# Patient Record
Sex: MALE | Race: WHITE | NOT HISPANIC OR LATINO | Employment: OTHER | URBAN - METROPOLITAN AREA
[De-identification: names, ages, dates, MRNs, and addresses within clinical notes are randomized per-mention and may not be internally consistent; named-entity substitution may affect disease eponyms.]

---

## 2017-01-31 ENCOUNTER — GENERIC CONVERSION - ENCOUNTER (OUTPATIENT)
Dept: CARDIAC SURGERY | Facility: CLINIC | Age: 77
End: 2017-01-31

## 2017-12-22 ENCOUNTER — HOSPITAL ENCOUNTER (OUTPATIENT)
Facility: HOSPITAL | Age: 77
Setting detail: OBSERVATION
Discharge: HOME | End: 2017-12-23
Attending: EMERGENCY MEDICINE | Admitting: HOSPITALIST
Payer: COMMERCIAL

## 2017-12-22 ENCOUNTER — APPOINTMENT (EMERGENCY)
Dept: RADIOLOGY | Facility: HOSPITAL | Age: 77
End: 2017-12-22
Payer: COMMERCIAL

## 2017-12-22 DIAGNOSIS — J18.9 PNEUMONIA: Primary | ICD-10-CM

## 2017-12-22 DIAGNOSIS — M54.9 BACK PAIN: ICD-10-CM

## 2017-12-22 PROBLEM — R06.02 SOB (SHORTNESS OF BREATH): Status: ACTIVE | Noted: 2017-12-22

## 2017-12-22 PROBLEM — J20.9 ACUTE TRACHEOBRONCHITIS: Status: ACTIVE | Noted: 2017-12-22

## 2017-12-22 PROBLEM — N40.0 BPH (BENIGN PROSTATIC HYPERPLASIA): Chronic | Status: ACTIVE | Noted: 2017-12-22

## 2017-12-22 PROBLEM — K31.819 GAVE (GASTRIC ANTRAL VASCULAR ECTASIA): Chronic | Status: ACTIVE | Noted: 2017-12-22

## 2017-12-22 PROBLEM — M48.05 SPINAL STENOSIS OF THORACOLUMBAR REGION: Chronic | Status: ACTIVE | Noted: 2017-12-22

## 2017-12-22 LAB
ANION GAP SERPL CALCULATED.3IONS-SCNC: 10 MMOL/L (ref 4–13)
APTT PPP: 35 SECONDS (ref 23–35)
ATRIAL RATE: 71 BPM
BACTERIA UR QL AUTO: ABNORMAL /HPF
BASOPHILS # BLD AUTO: 0.01 THOUSANDS/ΜL (ref 0–0.1)
BASOPHILS NFR BLD AUTO: 0 % (ref 0–1)
BILIRUB UR QL STRIP: NEGATIVE
BUN SERPL-MCNC: 24 MG/DL (ref 5–25)
CALCIUM SERPL-MCNC: 10.3 MG/DL (ref 8.3–10.1)
CHLORIDE SERPL-SCNC: 103 MMOL/L (ref 100–108)
CLARITY UR: CLEAR
CO2 SERPL-SCNC: 26 MMOL/L (ref 21–32)
COLOR UR: YELLOW
CREAT SERPL-MCNC: 1.19 MG/DL (ref 0.6–1.3)
EOSINOPHIL # BLD AUTO: 0.02 THOUSAND/ΜL (ref 0–0.61)
EOSINOPHIL NFR BLD AUTO: 0 % (ref 0–6)
ERYTHROCYTE [DISTWIDTH] IN BLOOD BY AUTOMATED COUNT: 15.8 % (ref 11.6–15.1)
GFR SERPL CREATININE-BSD FRML MDRD: 59 ML/MIN/1.73SQ M
GLUCOSE SERPL-MCNC: 138 MG/DL (ref 65–140)
GLUCOSE UR STRIP-MCNC: NEGATIVE MG/DL
HCT VFR BLD AUTO: 31.1 % (ref 36.5–49.3)
HGB BLD-MCNC: 9.7 G/DL (ref 12–17)
HGB UR QL STRIP.AUTO: ABNORMAL
INR PPP: 1.08 (ref 0.86–1.16)
KETONES UR STRIP-MCNC: NEGATIVE MG/DL
LEUKOCYTE ESTERASE UR QL STRIP: NEGATIVE
LYMPHOCYTES # BLD AUTO: 0.79 THOUSANDS/ΜL (ref 0.6–4.47)
LYMPHOCYTES NFR BLD AUTO: 6 % (ref 14–44)
MCH RBC QN AUTO: 25.5 PG (ref 26.8–34.3)
MCHC RBC AUTO-ENTMCNC: 31.2 G/DL (ref 31.4–37.4)
MCV RBC AUTO: 82 FL (ref 82–98)
MONOCYTES # BLD AUTO: 0.78 THOUSAND/ΜL (ref 0.17–1.22)
MONOCYTES NFR BLD AUTO: 6 % (ref 4–12)
NEUTROPHILS # BLD AUTO: 11.38 THOUSANDS/ΜL (ref 1.85–7.62)
NEUTS SEG NFR BLD AUTO: 88 % (ref 43–75)
NITRITE UR QL STRIP: NEGATIVE
NON-SQ EPI CELLS URNS QL MICRO: ABNORMAL /HPF
P AXIS: 75 DEGREES
PH UR STRIP.AUTO: 5.5 [PH] (ref 4.5–8)
PLATELET # BLD AUTO: 381 THOUSANDS/UL (ref 149–390)
PMV BLD AUTO: 9.1 FL (ref 8.9–12.7)
POTASSIUM SERPL-SCNC: 4 MMOL/L (ref 3.5–5.3)
PR INTERVAL: 176 MS
PROT UR STRIP-MCNC: ABNORMAL MG/DL
PROTHROMBIN TIME: 14.3 SECONDS (ref 12.1–14.4)
QRS AXIS: -9 DEGREES
QRSD INTERVAL: 98 MS
QT INTERVAL: 376 MS
QTC INTERVAL: 408 MS
RBC # BLD AUTO: 3.81 MILLION/UL (ref 3.88–5.62)
RBC #/AREA URNS AUTO: ABNORMAL /HPF
SODIUM SERPL-SCNC: 139 MMOL/L (ref 136–145)
SP GR UR STRIP.AUTO: 1.02 (ref 1–1.03)
T WAVE AXIS: 29 DEGREES
TROPONIN I SERPL-MCNC: <0.02 NG/ML
UROBILINOGEN UR QL STRIP.AUTO: 0.2 E.U./DL
VENTRICULAR RATE: 71 BPM
WBC # BLD AUTO: 12.98 THOUSAND/UL (ref 4.31–10.16)
WBC #/AREA URNS AUTO: ABNORMAL /HPF

## 2017-12-22 PROCEDURE — 99284 EMERGENCY DEPT VISIT MOD MDM: CPT

## 2017-12-22 PROCEDURE — 87040 BLOOD CULTURE FOR BACTERIA: CPT | Performed by: EMERGENCY MEDICINE

## 2017-12-22 PROCEDURE — 85025 COMPLETE CBC W/AUTO DIFF WBC: CPT | Performed by: EMERGENCY MEDICINE

## 2017-12-22 PROCEDURE — 93005 ELECTROCARDIOGRAM TRACING: CPT

## 2017-12-22 PROCEDURE — 85730 THROMBOPLASTIN TIME PARTIAL: CPT | Performed by: EMERGENCY MEDICINE

## 2017-12-22 PROCEDURE — 81001 URINALYSIS AUTO W/SCOPE: CPT | Performed by: PHYSICIAN ASSISTANT

## 2017-12-22 PROCEDURE — 85610 PROTHROMBIN TIME: CPT | Performed by: EMERGENCY MEDICINE

## 2017-12-22 PROCEDURE — 36415 COLL VENOUS BLD VENIPUNCTURE: CPT | Performed by: EMERGENCY MEDICINE

## 2017-12-22 PROCEDURE — 93005 ELECTROCARDIOGRAM TRACING: CPT | Performed by: EMERGENCY MEDICINE

## 2017-12-22 PROCEDURE — 84484 ASSAY OF TROPONIN QUANT: CPT | Performed by: EMERGENCY MEDICINE

## 2017-12-22 PROCEDURE — 80048 BASIC METABOLIC PNL TOTAL CA: CPT | Performed by: EMERGENCY MEDICINE

## 2017-12-22 RX ORDER — LIDOCAINE 50 MG/G
1 PATCH TOPICAL DAILY
Status: DISCONTINUED | OUTPATIENT
Start: 2017-12-22 | End: 2017-12-23 | Stop reason: HOSPADM

## 2017-12-22 RX ORDER — DIAZEPAM 5 MG/ML
2.5 INJECTION, SOLUTION INTRAMUSCULAR; INTRAVENOUS EVERY 6 HOURS PRN
Status: DISCONTINUED | OUTPATIENT
Start: 2017-12-22 | End: 2017-12-23 | Stop reason: HOSPADM

## 2017-12-22 RX ORDER — FERROUS SULFATE 300 MG/5ML
300 LIQUID (ML) ORAL 2 TIMES DAILY
Status: DISCONTINUED | OUTPATIENT
Start: 2017-12-22 | End: 2017-12-22 | Stop reason: SDUPTHER

## 2017-12-22 RX ORDER — LEVOTHYROXINE SODIUM 88 UG/1
88 TABLET ORAL DAILY
COMMUNITY
End: 2018-08-06 | Stop reason: DRUGHIGH

## 2017-12-22 RX ORDER — GABAPENTIN 100 MG/1
100 CAPSULE ORAL
Status: DISCONTINUED | OUTPATIENT
Start: 2017-12-22 | End: 2017-12-23 | Stop reason: HOSPADM

## 2017-12-22 RX ORDER — METHOCARBAMOL 500 MG/1
500 TABLET, FILM COATED ORAL EVERY 6 HOURS SCHEDULED
Status: DISCONTINUED | OUTPATIENT
Start: 2017-12-22 | End: 2017-12-23 | Stop reason: HOSPADM

## 2017-12-22 RX ORDER — AMLODIPINE BESYLATE 5 MG/1
5 TABLET ORAL DAILY
Status: DISCONTINUED | OUTPATIENT
Start: 2017-12-22 | End: 2017-12-23 | Stop reason: HOSPADM

## 2017-12-22 RX ORDER — AZITHROMYCIN 250 MG/1
500 TABLET, FILM COATED ORAL EVERY 24 HOURS
Status: DISCONTINUED | OUTPATIENT
Start: 2017-12-23 | End: 2017-12-23 | Stop reason: HOSPADM

## 2017-12-22 RX ORDER — TRAMADOL HYDROCHLORIDE 50 MG/1
50 TABLET ORAL EVERY 6 HOURS PRN
Status: DISCONTINUED | OUTPATIENT
Start: 2017-12-22 | End: 2017-12-23 | Stop reason: HOSPADM

## 2017-12-22 RX ORDER — LOSARTAN POTASSIUM 50 MG/1
100 TABLET ORAL DAILY
Status: DISCONTINUED | OUTPATIENT
Start: 2017-12-22 | End: 2017-12-23 | Stop reason: HOSPADM

## 2017-12-22 RX ORDER — ONDANSETRON 2 MG/ML
4 INJECTION INTRAMUSCULAR; INTRAVENOUS EVERY 6 HOURS PRN
Status: DISCONTINUED | OUTPATIENT
Start: 2017-12-22 | End: 2017-12-23 | Stop reason: HOSPADM

## 2017-12-22 RX ORDER — DOXAZOSIN MESYLATE 4 MG/1
4 TABLET ORAL
COMMUNITY
End: 2018-04-30 | Stop reason: SDUPTHER

## 2017-12-22 RX ORDER — CALCIUM CARBONATE 200(500)MG
1000 TABLET,CHEWABLE ORAL DAILY PRN
Status: DISCONTINUED | OUTPATIENT
Start: 2017-12-22 | End: 2017-12-23 | Stop reason: HOSPADM

## 2017-12-22 RX ORDER — LEVOTHYROXINE SODIUM 88 UG/1
88 TABLET ORAL
Status: DISCONTINUED | OUTPATIENT
Start: 2017-12-23 | End: 2017-12-23 | Stop reason: HOSPADM

## 2017-12-22 RX ORDER — AMLODIPINE BESYLATE 5 MG/1
5 TABLET ORAL DAILY
COMMUNITY
End: 2018-02-23 | Stop reason: ALTCHOICE

## 2017-12-22 RX ORDER — LOSARTAN POTASSIUM 100 MG/1
50 TABLET ORAL DAILY
COMMUNITY
End: 2018-06-11

## 2017-12-22 RX ORDER — FINASTERIDE 5 MG/1
5 TABLET, FILM COATED ORAL DAILY
Status: DISCONTINUED | OUTPATIENT
Start: 2017-12-22 | End: 2017-12-23 | Stop reason: HOSPADM

## 2017-12-22 RX ORDER — FERROUS SULFATE 300 MG/5ML
300 LIQUID (ML) ORAL
Status: DISCONTINUED | OUTPATIENT
Start: 2017-12-22 | End: 2017-12-23 | Stop reason: HOSPADM

## 2017-12-22 RX ORDER — FINASTERIDE 5 MG/1
5 TABLET, FILM COATED ORAL DAILY
COMMUNITY
End: 2018-04-30 | Stop reason: SDUPTHER

## 2017-12-22 RX ORDER — DOXAZOSIN MESYLATE 4 MG/1
4 TABLET ORAL
Status: DISCONTINUED | OUTPATIENT
Start: 2017-12-22 | End: 2017-12-23 | Stop reason: HOSPADM

## 2017-12-22 RX ORDER — DOCUSATE SODIUM 100 MG/1
100 CAPSULE, LIQUID FILLED ORAL 2 TIMES DAILY
Status: DISCONTINUED | OUTPATIENT
Start: 2017-12-22 | End: 2017-12-23 | Stop reason: HOSPADM

## 2017-12-22 RX ORDER — ACETAMINOPHEN 325 MG/1
975 TABLET ORAL EVERY 8 HOURS SCHEDULED
Status: DISCONTINUED | OUTPATIENT
Start: 2017-12-22 | End: 2017-12-23 | Stop reason: HOSPADM

## 2017-12-22 RX ORDER — NICOTINE 21 MG/24HR
1 PATCH, TRANSDERMAL 24 HOURS TRANSDERMAL DAILY
Status: DISCONTINUED | OUTPATIENT
Start: 2017-12-22 | End: 2017-12-23 | Stop reason: HOSPADM

## 2017-12-22 RX ADMIN — NICOTINE 1 PATCH: 21 PATCH, EXTENDED RELEASE TRANSDERMAL at 16:38

## 2017-12-22 RX ADMIN — GABAPENTIN 100 MG: 100 CAPSULE ORAL at 21:42

## 2017-12-22 RX ADMIN — DOCUSATE SODIUM 100 MG: 100 CAPSULE, LIQUID FILLED ORAL at 17:10

## 2017-12-22 RX ADMIN — MINERAL SUPPLEMENT IRON 300 MG / 5 ML STRENGTH LIQUID 100 PER BOX UNFLAVORED 300 MG: at 16:41

## 2017-12-22 RX ADMIN — ONDANSETRON 4 MG: 2 INJECTION INTRAMUSCULAR; INTRAVENOUS at 23:36

## 2017-12-22 RX ADMIN — METHOCARBAMOL 500 MG: 500 TABLET ORAL at 17:10

## 2017-12-22 RX ADMIN — ACETAMINOPHEN 975 MG: 325 TABLET, FILM COATED ORAL at 21:42

## 2017-12-22 RX ADMIN — DOXAZOSIN 4 MG: 4 TABLET ORAL at 21:41

## 2017-12-22 RX ADMIN — ACETAMINOPHEN 975 MG: 325 TABLET, FILM COATED ORAL at 16:40

## 2017-12-22 RX ADMIN — SODIUM CHLORIDE 1000 ML: 0.9 INJECTION, SOLUTION INTRAVENOUS at 13:55

## 2017-12-22 RX ADMIN — TRAMADOL HYDROCHLORIDE 50 MG: 50 TABLET, FILM COATED ORAL at 16:39

## 2017-12-22 RX ADMIN — HYDROMORPHONE HYDROCHLORIDE 1 MG: 1 INJECTION, SOLUTION INTRAMUSCULAR; INTRAVENOUS; SUBCUTANEOUS at 14:29

## 2017-12-22 RX ADMIN — CEFTRIAXONE 1000 MG: 1 INJECTION, SOLUTION INTRAVENOUS at 14:33

## 2017-12-22 RX ADMIN — LIDOCAINE 1 PATCH: 50 PATCH TOPICAL at 16:39

## 2017-12-22 RX ADMIN — FINASTERIDE 5 MG: 5 TABLET, FILM COATED ORAL at 16:41

## 2017-12-22 RX ADMIN — METHOCARBAMOL 500 MG: 500 TABLET ORAL at 23:28

## 2017-12-22 RX ADMIN — CEFTRIAXONE 1000 MG: 1 INJECTION, SOLUTION INTRAVENOUS at 15:00

## 2017-12-22 RX ADMIN — TRAMADOL HYDROCHLORIDE 50 MG: 50 TABLET, FILM COATED ORAL at 23:28

## 2017-12-22 NOTE — ASSESSMENT & PLAN NOTE
· Severe mid-lower back pain suspect musculoskeletal with significant history of spinal stenosis of L4-L5 without radiculopathy  · Follows outpatient pain management specialist Dr Logan  · Hold home medication-meloxicam avoidance of NSAIDs given history of GAVE  · Initiate pain control-aqua K pad, Lidoderm patch, Tylenol around the clock, Robaxin Q 6 scheduled, tramadol p r n  moderate pain, Valium 2 5 mg IV p r n  severe pain q 6, morphine 2 mg p r n  Breakthrough    · Continue bowel regimen-[facility] PT consult

## 2017-12-22 NOTE — ED PROVIDER NOTES
History  Chief Complaint   Patient presents with    Back Pain     Pt sent from PCP c/o back pain  Had x-ray that shows pneumonia  Also believes blood count may be low  Patient referred to the emergency department by his primary care physician for treatment of pneumonia and back pain believed secondary to the pneumonia  Patient has had back pain in the thoracic region for 3 days that worsened today  He denies trauma fall or injury  Denies fever chills cough  Denies nausea vomiting diarrhea  He has not had anything for pain  Prior to Admission Medications   Prescriptions Last Dose Informant Patient Reported? Taking? IRON PO   Yes Yes   Sig: Take 65 mg by mouth daily   amLODIPine (NORVASC) 5 mg tablet   Yes Yes   Sig: Take 5 mg by mouth daily   doxazosin (CARDURA) 4 mg tablet   Yes Yes   Sig: Take 4 mg by mouth daily at bedtime   finasteride (PROSCAR) 5 mg tablet   Yes Yes   Sig: Take 5 mg by mouth daily   levothyroxine 88 mcg tablet   Yes Yes   Sig: Take 88 mcg by mouth daily   losartan (COZAAR) 100 MG tablet   Yes Yes   Sig: Take 100 mg by mouth daily      Facility-Administered Medications: None       Past Medical History:   Diagnosis Date    Anemia     Disease of thyroid gland     Hypertension        History reviewed  No pertinent surgical history  History reviewed  No pertinent family history  I have reviewed and agree with the history as documented  Social History   Substance Use Topics    Smoking status: Current Every Day Smoker    Smokeless tobacco: Never Used    Alcohol use No        Review of Systems   Constitutional: Negative  Negative for activity change, appetite change, chills, diaphoresis, fatigue and fever  HENT: Negative  Negative for congestion, sore throat, trouble swallowing and voice change  Eyes: Negative  Negative for photophobia and visual disturbance  Respiratory: Negative    Negative for cough, chest tightness, shortness of breath, wheezing and stridor  Cardiovascular: Negative  Negative for chest pain, palpitations and leg swelling  Gastrointestinal: Negative  Negative for abdominal pain, constipation, diarrhea, rectal pain and vomiting  Endocrine: Negative  Genitourinary: Negative  Negative for dysuria, flank pain, frequency, hematuria, penile pain, penile swelling, scrotal swelling, testicular pain and urgency  Musculoskeletal: Positive for back pain  Negative for arthralgias, gait problem, joint swelling, neck pain and neck stiffness  Skin: Negative  Negative for rash and wound  Allergic/Immunologic: Negative  Neurological: Negative  Negative for dizziness, tremors, seizures, syncope, facial asymmetry, speech difficulty, weakness, light-headedness, numbness and headaches  Hematological: Negative  Does not bruise/bleed easily  Psychiatric/Behavioral: Negative  Physical Exam  ED Triage Vitals [12/22/17 1319]   Temperature Pulse Respirations Blood Pressure SpO2   97 8 °F (36 6 °C) 69 16 (!) 188/87 99 %      Temp Source Heart Rate Source Patient Position - Orthostatic VS BP Location FiO2 (%)   Oral -- -- -- --      Pain Score       Worst Possible Pain           Orthostatic Vital Signs  Vitals:    12/22/17 1319   BP: (!) 188/87   Pulse: 69       Physical Exam   Constitutional: He is oriented to person, place, and time  He appears well-developed and well-nourished  Nontoxic appearance without respiratory distress  Patient is mildly uncomfortable especially with movement  HENT:   Head: Normocephalic and atraumatic  Right Ear: External ear normal    Left Ear: External ear normal    Mouth/Throat: Oropharynx is clear and moist    Eyes: Conjunctivae and EOM are normal  Pupils are equal, round, and reactive to light  Neck: Normal range of motion  Neck supple  Cardiovascular: Normal rate, regular rhythm, normal heart sounds and intact distal pulses      Pulmonary/Chest: Effort normal and breath sounds normal  No respiratory distress  He has no wheezes  He has no rales  He exhibits no tenderness  Abdominal: Soft  Bowel sounds are normal  He exhibits no distension and no mass  There is no tenderness  There is no rebound and no guarding  No hernia  Musculoskeletal: He exhibits no edema, tenderness or deformity  Moderate parathoracic back pain which is reproducible both with palpation and movement  Range of motion diminished secondary to back pain  Neurological: He is alert and oriented to person, place, and time  He has normal reflexes  He displays normal reflexes  No cranial nerve deficit or sensory deficit  He exhibits normal muscle tone  Coordination normal    Skin: Skin is warm and dry  No rash noted  No erythema  No pallor  Psychiatric: He has a normal mood and affect  His behavior is normal  Judgment and thought content normal    Nursing note and vitals reviewed  ED Medications  Medications   sodium chloride 0 9 % bolus 1,000 mL (1,000 mL Intravenous New Bag 12/22/17 8275)   HYDROmorphone (DILAUDID) 1 mg/mL injection 1 mg (not administered)   cefTRIAXone (ROCEPHIN) IVPB (premix) 1,000 mg (not administered)       Diagnostic Studies  Results Reviewed     Procedure Component Value Units Date/Time    Troponin I [[id number]]  (Normal) Collected:  12/22/17 1348    Lab Status:  Final result Specimen:  Blood from Arm, Right Updated:  12/22/17 1423     Troponin I <0 02 ng/mL     Narrative:         [employer] Chemistry analyzer 99% cutoff is > 0 04 ng/mL in network labs    o cTnI 99% cutoff is useful only when applied to patients in the clinical setting of myocardial ischemia  o cTnI 99% cutoff should be interpreted in the context of clinical history, ECG findings and possibly cardiac imaging to establish correct diagnosis  o cTnI 99% cutoff may be suggestive but clearly not indicative of a coronary event without the clinical setting of myocardial ischemia      Protime-INR [[id number]]  (Normal) Collected:  12/22/17 3504 Lab Status:  Final result Specimen:  Blood from Arm, Right Updated:  12/22/17 1417     Protime 14 3 seconds      INR 1 08    APTT [[id number]]  (Normal) Collected:  12/22/17 1348    Lab Status:  Final result Specimen:  Blood from Arm, Right Updated:  12/22/17 1417     PTT 35 seconds     Narrative: Therapeutic Heparin Range = 60-90 seconds    Basic metabolic panel [[id number]]  (Abnormal) Collected:  12/22/17 1348    Lab Status:  Final result Specimen:  Blood from Arm, Right Updated:  12/22/17 1414     Sodium 139 mmol/L      Potassium 4 0 mmol/L      Chloride 103 mmol/L      CO2 26 mmol/L      Anion Gap 10 mmol/L      BUN 24 mg/dL      Creatinine 1 19 mg/dL      Glucose 138 mg/dL      Calcium 10 3 (H) mg/dL      eGFR 59 ml/min/1 73sq m     Narrative:         National Kidney Disease Education Program recommendations are as follows:  GFR calculation is accurate only with a steady state creatinine  Chronic Kidney disease less than 60 ml/min/1 73 sq  meters  Kidney failure less than 15 ml/min/1 73 sq  meters      Blood culture #1 [[id number]]     Lab Status:  No result Specimen:  Blood from Line, Venous     Blood culture #2 [[id number]]     Lab Status:  No result Specimen:  Blood from Line, Venous     CBC and differential [[id number]]  (Abnormal) Collected:  12/22/17 1348    Lab Status:  Final result Specimen:  Blood from Arm, Right Updated:  12/22/17 1357     WBC 12 98 (H) Thousand/uL      RBC 3 81 (L) Million/uL      Hemoglobin 9 7 (L) g/dL      Hematocrit 31 1 (L) %      MCV 82 fL      MCH 25 5 (L) pg      MCHC 31 2 (L) g/dL      RDW 15 8 (H) %      MPV 9 1 fL      Platelets 897 Thousands/uL      Neutrophils Relative 88 (H) %      Lymphocytes Relative 6 (L) %      Monocytes Relative 6 %      Eosinophils Relative 0 %      Basophils Relative 0 %      Neutrophils Absolute 11 38 (H) Thousands/µL      Lymphocytes Absolute 0 79 Thousands/µL      Monocytes Absolute 0 78 Thousand/µL      Eosinophils Absolute 0 02 Thousand/µL Basophils Absolute 0 01 Thousands/µL                  No orders to display              Procedures  ECG 12 Lead Documentation  Date/Time: 12/22/2017 2:23 PM  Performed by: Chantelle Ornelas by: Mary Golden     ECG reviewed by me, the ED Provider: yes    Patient location:  ED  Previous ECG:     Previous ECG:  Compared to current    Similarity:  Changes noted  Interpretation:     Interpretation: abnormal             Phone Contacts  ED Phone Contact    ED Course  ED Course                                MDM  CritCare Time    Disposition  Final diagnoses:   Pneumonia   Back pain     Time reflects when diagnosis was documented in both MDM as applicable and the Disposition within this note     Time User Action Codes Description Comment    12/22/2017  2:17 PM Abe Phoenix Add [J18 9] Pneumonia     12/22/2017  2:17 PM Sada Hassan Add [M54 9] Back pain       ED Disposition     ED Disposition Condition Comment    Admit  Case was discussed with Dr Oliver Andersen   and the patient's admission status was agreed to be Admission Status: observation status to the service of Dr Sandra Hamilton    None       Patient's Medications   Discharge Prescriptions    No medications on file     No discharge procedures on file      ED Provider  Electronically Signed by           Anibal Gonzales MD  12/22/17 2403 Albert Blankenship MD  12/22/17 2167

## 2017-12-22 NOTE — ASSESSMENT & PLAN NOTE
· Denies cough and sputum production  · Continue broad-spectrum antibiotic therapy X 24 hours and transitioned to p o  azithromycin

## 2017-12-22 NOTE — ASSESSMENT & PLAN NOTE
· Stable respiratory status denies cough, sputum production, fevers or chills likely acute tracheobronchitis with under diagnosis possible COPD given significant smoking history 10 cigarettes per day over 50 years  · Continue broad-spectrum antibiotic therapy X 24 hours transition to oral azithromycin to complete 5 day course  · Offered the QT patch refused  · Likely need follow-up outpatient PFTs

## 2017-12-22 NOTE — H&P
H&P- Sonali Patiño 1940, 69 y o  male MRN: [id number]    Unit/Bed#: [facility] Encounter: [id number]    Primary Care Provider: Brooke Araya MD   Date and time admitted to hospital: 12/22/2017  1:12 PM        * SOB (shortness of breath)   Assessment & Plan    · Stable respiratory status denies cough, sputum production, fevers or chills likely acute tracheobronchitis with under diagnosis possible COPD given significant smoking history 10 cigarettes per day over 50 years  · Continue broad-spectrum antibiotic therapy X 24 hours transition to oral azithromycin to complete 5 day course  · Offered the QT patch refused  · Likely need follow-up outpatient PFTs        Acute tracheobronchitis   Assessment & Plan    · Denies cough and sputum production  · Continue broad-spectrum antibiotic therapy X 24 hours and transitioned to p o  azithromycin        Spinal stenosis of thoracolumbar region   Assessment & Plan    · Severe mid-lower back pain suspect musculoskeletal with significant history of spinal stenosis of L4-L5 without radiculopathy  · Follows outpatient pain management specialist Dr Logan  · Hold home medication-meloxicam avoidance of NSAIDs given history of GAVE  · Initiate pain control-aqua K pad, Lidoderm patch, Tylenol around the clock, Robaxin Q 6 scheduled, tramadol p r n  moderate pain, Valium 2 5 mg IV p r n  severe pain q 6, morphine 2 mg p r n  Breakthrough  · Continue bowel regimen-[facility] PT consult        Disease of thyroid gland   Assessment & Plan    · TSH fair  · Continue home medication-levothyroxine        GAVE (gastric antral vascular ectasia)   Assessment & Plan    · Stable hemoglobin/hematocrit 9 7/31 1 denies hematochezia, melena, hemoptysis or hematemesis recent EGD with Dr Anne Buckley gastroenterology on 12/04/2017   · Initiate Pepcid given opioid use  · Obtain iron panel  · Will need close follow-up with Dr Jimmie Charles of [facility] gastroenterology outpatient           Hypertension Assessment & Plan    · Stable BP  · Continue home medication-losartan, Cardura, amlodipine        BPH (benign prostatic hyperplasia)   Assessment & Plan    · Stable  · Continue home medication-Cardura, Proscar          VTE Prophylaxis: Enoxaparin (Lovenox)  / sequential compression device   Code Status:  Full code-discussed with patient patient's wife at the bedside  POLST: POLST information provided but not completed  Discussion with family:  Discussed with patient and patient's wife in regards to appropriate pain control along with suspected underlying lung disorder possible COPD with outpatient follow-up with PFT and initiation of broad-spectrum antibiotic therapy 24 hours then transition to p o  antibiotics    Anticipated Length of Stay:  Patient will be admitted on an Observation basis with an anticipated length of stay of  less than 2 midnights  Justification for Hospital Stay:  Shortness of breath secondary to acute tracheobronchitis with acute back pain secondary to spinal stenosis of thoracic lumbar region    Total Time for Visit, including Counseling / Coordination of Care: 1 hour  Greater than 50% of this total time spent on direct patient counseling and coordination of care  Chief Complaint:   "I could not get out of bed "    History of Present Illness:    Sonali Patiño is a 69 y o  male past medical history of hypothyroidism, hypertension, BPH, spinal stenosis of thoracic lumbar region who presents with shortness breath and mid back pain with difficulty ambulating X 1 day  Patient and patient's wife admitted to a sudden onset of mid back pain worse with ambulation and rotation without numbness or tingling in lower extremities, weakness of lower extremities patient had difficulty ambulating around home yesterday and was unable to get out of bed this morning    Patient's sought medical treatment at PCP who recommended chest x-ray after revealing increased lethargy and suspected pneumonia chest x-ray out patient on 12/22/2017 revealed a suspected consolidation of the left upper lobe lingular suspicious for COPD versus pneumonia and was directed by PCP to seek medical attention through the emergency department for further evaluation  Patient denied sputum production, cough, abdominal pain, nausea vomiting or diarrhea, fevers or chills, URI symptoms  Patient admits to significant smoking history roughly 10 cigarettes for over 50 years without history of lung disorder  Patient admits to shortness of breath with exertion and was likely secondary to 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 performed on 12/04/2017 with [facility] gastroenterologist Dr Jozef Ernst  Patient denies hematochezia, melena, hemoptysis, hematemesis  In the ED, patient was found to be hemodynamically stable a mild elevation leukocytosis 12 98 with a stable H&H 9 7/31 1 it was initiated on ceftriaxone given 1 mg of Dilaudid IV and admitted to medical-surgical floor as an observation for shortness of breath secondary to acute tracheobronchitis and adequate pain control secondary to spinal stenosis of the thoracic and lumbar region  Review of Systems:    Review of Systems   Constitutional: Positive for activity change and fatigue  Negative for appetite change, chills, diaphoresis, fever and unexpected weight change  HENT: Negative for congestion, postnasal drip, rhinorrhea, sinus pain, sinus pressure, sore throat, tinnitus, trouble swallowing and voice change  Eyes: Negative for photophobia, redness and visual disturbance  Respiratory: Positive for shortness of breath  Negative for apnea, cough, choking, chest tightness, wheezing and stridor  Cardiovascular: Negative for chest pain, palpitations and leg swelling  Gastrointestinal: Negative for abdominal distention, abdominal pain, constipation, diarrhea, nausea and vomiting  Endocrine: Negative for polydipsia, polyphagia and polyuria     Genitourinary: Negative for difficulty urinating, dysuria, hematuria and urgency  Musculoskeletal: Positive for back pain and gait problem  Negative for arthralgias, joint swelling, myalgias, neck pain and neck stiffness  Skin: Negative for color change and rash  Neurological: Positive for weakness  Negative for dizziness, tremors, seizures, syncope, facial asymmetry, speech difficulty, light-headedness, numbness and headaches  Past Medical and Surgical History:     Past Medical History:   Diagnosis Date    Anemia     Disease of thyroid gland     Hypertension        History reviewed  No pertinent surgical history  Meds/Allergies:    Prior to Admission medications    Medication Sig Start Date End Date Taking? Authorizing Provider   amLODIPine (NORVASC) 5 mg tablet Take 5 mg by mouth daily   Yes Historical Provider, MD   doxazosin (CARDURA) 4 mg tablet Take 4 mg by mouth daily at bedtime   Yes Historical Provider, MD   finasteride (PROSCAR) 5 mg tablet Take 5 mg by mouth daily   Yes Historical Provider, MD   IRON PO Take 65 mg by mouth daily   Yes Historical Provider, MD   levothyroxine 88 mcg tablet Take 88 mcg by mouth daily   Yes Historical Provider, MD   losartan (COZAAR) 100 MG tablet Take 100 mg by mouth daily   Yes Historical Provider, MD     I have reviewed home medications with patient family member  Allergies: No Known Allergies    Social History:     Marital Status: /Civil Union   Occupation:  Retired  Patient Pre-hospital Living Situation:  Lives with wife at home  Patient Pre-hospital Level of Mobility:   Ambulates without rolling walker cane or assistive  Patient Pre-hospital Diet Restrictions:  None  Substance Use History:   History   Alcohol Use No     History   Smoking Status    Current Every Day Smoker   Smokeless Tobacco    Never Used     History   Drug Use No       Family History:    History reviewed  No pertinent family history      Physical Exam:     Vitals:   Blood Pressure: (!) 188/87 (12/22/17 1319)  Pulse: 69 (12/22/17 1319)  Temperature: 97 8 °F (36 6 °C) (12/22/17 1319)  Temp Source: Oral (12/22/17 1319)  Respirations: 16 (12/22/17 1319)  Weight - Scale: 84 1 kg (185 lb 6 5 oz) (12/22/17 1319)  SpO2: 99 % (12/22/17 1319)    Physical Exam   Constitutional: He is oriented to person, place, and time  He appears well-developed  No distress  Lying somewhat uncomfortable in bed in no acute distress alert oriented to person place and time   HENT:   Head: Normocephalic and atraumatic  Mouth/Throat: Oropharynx is clear and moist  No oropharyngeal exudate  Eyes: Conjunctivae and EOM are normal  Pupils are equal, round, and reactive to light  Right eye exhibits no discharge  Left eye exhibits no discharge  No scleral icterus  Neck: Normal range of motion  Neck supple  No JVD present  No tracheal deviation present  No thyromegaly present  Cardiovascular: Normal rate, regular rhythm and intact distal pulses  Exam reveals no gallop and no friction rub  No murmur heard  DP pulses present bilaterally, no bilateral lower extremity edema   Pulmonary/Chest: Effort normal and breath sounds normal  No respiratory distress  He has no wheezes  He has no rales  He exhibits no tenderness  Slightly diminished breath sounds bilaterally   Abdominal: Soft  Bowel sounds are normal  He exhibits no distension and no mass  There is no tenderness  There is no rebound and no guarding  Musculoskeletal: He exhibits no edema, tenderness or deformity  Arms:  Neurological: He is alert and oriented to person, place, and time  He has normal reflexes  He displays normal reflexes  No cranial nerve deficit  He exhibits normal muscle tone  Coordination normal    5/5 upper and lower extremity strength, finger  5/5, sensation intact bilaterally upper and lower extremity   Skin: Skin is warm and dry  No rash noted  He is not diaphoretic  No erythema           Additional Data:     Lab Results: I have personally reviewed pertinent reports  Results from last 7 days  Lab Units 12/22/17  1348   WBC Thousand/uL 12 98*   HEMOGLOBIN g/dL 9 7*   HEMATOCRIT % 31 1*   PLATELETS Thousands/uL 381   NEUTROS PCT % 88*   LYMPHS PCT % 6*   MONOS PCT % 6   EOS PCT % 0       Results from last 7 days  Lab Units 12/22/17  1348   SODIUM mmol/L 139   POTASSIUM mmol/L 4 0   CHLORIDE mmol/L 103   CO2 mmol/L 26   BUN mg/dL 24   CREATININE mg/dL 1 19   CALCIUM mg/dL 10 3*   GLUCOSE RANDOM mg/dL 138       Results from last 7 days  Lab Units 12/22/17  1348   INR  1 08       Imaging: I have personally reviewed pertinent films in PACS    No orders to display       EKG, Pathology, and Other Studies Reviewed on Admission:   · EKG:  Normal sinus rhythm without ST elevation or depression HR 71  · Chest x-ray outpatient 12/22/2017-patchy density of lingular left upper lobe uncertain if chronic or acute suspect COPD versus pneumonia    Allscripts / Epic Records Reviewed: Yes     ** Please Note: This note has been constructed using a voice recognition system   **

## 2017-12-22 NOTE — ASSESSMENT & PLAN NOTE
· Stable hemoglobin/hematocrit 9 7/31 1 denies hematochezia, melena, hemoptysis or hematemesis recent EGD with Dr Ralph Townsends gastroenterology on 12/04/2017   · Initiate Pepcid given opioid use  · Obtain iron panel  · Will need close follow-up with Dr Genaro Fatima of  gastroenterology outpatient

## 2017-12-23 VITALS
SYSTOLIC BLOOD PRESSURE: 125 MMHG | RESPIRATION RATE: 18 BRPM | HEIGHT: 67 IN | TEMPERATURE: 98.4 F | WEIGHT: 186.51 LBS | OXYGEN SATURATION: 95 % | BODY MASS INDEX: 29.27 KG/M2 | HEART RATE: 86 BPM | DIASTOLIC BLOOD PRESSURE: 61 MMHG

## 2017-12-23 LAB
ANION GAP SERPL CALCULATED.3IONS-SCNC: 8 MMOL/L (ref 4–13)
BUN SERPL-MCNC: 20 MG/DL (ref 5–25)
CALCIUM SERPL-MCNC: 9.4 MG/DL (ref 8.3–10.1)
CHLORIDE SERPL-SCNC: 104 MMOL/L (ref 100–108)
CO2 SERPL-SCNC: 26 MMOL/L (ref 21–32)
CREAT SERPL-MCNC: 1.22 MG/DL (ref 0.6–1.3)
ERYTHROCYTE [DISTWIDTH] IN BLOOD BY AUTOMATED COUNT: 15.9 % (ref 11.6–15.1)
FERRITIN SERPL-MCNC: 660 NG/ML (ref 8–388)
GFR SERPL CREATININE-BSD FRML MDRD: 57 ML/MIN/1.73SQ M
GLUCOSE SERPL-MCNC: 150 MG/DL (ref 65–140)
HCT VFR BLD AUTO: 29 % (ref 36.5–49.3)
HGB BLD-MCNC: 8.7 G/DL (ref 12–17)
IRON SATN MFR SERPL: 8 %
IRON SERPL-MCNC: 16 UG/DL (ref 65–175)
MCH RBC QN AUTO: 24.6 PG (ref 26.8–34.3)
MCHC RBC AUTO-ENTMCNC: 30 G/DL (ref 31.4–37.4)
MCV RBC AUTO: 82 FL (ref 82–98)
PLATELET # BLD AUTO: 336 THOUSANDS/UL (ref 149–390)
PMV BLD AUTO: 9.2 FL (ref 8.9–12.7)
POTASSIUM SERPL-SCNC: 4.1 MMOL/L (ref 3.5–5.3)
RBC # BLD AUTO: 3.53 MILLION/UL (ref 3.88–5.62)
SODIUM SERPL-SCNC: 138 MMOL/L (ref 136–145)
TIBC SERPL-MCNC: 200 UG/DL (ref 250–450)
WBC # BLD AUTO: 11.65 THOUSAND/UL (ref 4.31–10.16)

## 2017-12-23 PROCEDURE — 83550 IRON BINDING TEST: CPT | Performed by: PHYSICIAN ASSISTANT

## 2017-12-23 PROCEDURE — 83540 ASSAY OF IRON: CPT | Performed by: PHYSICIAN ASSISTANT

## 2017-12-23 PROCEDURE — 82728 ASSAY OF FERRITIN: CPT | Performed by: PHYSICIAN ASSISTANT

## 2017-12-23 PROCEDURE — 80048 BASIC METABOLIC PNL TOTAL CA: CPT | Performed by: PHYSICIAN ASSISTANT

## 2017-12-23 PROCEDURE — 85027 COMPLETE CBC AUTOMATED: CPT | Performed by: PHYSICIAN ASSISTANT

## 2017-12-23 RX ORDER — METHOCARBAMOL 500 MG/1
500 TABLET, FILM COATED ORAL EVERY 8 HOURS PRN
Qty: 30 TABLET | Refills: 0 | Status: ON HOLD | OUTPATIENT
Start: 2017-12-23 | End: 2018-01-13 | Stop reason: ALTCHOICE

## 2017-12-23 RX ORDER — AZITHROMYCIN 500 MG/1
500 TABLET, FILM COATED ORAL EVERY 24 HOURS
Qty: 5 TABLET | Refills: 0 | Status: SHIPPED | OUTPATIENT
Start: 2017-12-24 | End: 2017-12-29

## 2017-12-23 RX ORDER — TRAMADOL HYDROCHLORIDE 50 MG/1
50 TABLET ORAL EVERY 6 HOURS PRN
Qty: 10 TABLET | Refills: 0 | Status: SHIPPED | OUTPATIENT
Start: 2017-12-23 | End: 2018-01-02

## 2017-12-23 RX ORDER — ACETAMINOPHEN 325 MG/1
975 TABLET ORAL EVERY 8 HOURS SCHEDULED
Qty: 30 TABLET | Refills: 0 | Status: SHIPPED | OUTPATIENT
Start: 2017-12-23 | End: 2018-01-02

## 2017-12-23 RX ADMIN — LIDOCAINE 1 PATCH: 50 PATCH TOPICAL at 08:27

## 2017-12-23 RX ADMIN — TRAMADOL HYDROCHLORIDE 50 MG: 50 TABLET, FILM COATED ORAL at 08:27

## 2017-12-23 RX ADMIN — AZITHROMYCIN 500 MG: 250 TABLET, FILM COATED ORAL at 05:25

## 2017-12-23 RX ADMIN — METHOCARBAMOL 500 MG: 500 TABLET ORAL at 05:25

## 2017-12-23 RX ADMIN — LOSARTAN POTASSIUM 100 MG: 50 TABLET, FILM COATED ORAL at 08:27

## 2017-12-23 RX ADMIN — ENOXAPARIN SODIUM 40 MG: 40 INJECTION SUBCUTANEOUS at 08:27

## 2017-12-23 RX ADMIN — AMLODIPINE BESYLATE 5 MG: 5 TABLET ORAL at 08:27

## 2017-12-23 RX ADMIN — ACETAMINOPHEN 975 MG: 325 TABLET, FILM COATED ORAL at 05:25

## 2017-12-23 RX ADMIN — MINERAL SUPPLEMENT IRON 300 MG / 5 ML STRENGTH LIQUID 100 PER BOX UNFLAVORED 300 MG: at 05:25

## 2017-12-23 RX ADMIN — FINASTERIDE 5 MG: 5 TABLET, FILM COATED ORAL at 08:27

## 2017-12-23 RX ADMIN — LEVOTHYROXINE SODIUM 88 MCG: 88 TABLET ORAL at 05:25

## 2017-12-23 RX ADMIN — DOCUSATE SODIUM 100 MG: 100 CAPSULE, LIQUID FILLED ORAL at 08:28

## 2017-12-23 NOTE — ASSESSMENT & PLAN NOTE
· Assessment: Pain under control now  No neurologic symptoms    · Plan: Send home with Tylenol scheduled for 10 days  Robaxin as needed  Tramadol as needed   Avoid NSAIDs, advice given

## 2017-12-23 NOTE — ASSESSMENT & PLAN NOTE
· Assessment: Secondary to anemia and back pain  Anemia dropped from 9 7 to 8 7 likely secondary to blood draws and dilution from IV bolus in ED  No active signs of bleeding  Denies SOB, wheezing, cough now  ·    · Plan: Stable for discharge  Send home with analgesia   Course of azithromycin to cover for possible bronchitis

## 2017-12-23 NOTE — DISCHARGE SUMMARY
[address] [facility]  Discharge- Dawood Holley 1940, 69 y o  male MRN: [id number]    Unit/Bed#: -01 Encounter: [id number]    Primary Care Provider: Elizabeth Martinez MD   Date and time admitted to hospital: 12/22/2017  1:12 PM        * SOB (shortness of breath)   Assessment & Plan    · Assessment: Secondary to anemia and back pain  Anemia dropped from 9 7 to 8 7 likely secondary to blood draws and dilution from IV bolus in ED  No active signs of bleeding  Denies SOB, wheezing, cough now  ·    · Plan: Stable for discharge  Send home with analgesia  Course of azithromycin to cover for possible bronchitis        Acute tracheobronchitis   Assessment & Plan    · Assessment: Continues to deny any respiratory symptoms  Lungs CTA B/L    · Plan: Course of Azithromycin as above        GAVE (gastric antral vascular ectasia)   Assessment & Plan    · Assessment: No signs of active bleeding  Hgb 8 7    · Plan: Continue iron supplementation, outpatient GI follow up  Offered hematology consultation for possible IV venofer infusions outpatient, but patient refused         Hypertension   Assessment & Plan    · Assessment: BP stable     · Plan: Continue home medical management        Spinal stenosis of thoracolumbar region   Assessment & Plan    · Assessment: Pain under control now  No neurologic symptoms    · Plan: Send home with Tylenol scheduled for 10 days  Robaxin as needed  Tramadol as needed  Avoid NSAIDs, advice given                Consultations During Hospital Stay:  · None    Procedures Performed:     · None    Significant Findings / Test Results:     · None    Incidental Findings:   · None     Test Results Pending at Discharge (will require follow up):    · None     Outpatient Tests Requested:  · None    Complications:  None    Reason for Admission: SOB, back pain    Hospital Course:     Dawood Holley is a 69 y o  male patient who originally presented to the hospital on 12/22/2017 due to intractable back pain and suspected pneumonia seen on outpatient CXR  Patient was mainly complaining of back pain with little respiratory symptoms  He was admitted for pain control and he improved with bed rest and analgesia  He failed to develop any further signs of infection so he was stable for discharge home with oral analgesia and azithromycin to cover for suspect bronchitis  Please see above list of diagnoses and related plan for additional information  Condition at Discharge: stable     Discharge Day Visit / Exam:     Subjective:  Patient feels much improved  Denies any back pain  Denies numbness  Denies SOB  Denies fevers or chills  Vitals: Blood Pressure: 125/61 (12/23/17 0700)  Pulse: 86 (12/23/17 0700)  Temperature: 98 4 °F (36 9 °C) (12/23/17 0700)  Temp Source: Oral (12/22/17 2328)  Respirations: 18 (12/23/17 0700)  Height: 5' 7" (170 2 cm) (12/22/17 1517)  Weight - Scale: 84 6 kg (186 lb 8 2 oz) (12/22/17 1517)  SpO2: 95 % (12/23/17 0700)  Exam:   Physical Exam   Constitutional: He is oriented to person, place, and time  Vital signs are normal  He appears well-developed and well-nourished  Non-toxic appearance  No distress  HENT:   Head: Normocephalic and atraumatic  Eyes: Conjunctivae and EOM are normal  Pupils are equal, round, and reactive to light  Neck: Neck supple  Cardiovascular: Normal rate, regular rhythm, S1 normal, S2 normal, normal heart sounds and intact distal pulses  Exam reveals no S3 and no S4  No murmur heard  Pulmonary/Chest: Effort normal and breath sounds normal  No accessory muscle usage  No respiratory distress  He has no decreased breath sounds  He has no wheezes  He has no rhonchi  He has no rales  He exhibits no tenderness  Abdominal: Soft  Bowel sounds are normal  He exhibits no distension and no mass  There is no tenderness  There is no rigidity, no rebound and no guarding  Neurological: He is alert and oriented to person, place, and time     Skin: Skin is warm and dry      Discussion with Family: Discussed with wife at bedside     Discharge instructions/Information to patient and family:   See after visit summary for information provided to patient and family  Provisions for Follow-Up Care:  See after visit summary for information related to follow-up care and any pertinent home health orders  Disposition:     Home    For Discharges to [facility]:   · Not Applicable to this Patient - Not Applicable to this Patient    Planned Readmission: None     Discharge Statement:  I spent 45 minutes discharging the patient  This time was spent on the day of discharge  I had direct contact with the patient on the day of discharge  Greater than 50% of the total time was spent examining patient, answering all patient questions, arranging and discussing plan of care with patient as well as directly providing post-discharge instructions  Additional time then spent on discharge activities  Discharge Medications:  See after visit summary for reconciled discharge medications provided to patient and family        ** Please Note: This note has been constructed using a voice recognition system **

## 2017-12-23 NOTE — ASSESSMENT & PLAN NOTE
· Assessment: No signs of active bleeding  Hgb 8 7    · Plan: Continue iron supplementation, outpatient GI follow up   Offered hematology consultation for possible IV venofer infusions outpatient, but patient refused

## 2017-12-23 NOTE — CASE MANAGEMENT
Initial Clinical Review    Admission: Date/Time/Statement: 12/22/2017 @  14:18    Orders Placed This Encounter   Procedures    Place in Observation (expected length of stay for this patient is less than two midnights)     Standing Status:   Standing     Number of Occurrences:   1     Order Specific Question:   Admitting Physician     Answer:   Wilmer Corey     Order Specific Question:   Level of Care     Answer:   Med Surg [16]         ED: Date/Time/Mode of Arrival:   ED Arrival Information     Expected Arrival Acuity Means of Arrival Escorted By Service Admission Type    - 12/22/2017 13:12 Urgent Walk-In Family Member General Medicine Urgent    Arrival Complaint    Weakness          Chief Complaint:   Chief Complaint   Patient presents with    Back Pain     Pt sent from PCP c/o back pain  Had x-ray that shows pneumonia  Also believes blood count may be low  History of Illness:  69 y o  male past medical history of hypothyroidism, hypertension, BPH, spinal stenosis of thoracic lumbar region who presents with shortness breath and mid back pain with difficulty ambulating X 1 day  Patient and patient's wife admitted to a sudden onset of mid back pain worse with ambulation and rotation without numbness or tingling in lower extremities, weakness of lower extremities patient had difficulty ambulating around home yesterday and was unable to get out of bed this morning  Patient's sought medical treatment at PCP who recommended chest x-ray after revealing increased lethargy and suspected pneumonia chest x-ray out patient on 12/22/2017 revealed a suspected consolidation of the left upper lobe lingular suspicious for COPD versus pneumonia and was directed by PCP to seek medical attention through the emergency department for further evaluation  Patient denied sputum production, cough, abdominal pain, nausea vomiting or diarrhea, fevers or chills, URI symptoms    Patient admits to significant smoking history roughly 10 cigarettes for over 50 years without history of lung disorder  Patient admits to shortness of breath with exertion and was likely secondary to 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 performed on 12/04/2017 with [facility] gastroenterologist Dr eGe Leon  Patient denies hematochezia, melena, hemoptysis, hematemesis        ED Vital Signs:   ED Triage Vitals   Temperature Pulse Respirations Blood Pressure SpO2   12/22/17 1319 12/22/17 1319 12/22/17 1319 12/22/17 1319 12/22/17 1319   97 8 °F (36 6 °C) 69 16 (!) 188/87 99 %      Temp Source Heart Rate Source Patient Position - Orthostatic VS BP Location FiO2 (%)   12/22/17 1319 -- 12/22/17 1517 12/22/17 1517 --   Oral  Lying Left arm       Pain Score       12/22/17 1319       Worst Possible Pain        Wt Readings from Last 1 Encounters:   12/22/17 84 6 kg (186 lb 8 2 oz)     Abnormal Labs/Diagnostic Test Results: WBC 12 98, H/H 9 7/31 1, Neutros PCT 88    ED Treatment:   Medication Administration from 12/22/2017 1312 to 12/22/2017 1513       Date/Time Order Dose Route Action Action by Comments     12/22/2017 1355 sodium chloride 0 9 % bolus 1,000 mL 1,000 mL Intravenous [address] [facility]      12/22/2017 1429 HYDROmorphone (DILAUDID) 1 mg/mL injection 1 mg 1 mg Intravenous Given Teri Moura RN      12/22/2017 1433 cefTRIAXone (ROCEPHIN) IVPB (premix) 1,000 mg 1,000 mg Intravenous [address] Teri Moura RN      12/22/2017 1500 cefTRIAXone (ROCEPHIN) IVPB (premix) 1,000 mg 1,000 mg Intravenous Norberto Herrera RN         Physical Exam   Constitutional: He is oriented to person, place, and time  He appears well-developed and well-nourished  Nontoxic appearance without respiratory distress  Patient is mildly uncomfortable especially with movement  Cardiovascular: Normal rate, regular rhythm, normal heart sounds and intact distal pulses  Pulmonary/Chest: Effort normal and breath sounds normal  No respiratory distress   He has no wheezes  He has no rales  He exhibits no tenderness  Musculoskeletal: He exhibits no edema, tenderness or deformity  Moderate parathoracic back pain which is reproducible both with palpation and movement  Range of motion diminished secondary to back pain  Past Medical/Surgical History: Active Ambulatory Problems     Diagnosis Date Noted    No Active Ambulatory Problems     Resolved Ambulatory Problems     Diagnosis Date Noted    No Resolved Ambulatory Problems     Past Medical History:   Diagnosis Date    Anemia     Disease of thyroid gland     Hypertension        Admitting Diagnosis: Back pain [M54 9]  Pneumonia [J18 9]  Weakness [R53 1]    Age/Sex: 69 y o  male    Assessment/Plan:     * SOB (shortness of breath)   Assessment & Plan     · Stable respiratory status denies cough, sputum production, fevers or chills likely acute tracheobronchitis with under diagnosis possible COPD given significant smoking history 10 cigarettes per day over 50 years  ? Continue broad-spectrum antibiotic therapy X 24 hours transition to oral azithromycin to complete 5 day course  ? Offered the QT patch refused  ? Likely need follow-up outpatient PFTs       Acute tracheobronchitis   Assessment & Plan     · Denies cough and sputum production  ? Continue broad-spectrum antibiotic therapy X 24 hours and transitioned to p o  azithromycin       Spinal stenosis of thoracolumbar region   Assessment & Plan     · Severe mid-lower back pain suspect musculoskeletal with significant history of spinal stenosis of L4-L5 without radiculopathy  ? Follows outpatient pain management specialist Jose Gunn  ? Hold home medication-meloxicam avoidance of NSAIDs given history of GAVE  ? Initiate pain control-aqua K pad, Lidoderm patch, Tylenol around the clock, Robaxin Q 6 scheduled, tramadol p r n  moderate pain, Valium 2 5 mg IV p r n  severe pain q 6, morphine 2 mg p r n  Breakthrough    ? Continue bowel regimen-[facility] PT consult     Disease of thyroid gland   Assessment & Plan     · TSH fair  ? Continue home medication-levothyroxine       GAVE (gastric antral vascular ectasia)   Assessment & Plan     · Stable hemoglobin/hematocrit 9 7/31 1 denies hematochezia, melena, hemoptysis or hematemesis recent EGD with Dr Catracho Alfonso gastroenterology on 12/04/2017   ? Initiate Pepcid given opioid use  ? Obtain iron panel  ? Will need close follow-up with Dr Nany Ravi of [facility] gastroenterology outpatient                     Hypertension   Assessment & Plan     · Stable BP  ? Continue home medication-losartan, Cardura, amlodipine       BPH (benign prostatic hyperplasia)   Assessment & Plan     · Stable  ? Continue home medication-Cardura, Proscar          VTE Prophylaxis: Enoxaparin (Lovenox)  / sequential compression device   Code Status:  Full code-discussed with patient patient's wife at the bedside  POLST: POLST information provided but not completed  Discussion with family:  Discussed with patient and patient's wife in regards to appropriate pain control along with suspected underlying lung disorder possible COPD with outpatient follow-up with PFT and initiation of broad-spectrum antibiotic therapy 24 hours then transition to p o  antibiotics     Anticipated Length of Stay:  Patient will be admitted on an Observation basis with an anticipated length of stay of  less than 2 midnights     Justification for Hospital Stay:  Shortness of breath secondary to acute tracheobronchitis with acute back pain secondary to spinal stenosis of thoracic lumbar region       Admission Orders:  Observation/MedSurg  Bilateral Sequential Compression Device  PT Evaluation  Aqua K Pad apply 20 mins each time    Scheduled Meds:   acetaminophen 975 mg Oral Q8H [facility]   amLODIPine 5 mg Oral Daily   azithromycin 500 mg Oral Q24H   docusate sodium 100 mg Oral BID   doxazosin 4 mg Oral HS   enoxaparin 40 mg Subcutaneous Daily   ferrous sulfate 300 mg Oral BID AC finasteride 5 mg Oral Daily   gabapentin 100 mg Oral HS   levothyroxine 88 mcg Oral Early Morning   lidocaine 1 patch Transdermal Daily   losartan 100 mg Oral Daily   methocarbamol 500 mg Oral Q6H [address]   nicotine 1 patch Transdermal Daily     IV Zofran 4 mg x 1 thus far  Ultram 50 mg po x 3 thus far

## 2017-12-23 NOTE — ASSESSMENT & PLAN NOTE
· Assessment: Continues to deny any respiratory symptoms   Lungs CTA B/L    · Plan: Course of Azithromycin as above

## 2017-12-27 LAB
BACTERIA BLD CULT: NORMAL
BACTERIA BLD CULT: NORMAL

## 2018-01-08 ENCOUNTER — GENERIC CONVERSION - ENCOUNTER (OUTPATIENT)
Dept: OTHER | Facility: OTHER | Age: 78
End: 2018-01-08

## 2018-01-08 ENCOUNTER — TRANSCRIBE ORDERS (OUTPATIENT)
Dept: ADMINISTRATIVE | Facility: HOSPITAL | Age: 78
End: 2018-01-08

## 2018-01-08 ENCOUNTER — HOSPITAL ENCOUNTER (OUTPATIENT)
Dept: RADIOLOGY | Facility: MEDICAL CENTER | Age: 78
Discharge: HOME | End: 2018-01-08
Payer: COMMERCIAL

## 2018-01-08 ENCOUNTER — APPOINTMENT (OUTPATIENT)
Dept: RADIOLOGY | Facility: MEDICAL CENTER | Age: 78
End: 2018-01-08
Payer: COMMERCIAL

## 2018-01-08 DIAGNOSIS — J18.9 PNEUMONIA DUE TO INFECTIOUS ORGANISM, UNSPECIFIED LATERALITY, UNSPECIFIED PART OF LUNG: ICD-10-CM

## 2018-01-08 DIAGNOSIS — M54.40 LOW BACK PAIN WITH SCIATICA, SCIATICA LATERALITY UNSPECIFIED, UNSPECIFIED BACK PAIN LATERALITY, UNSPECIFIED CHRONICITY: ICD-10-CM

## 2018-01-08 DIAGNOSIS — C34.90 MALIGNANT NEOPLASM OF LUNG, UNSPECIFIED LATERALITY, UNSPECIFIED PART OF LUNG (HCC): Primary | ICD-10-CM

## 2018-01-08 DIAGNOSIS — M54.40 LOW BACK PAIN WITH SCIATICA, SCIATICA LATERALITY UNSPECIFIED, UNSPECIFIED BACK PAIN LATERALITY, UNSPECIFIED CHRONICITY: Primary | ICD-10-CM

## 2018-01-08 PROCEDURE — 72110 X-RAY EXAM L-2 SPINE 4/>VWS: CPT

## 2018-01-08 PROCEDURE — 71250 CT THORAX DX C-: CPT

## 2018-01-09 ENCOUNTER — TRANSCRIBE ORDERS (OUTPATIENT)
Dept: ADMINISTRATIVE | Facility: HOSPITAL | Age: 78
End: 2018-01-09

## 2018-01-09 DIAGNOSIS — B38.1: Primary | ICD-10-CM

## 2018-01-11 ENCOUNTER — GENERIC CONVERSION - ENCOUNTER (OUTPATIENT)
Dept: OTHER | Facility: OTHER | Age: 78
End: 2018-01-11

## 2018-01-11 ENCOUNTER — HOSPITAL ENCOUNTER (OUTPATIENT)
Dept: PULMONOLOGY | Facility: HOSPITAL | Age: 78
Discharge: HOME | End: 2018-01-14
Attending: INTERNAL MEDICINE
Payer: COMMERCIAL

## 2018-01-11 ENCOUNTER — HOSPITAL ENCOUNTER (OUTPATIENT)
Dept: RADIOLOGY | Age: 78
Discharge: HOME | End: 2018-01-11
Payer: COMMERCIAL

## 2018-01-11 VITALS — BODY MASS INDEX: 28.04 KG/M2 | WEIGHT: 179 LBS

## 2018-01-11 DIAGNOSIS — B38.1: ICD-10-CM

## 2018-01-11 DIAGNOSIS — C34.90 MALIGNANT NEOPLASM OF LUNG, UNSPECIFIED LATERALITY, UNSPECIFIED PART OF LUNG (HCC): ICD-10-CM

## 2018-01-11 LAB — GLUCOSE SERPL-MCNC: 101 MG/DL (ref 65–140)

## 2018-01-11 PROCEDURE — 94726 PLETHYSMOGRAPHY LUNG VOLUMES: CPT

## 2018-01-11 PROCEDURE — 94060 EVALUATION OF WHEEZING: CPT

## 2018-01-11 PROCEDURE — 82948 REAGENT STRIP/BLOOD GLUCOSE: CPT

## 2018-01-11 PROCEDURE — 94760 N-INVAS EAR/PLS OXIMETRY 1: CPT

## 2018-01-11 PROCEDURE — 78815 PET IMAGE W/CT SKULL-THIGH: CPT

## 2018-01-11 PROCEDURE — 94729 DIFFUSING CAPACITY: CPT

## 2018-01-11 PROCEDURE — A9552 F18 FDG: HCPCS

## 2018-01-11 RX ADMIN — IOHEXOL 5 ML: 240 INJECTION, SOLUTION INTRATHECAL; INTRAVASCULAR; INTRAVENOUS; ORAL at 09:35

## 2018-01-12 ENCOUNTER — GENERIC CONVERSION - ENCOUNTER (OUTPATIENT)
Dept: OTHER | Facility: OTHER | Age: 78
End: 2018-01-12

## 2018-01-13 ENCOUNTER — HOSPITAL ENCOUNTER (INPATIENT)
Facility: HOSPITAL | Age: 78
LOS: 2 days | Discharge: HOME | DRG: 478 | End: 2018-01-15
Attending: INTERNAL MEDICINE | Admitting: INTERNAL MEDICINE
Payer: COMMERCIAL

## 2018-01-13 ENCOUNTER — APPOINTMENT (EMERGENCY)
Dept: RADIOLOGY | Facility: HOSPITAL | Age: 78
DRG: 478 | End: 2018-01-13
Payer: COMMERCIAL

## 2018-01-13 DIAGNOSIS — C34.90: ICD-10-CM

## 2018-01-13 DIAGNOSIS — G89.3 CANCER ASSOCIATED PAIN: ICD-10-CM

## 2018-01-13 DIAGNOSIS — M54.9 INTRACTABLE BACK PAIN: Primary | ICD-10-CM

## 2018-01-13 DIAGNOSIS — C41.2 CANCER OF SPINE (HCC): ICD-10-CM

## 2018-01-13 PROBLEM — J20.9 ACUTE TRACHEOBRONCHITIS: Status: RESOLVED | Noted: 2017-12-22 | Resolved: 2018-01-13

## 2018-01-13 PROBLEM — R06.02 SOB (SHORTNESS OF BREATH): Status: RESOLVED | Noted: 2017-12-22 | Resolved: 2018-01-13

## 2018-01-13 PROBLEM — K59.00 CONSTIPATION: Status: ACTIVE | Noted: 2018-01-13

## 2018-01-13 LAB
ALBUMIN SERPL BCP-MCNC: 2.2 G/DL (ref 3.5–5)
ALP SERPL-CCNC: 107 U/L (ref 46–116)
ALT SERPL W P-5'-P-CCNC: 39 U/L (ref 12–78)
ANION GAP SERPL CALCULATED.3IONS-SCNC: 8 MMOL/L (ref 4–13)
APTT PPP: 38 SECONDS (ref 23–35)
AST SERPL W P-5'-P-CCNC: 15 U/L (ref 5–45)
BASOPHILS # BLD AUTO: 0.01 THOUSANDS/ΜL (ref 0–0.1)
BASOPHILS NFR BLD AUTO: 0 % (ref 0–1)
BILIRUB SERPL-MCNC: 0.2 MG/DL (ref 0.2–1)
BUN SERPL-MCNC: 20 MG/DL (ref 5–25)
CALCIUM SERPL-MCNC: 8.9 MG/DL (ref 8.3–10.1)
CHLORIDE SERPL-SCNC: 105 MMOL/L (ref 100–108)
CLARITY, POC: CLEAR
CO2 SERPL-SCNC: 26 MMOL/L (ref 21–32)
COLOR, POC: YELLOW
CREAT SERPL-MCNC: 1.06 MG/DL (ref 0.6–1.3)
EOSINOPHIL # BLD AUTO: 0.03 THOUSAND/ΜL (ref 0–0.61)
EOSINOPHIL NFR BLD AUTO: 0 % (ref 0–6)
ERYTHROCYTE [DISTWIDTH] IN BLOOD BY AUTOMATED COUNT: 17.3 % (ref 11.6–15.1)
EXT BILIRUBIN, UA: NEGATIVE
EXT BLOOD URINE: NEGATIVE
EXT GLUCOSE, UA: NEGATIVE
EXT KETONES: NEGATIVE
EXT NITRITE, UA: NEGATIVE
EXT PH, UA: 6
EXT PROTEIN, UA: NORMAL
EXT SPECIFIC GRAVITY, UA: 1.02
EXT UROBILINOGEN: 0.2
GFR SERPL CREATININE-BSD FRML MDRD: 68 ML/MIN/1.73SQ M
GLUCOSE SERPL-MCNC: 164 MG/DL (ref 65–140)
HCT VFR BLD AUTO: 27.6 % (ref 36.5–49.3)
HGB BLD-MCNC: 8.2 G/DL (ref 12–17)
INR PPP: 1.03 (ref 0.86–1.16)
LYMPHOCYTES # BLD AUTO: 1.38 THOUSANDS/ΜL (ref 0.6–4.47)
LYMPHOCYTES NFR BLD AUTO: 13 % (ref 14–44)
MCH RBC QN AUTO: 24.7 PG (ref 26.8–34.3)
MCHC RBC AUTO-ENTMCNC: 29.7 G/DL (ref 31.4–37.4)
MCV RBC AUTO: 83 FL (ref 82–98)
MONOCYTES # BLD AUTO: 0.74 THOUSAND/ΜL (ref 0.17–1.22)
MONOCYTES NFR BLD AUTO: 7 % (ref 4–12)
NEUTROPHILS # BLD AUTO: 8.9 THOUSANDS/ΜL (ref 1.85–7.62)
NEUTS SEG NFR BLD AUTO: 80 % (ref 43–75)
PLATELET # BLD AUTO: 303 THOUSANDS/UL (ref 149–390)
PMV BLD AUTO: 9 FL (ref 8.9–12.7)
POTASSIUM SERPL-SCNC: 4 MMOL/L (ref 3.5–5.3)
PROT SERPL-MCNC: 6.4 G/DL (ref 6.4–8.2)
PROTHROMBIN TIME: 13.8 SECONDS (ref 12.1–14.4)
RBC # BLD AUTO: 3.32 MILLION/UL (ref 3.88–5.62)
SODIUM SERPL-SCNC: 139 MMOL/L (ref 136–145)
TSH SERPL DL<=0.05 MIU/L-ACNC: 1.3 UIU/ML (ref 0.36–3.74)
WBC # BLD AUTO: 11.06 THOUSAND/UL (ref 4.31–10.16)
WBC # BLD EST: NEGATIVE 10*3/UL

## 2018-01-13 PROCEDURE — 81002 URINALYSIS NONAUTO W/O SCOPE: CPT | Performed by: PHYSICIAN ASSISTANT

## 2018-01-13 PROCEDURE — 85025 COMPLETE CBC W/AUTO DIFF WBC: CPT | Performed by: PHYSICIAN ASSISTANT

## 2018-01-13 PROCEDURE — 80053 COMPREHEN METABOLIC PANEL: CPT | Performed by: PHYSICIAN ASSISTANT

## 2018-01-13 PROCEDURE — 85610 PROTHROMBIN TIME: CPT | Performed by: PHYSICIAN ASSISTANT

## 2018-01-13 PROCEDURE — 99285 EMERGENCY DEPT VISIT HI MDM: CPT

## 2018-01-13 PROCEDURE — 71045 X-RAY EXAM CHEST 1 VIEW: CPT

## 2018-01-13 PROCEDURE — 96376 TX/PRO/DX INJ SAME DRUG ADON: CPT

## 2018-01-13 PROCEDURE — 36415 COLL VENOUS BLD VENIPUNCTURE: CPT | Performed by: PHYSICIAN ASSISTANT

## 2018-01-13 PROCEDURE — 96374 THER/PROPH/DIAG INJ IV PUSH: CPT

## 2018-01-13 PROCEDURE — 85730 THROMBOPLASTIN TIME PARTIAL: CPT | Performed by: PHYSICIAN ASSISTANT

## 2018-01-13 PROCEDURE — 96361 HYDRATE IV INFUSION ADD-ON: CPT

## 2018-01-13 PROCEDURE — 84443 ASSAY THYROID STIM HORMONE: CPT | Performed by: PHYSICIAN ASSISTANT

## 2018-01-13 PROCEDURE — 96375 TX/PRO/DX INJ NEW DRUG ADDON: CPT

## 2018-01-13 RX ORDER — DOXAZOSIN MESYLATE 4 MG/1
4 TABLET ORAL DAILY
Status: DISCONTINUED | OUTPATIENT
Start: 2018-01-14 | End: 2018-01-15 | Stop reason: HOSPADM

## 2018-01-13 RX ORDER — ACETAMINOPHEN 325 MG/1
650 TABLET ORAL EVERY 6 HOURS PRN
Status: DISCONTINUED | OUTPATIENT
Start: 2018-01-13 | End: 2018-01-15 | Stop reason: HOSPADM

## 2018-01-13 RX ORDER — POLYETHYLENE GLYCOL 3350 17 G/17G
17 POWDER, FOR SOLUTION ORAL DAILY PRN
Status: DISCONTINUED | OUTPATIENT
Start: 2018-01-13 | End: 2018-01-14

## 2018-01-13 RX ORDER — MORPHINE SULFATE 4 MG/ML
4 INJECTION, SOLUTION INTRAMUSCULAR; INTRAVENOUS ONCE
Status: COMPLETED | OUTPATIENT
Start: 2018-01-13 | End: 2018-01-13

## 2018-01-13 RX ORDER — MORPHINE SULFATE 10 MG/ML
6 INJECTION, SOLUTION INTRAMUSCULAR; INTRAVENOUS ONCE
Status: COMPLETED | OUTPATIENT
Start: 2018-01-13 | End: 2018-01-13

## 2018-01-13 RX ORDER — DOCUSATE SODIUM 100 MG/1
100 CAPSULE, LIQUID FILLED ORAL 2 TIMES DAILY
Status: DISCONTINUED | OUTPATIENT
Start: 2018-01-13 | End: 2018-01-15 | Stop reason: HOSPADM

## 2018-01-13 RX ORDER — SENNOSIDES 8.6 MG
1 TABLET ORAL DAILY
Status: DISCONTINUED | OUTPATIENT
Start: 2018-01-13 | End: 2018-01-15

## 2018-01-13 RX ORDER — OXYCODONE HYDROCHLORIDE 5 MG/1
5 TABLET ORAL EVERY 4 HOURS PRN
Status: DISCONTINUED | OUTPATIENT
Start: 2018-01-13 | End: 2018-01-15 | Stop reason: HOSPADM

## 2018-01-13 RX ORDER — LEVOTHYROXINE SODIUM 88 UG/1
88 TABLET ORAL
Status: DISCONTINUED | OUTPATIENT
Start: 2018-01-14 | End: 2018-01-15 | Stop reason: HOSPADM

## 2018-01-13 RX ORDER — ONDANSETRON 2 MG/ML
4 INJECTION INTRAMUSCULAR; INTRAVENOUS ONCE
Status: COMPLETED | OUTPATIENT
Start: 2018-01-13 | End: 2018-01-13

## 2018-01-13 RX ORDER — FERROUS SULFATE 325(65) MG
325 TABLET ORAL
Status: DISCONTINUED | OUTPATIENT
Start: 2018-01-13 | End: 2018-01-15 | Stop reason: HOSPADM

## 2018-01-13 RX ORDER — TRAMADOL HYDROCHLORIDE 50 MG/1
50 TABLET ORAL EVERY 6 HOURS PRN
COMMUNITY
End: 2018-01-15 | Stop reason: HOSPADM

## 2018-01-13 RX ORDER — OXYCODONE HYDROCHLORIDE 10 MG/1
10 TABLET ORAL EVERY 4 HOURS PRN
Status: DISCONTINUED | OUTPATIENT
Start: 2018-01-13 | End: 2018-01-15

## 2018-01-13 RX ORDER — NICOTINE 21 MG/24HR
21 PATCH, TRANSDERMAL 24 HOURS TRANSDERMAL DAILY
Status: DISCONTINUED | OUTPATIENT
Start: 2018-01-14 | End: 2018-01-15 | Stop reason: HOSPADM

## 2018-01-13 RX ORDER — FINASTERIDE 5 MG/1
5 TABLET, FILM COATED ORAL
Status: DISCONTINUED | OUTPATIENT
Start: 2018-01-13 | End: 2018-01-15 | Stop reason: HOSPADM

## 2018-01-13 RX ORDER — LOSARTAN POTASSIUM 50 MG/1
100 TABLET ORAL DAILY
Status: DISCONTINUED | OUTPATIENT
Start: 2018-01-14 | End: 2018-01-15 | Stop reason: HOSPADM

## 2018-01-13 RX ORDER — ONDANSETRON 2 MG/ML
4 INJECTION INTRAMUSCULAR; INTRAVENOUS EVERY 6 HOURS PRN
Status: DISCONTINUED | OUTPATIENT
Start: 2018-01-13 | End: 2018-01-15 | Stop reason: HOSPADM

## 2018-01-13 RX ORDER — PREDNISONE 20 MG/1
20 TABLET ORAL DAILY
COMMUNITY
End: 2018-01-15 | Stop reason: HOSPADM

## 2018-01-13 RX ORDER — SENNOSIDES 8.6 MG
1 TABLET ORAL DAILY
Status: DISCONTINUED | OUTPATIENT
Start: 2018-01-14 | End: 2018-01-13

## 2018-01-13 RX ORDER — MORPHINE SULFATE 4 MG/ML
4 INJECTION, SOLUTION INTRAMUSCULAR; INTRAVENOUS EVERY 4 HOURS PRN
Status: DISCONTINUED | OUTPATIENT
Start: 2018-01-13 | End: 2018-01-15

## 2018-01-13 RX ORDER — AMLODIPINE BESYLATE 5 MG/1
5 TABLET ORAL DAILY
Status: DISCONTINUED | OUTPATIENT
Start: 2018-01-14 | End: 2018-01-15 | Stop reason: HOSPADM

## 2018-01-13 RX ADMIN — SENNOSIDES 8.6 MG: 8.6 TABLET, FILM COATED ORAL at 16:54

## 2018-01-13 RX ADMIN — ONDANSETRON 4 MG: 2 INJECTION INTRAMUSCULAR; INTRAVENOUS at 10:46

## 2018-01-13 RX ADMIN — SODIUM CHLORIDE 1000 ML: 0.9 INJECTION, SOLUTION INTRAVENOUS at 10:46

## 2018-01-13 RX ADMIN — FERROUS SULFATE TAB 325 MG (65 MG ELEMENTAL FE) 325 MG: 325 (65 FE) TAB at 16:52

## 2018-01-13 RX ADMIN — DOCUSATE SODIUM 100 MG: 100 CAPSULE, LIQUID FILLED ORAL at 17:00

## 2018-01-13 RX ADMIN — MORPHINE SULFATE 6 MG: 10 INJECTION, SOLUTION INTRAMUSCULAR; INTRAVENOUS at 10:47

## 2018-01-13 RX ADMIN — FINASTERIDE 5 MG: 5 TABLET, FILM COATED ORAL at 23:17

## 2018-01-13 RX ADMIN — MORPHINE SULFATE 4 MG: 4 INJECTION INTRAVENOUS at 14:29

## 2018-01-13 NOTE — ED NOTES
Report called to [facility], aware pt is non tele     Mireya Franco, DAVON  01/13/18 [id number]

## 2018-01-13 NOTE — H&P
H&P- Wilton Ebbing 1940, 69 y o  male MRN: [id number]    Unit/Bed#: -01 Encounter: [id number]    Primary Care Provider: Alayna Moran MD   Date and time admitted to hospital: 1/13/2018  9:04 AM        * Cancer associated pain   Assessment & Plan    · Pt recently diagnosed with cancer of the lung, osseous metastasis and lymph node involvement  · CT chest on 01/08/2018 showing a 2 0 cm spiculated nodule in the superior segment left lower lobe suspicious for malignancy  Large lytic lesion in the T7 and T10 vertebral bodies compatible with metastatic disease with minimal loss of stature and T10 suggesting pathologic fracture  Stable 6 mm nodules in the right middle lobe  · PET scan performed on 01/11/2018-FDG avid left lower lobe spiculated lesion compatible with hypermetabolic malignancy  FDG avid lymph nodes in the left perihilar and mediastinal regions compatible with metastasis  Multiple FDG avid osseous lesions compatible with osseous metastasis these are seen along the spine, ribs, sacrum, pelvis and bilateral proximal femur  Lesion at T3 transverse process, T7 vertebral body lesion, L1 lesion, lytic lesion at the left ilium, destructive lesion at the left bryson sacrum with underlying soft tissue component, lesion at the right femoral neck  · Add oxycodone and morphine for pain control  · Consult palliative care for goals of care pain control  · Consult Oncology  Patient is scheduled for his 1st Oncology visit this coming Thursday any has a biopsy planned on 01/24/2018 outpatient        Anemia   Assessment & Plan    · Monitor hemoglobin  · Recent iron level performed December was 16  · Continue with iron t i d         Constipation   Assessment & Plan    · Likely secondary to opioids  · Start Colace 100 mg b i d  and Senokot at night  · MiraLax p r n          GAVE (gastric antral vascular ectasia)   Assessment & Plan    · Monitor hemoglobin  · Check fecal occult blood due to black tarry stool Tobacco abuse   Assessment & Plan    · Patient reports that he quit 2 days ago, initiated Nicoderm patch outpatient 21 mg per day  · Continue with Nicoderm patch        BPH (benign prostatic hyperplasia)   Assessment & Plan    · Continue doxazosin and finasteride        Hypertension   Assessment & Plan    · Continue with amlodipine and Cozaar        Hypothyroid   Assessment & Plan    · TSH 1 296  · Continue with levothyroxine 88 mcg per day            VTE Prophylaxis: Hold on anticoagulation until fecal occult blood cards back negative  / sequential compression device   Code Status: level 2  POLST: POLST form is not discussed and not completed at this time  Discussion with family:  Discussed with patient, wife and daughter at bedside    Anticipated Length of Stay:  Patient will be admitted on an Inpatient basis with an anticipated length of stay of  greater than 2 midnights  Justification for Hospital Stay:  Due to uncontrolled pain    Total Time for Visit, including Counseling / Coordination of Care: 1 hour  Greater than 50% of this total time spent on direct patient counseling and coordination of care  Chief Complaint:   Intractable pain    History of Present Illness:    Nataliya Cartagena is a 69 y o  male with a new diagnosis of lung and bone cancer who presents with intractable back pain  Patient reports that he was recently admitted on 12/23/2017 for back pain  He was treated for muscular strain and he was discharged but unfortunately his back pain persisted  The patient saw his PCP who ordered a CT scan of the chest which showed a 2 cm spiculated nodule in the left lower lobe with large lytic lesions in the T7 and T10 vertebral bodies compatible with metastatic disease, with minimal loss of stature in T10 suggesting pathologic fracture  The patient then had a PET scan performed on 01/11/2018 showing FDG avid left lower lobe spiculated lesion compatible with hypermetabolic malignancy    FDG avid lymph nodes in the left perihilar and mediastinal regions compatible with metastasis  Multiple FDG avid osseous lesions compatible with osseous metastasis these are seen along the spine, ribs, sacrum, pelvis and bilateral proximal femur  Lesion at T3 transverse process, T7 vertebral body lesion, L1 lesion, lytic lesion at the left ilium, destructive lesion at the left bryson sacrum with underlying soft tissue component, lesion at the right femoral neck  The patient had an appointment set up with Oncology for this coming Thursday and had a planned biopsy for 01/24/2018  The patient presented today because he was in intractable pain  He was on Ultram at home which was not helping  The patient reports that the pain is up and down his upper and lower back and across his lower back  He describes it as aching intermittent pain that is exacerbated by movement and improved with rest   Prior to coming in today his pain was rated 8/10 but currently his pain is a 3/10 because he received morphine in the emergency room which helped  The patient denies any lower extremity weakness, loss of bowel or bladder control, or radiating symptoms  He does admit to shortness of breath but denies any coughing  He reports a 12 lb weight loss since the summer point which she is attributing to poor appetite due to the pain  Patient reports that he is constipated, he takes iron 3 times a day and also on pain medication at home  He has been taking iron 3 times a day along with stool softeners 3 times a day  The patient has a history of GAVE and follows with Dr Elian Hoyos outpatient  He does report having dark tarry stools recently which he is unsure if this is related to the iron or bleed  Review of Systems:    Review of Systems   Constitutional: Positive for appetite change (Poor appetite exacerbated by pain) and unexpected weight change (12 lb since December)  Negative for chills and fever     HENT: Negative for congestion, ear pain, sore throat and trouble swallowing  Eyes: Negative for visual disturbance  Respiratory: Positive for shortness of breath  Negative for cough and wheezing  Cardiovascular: Negative for chest pain, palpitations and leg swelling  Gastrointestinal: Positive for constipation  Negative for abdominal pain, blood in stool, diarrhea, nausea and vomiting  Black tarry stools   Genitourinary: Negative for difficulty urinating, dysuria, frequency and hematuria  Musculoskeletal: Positive for back pain  Negative for joint swelling and myalgias  Skin: Negative for rash  Neurological: Negative for dizziness, tremors, seizures, syncope, speech difficulty, weakness, light-headedness, numbness and headaches  Psychiatric/Behavioral: Negative for suicidal ideas  The patient is not nervous/anxious  Past Medical and Surgical History:     Past Medical History:   Diagnosis Date    Anemia     BPH (benign prostatic hyperplasia)     [facility]     [facility]     Disease of thyroid gland     GAVE (gastric antral vascular ectasia)     Hypertension     Spinal stenosis     Tobacco abuse        History reviewed  No pertinent surgical history  Meds/Allergies:    Prior to Admission medications    Medication Sig Start Date End Date Taking?  Authorizing Provider   amLODIPine (NORVASC) 5 mg tablet Take 5 mg by mouth daily   Yes Historical Provider, MD   doxazosin (CARDURA) 4 mg tablet Take 4 mg by mouth daily at bedtime   Yes Historical Provider, MD   finasteride (PROSCAR) 5 mg tablet Take 5 mg by mouth daily   Yes Historical Provider, MD   IRON PO Take 65 mg by mouth daily   Yes Historical Provider, MD   levothyroxine 88 mcg tablet Take 88 mcg by mouth daily   Yes Historical Provider, MD   losartan (COZAAR) 100 MG tablet Take 100 mg by mouth daily   Yes Historical Provider, MD   predniSONE 20 mg tablet Take 20 mg by mouth daily   Yes Remington Abreu MD   traMADol (ULTRAM) 50 mg tablet Take 50 mg by mouth every 6 (six) hours as needed for moderate pain   Yes Historical Provider, MD   methocarbamol (ROBAXIN) 500 mg tablet Take 1 tablet by mouth every 8 (eight) hours as needed for muscle spasms 12/23/17 1/13/18 Yes Candelario Nunez PA-C     I have reviewed home medications with patient personally  Allergies: No Known Allergies    Social History:     Marital Status: /Civil Union   Occupation:  Retired   Patient Pre-hospital Living Situation:  Lives with wife    Substance Use History:   History   Alcohol Use No     History   Smoking Status    Former Smoker    Packs/day: 0 50    Years: 15 00    Types: Cigarettes    Quit date: 1/11/2018   Smokeless Tobacco    Never Used     History   Drug Use No       Family History:    non-contributory    Physical Exam:     Vitals:   Blood Pressure: 114/65 (01/13/18 1627)  Pulse: 63 (01/13/18 1627)  Temperature: 98 4 °F (36 9 °C) (01/13/18 1627)  Temp Source: Oral (01/13/18 1627)  Respirations: 16 (01/13/18 1627)  Height: 5' 7" (170 2 cm) (01/13/18 1627)  Weight - Scale: 83 9 kg (185 lb) (01/13/18 0913)  SpO2: 97 % (01/13/18 1627)    Physical Exam   Constitutional: He is oriented to person, place, and time  He is cooperative  No distress  HENT:   Head: Normocephalic and atraumatic  Mouth/Throat: Oropharynx is clear and moist  No oropharyngeal exudate  Eyes: Conjunctivae and EOM are normal  Pupils are equal, round, and reactive to light  Right eye exhibits no discharge  No scleral icterus  Neck: Neck supple  No JVD present  Carotid bruit is not present  Cardiovascular: Normal rate, regular rhythm, S1 normal, S2 normal, normal heart sounds and intact distal pulses  Exam reveals no gallop and no friction rub  No murmur heard  Pulmonary/Chest: Effort normal and breath sounds normal  No respiratory distress  He has no wheezes  He has no rhonchi  He has no rales  Abdominal: Soft  Bowel sounds are normal  He exhibits no distension   There is no tenderness  There is no guarding  Musculoskeletal: He exhibits no edema or tenderness  Neurological: He is alert and oriented to person, place, and time  No cranial nerve deficit  Skin: Skin is warm and dry  No rash noted  He is not diaphoretic  No erythema  Psychiatric: He has a normal mood and affect  His behavior is normal  His mood appears not anxious  He does not exhibit a depressed mood  Additional Data:     Lab Results: I have personally reviewed pertinent reports  Results from last 7 days  Lab Units 01/13/18  1043   WBC Thousand/uL 11 06*   HEMOGLOBIN g/dL 8 2*   HEMATOCRIT % 27 6*   PLATELETS Thousands/uL 303   NEUTROS PCT % 80*   LYMPHS PCT % 13*   MONOS PCT % 7   EOS PCT % 0       Results from last 7 days  Lab Units 01/13/18  1043   SODIUM mmol/L 139   POTASSIUM mmol/L 4 0   CHLORIDE mmol/L 105   CO2 mmol/L 26   BUN mg/dL 20   CREATININE mg/dL 1 06   CALCIUM mg/dL 8 9   TOTAL PROTEIN g/dL 6 4   BILIRUBIN TOTAL mg/dL 0 20   ALK PHOS U/L 107   ALT U/L 39   AST U/L 15   GLUCOSE RANDOM mg/dL 164*       Results from last 7 days  Lab Units 01/13/18  1043   INR  1 03       Imaging: I have personally reviewed pertinent reports  XR chest 1 view portable   ED Interpretation by Fiona Strange PA-C (01/13 1151)   Known spiculated lung mass, pulmonary nodules  Final Result by Joseph Dey MD (01/13 1313)      No active disease  Workstation performed: [id number]             EKG, Pathology, and Other Studies Reviewed on Admission:   · EKG: n/a    Allscripts / Epic Records Reviewed: Yes     ** Please Note: This note has been constructed using a voice recognition system   **

## 2018-01-13 NOTE — ASSESSMENT & PLAN NOTE
· Monitor hemoglobin  · Check fecal occult blood due to black tarry stool  · Pt follows with Dr Jacqueline Quintana outpatient

## 2018-01-13 NOTE — ASSESSMENT & PLAN NOTE
· Pt recently diagnosed with cancer of the lung, osseous metastasis and lymph node involvement  · CT chest on 01/08/2018 showing a 2 0 cm spiculated nodule in the superior segment left lower lobe suspicious for malignancy  Large lytic lesion in the T7 and T10 vertebral bodies compatible with metastatic disease with minimal loss of stature and T10 suggesting pathologic fracture  Stable 6 mm nodules in the right middle lobe  · PET scan performed on 01/11/2018-FDG avid left lower lobe spiculated lesion compatible with hypermetabolic malignancy  FDG avid lymph nodes in the left perihilar and mediastinal regions compatible with metastasis  Multiple FDG avid osseous lesions compatible with osseous metastasis these are seen along the spine, ribs, sacrum, pelvis and bilateral proximal femur  Lesion at T3 transverse process, T7 vertebral body lesion, L1 lesion, lytic lesion at the left ilium, destructive lesion at the left bryson sacrum with underlying soft tissue component, lesion at the right femoral neck  · Add oxycodone and morphine for pain control  · Consult palliative care for goals of care pain control  · Consult Oncology    Patient is scheduled for his 1st Oncology visit this coming Thursday any has a biopsy planned on 01/24/2018 outpatient

## 2018-01-13 NOTE — PLAN OF CARE
Problem: Potential for Falls  Goal: Patient will remain free of falls  INTERVENTIONS:  - Assess patient frequently for physical needs  -  Identify cognitive and physical deficits and behaviors that affect risk of falls    -  [place] fall precautions as indicated by assessment   - Educate patient/family on patient safety including physical limitations  - Instruct patient to call for assistance with activity based on assessment  - Modify environment to reduce risk of injury  - Consider OT/PT consult to assist with strengthening/mobility   Outcome: Progressing      Problem: PAIN - ADULT  Goal: Verbalizes/displays adequate comfort level or baseline comfort level  Interventions:  - Encourage patient to monitor pain and request assistance  - Assess pain using appropriate pain scale  - Administer analgesics based on type and severity of pain and evaluate response  - Implement non-pharmacological measures as appropriate and evaluate response  - Consider cultural and social influences on pain and pain management  - Notify physician/advanced practitioner if interventions unsuccessful or patient reports new pain  Outcome: Progressing      Problem: INFECTION - ADULT  Goal: Absence or prevention of progression during hospitalization  INTERVENTIONS:  - Assess and monitor for signs and symptoms of infection  - Monitor lab/diagnostic results  - Monitor all insertion sites, i e  indwelling lines, tubes, and drains  - Monitor endotracheal (as able) and nasal secretions for changes in amount and color  - [place] appropriate cooling/warming therapies per order  - Administer medications as ordered  - Instruct and encourage patient and family to use good hand hygiene technique  - Identify and instruct in appropriate isolation precautions for identified infection/condition  Outcome: Progressing      Problem: SAFETY ADULT  Goal: Maintain or return to baseline ADL function  INTERVENTIONS:  -  Assess patient's ability to carry out ADLs; assess patient's baseline for ADL function and identify physical deficits which impact ability to perform ADLs (bathing, care of mouth/teeth, toileting, grooming, dressing, etc )  - Assess/evaluate cause of self-care deficits   - Assess range of motion  - Assess patient's mobility; develop plan if impaired  - Assess patient's need for assistive devices and provide as appropriate  - Encourage maximum independence but intervene and supervise when necessary  ¯ Involve family in performance of ADLs  ¯ Assess for home care needs following discharge   ¯ Request OT consult to assist with ADL evaluation and planning for discharge  ¯ Provide patient education as appropriate  Outcome: Progressing    Goal: Maintain or return mobility status to optimal level  INTERVENTIONS:  - Assess patient's baseline mobility status (ambulation, transfers, stairs, etc )    - Identify cognitive and physical deficits and behaviors that affect mobility  - Identify mobility aids required to assist with transfers and/or ambulation (gait belt, sit-to-stand, lift, walker, cane, etc )  - [place] fall precautions as indicated by assessment  - Record patient progress and toleration of activity level on Mobility SBAR; progress patient to next Phase/Stage  - Instruct patient to call for assistance with activity based on assessment  - Request Rehabilitation consult to assist with strengthening/weightbearing, etc   Outcome: Progressing

## 2018-01-13 NOTE — ED PROVIDER NOTES
History  Chief Complaint   Patient presents with    Back Pain     pt who had recent diagnosis of spine, hip and lung CA, unknown stage, unknown source presents with bilatera upper back pain and lower back pain, states that was sent home with tramadol and it is not working  cannot get into oncology for biopsy until jan 24th     This 27-year-old white male presents emergency room with intractable back pain  He was recently diagnosed with lung cancer with metastatic disease to the bone  He recently went for a PET scan that demonstrated that he had thoracic involvement as well as lumbar, ribs, sacrum, pelvis and and both femurs  Complains of intractable back pain that is unrelieved with tramadol  Denies any radicular symptoms of pain, numbness, weakness, tingling in his lower extremities  He denies any bowel or bladder incontinence  He denies any saddle anesthesia  He has had back pain on and off for the past month  He denies any weight loss  Initially had some plain x-rays of his lumbar spine that did not show any bony abnormalities  Follow-up CT scan is what diagnosis the lytic lesions  The PET scan confirmed the diagnosis  Patient has an appointment with the oncologist on Thursday for his 1st visit  He is scheduled to have a bone biopsy on January 24th  The patient states that he can't wait that long  Past medical history is positive for anemia, cancer of the lung with metastasis to the bone, hypothyroidism, hypertension          History provided by:  Patient  Back Pain   Location:  Thoracic spine and lumbar spine  Quality:  Aching  Pain severity:  Severe  Pain is:  Same all the time  Onset quality:  Gradual  Duration:  4 weeks  Timing:  Constant  Progression:  Worsening  Chronicity:  New  Context: not emotional stress, not falling, not jumping from heights, not lifting heavy objects, not MCA, not MVA, not occupational injury, not pedestrian accident, not physical stress, not recent illness, not recent injury and not twisting    Relieved by:  Nothing  Worsened by: Movement, standing, sitting, deep breathing and bending  Ineffective treatments:  Bed rest (Tramadol)  Associated symptoms: no abdominal pain, no abdominal swelling, no bladder incontinence, no bowel incontinence, no chest pain, no dysuria, no fever, no headaches, no leg pain, no numbness, no paresthesias, no pelvic pain, no perianal numbness, no tingling, no weakness and no weight loss    Risk factors: hx of cancer and lack of exercise    Risk factors: no hx of osteoporosis, no menopause, not obese, no recent surgery, no steroid use and no vascular disease        Prior to Admission Medications   Prescriptions Last Dose Informant Patient Reported? Taking? IRON PO 1/12/2018 at Unknown time  Yes Yes   Sig: Take 65 mg by mouth daily   amLODIPine (NORVASC) 5 mg tablet 1/13/2018 at 0800  Yes Yes   Sig: Take 5 mg by mouth daily   doxazosin (CARDURA) 4 mg tablet 1/13/2018 at 0800  Yes Yes   Sig: Take 4 mg by mouth daily at bedtime   finasteride (PROSCAR) 5 mg tablet 1/12/2018 at Unknown time  Yes Yes   Sig: Take 5 mg by mouth daily   levothyroxine 88 mcg tablet 1/13/2018 at 0800  Yes Yes   Sig: Take 88 mcg by mouth daily   losartan (COZAAR) 100 MG tablet 1/13/2018 at 0800  Yes Yes   Sig: Take 100 mg by mouth daily   predniSONE 20 mg tablet  Self Yes Yes   Sig: Take 20 mg by mouth daily   traMADol (ULTRAM) 50 mg tablet 1/12/2018 at Unknown time  Yes Yes   Sig: Take 50 mg by mouth every 6 (six) hours as needed for moderate pain      Facility-Administered Medications: None       Past Medical History:   Diagnosis Date    Anemia     BPH (benign prostatic hyperplasia)     Cancer of lung (HCC)     Cancer of spine ([address] )     Disease of thyroid gland     GAVE (gastric antral vascular ectasia)     Hypertension     Spinal stenosis     Tobacco abuse        History reviewed  No pertinent surgical history      Family History   Problem Relation Age of Onset  Esophageal cancer Mother     Diabetes Father     No Known Problems Sister     No Known Problems Brother      I have reviewed and agree with the history as documented  Social History   Substance Use Topics    Smoking status: Former Smoker     Packs/day: 0 50     Years: 15 00     Types: Cigarettes     Quit date: 1/11/2018    Smokeless tobacco: Never Used    Alcohol use No        Review of Systems   Constitutional: Positive for activity change  Negative for appetite change, chills, diaphoresis, fatigue, fever, unexpected weight change and weight loss  HENT: Negative for congestion, dental problem, ear discharge, ear pain, mouth sores, postnasal drip, rhinorrhea, sinus pain, sinus pressure, sore throat and trouble swallowing  Eyes: Negative for pain, discharge, redness and itching  Cardiovascular: Negative for chest pain  Gastrointestinal: Positive for constipation  Negative for abdominal distention, abdominal pain, anal bleeding, blood in stool, bowel incontinence, diarrhea, nausea and vomiting  Endocrine: Negative for cold intolerance, heat intolerance, polydipsia, polyphagia and polyuria  Genitourinary: Negative for bladder incontinence, difficulty urinating, dysuria, frequency, hematuria, pelvic pain and urgency  Musculoskeletal: Positive for back pain  Negative for neck pain and neck stiffness  Skin: Negative for color change, pallor and rash  Neurological: Negative for tingling, weakness, numbness, headaches and paresthesias  Psychiatric/Behavioral: Negative for confusion  All other systems reviewed and are negative        Physical Exam  ED Triage Vitals [01/13/18 0913]   Temperature Pulse Respirations Blood Pressure SpO2   97 6 °F (36 4 °C) 60 18 130/61 99 %      Temp Source Heart Rate Source Patient Position - Orthostatic VS BP Location FiO2 (%)   Oral Monitor Sitting Right arm --      Pain Score       8           Orthostatic Vital Signs  Vitals:    01/13/18 1130 01/13/18 1230 01/13/18 1300 01/13/18 1430   BP: 124/61 126/63 118/65 112/57   Pulse: 68 62 68 70   Patient Position - Orthostatic VS: Sitting Sitting Sitting Sitting       Physical Exam   Constitutional: He is oriented to person, place, and time  He appears well-developed and well-nourished  No distress  HENT:   Head: Normocephalic  Right Ear: External ear normal    Left Ear: External ear normal    Nose: Nose normal    Mouth/Throat: Oropharynx is clear and moist    Eyes: Right eye exhibits no discharge  Left eye exhibits no discharge  No scleral icterus  Neck: Neck supple  No tracheal deviation present  No thyromegaly present  Cardiovascular: Normal rate, regular rhythm, normal heart sounds and intact distal pulses  Exam reveals no gallop and no friction rub  No murmur heard  Pulmonary/Chest: Effort normal and breath sounds normal  No respiratory distress  He has no wheezes  He has no rales  He exhibits no tenderness  Abdominal: Soft  He exhibits no distension and no mass  There is no tenderness  There is no rebound and no guarding  Musculoskeletal: He exhibits no edema or tenderness  Examination of the thoracic and lumbar spine-  No tenderness upon palpation, right paravertebral spasm palpated mid dorsal area  Lymphadenopathy:     He has no cervical adenopathy  Neurological: He is alert and oriented to person, place, and time  Skin: Skin is warm  Capillary refill takes less than 2 seconds  No rash noted  He is not diaphoretic  No erythema  Psychiatric: He has a normal mood and affect  His behavior is normal  Judgment and thought content normal    Nursing note and vitals reviewed        ED Medications  Medications   amLODIPine (NORVASC) tablet 5 mg (not administered)   doxazosin (CARDURA) tablet 4 mg (not administered)   finasteride (PROSCAR) tablet 5 mg (not administered)   ferrous sulfate tablet 325 mg (not administered)   levothyroxine tablet 88 mcg (not administered)   losartan (COZAAR) tablet 100 mg (not administered)   docusate sodium (COLACE) capsule 100 mg (not administered)   senna (SENOKOT) tablet 8 6 mg (not administered)   polyethylene glycol (MIRALAX) packet 17 g (not administered)   enoxaparin (LOVENOX) subcutaneous injection 40 mg (not administered)   acetaminophen (TYLENOL) tablet 650 mg (not administered)   sodium chloride 0 9 % bolus 1,000 mL (0 mL Intravenous Stopped 1/13/18 1148)   morphine (PF) 10 mg/mL injection 6 mg (6 mg Intravenous Given 1/13/18 1047)   ondansetron (ZOFRAN) injection 4 mg (4 mg Intravenous Given 1/13/18 1046)   morphine (PF) 4 mg/mL injection 4 mg (4 mg Intravenous Given 1/13/18 1429)       Diagnostic Studies  Results Reviewed     Procedure Component Value Units Date/Time    TSH [[id number]]  (Normal) Collected:  01/13/18 1043    Lab Status:  Final result Specimen:  Blood from Arm, Right Updated:  01/13/18 1412     TSH 3RD GENERATON 1 296 uIU/mL     Narrative:         Patients undergoing fluorescein dye angiography may retain small amounts of fluorescein in the body for 48-72 hours post procedure  Samples containing fluorescein can produce falsely depressed TSH values  If the patient had this procedure,a specimen should be resubmitted post fluorescein clearance      Comprehensive metabolic panel [[id number]]  (Abnormal) Collected:  01/13/18 1043    Lab Status:  Final result Specimen:  Blood from Arm, Right Updated:  01/13/18 1411     Sodium 139 mmol/L      Potassium 4 0 mmol/L      Chloride 105 mmol/L      CO2 26 mmol/L      Anion Gap 8 mmol/L      BUN 20 mg/dL      Creatinine 1 06 mg/dL      Glucose 164 (H) mg/dL      Calcium 8 9 mg/dL      AST 15 U/L      ALT 39 U/L      Alkaline Phosphatase 107 U/L      Total Protein 6 4 g/dL      Albumin 2 2 (L) g/dL      Total Bilirubin 0 20 mg/dL      eGFR 68 ml/min/1 73sq m     Narrative:         National Kidney Disease Education Program recommendations are as follows:  GFR calculation is accurate only with a steady state creatinine  Chronic Kidney disease less than 60 ml/min/1 73 sq  meters  Kidney failure less than 15 ml/min/1 73 sq  meters  Protime-INR [[id number]]  (Normal) Collected:  01/13/18 1043    Lab Status:  Final result Specimen:  Blood from Arm, Right Updated:  01/13/18 1405     Protime 13 8 seconds      INR 1 03    APTT [[id number]]  (Abnormal) Collected:  01/13/18 1043    Lab Status:  Final result Specimen:  Blood from Arm, Right Updated:  01/13/18 1405     PTT 38 (H) seconds     Narrative:          Therapeutic Heparin Range = 60-90 seconds    CBC and differential [[id number]]  (Abnormal) Collected:  01/13/18 1043    Lab Status:  Final result Specimen:  Blood from Arm, Right Updated:  01/13/18 1347     WBC 11 06 (H) Thousand/uL      RBC 3 32 (L) Million/uL      Hemoglobin 8 2 (L) g/dL      Hematocrit 27 6 (L) %      MCV 83 fL      MCH 24 7 (L) pg      MCHC 29 7 (L) g/dL      RDW 17 3 (H) %      MPV 9 0 fL      Platelets 716 Thousands/uL      Neutrophils Relative 80 (H) %      Lymphocytes Relative 13 (L) %      Monocytes Relative 7 %      Eosinophils Relative 0 %      Basophils Relative 0 %      Neutrophils Absolute 8 90 (H) Thousands/µL      Lymphocytes Absolute 1 38 Thousands/µL      Monocytes Absolute 0 74 Thousand/µL      Eosinophils Absolute 0 03 Thousand/µL      Basophils Absolute 0 01 Thousands/µL     POCT urinalysis dipstick [[id number]]  (Normal) Resulted:  01/13/18 1041    Lab Status:  Final result Specimen:  Urine Updated:  01/13/18 1042     Color, UA Yellow     Clarity, UA Clear     EXT Glucose, UA Negative     EXT Bilirubin, UA (Ref: Negative) Negative     EXT Ketones, UA (Ref: Negative) Negative     EXT Spec Grav, UA 1 020     EXT Blood, UA (Ref: Negative) Negative     EXT pH, UA 6 0     EXT Protein, UA (Ref: Negative) Trace     EXT Urobilinogen, UA (Ref: 0 2- 1 0) 0 2     EXT Leukocytes, UA (Ref: Negative) Negative     EXT Nitrite, UA (Ref: Negative) Negative                 XR chest 1 view portable   ED Interpretation by Cleo Bowie PA-C (01/13 1151)   Known spiculated lung mass, pulmonary nodules  Final Result by Bryan Archibald MD (01/13 1313)      No active disease  Workstation performed: [id number]                    Procedures  Procedures       Phone Contacts  ED Phone Contact    ED Course  ED Course                                MDM  Number of Diagnoses or Management Options  Intractable back pain: new and requires workup     Amount and/or Complexity of Data Reviewed  Clinical lab tests: ordered and reviewed  Tests in the radiology section of CPT®: ordered and reviewed  Tests in the medicine section of CPT®: ordered and reviewed    Risk of Complications, Morbidity, and/or Mortality  Presenting problems: high  Diagnostic procedures: high  Management options: high  General comments: Patient presents emergency room with intractable back pain  He was recently diagnosed with lung cancer with metastasis to the bone  He was seen and evaluated  She was medicated for pain  His pain persisted  He was decided at that time that he would be admitted to the hospital for pain management and to see if we could facilitate his workup for his newly diagnosed cancer      Patient Progress  Patient progress: stable    CritCare Time    Disposition  Final diagnoses:   Intractable back pain - Lung cancer with bony metastasis T3, T7, T10, L1     Time reflects when diagnosis was documented in both MDM as applicable and the Disposition within this note     Time User Action Codes Description Comment    1/13/2018  2:30 PM Chantelle Ramirez Add [M54 9] Intractable back pain     1/13/2018  2:31 PM Chantelle Ramirez Modify [M54 9] Intractable back pain Lung cancer with bony metastasis T3, T7, T10, L1      ED Disposition     ED Disposition Condition Comment    Admit  Case was discussed with Dr Dylan Ayon and the patient's admission status was agreed to be Admission Status: inpatient status to the service of Dr Dylan Ayon          Follow-up Information    None       Current Discharge Medication List      CONTINUE these medications which have NOT CHANGED    Details   amLODIPine (NORVASC) 5 mg tablet Take 5 mg by mouth daily      doxazosin (CARDURA) 4 mg tablet Take 4 mg by mouth daily at bedtime      finasteride (PROSCAR) 5 mg tablet Take 5 mg by mouth daily      IRON PO Take 65 mg by mouth daily      levothyroxine 88 mcg tablet Take 88 mcg by mouth daily      losartan (COZAAR) 100 MG tablet Take 100 mg by mouth daily      predniSONE 20 mg tablet Take 20 mg by mouth daily      traMADol (ULTRAM) 50 mg tablet Take 50 mg by mouth every 6 (six) hours as needed for moderate pain           No discharge procedures on file      ED Provider  Electronically Signed by           Natty Guidry PA-C  01/13/18 7952

## 2018-01-13 NOTE — ASSESSMENT & PLAN NOTE
· Patient reports that he quit 2 days ago, initiated Nicoderm patch outpatient 21 mg per day  · Continue with Nicoderm patch

## 2018-01-14 ENCOUNTER — APPOINTMENT (INPATIENT)
Dept: CT IMAGING | Facility: HOSPITAL | Age: 78
DRG: 478 | End: 2018-01-14
Payer: COMMERCIAL

## 2018-01-14 LAB
ERYTHROCYTE [DISTWIDTH] IN BLOOD BY AUTOMATED COUNT: 17.4 % (ref 11.6–15.1)
FERRITIN SERPL-MCNC: 444 NG/ML (ref 8–388)
HCT VFR BLD AUTO: 31 % (ref 36.5–49.3)
HGB BLD-MCNC: 9.3 G/DL (ref 12–17)
IRON SATN MFR SERPL: 11 %
IRON SERPL-MCNC: 21 UG/DL (ref 65–175)
MCH RBC QN AUTO: 24.9 PG (ref 26.8–34.3)
MCHC RBC AUTO-ENTMCNC: 30 G/DL (ref 31.4–37.4)
MCV RBC AUTO: 83 FL (ref 82–98)
PLATELET # BLD AUTO: 291 THOUSANDS/UL (ref 149–390)
PMV BLD AUTO: 9 FL (ref 8.9–12.7)
RBC # BLD AUTO: 3.74 MILLION/UL (ref 3.88–5.62)
RETICS # AUTO: NORMAL 10*3/UL (ref 14356–105094)
RETICS # CALC: 0.95 % (ref 0.37–1.87)
TIBC SERPL-MCNC: 189 UG/DL (ref 250–450)
VIT B12 SERPL-MCNC: 1785 PG/ML (ref 100–900)
WBC # BLD AUTO: 8.9 THOUSAND/UL (ref 4.31–10.16)

## 2018-01-14 PROCEDURE — G8980 MOBILITY D/C STATUS: HCPCS

## 2018-01-14 PROCEDURE — 70470 CT HEAD/BRAIN W/O & W/DYE: CPT

## 2018-01-14 PROCEDURE — 97162 PT EVAL MOD COMPLEX 30 MIN: CPT

## 2018-01-14 PROCEDURE — G8979 MOBILITY GOAL STATUS: HCPCS

## 2018-01-14 PROCEDURE — 83550 IRON BINDING TEST: CPT | Performed by: INTERNAL MEDICINE

## 2018-01-14 PROCEDURE — 82607 VITAMIN B-12: CPT | Performed by: INTERNAL MEDICINE

## 2018-01-14 PROCEDURE — 85045 AUTOMATED RETICULOCYTE COUNT: CPT | Performed by: INTERNAL MEDICINE

## 2018-01-14 PROCEDURE — G8978 MOBILITY CURRENT STATUS: HCPCS

## 2018-01-14 PROCEDURE — 83010 ASSAY OF HAPTOGLOBIN QUANT: CPT | Performed by: INTERNAL MEDICINE

## 2018-01-14 PROCEDURE — 83540 ASSAY OF IRON: CPT | Performed by: INTERNAL MEDICINE

## 2018-01-14 PROCEDURE — 82728 ASSAY OF FERRITIN: CPT | Performed by: INTERNAL MEDICINE

## 2018-01-14 PROCEDURE — 85027 COMPLETE CBC AUTOMATED: CPT | Performed by: NURSE PRACTITIONER

## 2018-01-14 RX ORDER — POLYETHYLENE GLYCOL 3350 17 G/17G
17 POWDER, FOR SOLUTION ORAL DAILY
Status: DISCONTINUED | OUTPATIENT
Start: 2018-01-15 | End: 2018-01-15 | Stop reason: HOSPADM

## 2018-01-14 RX ADMIN — FINASTERIDE 5 MG: 5 TABLET, FILM COATED ORAL at 23:19

## 2018-01-14 RX ADMIN — OXYCODONE HYDROCHLORIDE 10 MG: 10 TABLET ORAL at 09:13

## 2018-01-14 RX ADMIN — LOSARTAN POTASSIUM 100 MG: 50 TABLET, FILM COATED ORAL at 09:13

## 2018-01-14 RX ADMIN — AMLODIPINE BESYLATE 5 MG: 5 TABLET ORAL at 09:13

## 2018-01-14 RX ADMIN — FERROUS SULFATE TAB 325 MG (65 MG ELEMENTAL FE) 325 MG: 325 (65 FE) TAB at 15:52

## 2018-01-14 RX ADMIN — DOCUSATE SODIUM 100 MG: 100 CAPSULE, LIQUID FILLED ORAL at 18:08

## 2018-01-14 RX ADMIN — POLYETHYLENE GLYCOL 3350 17 G: 17 POWDER, FOR SOLUTION ORAL at 09:13

## 2018-01-14 RX ADMIN — FERROUS SULFATE TAB 325 MG (65 MG ELEMENTAL FE) 325 MG: 325 (65 FE) TAB at 09:12

## 2018-01-14 RX ADMIN — SENNOSIDES 8.6 MG: 8.6 TABLET, FILM COATED ORAL at 09:13

## 2018-01-14 RX ADMIN — FERROUS SULFATE TAB 325 MG (65 MG ELEMENTAL FE) 325 MG: 325 (65 FE) TAB at 11:54

## 2018-01-14 RX ADMIN — OXYCODONE HYDROCHLORIDE 5 MG: 5 TABLET ORAL at 13:52

## 2018-01-14 RX ADMIN — DOCUSATE SODIUM 100 MG: 100 CAPSULE, LIQUID FILLED ORAL at 09:12

## 2018-01-14 RX ADMIN — IOHEXOL 85 ML: 350 INJECTION, SOLUTION INTRAVENOUS at 17:14

## 2018-01-14 RX ADMIN — NICOTINE 21 MG: 21 PATCH, EXTENDED RELEASE TRANSDERMAL at 09:19

## 2018-01-14 RX ADMIN — LEVOTHYROXINE SODIUM 88 MCG: 88 TABLET ORAL at 06:28

## 2018-01-14 NOTE — PROGRESS NOTES
Progress Note Reola Fabry 1940, 69 y o  male MRN: [id number]    Unit/Bed#: -01 Encounter: [id number]    Primary Care Provider: Noreen Peralta MD   Date and time admitted to hospital: 1/13/2018  9:04 AM        * Cancer associated pain   Assessment & Plan    · Pt recently diagnosed with highly suspicious metastatic cancer  · CT chest on 01/08/2018 showing a 2 0 cm spiculated nodule in the superior segment left lower lobe suspicious for malignancy  Large lytic lesion in the T7 and T10 vertebral bodies compatible with metastatic disease with minimal loss of stature and T10 suggesting pathologic fracture  Stable 6 mm nodules in the right middle lobe  · PET scan performed on 01/11/2018-FDG avid left lower lobe spiculated lesion compatible with hypermetabolic malignancy  FDG avid lymph nodes in the left perihilar and mediastinal regions compatible with metastasis  Multiple FDG avid osseous lesions compatible with osseous metastasis these are seen along the spine, ribs, sacrum, pelvis and bilateral proximal femur    Lesion at T3 transverse process, T7 vertebral body lesion, L1 lesion, lytic lesion at the left ilium, destructive lesion at the left bryson sacrum with underlying soft tissue component, lesion at the right femoral neck  · c/w oxycodone and morphine for pain control  · Consult palliative care for goals of care pain control  · Oncology consult appreciated   · Check CT head  · Plan for bx         Anemia   Assessment & Plan    · Monitor hemoglobin  · Recent iron level performed December was 16  · Continue with iron t i d   · Oncology checking haptoglobin, b12 and retic count         Constipation   Assessment & Plan    · Likely secondary to opioids  · Start Colace 100 mg b i d  and Senokot at night and MiraLax QD  · Consider lactulose if this persists on regimen        GAVE (gastric antral vascular ectasia)   Assessment & Plan    · Monitor hemoglobin- hgb improved today to 9 3  · Check fecal occult blood due to black tarry stool  · Pt follows with Dr Schaffer Record outpatient         Tobacco abuse   Assessment & Plan    · Patient reports that he quit, initiated Nicoderm patch outpatient 21 mg per day  · Continue with Nicoderm patch        BPH (benign prostatic hyperplasia)   Assessment & Plan    · Continue doxazosin and finasteride        Hypertension   Assessment & Plan    · Continue with amlodipine and Cozaar        Hypothyroid   Assessment & Plan    · TSH 1 296  · Continue with levothyroxine 88 mcg per day            VTE Pharmacologic Prophylaxis:   Pharmacologic:  Hold on anticoagulation until fecal occult blood cards back negative    Mechanical VTE Prophylaxis in Place: Yes    Patient Centered Rounds: I have performed bedside rounds with nursing staff today  Discussions with Specialists or Other Care Team Provider: d/w RN     Education and Discussions with Family / Patient: d/w patient and wife at bedside    Time Spent for Care: 30 minutes  More than 50% of total time spent on counseling and coordination of care as described above  Current Length of Stay: 1 day(s)    Current Patient Status: Inpatient   Certification Statement: The patient will continue to require additional inpatient hospital stay due to pending bx     Discharge Plan: not stable for discharge     Code Status: Level 2 - DNAR: but accepts endotracheal intubation      Subjective:   Pt reports pain has improved but he really hasnt gotten up and out of the bed yet  His pain is better controlled when he is lying still- 5/10 currently  + constipation  No other complaints    Objective:     Vitals:   Temp (24hrs), Av 3 °F (36 8 °C), Min:97 6 °F (36 4 °C), Max:98 8 °F (37 1 °C)    HR:  [63-70] 70  Resp:  [16] 16  BP: (114-131)/(63-66) 131/66  SpO2:  [95 %-97 %] 95 %  Body mass index is 28 98 kg/m²  Input and Output Summary (last 24 hours):        Intake/Output Summary (Last 24 hours) at 18 1447  Last data filed at 18 1300   Gross per 24 hour   Intake              320 ml   Output             2000 ml   Net            -1680 ml       Physical Exam:     Physical Exam   Constitutional: He is oriented to person, place, and time  No distress  Neck: Neck supple  Cardiovascular: Normal rate, regular rhythm, normal heart sounds and intact distal pulses  No murmur heard  Pulmonary/Chest: Effort normal  No respiratory distress  He has decreased breath sounds  He has no wheezes  He has no rales  Abdominal: Soft  Bowel sounds are normal  He exhibits no distension  There is no tenderness  There is no rebound  Musculoskeletal: He exhibits no edema or tenderness  Neurological: He is alert and oriented to person, place, and time  No cranial nerve deficit  Skin: Skin is warm and dry  No rash noted  He is not diaphoretic  No erythema  Pale   Psychiatric: He has a normal mood and affect  Additional Data:     Labs:      Results from last 7 days  Lab Units 01/14/18  0927 01/13/18  1043   WBC Thousand/uL 8 90 11 06*   HEMOGLOBIN g/dL 9 3* 8 2*   HEMATOCRIT % 31 0* 27 6*   PLATELETS Thousands/uL 291 303   NEUTROS PCT %  --  80*   LYMPHS PCT %  --  13*   MONOS PCT %  --  7   EOS PCT %  --  0       Results from last 7 days  Lab Units 01/13/18  1043   SODIUM mmol/L 139   POTASSIUM mmol/L 4 0   CHLORIDE mmol/L 105   CO2 mmol/L 26   BUN mg/dL 20   CREATININE mg/dL 1 06   CALCIUM mg/dL 8 9   TOTAL PROTEIN g/dL 6 4   BILIRUBIN TOTAL mg/dL 0 20   ALK PHOS U/L 107   ALT U/L 39   AST U/L 15   GLUCOSE RANDOM mg/dL 164*       Results from last 7 days  Lab Units 01/13/18  1043   INR  1 03       * I Have Reviewed All Lab Data Listed Above  * Additional Pertinent Lab Tests Reviewed:  All Labs Within Last 24 Hours Reviewed      Recent Cultures (last 7 days):           Last 24 Hours Medication List:     amLODIPine 5 mg Oral Daily   docusate sodium 100 mg Oral BID   doxazosin 4 mg Oral Daily   ferrous sulfate 325 mg Oral TID With Meals   finasteride 5 mg Oral HS   levothyroxine 88 mcg Oral Early Morning   losartan 100 mg Oral Daily   nicotine 21 mg Transdermal Daily   senna 1 tablet Oral Daily        Today, Patient Was Seen By: BETTY Mitchell    ** Please Note: Dictation voice to text software may have been used in the creation of this document   **

## 2018-01-14 NOTE — ASSESSMENT & PLAN NOTE
· Likely secondary to opioids  · Start Colace 100 mg b i d  and Senokot at night and MiraLax QD  · Consider lactulose if this persists on regimen

## 2018-01-14 NOTE — CONSULTS
Patient: Everton Barrera  Patient MRN: [id number]  Service date: 1/13/2018  Attending Physician:       CHIEF COMPLAIN  Chief Complaint   Patient presents with    Back Pain     pt who had recent diagnosis of spine, hip and lung CA, unknown stage, unknown source presents with bilatera upper back pain and lower back pain, states that was sent home with tramadol and it is not working  cannot get into oncology for biopsy until jan 24th       HISTORY OF PRESENT ILLNESS:  Everton Barrera is a 69 y o  male who has below mentioned medical history, presented with worsening of back pain  Previous imaging studies suggested lung mass and spine diffuse mass  He was originally scheduled as outpatient biopsy on January 24th  Ms  reported the pain is better controlled  He has no other cancer history in the past   He has no new headaches, vision changes, chest pain dyspnea hemoptysis abdominal pain nausea vomiting change of urination bowel movement habits  His only symptoms is back pain and some weight loss  Former smoker, mother had head neck cancer  Lives with wife about 21 mi from [place], good social support  PAST MEDICAL HISTORY:   has a past medical history of Anemia; BPH (benign prostatic hyperplasia); Cancer of lung ([facility] [address] ); Cancer of spine ([facility] [address] ); Disease of thyroid gland; GAVE (gastric antral vascular ectasia); Hypertension; Spinal stenosis; and Tobacco abuse  PAST SURGICAL HISTORY:   has no past surgical history on file  CURRENT MEDICATIONS  Scheduled Meds:  [START ON 1/14/2018] amLODIPine 5 mg Oral Daily   docusate sodium 100 mg Oral BID   [START ON 1/14/2018] doxazosin 4 mg Oral Daily   ferrous sulfate 325 mg Oral TID With Meals   finasteride 5 mg Oral HS   [START ON 1/14/2018] levothyroxine 88 mcg Oral Early Morning   [START ON 1/14/2018] losartan 100 mg Oral Daily   [START ON 1/14/2018] nicotine 21 mg Transdermal Daily   senna 1 tablet Oral Daily     Continuous Infusions:   PRN Meds:   acetaminophen    morphine injection    ondansetron    oxyCODONE    oxyCODONE    polyethylene glycol    SOCIAL HISTORY:   reports that he quit smoking 2 days ago  His smoking use included Cigarettes  He has a 7 50 pack-year smoking history  He has never used smokeless tobacco  He reports that he does not drink alcohol or use drugs  FAMILY HISTORY:  family history includes Diabetes in his father; Esophageal cancer in his mother; No Known Problems in his brother and sister  ALLERGIES:  has No Known Allergies  REVIEW OF SYSTEMS:  Please note that a 14-point review of systems was performed to include Constitutional, HEENT, Respiratory, CVS, GI, , Musculoskeletal, Integumentary, Neurologic, Rheumatologic, Endocrinologic, Psychiatric, Lymphatic, and Hematologic/Oncologic systems were reviewed and are negative unless otherwise stated in HPI  Positive and negative findings pertinent to this evaluation are incorporated into the history of present illness  PHYSICAL EXAMINATION:  Vital Signs: Temp:  [97 6 °F (36 4 °C)-98 4 °F (36 9 °C)] 98 4 °F (36 9 °C)  HR:  [60-70] 63  Resp:  [16-18] 16  BP: (112-130)/(57-65) 114/65  Body mass index is 28 98 kg/m²  Body surface area is 1 96 meters squared  Constitutional: Alert and oriented  HEENT: Anicteric, PERRLA  Chest: Decreased breathing sound bilaterally, No wheezes/rales/rhonchi  CVS: Regular rhythm  Normal rate  Abdomen: Soft, nontender, nondistended  Bowel sounds positive X 4  No palpable organomegaly  Extremities: No cyanosis/clubbing/edema  Integumentary: No obvious rashes or bruises  Musculoskeletal: No obvious bony or joint deformities  Psychiatric: Appropriate affect and mood  Lymph Node Survey: No palpable preauricular, submandibular, cervical, supraclavicular, axillary, epitrochlear or inguinal lymphadenopathy      LABS:    Results from last 7 days  Lab Units 01/13/18  1043   WBC Thousand/uL 11 06*   HEMATOCRIT % 27 6*   PLATELETS Thousands/uL 303   NEUTROS PCT % 80*   MONOS PCT % 7       Results from last 7 days  Lab Units 01/13/18  1043   SODIUM mmol/L 139   POTASSIUM mmol/L 4 0   CHLORIDE mmol/L 105   CO2 mmol/L 26   BUN mg/dL 20       Results from last 7 days  Lab Units 01/13/18  1043   BILIRUBIN TOTAL mg/dL 0 20   ALK PHOS U/L 107   ALT U/L 39   AST U/L 15       Results from last 7 days  Lab Units 01/13/18  1043   INR  1 03   PTT seconds 38*           Invalid input(s): TNI,  PCT              IMAGING:  XR chest 1 view portable   ED Interpretation   Known spiculated lung mass, pulmonary nodules  Final Result      No active disease  Workstation performed: [id number]         CT head w contrast    (Results Pending)       PROBLEM LIST:  Patient Active Problem List   Diagnosis    Hypothyroid    Hypertension    Spinal stenosis of thoracolumbar region    BPH (benign prostatic hyperplasia)    GAVE (gastric antral vascular ectasia)    Cancer of spine ([address]    Cancer [facility] lung ([facility] [address]    Anemia    Spinal stenosis    Tobacco abuse    Cancer associated pain    Constipation       ASSESSMENT/PLAN:  Elbert Holman is a 69 y o  male with:    1) highly suspicious for metastatic cancer:  Possible lung primary with bone metastatic lesion  Other etiologies possible  -- agree with biopsy  -- CT head to complete staging   -- overall prognosis, management was discussed with patient in detail  He expressed frustration, but willing to proceed with biopsy  He may consider comfort care/ hospice eventually  2):  Anemia:  Normocytic, hemoglobin 8 2, MCV 83  Likely due to chronic disease with cancer history  - no obvious bleeding, total bilirubin is 0 2 not consistent with hemolysis  No CKD  - check iron profile, haptoglobin, B12, retic count  - monitor CBC, transfuse if needed , keep Hb > 8           Cait Fan MD PhD  Hematology / Oncology

## 2018-01-14 NOTE — ASSESSMENT & PLAN NOTE
· Monitor hemoglobin  · Recent iron level performed December was 16  · Continue with iron t i d   · Oncology checking haptoglobin, b12 and retic count

## 2018-01-14 NOTE — CASE MANAGEMENT
Initial Clinical Review  Admission: Date/Time/Statement: 1/13/18 @ 1434   Orders Placed This Encounter   Procedures    Inpatient Admission (expected length of stay for this patient is greater than two midnights)     Standing Status:   Standing     Number of Occurrences:   1     Order Specific Question:   Admitting Physician     Answer:   Joan Hendrickson [[id number]]     Order Specific Question:   Level of Care     Answer:   Level 2 Stepdown / HOT [14]     Order Specific Question:   Estimated length of stay     Answer:   More than 2 Midnights     Order Specific Question:   Certification     Answer:   I certify that inpatient services are medically necessary for this patient for a duration of greater than two midnights  See H&P and MD Progress Notes for additional information about the patient's course of treatment   Inpatient Admission (expected length of stay for this patient is greater than two midnights)     Standing Status:   Standing     Number of Occurrences:   1     Order Specific Question:   Admitting Physician     Answer:   Joan Hendrickson [[id number]]     Order Specific Question:   Level of Care     Answer:   Med Surg [16]     Order Specific Question:   Estimated length of stay     Answer:   More than 2 Midnights     Order Specific Question:   Certification     Answer:   I certify that inpatient services are medically necessary for this patient for a duration of greater than two midnights  See H&P and MD Progress Notes for additional information about the patient's course of treatment     ED: Date/Time/Mode of Arrival:   ED Arrival Information     Expected Arrival Acuity Means of Arrival Escorted By Service Admission Type    - 1/13/2018 09:03 Urgent Ambulance [facility] General Medicine Urgent    Arrival Complaint    Back Pain      Chief Complaint:   Chief Complaint   Patient presents with    Back Pain     pt who had recent diagnosis of spine, hip and lung CA, unknown stage, unknown source presents with tawnyaa upper back pain and lower back pain, states that was sent home with tramadol and it is not working  cannot get into oncology for biopsy until jan 24th   History of Illness: This 41-year-old white male presents emergency room with intractable back pain  He was recently diagnosed with lung cancer with metastatic disease to the bone  He recently went for a PET scan that demonstrated that he had thoracic involvement as well as lumbar, ribs, sacrum, pelvis and and both femurs  Complains of intractable back pain that is unrelieved with tramadol  Denies any radicular symptoms of pain, numbness, weakness, tingling in his lower extremities  He denies any bowel or bladder incontinence  He denies any saddle anesthesia  He has had back pain on and off for the past month  He denies any weight loss  Initially had some plain x-rays of his lumbar spine that did not show any bony abnormalities  Follow-up CT scan is what diagnosis the lytic lesions  The PET scan confirmed the diagnosis  Past medical history is positive for anemia, cancer of the lung with metastasis to the bone, hypothyroidism, hypertension  Musculoskeletal: Examination of the thoracic and lumbar spine-  No tenderness upon palpation, right paravertebral spasm palpated mid dorsal area  ED Vital Signs:   ED Triage Vitals [01/13/18 0913]   Temperature Pulse Respirations Blood Pressure SpO2   97 6 °F (36 4 °C) 60 18 130/61 99 %      Temp Source Heart Rate Source Patient Position - Orthostatic VS BP Location FiO2 (%)   Oral Monitor Sitting Right arm --      Pain Score       8        Wt Readings from Last 1 Encounters:   01/13/18 83 9 kg (185 lb)   Vital Signs (abnormal): Abnormal Labs/Diagnostic Test Results:   WBC 11 06 HGB 8 2 PTT 38 GLUC 164 ALB 2 2  CXR=No active disease    ED Treatment:   Medication Administration from 01/13/2018 0903 to 01/13/2018 1507       Date/Time Order Dose Route Action Action by Comments     01/13/2018 1148 sodium chloride 0 9 % bolus 1,000 mL 0 mL Intravenous Stopped Mike Greenberg RN      01/13/2018 1046 sodium chloride 0 9 % bolus 1,000 mL 1,000 mL Intravenous Georginaet 37 Mike Greenberg RN      01/13/2018 1047 morphine (PF) 10 mg/mL injection 6 mg 6 mg Intravenous Given Mike Greenberg RN      01/13/2018 1046 ondansetron (ZOFRAN) injection 4 mg 4 mg Intravenous Given Mike Greenberg RN      01/13/2018 1429 morphine (PF) 4 mg/mL injection 4 mg 4 mg Intravenous Given Mike Greenberg RN       Past Medical/Surgical History: Active Ambulatory Problems     Diagnosis Date Noted    Hypothyroid     Hypertension     Spinal stenosis of thoracolumbar region 12/22/2017    BPH (benign prostatic hyperplasia) 12/22/2017    GAVE (gastric antral vascular ectasia) 12/22/2017    Spinal stenosis      Resolved Ambulatory Problems     Diagnosis Date Noted    SOB (shortness of breath) 12/22/2017    Acute tracheobronchitis 12/22/2017     Past Medical History:   Diagnosis Date    Anemia     BPH (benign prostatic hyperplasia)     Cancer of lung (HCC)     Cancer of spine ([address]     Disease of thyroid gland     GAVE (gastric antral vascular ectasia)     Hypertension     Spinal stenosis     Tobacco abuse    Admitting Diagnosis: Back pain [M54 9]  Intractable back pain [M54 9]  Age/Sex: 69 y o  male  Assessment/Plan:   * Cancer associated pain   Assessment & Plan     · Pt recently diagnosed with cancer of the lung, osseous metastasis and lymph node involvement  § CT chest on 01/08/2018 showing a 2 0 cm spiculated nodule in the superior segment left lower lobe suspicious for malignancy  Large lytic lesion in the T7 and T10 vertebral bodies compatible with metastatic disease with minimal loss of stature and T10 suggesting pathologic fracture  Stable 6 mm nodules in the right middle lobe  § PET scan performed on 01/11/2018-FDG avid left lower lobe spiculated lesion compatible with hypermetabolic malignancy    FDG avid lymph nodes in the left perihilar and mediastinal regions compatible with metastasis  Multiple FDG avid osseous lesions compatible with osseous metastasis these are seen along the spine, ribs, sacrum, pelvis and bilateral proximal femur  Lesion at T3 transverse process, T7 vertebral body lesion, L1 lesion, lytic lesion at the left ilium, destructive lesion at the left bryson sacrum with underlying soft tissue component, lesion at the right femoral neck  · Add oxycodone and morphine for pain control  · Consult palliative care for goals of care pain control  · Consult Oncology    Patient is scheduled for his 1st Oncology visit this coming Thursday any has a biopsy planned on 01/24/2018 outpatient       Anemia   Assessment & Plan     · Monitor hemoglobin  · Recent iron level performed December was 16  · Continue with iron t i d        Constipation   Assessment & Plan     · Likely secondary to opioids  · Start Colace 100 mg b i d  and Senokot at night  · MiraLax p r n        GAVE (gastric antral vascular ectasia)   Assessment & Plan     · Monitor hemoglobin  · Check fecal occult blood due to black tarry stool       Tobacco abuse   Assessment & Plan     · Patient reports that he quit 2 days ago, initiated Nicoderm patch outpatient 21 mg per day  · Continue with Nicoderm patch       BPH (benign prostatic hyperplasia)   Assessment & Plan     · Continue doxazosin and finasteride       Hypertension   Assessment & Plan     · Continue with amlodipine and Cozaar       Hypothyroid   Assessment & Plan     · TSH 1 296  · Continue with levothyroxine 88 mcg per day   Admission Orders:  MED SURG  PT EVAL & TX  MARIFER HASKINS  CONSULT ONCOLOGY  CONSULT PALLIATIVE CARE  Scheduled Meds:   amLODIPine 5 mg Oral Daily   docusate sodium 100 mg Oral BID   doxazosin 4 mg Oral Daily   ferrous sulfate 325 mg Oral TID With Meals   finasteride 5 mg Oral HS   levothyroxine 88 mcg Oral Early Morning   losartan 100 mg Oral Daily   nicotine 21 mg Transdermal Daily senna 1 tablet Oral Daily   Continuous Infusions:    PRN Meds:   acetaminophen    morphine injection    ondansetron    oxyCODONE     oxyCODONE    polyethylene glycol  Thank you,  [address] by [facility] Porter for 2017  Network Utilization Review Department  Phone: [phone]; Fax [phone]  ATTENTION: The Network Utilization Review Department is now centralized for our 7 Facilities  Make a note that we have a new phone and fax numbers for our Department  Please call with any questions or concerns to [phone] and carefully follow the prompts so that you are directed to the right person  All voicemails are confidential  Fax any determinations, approvals, denials, and requests for initial or continue stay review clinical to [phone]  Due to HIGH CALL volume, it would be easier if you could please send faxed requests to expedite your requests and in part, help us provide discharge notifications faster

## 2018-01-14 NOTE — ASSESSMENT & PLAN NOTE
· Patient reports that he quit, initiated Nicoderm patch outpatient 21 mg per day  · Continue with Nicoderm patch

## 2018-01-14 NOTE — ASSESSMENT & PLAN NOTE
· Pt recently diagnosed with highly suspicious metastatic cancer  · CT chest on 01/08/2018 showing a 2 0 cm spiculated nodule in the superior segment left lower lobe suspicious for malignancy  Large lytic lesion in the T7 and T10 vertebral bodies compatible with metastatic disease with minimal loss of stature and T10 suggesting pathologic fracture  Stable 6 mm nodules in the right middle lobe  · PET scan performed on 01/11/2018-FDG avid left lower lobe spiculated lesion compatible with hypermetabolic malignancy  FDG avid lymph nodes in the left perihilar and mediastinal regions compatible with metastasis  Multiple FDG avid osseous lesions compatible with osseous metastasis these are seen along the spine, ribs, sacrum, pelvis and bilateral proximal femur    Lesion at T3 transverse process, T7 vertebral body lesion, L1 lesion, lytic lesion at the left ilium, destructive lesion at the left bryson sacrum with underlying soft tissue component, lesion at the right femoral neck  · c/w oxycodone and morphine for pain control  · Consult palliative care for goals of care pain control  · Oncology consult appreciated   · Check CT head  · Plan for bx

## 2018-01-14 NOTE — PLAN OF CARE
Problem: Potential for Falls  Goal: Patient will remain free of falls  INTERVENTIONS:  - Assess patient frequently for physical needs  -  Identify cognitive and physical deficits and behaviors that affect risk of falls    -  [place] fall precautions as indicated by assessment   - Educate patient/family on patient safety including physical limitations  - Instruct patient to call for assistance with activity based on assessment  - Modify environment to reduce risk of injury  - Consider OT/PT consult to assist with strengthening/mobility   Outcome: Progressing      Problem: PAIN - ADULT  Goal: Verbalizes/displays adequate comfort level or baseline comfort level  Interventions:  - Encourage patient to monitor pain and request assistance  - Assess pain using appropriate pain scale  - Administer analgesics based on type and severity of pain and evaluate response  - Implement non-pharmacological measures as appropriate and evaluate response  - Consider cultural and social influences on pain and pain management  - Notify physician/advanced practitioner if interventions unsuccessful or patient reports new pain   Outcome: Progressing      Problem: INFECTION - ADULT  Goal: Absence or prevention of progression during hospitalization  INTERVENTIONS:  - Assess and monitor for signs and symptoms of infection  - Monitor lab/diagnostic results  - Monitor all insertion sites, i e  indwelling lines, tubes, and drains  - Monitor endotracheal (as able) and nasal secretions for changes in amount and color  - [place] appropriate cooling/warming therapies per order  - Administer medications as ordered  - Instruct and encourage patient and family to use good hand hygiene technique  - Identify and instruct in appropriate isolation precautions for identified infection/condition   Outcome: Progressing      Problem: SAFETY ADULT  Goal: Maintain or return to baseline ADL function  INTERVENTIONS:  -  Assess patient's ability to carry out ADLs; assess patient's baseline for ADL function and identify physical deficits which impact ability to perform ADLs (bathing, care of mouth/teeth, toileting, grooming, dressing, etc )  - Assess/evaluate cause of self-care deficits   - Assess range of motion  - Assess patient's mobility; develop plan if impaired  - Assess patient's need for assistive devices and provide as appropriate  - Encourage maximum independence but intervene and supervise when necessary  ¯ Involve family in performance of ADLs  ¯ Assess for home care needs following discharge   ¯ Request OT consult to assist with ADL evaluation and planning for discharge  ¯ Provide patient education as appropriate   Outcome: Progressing    Goal: Maintain or return mobility status to optimal level  INTERVENTIONS:  - Assess patient's baseline mobility status (ambulation, transfers, stairs, etc )    - Identify cognitive and physical deficits and behaviors that affect mobility  - Identify mobility aids required to assist with transfers and/or ambulation (gait belt, sit-to-stand, lift, walker, cane, etc )  - [place] fall precautions as indicated by assessment  - Record patient progress and toleration of activity level on Mobility SBAR; progress patient to next Phase/Stage  - Instruct patient to call for assistance with activity based on assessment  - Request Rehabilitation consult to assist with strengthening/weightbearing, etc    Outcome: Progressing

## 2018-01-14 NOTE — PHYSICAL THERAPY NOTE
PHYSICAL THERAPY EVALUATION  NAME:  Dawood Holley  DATE: 01/14/18    AGE:   69 y o  Mrn:   [id number]  ADMIT DX:  Back pain [M54 9]  Intractable back pain [M54 9]    Past Medical History:   Diagnosis Date    Anemia     BPH (benign prostatic hyperplasia)     [facility]     [facility] ([facility] )     Disease of thyroid gland     GAVE (gastric antral vascular ectasia)     Hypertension     Spinal stenosis     Tobacco abuse      Length Of Stay: 1  Performed at least 2 patient identifiers during session: Name, Birthday and wristband    PHYSICAL THERAPY EVALUATION :    01/14/18 1329   Note Type   Note type Eval only   Pain Assessment   Pain Assessment 0-10   Pain Score 2   Pain Location Back   Pain Orientation Bilateral  (paraspinals, not midsaggital)   Effect of Pain on Daily Activities increased pain more with distance ambulation, decreases with flexion   Patient's Stated Pain Goal No pain   Hospital Pain Intervention(s) Repositioned; Emotional support; Ambulation/increased activity   Home Living   Type of [address] One level  (no JOI)   Home Equipment (none)   Prior Function   Level of [place] Independent with ADLs and functional mobility   Lives With Spouse   Falls in the last 6 months 0   Restrictions/Precautions   Other Precautions Pain   General   Family/Caregiver Present Yes  (spouse)   Cognition   Overall Cognitive Status WFL   Arousal/Participation Alert   Orientation Level Oriented X4   Memory Within functional limits   Following Commands Follows one step commands without difficulty   RUE Strength   RUE Overall Strength Within Functional Limits - able to perform ADL tasks with strength   LUE Strength   LUE Overall Strength Within Functional Limits - able to perform ADL tasks with strength   RLE Assessment   RLE Assessment (negative SLR, piriformis, ITB, KEISHA)   Strength RLE   R Hip Flexion 4+/5   R Knee Extension 4+/5   R Ankle Dorsiflexion 4+/5   LLE Assessment   LLE Assessment (negative SLR, piriformis, ITB, KEISHA)   Strength LLE   L Hip Flexion 4+/5   L Knee Extension 4+/5   L Ankle Dorsiflexion 4+/5   Coordination   Movements are Fluid and Coordinated 1   Sensation (vision and hearing)   Light Touch   RLE Light Touch Grossly intact   LLE Light Touch Grossly intact   Bed Mobility   Supine to Sit 5  Supervision  (attempting to perform sit up style first time)   Additional items Assist x 1;Leg    Sit to Supine 6  Modified independent   Additional Comments Pt instructed to perform sidelying into sit style for supine->sit to minimize back pain  TRunk ROM WFL flexion, WFL extension, but + pain      Transfers   Sit to Stand 6  Modified independent   Additional items Assist x 1   Stand to Sit 6  Modified independent   Additional items Assist x 1   Additional Comments Prior to session, pt amb with HHA of wife in halls w/o A Device for >100'    Ambulation/Elevation   Gait pattern Forward Flexion  (no uncorrected losses of balance)   Gait Assistance 6  Modified independent   Additional items Assist x 1   Assistive Device None   Distance 20'   Balance   Static Sitting Normal   Static Standing Good   Ambulatory Good   Higher level balance (TUG=9 seconds w/o device)   Endurance Deficit   Endurance Deficit Yes   Endurance Deficit Description limited amb distance w/spouse in halls, compared to his norm   Activity Tolerance   Activity Tolerance Patient limited by fatigue;Patient limited by pain   Nurse Made Aware spoke to [facility] RN   Assessment   Prognosis Good   Problem List Decreased endurance;Pain   Barriers to Discharge None   Goals   Patient Goals less pain, to go home   Treatment Day 0   Plan   Treatment/Interventions Spoke to nursing   PT Frequency Other (Comment)  (DC from IPPT services)   Recommendation   Recommendation Home with family support   Equipment Recommended (Pt declined cane for higher balance demands)   Barthel Index   Feeding 10   Bathing 5   Grooming Score 5 Dressing Score 10   Bladder Score 10   Bowels Score 10   Toilet Use Score 10   Transfers (Bed/Chair) Score 15   Mobility (Level Surface) Score 0   Stairs Score 0   Barthel Index Score 75   (Please find full objective findings from PT assessment regarding body systems outlined above)  Assessment: Pt is a 69 y o  male seen for PT evaluation s/p admit to [facility] on 1/13/2018 w/ Cancer associated pain  Order placed for PT  Prior to admission, pt lived in one floor environment, lived with spouse and used no device for mobility  Upon evaluation: Pt requires no physical assistance for bed mobility, transfers, and ambulation with out device  Pt's clinical presentation is currently evolving given the functional mobility deficits above, especially decreased endurance and pain, combined with medical complications of abnormal H&H, abnormal WBCs and NOS back pain w/new dx of CA  From PT/mobility standpoint, recommendation at time of d/c would be home with family support pending progress in order to maximize pt's functional independence and consistency w/ mobility in order to facilitate return to PLOF  Pt declined cane use for higher level balance demands  During eval, pt/spouse provided education re: sidelying technique for bed mobility, GENTLE knee to chest stretches, but to stop if s/s worsen with the latter  Both verbalized good understanding  No further IPPT indicated at this time  The following objective measures were performed on IE: Barthel Index 75/100Timed Up and Go: 9 seconds (minimal to no risk for falls)  Comorbidities affecting pt's physical performance at time of assessment include: HTN and recent CA diagnosis, spinal stenosis, anemia, tobacco abuse  Personal factors affecting pt at time of IE include: advanced age    Deonna Mcqueen, PT, DPT

## 2018-01-15 ENCOUNTER — APPOINTMENT (INPATIENT)
Dept: CT IMAGING | Facility: HOSPITAL | Age: 78
DRG: 478 | End: 2018-01-15
Attending: SPECIALIST
Payer: COMMERCIAL

## 2018-01-15 VITALS
SYSTOLIC BLOOD PRESSURE: 126 MMHG | RESPIRATION RATE: 16 BRPM | DIASTOLIC BLOOD PRESSURE: 65 MMHG | BODY MASS INDEX: 29.03 KG/M2 | OXYGEN SATURATION: 95 % | WEIGHT: 185 LBS | TEMPERATURE: 98.3 F | HEART RATE: 69 BPM | HEIGHT: 67 IN

## 2018-01-15 PROCEDURE — 88307 TISSUE EXAM BY PATHOLOGIST: CPT | Performed by: SPECIALIST

## 2018-01-15 PROCEDURE — 20225 BONE BIOPSY TROCAR/NDL DEEP: CPT

## 2018-01-15 PROCEDURE — 88313 SPECIAL STAINS GROUP 2: CPT | Performed by: SPECIALIST

## 2018-01-15 PROCEDURE — 88342 IMHCHEM/IMCYTCHM 1ST ANTB: CPT | Performed by: SPECIALIST

## 2018-01-15 PROCEDURE — 0QB33ZX EXCISION OF LEFT PELVIC BONE, PERCUTANEOUS APPROACH, DIAGNOSTIC: ICD-10-PCS | Performed by: RADIOLOGY

## 2018-01-15 PROCEDURE — 99153 MOD SED SAME PHYS/QHP EA: CPT

## 2018-01-15 PROCEDURE — 88341 IMHCHEM/IMCYTCHM EA ADD ANTB: CPT | Performed by: SPECIALIST

## 2018-01-15 PROCEDURE — 77012 CT SCAN FOR NEEDLE BIOPSY: CPT

## 2018-01-15 PROCEDURE — 88311 DECALCIFY TISSUE: CPT | Performed by: SPECIALIST

## 2018-01-15 PROCEDURE — 99152 MOD SED SAME PHYS/QHP 5/>YRS: CPT

## 2018-01-15 PROCEDURE — 88333 PATH CONSLTJ SURG CYTO XM 1: CPT | Performed by: SPECIALIST

## 2018-01-15 RX ORDER — ACETAMINOPHEN 325 MG/1
650 TABLET ORAL EVERY 6 HOURS PRN
Qty: 30 TABLET | Refills: 0
Start: 2018-01-15 | End: 2018-06-11

## 2018-01-15 RX ORDER — NICOTINE 21 MG/24HR
1 PATCH, TRANSDERMAL 24 HOURS TRANSDERMAL DAILY
Qty: 28 PATCH | Refills: 0 | Status: SHIPPED | OUTPATIENT
Start: 2018-01-16 | End: 2018-02-23 | Stop reason: ALTCHOICE

## 2018-01-15 RX ORDER — MIDAZOLAM HYDROCHLORIDE 1 MG/ML
INJECTION INTRAMUSCULAR; INTRAVENOUS CODE/TRAUMA/SEDATION MEDICATION
Status: COMPLETED | OUTPATIENT
Start: 2018-01-15 | End: 2018-01-15

## 2018-01-15 RX ORDER — SENNOSIDES 8.6 MG
2 TABLET ORAL DAILY
Status: DISCONTINUED | OUTPATIENT
Start: 2018-01-16 | End: 2018-01-15 | Stop reason: HOSPADM

## 2018-01-15 RX ORDER — POLYETHYLENE GLYCOL 3350 17 G/17G
17 POWDER, FOR SOLUTION ORAL DAILY
Qty: 14 EACH | Refills: 0 | Status: SHIPPED | OUTPATIENT
Start: 2018-01-16 | End: 2018-02-23 | Stop reason: ALTCHOICE

## 2018-01-15 RX ORDER — OXYCODONE HYDROCHLORIDE 5 MG/1
5 TABLET ORAL EVERY 4 HOURS PRN
Qty: 30 TABLET | Refills: 0
Start: 2018-01-15 | End: 2018-01-25 | Stop reason: SDUPTHER

## 2018-01-15 RX ORDER — SENNOSIDES 8.6 MG
2 TABLET ORAL DAILY
Qty: 120 EACH | Refills: 0 | Status: SHIPPED | OUTPATIENT
Start: 2018-01-16 | End: 2018-02-23 | Stop reason: ALTCHOICE

## 2018-01-15 RX ORDER — FENTANYL CITRATE 50 UG/ML
INJECTION, SOLUTION INTRAMUSCULAR; INTRAVENOUS CODE/TRAUMA/SEDATION MEDICATION
Status: COMPLETED | OUTPATIENT
Start: 2018-01-15 | End: 2018-01-15

## 2018-01-15 RX ORDER — FERROUS SULFATE 325(65) MG
325 TABLET ORAL 2 TIMES DAILY WITH MEALS
Refills: 0
Start: 2018-01-15 | End: 2018-02-23 | Stop reason: ALTCHOICE

## 2018-01-15 RX ORDER — DOCUSATE SODIUM 100 MG/1
100 CAPSULE, LIQUID FILLED ORAL 2 TIMES DAILY
Qty: 10 CAPSULE | Refills: 0 | Status: SHIPPED | OUTPATIENT
Start: 2018-01-15 | End: 2018-02-23 | Stop reason: ALTCHOICE

## 2018-01-15 RX ADMIN — AMLODIPINE BESYLATE 5 MG: 5 TABLET ORAL at 08:01

## 2018-01-15 RX ADMIN — OXYCODONE HYDROCHLORIDE 5 MG: 5 TABLET ORAL at 12:36

## 2018-01-15 RX ADMIN — DOCUSATE SODIUM 100 MG: 100 CAPSULE, LIQUID FILLED ORAL at 08:01

## 2018-01-15 RX ADMIN — FENTANYL CITRATE 50 MCG: 50 INJECTION INTRAMUSCULAR; INTRAVENOUS at 15:07

## 2018-01-15 RX ADMIN — FERROUS SULFATE TAB 325 MG (65 MG ELEMENTAL FE) 325 MG: 325 (65 FE) TAB at 08:01

## 2018-01-15 RX ADMIN — OXYCODONE HYDROCHLORIDE 5 MG: 5 TABLET ORAL at 07:14

## 2018-01-15 RX ADMIN — MIDAZOLAM HYDROCHLORIDE 1 MG: 1 INJECTION, SOLUTION INTRAMUSCULAR; INTRAVENOUS at 14:59

## 2018-01-15 RX ADMIN — FENTANYL CITRATE 50 MCG: 50 INJECTION INTRAMUSCULAR; INTRAVENOUS at 14:59

## 2018-01-15 RX ADMIN — LEVOTHYROXINE SODIUM 88 MCG: 88 TABLET ORAL at 06:10

## 2018-01-15 RX ADMIN — MIDAZOLAM HYDROCHLORIDE 1 MG: 1 INJECTION, SOLUTION INTRAMUSCULAR; INTRAVENOUS at 15:07

## 2018-01-15 RX ADMIN — POLYETHYLENE GLYCOL 3350 17 G: 17 POWDER, FOR SOLUTION ORAL at 08:01

## 2018-01-15 RX ADMIN — NICOTINE 21 MG: 21 PATCH, EXTENDED RELEASE TRANSDERMAL at 08:01

## 2018-01-15 RX ADMIN — SENNOSIDES 8.6 MG: 8.6 TABLET, FILM COATED ORAL at 08:01

## 2018-01-15 RX ADMIN — LOSARTAN POTASSIUM 100 MG: 50 TABLET, FILM COATED ORAL at 08:01

## 2018-01-15 NOTE — PROGRESS NOTES
Progress Note -  Hematology/Oncology   Alida Martin 69 y o  male MRN: [id number]  Unit/Bed#: -01 Encounter: [id number]    HPI: Alida Martin is a 69y o  year old male who presented on 1/13 for back pain  He has a known lung mass as well as skeletal lesions which were in the process of being evaluated with an outpatient biopsy planned for 1/24  Please see Dr Elvis Alegre initial consult note dated 1/13 for details    Assessment/Plan:   #1 Spiculated lung mass with osseous lesions concerning for malignancy, s/p left iliac crest lytic lesion biopsy (1/15)  -outpatient PET-CT on 01/11 showed a left lower lobe spiculated lung mass concerning for primary malignancy  -multiple avid osseous lesions were also seen  -a biopsy was planned outpatient for next week however the patient presented on 01/13 for worsening back pain and a biopsy was scheduled for later today   -the concern is for primary lung malignancy with osseous involvement   -a CT head was performed this admission and did not demonstrate any acute pathology   -agree with oral pain control the a primary team   If pain not well controlled on oral regimen, palliative XRT can be considered for palliative management of pain   -the diagnosis and treatment plan will be discussed at outpatient follow-up on 1/30/2018 at 1:00 PM at the [address] [place] [address]  Discussed with to the primary team (Aly Reyes PA-C)  We will continue to follow this admission  Please contact us if you have any questions  Subjective: Today the patient reports his pain is better controlled since admission  He is currently on an oral regimen which he notes pain is approximately 2/10  He otherwise does not have any complaints  He specifically denies any shortness of breathing or chest pain  No lower extremity weakness or paresthesia  Review of Systems   All other systems reviewed and are negative        Objective:  /65   Pulse 69   Temp 98 3 °F (36 8 °C) (Oral)   Resp 16   Ht 5' 7" (1 702 m)   Wt 83 9 kg (185 lb)   SpO2 95%   BMI 28 98 kg/m²     Physical Exam   Constitutional: He is oriented to person, place, and time  He appears well-developed  HENT:   Mouth/Throat: Oropharynx is clear and moist    Eyes: Conjunctivae and EOM are normal  No scleral icterus  Neck: Normal range of motion  Cardiovascular: Normal rate and regular rhythm  Pulmonary/Chest: Effort normal  No respiratory distress  He has no wheezes  He exhibits no tenderness  Abdominal: Soft  He exhibits no distension  There is no tenderness  Musculoskeletal: He exhibits no edema or tenderness  Lymphadenopathy:     He has no cervical adenopathy  Neurological: He is alert and oriented to person, place, and time  Skin: Skin is warm and dry  Recent Labs      01/13/18   1043  01/14/18   0927   WBC  11 06*  8 90   HGB  8 2*  9 3*   PLT  303  291   MCV  83  83   RDW  17 3*  17 4*   CREATININE  1 06   --    AST  15   --    ALT  39   --      Laboratory studies were reviewed    Imaging Studies:        Pathology: None    Code Status: Level 2 - DNAR: but accepts endotracheal intubation    Counseling / Coordination of Care  Total floor / unit time spent today 30 minutes  Greater than 50% of total time was spent with the patient and / or family counseling and / or coordination of care

## 2018-01-15 NOTE — BRIEF OP NOTE (RAD/CATH)
CT biopsy of left iliac crest lytic lesion:  Procedure Note    PATIENT NAME: Army Armenta  : 1940  MRN: [id number]     Pre-op Diagnosis:   1  Intractable back pain    2  Cancer associated pain    3  Cancer of spine ([facility] )    4  Cancer of lung ([facility] )      Post-op Diagnosis:   1  Intractable back pain    2  Cancer associated pain    3  Cancer of spine ([facility] )    4  Cancer of lung [facility]        Surgeon:   Manolo Raygoza MD  Assistants:     No qualified resident was available, Resident is only observing    Estimated Blood Loss: minimal     Findings:     Lesional cells present  Specimens: 2 x 11 gauge cores of left iliac crest lytic lesion       Complications:  none    Anesthesia: Conscious sedation and Gladys Bazan MD     Date: 1/15/2018  Time: 3:31 PM

## 2018-01-15 NOTE — CONSULTS
Consultation - [address] Dalton 69 y o  male MRN: [id number]  Unit/Bed#: -01 Encounter: [id number]      Assessment/Plan     Assessment:  Suspected lung cancer with metastasis to bone (femurs, ribs, pelvis, lumbar spine) and lymph nodes  cancer related pain/ Back pain  Constipation  Anemia  Hypothyroidism  Hypertension  Gastric antral vascular ectasia  Former smoker  BPH    Plan:  Symptom management:  Cancer related pain: patient has used 20mg of oxycodone since 1/14/18  He is no longer requiring morphine IV for treatment of his pain  Continue oxycodone 5mg Q4H prn for pain  Establish care with palliative care outpatient  Will avoid NSAIDS given his anemia and gastric antral vascular ectasia    Nausea: asymptomatic    Constipation: continue colace; increase senna dose; may also consider lactulose    Goals of Care: Follow up with med/onc outpt when biopsy results are back  Level 2 code status    History of Present Illness   Physician Requesting Consult: Alyse Avila MD  Reason for Consult / Principal Problem: goals of care/ pain control  HPI: Creed Members is a 69y o  year old male with a recent work up for highly suspicious for metastatic lung cancer who presented to the hospital with intractable back pain  Patient underwent PET scan on 1/11/18 and he was found to have a left lower lobe spiculated lesion with hypermetabolic malignancy  He was also found to have avid lymph nodes int he left perihilar and mediastinal regions compatible with metastasis  Discussion with patient and his wife this am, revealed their frustrations/fear regarding patient's diagnosis  The wife explains he has had this for 1 month already and (Dec23rd- was the first time he presented with symptoms) and feels like no one is helping them   The patient reports he received bad news from the Oncology team over the weekend, but his wife was not here for the news, and therefore there is a lot of anxiety stemming from the wife as she feels she does not know anything that is going on  They have both expressed that if he cannot undergo the biopsy today, he wants to be discharged and they want to go to a different hospital     The patient reports his back pain is improving  He is having to use less medication each day  He reports the 10mg of oxycodone is too much and he prefers to use the 5mg dose  He also admits to constipation, but reports this often happens when he is not at home  Inpatient consult to Palliative Care  Consult performed by: Heike Grissom ordered by: Shaniqua Trejo          Review of Systems   Constitutional: Negative for unexpected weight change  HENT: Negative for congestion  Eyes: Negative for visual disturbance  Respiratory: Negative for shortness of breath  Gastrointestinal: Negative for abdominal pain, diarrhea, nausea and vomiting  Endocrine: Positive for polydipsia and polyuria  Negative for polyphagia  Genitourinary: Negative for difficulty urinating  Musculoskeletal: Positive for back pain  Skin: Negative for rash  Allergic/Immunologic: Negative for environmental allergies  Neurological: Negative for tremors and seizures  Psychiatric/Behavioral: The patient is nervous/anxious  Historical Information   Past Medical History:   Diagnosis Date    Anemia     BPH (benign prostatic hyperplasia)     Cancer of lung ([address] )     Disease of thyroid gland     GAVE (gastric antral vascular ectasia)     Hypertension     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HCC)     Spinal stenosis     Tobacco abuse      History reviewed  No pertinent surgical history    Social History     Social History    Marital status: /Civil Union     Spouse name: Haile Handler Number of children: 2    Years of education: N/A     Occupational History    retired       Social History Main Topics    Smoking status: Former Smoker     Packs/day: 0 50     Years: 15 00     Types: Cigarettes Quit date: 1/11/2018    Smokeless tobacco: Never Used    Alcohol use No    Drug use: No    Sexual activity: Not Asked     Other Topics Concern    None     Social History Narrative    None     Family History   Problem Relation Age of Onset    Esophageal cancer Mother     Diabetes Father     No Known Problems Sister     No Known Problems Brother        Meds/Allergies   all current active meds have been reviewed and current meds:   Current Facility-Administered Medications   Medication Dose Route Frequency    acetaminophen (TYLENOL) tablet 650 mg  650 mg Oral Q6H PRN    amLODIPine (NORVASC) tablet 5 mg  5 mg Oral Daily    docusate sodium (COLACE) capsule 100 mg  100 mg Oral BID    doxazosin (CARDURA) tablet 4 mg  4 mg Oral Daily    ferrous sulfate tablet 325 mg  325 mg Oral TID With Meals    finasteride (PROSCAR) tablet 5 mg  5 mg Oral HS    levothyroxine tablet 88 mcg  88 mcg Oral Early Morning    losartan (COZAAR) tablet 100 mg  100 mg Oral Daily    morphine (PF) 4 mg/mL injection 4 mg  4 mg Intravenous Q4H PRN    nicotine (NICODERM CQ) 21 mg/24 hr TD 24 hr patch 21 mg  21 mg Transdermal Daily    ondansetron (ZOFRAN) injection 4 mg  4 mg Intravenous Q6H PRN    oxyCODONE (ROXICODONE) immediate release tablet 10 mg  10 mg Oral Q4H PRN    oxyCODONE (ROXICODONE) IR tablet 5 mg  5 mg Oral Q4H PRN    polyethylene glycol (MIRALAX) packet 17 g  17 g Oral Daily    senna (SENOKOT) tablet 8 6 mg  1 tablet Oral Daily       Palliative Care Medications: OME: 10mg    No Known Allergies    Objective   Vitals:    01/15/18 1617   BP: 126/65   Pulse: 69   Resp: 16   Temp: 98 3 °F (36 8 °C)   SpO2: 95%       Physical Exam   Constitutional: He is oriented to person, place, and time  He appears well-developed and well-nourished  No distress  HENT:   Head: Normocephalic and atraumatic  Eyes: Conjunctivae and EOM are normal  Right eye exhibits no discharge  Left eye exhibits no discharge  No scleral icterus  Neck: Normal range of motion  Cardiovascular: Normal rate and regular rhythm  No murmur heard  Pulmonary/Chest: Effort normal and breath sounds normal  No tachypnea and no bradypnea  No respiratory distress  He has no wheezes  He has no rales  Abdominal: Soft  Bowel sounds are normal  He exhibits no distension  There is no tenderness  There is no rigidity and no guarding  Musculoskeletal: Normal range of motion  He exhibits no edema or tenderness  Neurological: He is alert and oriented to person, place, and time  No cranial nerve deficit  GCS eye subscore is 4  GCS verbal subscore is 5  GCS motor subscore is 6  Skin: Skin is warm and dry  No rash noted  He is not diaphoretic  Psychiatric: His speech is normal  Judgment and thought content normal  His mood appears anxious  Cognition and memory are normal        Lab Results: I have personally reviewed pertinent labs  Code Status: Level 2 - DNAR: but accepts endotracheal intubation  Advance Directive and Living Will:      Power of :    POLST:      Counseling / Coordination of Care  Total floor / unit time spent today 45 minutes  Greater than 50% of total time was spent with the patient and / or family counseling and / or coordination of care  A description of the counseling / coordination of care: long discussion with patient and his wife  Updated SLIM PA

## 2018-01-15 NOTE — ASSESSMENT & PLAN NOTE
· Based on PET scan findings this is most likely lung cancer with metastasis to the spine, but cannot be confirmed without biopsy  · Case was discussed in detail with Oncology and with palliative medicine team and with wife at bedside  Patient and his wife were extremely frustrated with waiting for the biopsy as this has been going on for 4 weeks as an outpatient and they want to proceed with treatment as soon as possible  I was able to contact interventional Radiology and arrange for the biopsy to be done at 2:00 p m  today which they are very happy about    If he tolerates the procedure he is interested in being discharged this evening and will have a follow-up appointment in 1-2 weeks as an outpatient with Oncology to discuss the results

## 2018-01-15 NOTE — PROGRESS NOTES
Discharge Summary   Drake Izquierdo 1940, 69 y o  male MRN: [id number]    Unit/Bed#: -01 Encounter: [id number]    Primary Care Provider: Dilia Fritz MD   Date and time admitted to hospital: 1/13/2018  9:04 AM    * Metastatic cancer to bone [facility])   Assessment & Plan    · Based on PET scan findings this is most likely lung cancer with metastasis to the spine, but cannot be confirmed without biopsy  · Case was discussed in detail with Oncology and with palliative medicine team and with wife at bedside  Patient and his wife were extremely frustrated with waiting for the biopsy as this has been going on for 4 weeks as an outpatient and they want to proceed with treatment as soon as possible  I was able to contact interventional Radiology and arrange for the biopsy to be done at 2:00 p m  today which they are very happy about    If he tolerates the procedure he is interested in being discharged this evening and will have a follow-up appointment in 1-2 weeks as an outpatient with Oncology to discuss the results        Cancer associated pain   Assessment & Plan    · Patient reports that his pain is controlled on current po regimen  · Appreciate HPM involvement        Constipation   Assessment & Plan    · Continue bowel regimen        Tobacco abuse   Assessment & Plan    · Patient reports that he quit, initiated Nicoderm patch outpatient 21 mg per day  · Continue with Nicoderm patch          Consultations During Hospital Stay:  · HPM  · oncology    Procedures Performed:     · 1/15/18 IR biopsy  · 1/14/18 CT head: normal  · 1/13/18 CXR no active disease    Significant Findings / Test Results:     · See above    Incidental Findings:   · none    Test Results Pending at Discharge (will require follow up):   · biopsy     Outpatient Tests Requested:  · none    Complications:  none    Reason for Admission: pain    Hospital Course:     Drake Izquierdo is a 69 y o  male patient who originally presented to the hospital on 1/13/2018 due to intractable back pain  Patient had recently been found as an outpatient to have a spiculated mass with concern for metastatic bone disease and was planned for a bone biopsy on January 24th  Upon admission to the hospital with complaints of pain he was initiated on a regimen for pain and for constipation and was seen by palliative medicine who assisted in medication management  He was also seen in consultation by Oncology regarding the abnormal PET scan and his biopsy was moved up and done as an inpatient on January 15th in interventional radiology  His pain overall is controlled and he is ambulating without difficulty and is interested in discharging home after the procedure but will need a follow-up with Oncology to review the results and formulate a treatment plan  Please see above list of diagnoses and related plan for additional information  Condition at Discharge: fair     Discharge Day Visit / Exam:     Subjective:  Patient reports that his pain overall is controlled on the current oral regimen  He and his wife are simply interested in making sure that the biopsy gets done so they can proceed with treatment as soon as possible as there frustrated at the lack of progress as an outpatient  Prior to this hospitalization patient was not experiencing any shortness of breath, cough, or hemoptysis  He did report a 5 lb weight loss and of course as previously mentioned was experiencing back pain  Vitals: Blood Pressure: 124/58 (01/15/18 0721)  Pulse: 66 (01/15/18 0721)  Temperature: 98 3 °F (36 8 °C) (01/15/18 0721)  Temp Source: Oral (01/15/18 0721)  Respirations: 18 (01/15/18 0721)  Height: 5' 7" (170 2 cm) (01/13/18 1627)  Weight - Scale: 83 9 kg (185 lb) (01/13/18 0913)  SpO2: 97 % (01/15/18 0721)  Exam:   Physical Exam   Constitutional: He appears well-developed and well-nourished  No distress  HENT:   Head: Normocephalic and atraumatic     Eyes: Conjunctivae are normal  Right eye exhibits no discharge  Left eye exhibits no discharge  No scleral icterus  Neck: Neck supple  Cardiovascular: Normal rate, regular rhythm and normal heart sounds  No murmur heard  Pulmonary/Chest: Effort normal and breath sounds normal  No respiratory distress  He has no wheezes  He has no rales  He exhibits no tenderness  Abdominal: Soft  Bowel sounds are normal  He exhibits no distension  There is no tenderness  Musculoskeletal: He exhibits no edema  Neurological: He is alert  Awake alert and interactive he is very pleasant and cooperative   Skin: Skin is warm and dry  No rash noted  He is not diaphoretic  No erythema  No pallor  Psychiatric: He has a normal mood and affect  His behavior is normal  Judgment and thought content normal    Nursing note and vitals reviewed  Discussion with Family: wife    Discharge instructions/Information to patient and family:   See after visit summary for information provided to patient and family  Provisions for Follow-Up Care:  See after visit summary for information related to follow-up care and any pertinent home health orders  Disposition:     Home    For Discharges to [facility] SNF:   · Not Applicable to this Patient - Not Applicable to this Patient    Planned Readmission: none     Discharge Statement:  I spent 45 minutes discharging the patient  This time was spent on the day of discharge  I had direct contact with the patient on the day of discharge  Greater than 50% of the total time was spent examining patient, answering all patient questions, arranging and discussing plan of care with patient as well as directly providing post-discharge instructions  Additional time then spent on discharge activities  Case was discussed directly with Interventional Radiology, palliative medicine, case management, nursing team, and I also rounded with Oncology      Discharge Medications:  See after visit summary for reconciled discharge medications provided to patient and family        ** Please Note: This note has been constructed using a voice recognition system **

## 2018-01-15 NOTE — DISCHARGE INSTRUCTIONS
POST BIOPSY    Care after your procedure:    1  Limit your activities for 24 hours after your biopsy  2  No driving day of biopsy  3  Return to your normal diet  Small sips of flat soda will help with mild nausea  4  Remove band-aid or dressing 24 hours after procedure  Contact Interventional Radiology at [phone] Kyle PATIENTS: Contact Interventional Radiology at [phone]) Eric Brinda PATIENTS: Contact Interventional Radiology at [phone]) if:    1  Difficulty breathing, nausea or vomiting  2  Chills or fever above 101 degrees F      3  Pain at biopsy site not relieved by medication  4  Develop any redness, swelling, heat, unusual drainage, heavy bruising or bleeding from biopsy site

## 2018-01-15 NOTE — DISCHARGE SUMMARY
Discharge Summary   Angelito Serna 1940, 69 y o  male MRN: [id number]     Unit/Bed#: -01 Encounter: [id number]     Primary Care Provider: Paulo Cooley MD   Date and time admitted to hospital: 1/13/2018  9:04 AM         * Metastatic cancer to bone [facility])   Assessment & Plan     · Based on PET scan findings this is most likely lung cancer with metastasis to the spine, but cannot be confirmed without biopsy  · Case was discussed in detail with Oncology and with palliative medicine team and with wife at bedside  Patient and his wife were extremely frustrated with waiting for the biopsy as this has been going on for 4 weeks as an outpatient and they want to proceed with treatment as soon as possible  I was able to contact interventional Radiology and arrange for the biopsy to be done at 2:00 p m  today which they are very happy about    If he tolerates the procedure he is interested in being discharged this evening and will have a follow-up appointment in 1-2 weeks as an outpatient with Oncology to discuss the results       Cancer associated pain   Assessment & Plan     · Patient reports that his pain is controlled on current po regimen  · Appreciate HPM involvement       Constipation   Assessment & Plan     · Continue bowel regimen       Tobacco abuse   Assessment & Plan     · Patient reports that he quit, initiated Nicoderm patch outpatient 21 mg per day  · Continue with Nicoderm patch          Consultations During Hospital Stay:  · HPM  · oncology     Procedures Performed:      · 1/15/18 IR biopsy  · 1/14/18 CT head: normal  · 1/13/18 CXR no active disease     Significant Findings / Test Results:      · See above     Incidental Findings:   · none     Test Results Pending at Discharge (will require follow up):   · biopsy     Outpatient Tests Requested:  · none     Complications:  none     Reason for Admission: pain     Hospital Course:      Angelito Serna is a 69 y o  male patient who originally presented to the hospital on 1/13/2018 due to intractable back pain  Patient had recently been found as an outpatient to have a spiculated mass with concern for metastatic bone disease and was planned for a bone biopsy on January 24th  Upon admission to the hospital with complaints of pain he was initiated on a regimen for pain and for constipation and was seen by palliative medicine who assisted in medication management  He was also seen in consultation by Oncology regarding the abnormal PET scan and his biopsy was moved up and done as an inpatient on January 15th in interventional radiology  His pain overall is controlled and he is ambulating without difficulty and is interested in discharging home after the procedure but will need a follow-up with Oncology to review the results and formulate a treatment plan      Please see above list of diagnoses and related plan for additional information       Condition at Discharge: fair      Discharge Day Visit / Exam:      Subjective:  Patient reports that his pain overall is controlled on the current oral regimen  He and his wife are simply interested in making sure that the biopsy gets done so they can proceed with treatment as soon as possible as there frustrated at the lack of progress as an outpatient      Prior to this hospitalization patient was not experiencing any shortness of breath, cough, or hemoptysis  He did report a 5 lb weight loss and of course as previously mentioned was experiencing back pain      Vitals: Blood Pressure: 124/58 (01/15/18 0721)  Pulse: 66 (01/15/18 0721)  Temperature: 98 3 °F (36 8 °C) (01/15/18 0721)  Temp Source: Oral (01/15/18 0721)  Respirations: 18 (01/15/18 0721)  Height: 5' 7" (170 2 cm) (01/13/18 1627)  Weight - Scale: 83 9 kg (185 lb) (01/13/18 0913)  SpO2: 97 % (01/15/18 0721)  Exam:   Physical Exam   Constitutional: He appears well-developed and well-nourished  No distress  HENT:   Head: Normocephalic and atraumatic  Eyes: Conjunctivae are normal  Right eye exhibits no discharge  Left eye exhibits no discharge  No scleral icterus  Neck: Neck supple  Cardiovascular: Normal rate, regular rhythm and normal heart sounds  No murmur heard  Pulmonary/Chest: Effort normal and breath sounds normal  No respiratory distress  He has no wheezes  He has no rales  He exhibits no tenderness  Abdominal: Soft  Bowel sounds are normal  He exhibits no distension  There is no tenderness  Musculoskeletal: He exhibits no edema  Neurological: He is alert  Awake alert and interactive he is very pleasant and cooperative   Skin: Skin is warm and dry  No rash noted  He is not diaphoretic  No erythema  No pallor  Psychiatric: He has a normal mood and affect  His behavior is normal  Judgment and thought content normal    Nursing note and vitals reviewed         Discussion with Family: wife     Discharge instructions/Information to patient and family:   See after visit summary for information provided to patient and family        Provisions for Follow-Up Care:  See after visit summary for information related to follow-up care and any pertinent home health orders        Disposition:      Home     For Discharges to [facility]:   · Not Applicable to this Patient - Not Applicable to this Patient     Planned Readmission: none     Discharge Statement:  I spent 45 minutes discharging the patient  This time was spent on the day of discharge  I had direct contact with the patient on the day of discharge  Greater than 50% of the total time was spent examining patient, answering all patient questions, arranging and discussing plan of care with patient as well as directly providing post-discharge instructions  Additional time then spent on discharge activities    Case was discussed directly with Interventional Radiology, palliative medicine, case management, nursing team, and I also rounded with Oncology      Discharge Medications:  See after visit summary for reconciled discharge medications provided to patient and family        ** Please Note: This note has been constructed using a voice recognition system **

## 2018-01-15 NOTE — PLAN OF CARE
Problem: Potential for Falls  Goal: Patient will remain free of falls  INTERVENTIONS:  - Assess patient frequently for physical needs  -  Identify cognitive and physical deficits and behaviors that affect risk of falls    -  [place] fall precautions as indicated by assessment   - Educate patient/family on patient safety including physical limitations  - Instruct patient to call for assistance with activity based on assessment  - Modify environment to reduce risk of injury  - Consider OT/PT consult to assist with strengthening/mobility   Outcome: Completed Date Met: 01/15/18      Problem: PAIN - ADULT  Goal: Verbalizes/displays adequate comfort level or baseline comfort level  Interventions:  - Encourage patient to monitor pain and request assistance  - Assess pain using appropriate pain scale  - Administer analgesics based on type and severity of pain and evaluate response  - Implement non-pharmacological measures as appropriate and evaluate response  - Consider cultural and social influences on pain and pain management  - Notify physician/advanced practitioner if interventions unsuccessful or patient reports new pain   Outcome: Completed Date Met: 01/15/18      Problem: INFECTION - ADULT  Goal: Absence or prevention of progression during hospitalization  INTERVENTIONS:  - Assess and monitor for signs and symptoms of infection  - Monitor lab/diagnostic results  - Monitor all insertion sites, i e  indwelling lines, tubes, and drains  - Monitor endotracheal (as able) and nasal secretions for changes in amount and color  - [place] appropriate cooling/warming therapies per order  - Administer medications as ordered  - Instruct and encourage patient and family to use good hand hygiene technique  - Identify and instruct in appropriate isolation precautions for identified infection/condition   Outcome: Completed Date Met: 01/15/18      Problem: SAFETY ADULT  Goal: Maintain or return to baseline ADL function  INTERVENTIONS:  -  Assess patient's ability to carry out ADLs; assess patient's baseline for ADL function and identify physical deficits which impact ability to perform ADLs (bathing, care of mouth/teeth, toileting, grooming, dressing, etc )  - Assess/evaluate cause of self-care deficits   - Assess range of motion  - Assess patient's mobility; develop plan if impaired  - Assess patient's need for assistive devices and provide as appropriate  - Encourage maximum independence but intervene and supervise when necessary  ¯ Involve family in performance of ADLs  ¯ Assess for home care needs following discharge   ¯ Request OT consult to assist with ADL evaluation and planning for discharge  ¯ Provide patient education as appropriate   Outcome: Completed Date Met: 01/15/18    Goal: Maintain or return mobility status to optimal level  INTERVENTIONS:  - Assess patient's baseline mobility status (ambulation, transfers, stairs, etc )    - Identify cognitive and physical deficits and behaviors that affect mobility  - Identify mobility aids required to assist with transfers and/or ambulation (gait belt, sit-to-stand, lift, walker, cane, etc )  - [place] fall precautions as indicated by assessment  - Record patient progress and toleration of activity level on Mobility SBAR; progress patient to next Phase/Stage  - Instruct patient to call for assistance with activity based on assessment  - Request Rehabilitation consult to assist with strengthening/weightbearing, etc    Outcome: Completed Date Met: 01/15/18

## 2018-01-16 ENCOUNTER — GENERIC CONVERSION - ENCOUNTER (OUTPATIENT)
Dept: OTHER | Facility: OTHER | Age: 78
End: 2018-01-16

## 2018-01-16 LAB — HAPTOGLOB SERPL-MCNC: 454 MG/DL (ref 34–200)

## 2018-01-16 NOTE — CASE MANAGEMENT
Notification of Discharge  This is a Notification of Discharge from our facility [address]  Please be advised that this patient has been discharge from our facility  Below you will find the admission and discharge date and time including the patients disposition  PRESENTATION DATE: 1/13/2018  9:04 AM  IP ADMISSION DATE: 1/13/18 1434  DISCHARGE DATE: 1/15/2018  4:50 PM  DISPOSITION: [address] [place] [address] in the [facility] by Cody Porter for 2017  Network Utilization Review Department  Phone: [phone]; Fax [phone]  ATTENTION: The Network Utilization Review Department is now centralized for our 7 Facilities  Make a note that we have a new phone and fax numbers for our Department  Please call with any questions or concerns to [phone] and carefully follow the prompts so that you are directed to the right person  All voicemails are confidential  Fax any determinations, approvals, denials, and requests for initial or continue stay review clinical to [phone]  Due to HIGH CALL volume, it would be easier if you could please send faxed requests to expedite your requests and in part, help us provide discharge notifications faster

## 2018-01-22 ENCOUNTER — GENERIC CONVERSION - ENCOUNTER (OUTPATIENT)
Dept: OTHER | Facility: OTHER | Age: 78
End: 2018-01-22

## 2018-01-23 NOTE — PROGRESS NOTES
Discussion/Summary  Social Work-Discussion Summary [facility]: Patient is being seen for an initial assessment   [facility] LSW met with pt  and wife with physicians while pt  was hospitalized yesterday  Introduced palliative team and philosophy  Pt  and wife will f/u in outpatient clinic once cancer treatment plan has been decided  Wife expressed concern about financial obligations for cancer care  They both live off their SS checks and tap into an ESAU if needed  Message left for Francis Raymond, Oncology Financial Counselor to reach out to pt  for assistance  Will follow and assist as needed        Signatures   Electronically signed by : Sanket Bennett LCSW; Jan 16 2018 12:47PM EST                       (Author)

## 2018-01-24 NOTE — MISCELLANEOUS
Message     Recorded as Task   Date: 01/22/2018 10:46 AM, Created By: Renny Correa   Task Name: Call Back   Assigned To: Marco Antonio List   Regarding Patient: Dawit Marc, Status: In Progress   Comment:    Michelle Christian - 22 Jan 2018 10:46 AM     TASK CREATED  This patient was seen in the ER by  Dr Kelsi Puente and had a bone biopsy done  Hossein Paul called, Celester Ku wife, wanting to know the results of the biospy and what are the next steps  She can be reached at [phone]  Marco Antonio List - 22 Jan 2018 11:17 AM     TASK IN PROGRESS   Called and reviewed that patient has a follow up appointment 1/30 at 1pm with AGUSTIN Conklin to review results of bone biopsy  Patient's wife was very adamant she would like a phone call with the results so they can begin treatment  She stated Dr Maged Bourgeois will be there doctor  I reviewed I would contact his nurse and review the information with her so she can be aware they would like contact sooner than 1/30  Active Problems    1  HTN (hypertension) (401 9) (I10)    Current Meds   1  Benicar 40 MG Oral Tablet (Olmesartan Medoxomil); Therapy: (Recorded:10Jan2018) to Recorded   2  Dexamethasone 4 MG Oral Tablet; Therapy: (Recorded:10Jan2018) to Recorded   3  Finasteride 5 MG Oral Tablet; Therapy: (Recorded:10Jan2018) to Recorded   4  Levothyroxine Sodium 88 MCG Oral Tablet; Therapy: (Recorded:10Jan2018) to Recorded   5  Losartan Potassium 100 MG Oral Tablet; Therapy: (Recorded:10Jan2018) to Recorded   6  Meloxicam 15 MG Oral Tablet; Therapy: (Recorded:10Jan2018) to Recorded   7  Norvasc 5 MG Oral Tablet (AmLODIPine Besylate); Therapy: (Recorded:10Jan2018) to Recorded   8  Omeprazole 40 MG Oral Capsule Delayed Release; Therapy: (Recorded:10Jan2018) to Recorded   9  TraMADol HCl - 50 MG Oral Tablet; Therapy: (Recorded:10Jan2018) to Recorded    Allergies    1   No Known Drug Allergies    Signatures   Electronically signed by : Arlina Councilman, ; Jan 22 2018 11: 21AM EST                       (Author)

## 2018-01-24 NOTE — PROGRESS NOTES
Discussion/Summary  Social Work-Discussion Summary [facility]: Patient is being seen for an ongoing assessment/follow up  Received call from wife, Ludivina Duncan regarding next Heme-Onc appointment  She expressed frustration about having to wait until January 30 for consultation and results of biopsy  Requested help getting an earlier appt  Called [facility] and able to reschedule for this Thursday, 1/25 at 1430 at [facility], [address]  Updated information given to Ludivina Duncan  Provided address as well  She was very appreciative  Will follow for additional support as needed        Signatures   Electronically signed by : Hossein Cheung LCSW; Jan 22 2018  2:54PM EST                       (Author)

## 2018-01-25 ENCOUNTER — OFFICE VISIT (OUTPATIENT)
Dept: HEMATOLOGY ONCOLOGY | Facility: CLINIC | Age: 78
End: 2018-01-25
Payer: COMMERCIAL

## 2018-01-25 VITALS
OXYGEN SATURATION: 98 % | BODY MASS INDEX: 28.7 KG/M2 | SYSTOLIC BLOOD PRESSURE: 98 MMHG | HEART RATE: 71 BPM | WEIGHT: 178.6 LBS | TEMPERATURE: 98.2 F | DIASTOLIC BLOOD PRESSURE: 58 MMHG | RESPIRATION RATE: 16 BRPM | HEIGHT: 66 IN

## 2018-01-25 DIAGNOSIS — C79.51 METASTATIC CANCER TO BONE (HCC): ICD-10-CM

## 2018-01-25 DIAGNOSIS — G89.3 CANCER RELATED PAIN: ICD-10-CM

## 2018-01-25 DIAGNOSIS — D50.0 IRON DEFICIENCY ANEMIA DUE TO CHRONIC BLOOD LOSS: ICD-10-CM

## 2018-01-25 DIAGNOSIS — C34.32 CANCER OF LOWER LOBE OF LEFT LUNG (HCC): Primary | ICD-10-CM

## 2018-01-25 PROBLEM — D50.9 IRON DEFICIENCY ANEMIA: Status: ACTIVE | Noted: 2018-01-25

## 2018-01-25 PROCEDURE — 99215 OFFICE O/P EST HI 40 MIN: CPT | Performed by: PHYSICIAN ASSISTANT

## 2018-01-25 RX ORDER — FOLIC ACID 1 MG/1
1 TABLET ORAL DAILY
Qty: 90 TABLET | Refills: 0 | Status: SHIPPED | OUTPATIENT
Start: 2018-01-25 | End: 2018-04-24 | Stop reason: SDUPTHER

## 2018-01-25 RX ORDER — OXYCODONE HYDROCHLORIDE 5 MG/1
5 TABLET ORAL EVERY 4 HOURS PRN
Qty: 30 TABLET | Refills: 0 | Status: SHIPPED | OUTPATIENT
Start: 2018-01-25 | End: 2018-02-04

## 2018-01-25 RX ORDER — ONDANSETRON 4 MG/1
4 TABLET, FILM COATED ORAL EVERY 6 HOURS PRN
Qty: 30 TABLET | Refills: 1 | Status: SHIPPED | OUTPATIENT
Start: 2018-01-25 | End: 2018-03-14 | Stop reason: CLARIF

## 2018-01-25 RX ORDER — DEXAMETHASONE 4 MG/1
4 TABLET ORAL 2 TIMES DAILY PRN
Qty: 12 TABLET | Refills: 0 | Status: SHIPPED | OUTPATIENT
Start: 2018-01-25 | End: 2018-01-29 | Stop reason: SDUPTHER

## 2018-01-25 RX ORDER — DEXAMETHASONE 4 MG/1
4 TABLET ORAL 2 TIMES DAILY PRN
Qty: 12 TABLET | Refills: 1 | Status: SHIPPED | OUTPATIENT
Start: 2018-01-25 | End: 2018-01-25 | Stop reason: SDUPTHER

## 2018-01-25 RX ORDER — OXYCODONE HYDROCHLORIDE 5 MG/1
5 TABLET ORAL EVERY 4 HOURS PRN
Qty: 30 TABLET | Refills: 0
Start: 2018-01-25 | End: 2018-01-25 | Stop reason: SDUPTHER

## 2018-01-25 NOTE — PROGRESS NOTES
Assessment and plan:  1  Metastatic adenocarcinoma   - Likely lung primary due to known lung mass, the disease in lymph nodes, extensive bone metastases   - Plan for Carboplatin AUC 5, Alimta 500 mg/m2, Keytruda 200 mg     - Discussed side effects include but are not limited to: cytopenias, nausea, vomiting, constipation, diarrhea, decreased appetite, alopecia, peripheral neuropathy, rash, hypothyroid, hypophysitis, fatigue  Written education was provided to patient  Dr Jose David Reynaga reviewed this with the patient  The patient signed informed consent with Dr Jose David Reynaga    - B12 1000 mcg IM at least 5 days prior to first infusion, continue with every infusion    - Take dexamethasone 4 mg BID day before, day of, and day after chemotherapy  - Folic acid 1 mg PO daily     2  Cancer related pain    - Pain has been varying  He has been taking Tylenol PRN once daily, also oxycodone 5 mg at bedtime   - Discussed that he needs to be more consistent with pain medication  He should be taking oxycodone 5 mg every 4 hours as needed  Also, he is to apply lidocaine patch from over-the-counter on back where he has worse amount of pain    -If his pain continues to worsen, we could refer him to Radiation Oncology for palliative radiation to bone metastasis  3  Anemia    - 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 that was performed on 12/04/2017 with Dr Jimmie Charles   - Iron panel on 1/14/18:  Iron 21, iron saturation 11%, TIBC 189, ferritin 444   - B12 1785    - 1/14/18: CBC -hemoglobin 9 3   - Decrease oral iron to 1 tablet daily     4  Constipation, secondary to opoid use and iron tablets   -Continue to use stool softener, miralax, and Senakot PRN  HPI:  78-year-old male with past medical history significant for hypothyroidism, hypertension, BPH, spinal stenosis of thoracic and lumbar region  He presented to the [address] [place] [address] on 12/22  He was having worsening shortness of breath in mid back pain   He was having difficulty walking  He saw his PCP also on 12/22 ordered a chest x-ray  This showed a suspected consolidation on the left upper lobe  Smoking history is significant for roughly 10 cigarettes per day for the past 50 years  Also, he has a history of anemia  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 that was performed on 12/04/2017 with Dr Jimmie Charles  He had PET/CT on 01/11/2018  This showed left lower lobe lesion, 2 cm, SUV 20 9  Also, left perihilar, mediastinal, left subcarinal lymph node (1 6 x 1 2 cm) lymph nodes  No disease in the abdomen or pelvis  Multiple osseous metastases, lesion on transverse process of T3, T7, L1; lytic lesion on the left ilium; destructive lesion on the left bryson sacrum --with soft tissue component; lesion of right femoral neck  CT of the head on 1/14/18 was normal      Dr Pa Guzmán noted patient to have hemoglobin dropped from 14-10  He was referred to GI  He was placed on oral iron TID, then he was decreased to BID when he was in the hospital      He started having worsening pain of the back around Laureen  He quit smoking about 1 month ago (Dec 2017)    Pathology:  Source:  Left iliac crest,  bone   Metastatic adenocarcinoma  TT half-1, napsin-a, P 40, CDX 2, CA 19-9, NKX3 1 -- all negative  Interval history:  Has been taking Tylenol 325 mg in afternoon  Oxycodone 5 mg at night    ROS:     Review of Systems   Constitutional: Positive for fatigue and unexpected weight change (8 lb x 1 month )  HENT: Positive for mouth sores  Respiratory: Positive for shortness of breath  Negative for cough  Cardiovascular: Negative for chest pain, palpitations and leg swelling  Gastrointestinal: Positive for constipation (2/2 to iron pill and oxycodone )  Endocrine: Positive for cold intolerance (x 2 months, when anemia started )  Genitourinary: Negative for hematuria  Musculoskeletal: Positive for back pain  Skin: Negative      Neurological: Positive for weakness  Negative for dizziness, light-headedness and numbness  Psychiatric/Behavioral: Negative  Physical Exam:    Physical Exam   Constitutional: He is oriented to person, place, and time  He appears well-developed and well-nourished  HENT:   Head: Normocephalic and atraumatic  Eyes: Conjunctivae are normal  No scleral icterus  Neck: Normal range of motion  Neck supple  Cardiovascular: Normal rate and regular rhythm  No murmur heard  Pulmonary/Chest: Effort normal and breath sounds normal  He has no wheezes  Abdominal: Soft  Bowel sounds are normal  There is no tenderness  Musculoskeletal: Normal range of motion  He exhibits no edema  Lymphadenopathy:     He has no cervical adenopathy  Neurological: He is alert and oriented to person, place, and time  No cranial nerve deficit  Skin: Skin is warm and dry  Psychiatric: He has a normal mood and affect  Vitals reviewed      Lab Results     Lab Results   Component Value Date     01/13/2018    K 4 0 01/13/2018     01/13/2018    CO2 26 01/13/2018    ANIONGAP 8 01/13/2018    BUN 20 01/13/2018    CREATININE 1 06 01/13/2018    GLUCOSE 164 (H) 01/13/2018    CALCIUM 8 9 01/13/2018    AST 15 01/13/2018    ALT 39 01/13/2018    ALKPHOS 107 01/13/2018    PROT 6 4 01/13/2018    BILITOT 0 20 01/13/2018    EGFR 68 01/13/2018

## 2018-01-26 ENCOUNTER — HOSPITAL ENCOUNTER (OUTPATIENT)
Dept: INFUSION CENTER | Facility: CLINIC | Age: 78
Discharge: HOME | End: 2018-01-26
Payer: COMMERCIAL

## 2018-01-26 PROCEDURE — 96372 THER/PROPH/DIAG INJ SC/IM: CPT

## 2018-01-26 RX ORDER — CYANOCOBALAMIN 1000 UG/ML
1000 INJECTION INTRAMUSCULAR; SUBCUTANEOUS ONCE
Status: COMPLETED | OUTPATIENT
Start: 2018-01-26 | End: 2018-01-26

## 2018-01-26 RX ADMIN — CYANOCOBALAMIN 1000 MCG: 1000 INJECTION, SOLUTION INTRAMUSCULAR at 11:40

## 2018-01-29 ENCOUNTER — TELEPHONE (OUTPATIENT)
Dept: HEMATOLOGY ONCOLOGY | Facility: CLINIC | Age: 78
End: 2018-01-29

## 2018-01-29 ENCOUNTER — APPOINTMENT (OUTPATIENT)
Dept: LAB | Facility: MEDICAL CENTER | Age: 78
End: 2018-01-29
Payer: COMMERCIAL

## 2018-01-29 DIAGNOSIS — C34.32 CANCER OF LOWER LOBE OF LEFT LUNG (HCC): ICD-10-CM

## 2018-01-29 LAB
ALBUMIN SERPL BCP-MCNC: 2.7 G/DL (ref 3.5–5)
ALP SERPL-CCNC: 89 U/L (ref 46–116)
ALT SERPL W P-5'-P-CCNC: 45 U/L (ref 12–78)
ANION GAP SERPL CALCULATED.3IONS-SCNC: 7 MMOL/L (ref 4–13)
AST SERPL W P-5'-P-CCNC: 19 U/L (ref 5–45)
BASOPHILS # BLD AUTO: 0.01 THOUSANDS/ΜL (ref 0–0.1)
BASOPHILS NFR BLD AUTO: 0 % (ref 0–1)
BILIRUB SERPL-MCNC: 0.34 MG/DL (ref 0.2–1)
BUN SERPL-MCNC: 19 MG/DL (ref 5–25)
CALCIUM SERPL-MCNC: 9.5 MG/DL (ref 8.3–10.1)
CHLORIDE SERPL-SCNC: 102 MMOL/L (ref 100–108)
CO2 SERPL-SCNC: 28 MMOL/L (ref 21–32)
CREAT SERPL-MCNC: 1.17 MG/DL (ref 0.6–1.3)
EOSINOPHIL # BLD AUTO: 0.05 THOUSAND/ΜL (ref 0–0.61)
EOSINOPHIL NFR BLD AUTO: 1 % (ref 0–6)
ERYTHROCYTE [DISTWIDTH] IN BLOOD BY AUTOMATED COUNT: 17.3 % (ref 11.6–15.1)
GFR SERPL CREATININE-BSD FRML MDRD: 60 ML/MIN/1.73SQ M
GLUCOSE SERPL-MCNC: 173 MG/DL (ref 65–140)
HCT VFR BLD AUTO: 29.7 % (ref 36.5–49.3)
HGB BLD-MCNC: 9.2 G/DL (ref 12–17)
LYMPHOCYTES # BLD AUTO: 0.9 THOUSANDS/ΜL (ref 0.6–4.47)
LYMPHOCYTES NFR BLD AUTO: 12 % (ref 14–44)
MCH RBC QN AUTO: 25.5 PG (ref 26.8–34.3)
MCHC RBC AUTO-ENTMCNC: 31 G/DL (ref 31.4–37.4)
MCV RBC AUTO: 82 FL (ref 82–98)
MONOCYTES # BLD AUTO: 0.48 THOUSAND/ΜL (ref 0.17–1.22)
MONOCYTES NFR BLD AUTO: 6 % (ref 4–12)
NEUTROPHILS # BLD AUTO: 6.02 THOUSANDS/ΜL (ref 1.85–7.62)
NEUTS SEG NFR BLD AUTO: 81 % (ref 43–75)
NRBC BLD AUTO-RTO: 0 /100 WBCS
PLATELET # BLD AUTO: 424 THOUSANDS/UL (ref 149–390)
PMV BLD AUTO: 9.4 FL (ref 8.9–12.7)
POTASSIUM SERPL-SCNC: 4.4 MMOL/L (ref 3.5–5.3)
PROT SERPL-MCNC: 7.6 G/DL (ref 6.4–8.2)
RBC # BLD AUTO: 3.61 MILLION/UL (ref 3.88–5.62)
SODIUM SERPL-SCNC: 137 MMOL/L (ref 136–145)
TSH SERPL DL<=0.05 MIU/L-ACNC: 2.35 UIU/ML (ref 0.36–3.74)
WBC # BLD AUTO: 7.5 THOUSAND/UL (ref 4.31–10.16)

## 2018-01-29 PROCEDURE — 85025 COMPLETE CBC W/AUTO DIFF WBC: CPT | Performed by: PHYSICIAN ASSISTANT

## 2018-01-29 PROCEDURE — 84443 ASSAY THYROID STIM HORMONE: CPT | Performed by: PHYSICIAN ASSISTANT

## 2018-01-29 PROCEDURE — 36415 COLL VENOUS BLD VENIPUNCTURE: CPT | Performed by: PHYSICIAN ASSISTANT

## 2018-01-29 PROCEDURE — 80053 COMPREHEN METABOLIC PANEL: CPT | Performed by: PHYSICIAN ASSISTANT

## 2018-01-29 RX ORDER — DEXAMETHASONE 4 MG/1
4 TABLET ORAL 2 TIMES DAILY PRN
Qty: 12 TABLET | Refills: 0 | Status: SHIPPED | OUTPATIENT
Start: 2018-01-29 | End: 2018-03-12 | Stop reason: SDUPTHER

## 2018-01-30 RX ORDER — SODIUM CHLORIDE 9 MG/ML
20 INJECTION, SOLUTION INTRAVENOUS ONCE
Status: COMPLETED | OUTPATIENT
Start: 2018-01-31 | End: 2018-01-31

## 2018-01-31 ENCOUNTER — DOCUMENTATION (OUTPATIENT)
Dept: INFUSION CENTER | Facility: HOSPITAL | Age: 78
End: 2018-01-31

## 2018-01-31 ENCOUNTER — HOSPITAL ENCOUNTER (OUTPATIENT)
Dept: INFUSION CENTER | Facility: HOSPITAL | Age: 78
Discharge: HOME | End: 2018-01-31
Payer: COMMERCIAL

## 2018-01-31 VITALS
DIASTOLIC BLOOD PRESSURE: 63 MMHG | WEIGHT: 180.78 LBS | HEART RATE: 76 BPM | HEIGHT: 67 IN | BODY MASS INDEX: 28.37 KG/M2 | TEMPERATURE: 98.3 F | SYSTOLIC BLOOD PRESSURE: 119 MMHG | RESPIRATION RATE: 18 BRPM

## 2018-01-31 PROCEDURE — 96417 CHEMO IV INFUS EACH ADDL SEQ: CPT

## 2018-01-31 PROCEDURE — 96413 CHEMO IV INFUSION 1 HR: CPT

## 2018-01-31 PROCEDURE — 96411 CHEMO IV PUSH ADDL DRUG: CPT

## 2018-01-31 PROCEDURE — 96367 TX/PROPH/DG ADDL SEQ IV INF: CPT

## 2018-01-31 RX ADMIN — SODIUM CHLORIDE 20 ML/HR: 0.9 INJECTION, SOLUTION INTRAVENOUS at 08:06

## 2018-01-31 RX ADMIN — Medication 16 MG: at 08:11

## 2018-01-31 RX ADMIN — CARBOPLATIN 459 MG: 10 INJECTION, SOLUTION INTRAVENOUS at 09:17

## 2018-01-31 RX ADMIN — SODIUM CHLORIDE 955 MG: 9 INJECTION, SOLUTION INTRAVENOUS at 09:00

## 2018-01-31 RX ADMIN — SODIUM CHLORIDE 200 MG: 9 INJECTION, SOLUTION INTRAVENOUS at 10:20

## 2018-01-31 NOTE — SOCIAL WORK
ZOEW reviewed pts distress thermometer completed by pt on 1/31/2018 in [facility]  Pt indicated no distress and denied psychosocial problems  LSW provided brief introduction to cancer support services to pt and his significant other  Pt denied interest in support at this time  If pt expresses psychosocial needs with their oncology care, ZOEW is available to provide cancer care counseling

## 2018-02-02 ENCOUNTER — TELEPHONE (OUTPATIENT)
Dept: HEMATOLOGY ONCOLOGY | Facility: CLINIC | Age: 78
End: 2018-02-02

## 2018-02-14 ENCOUNTER — OFFICE VISIT (OUTPATIENT)
Dept: HEMATOLOGY ONCOLOGY | Facility: CLINIC | Age: 78
End: 2018-02-14
Payer: COMMERCIAL

## 2018-02-14 ENCOUNTER — TELEPHONE (OUTPATIENT)
Dept: HEMATOLOGY ONCOLOGY | Facility: CLINIC | Age: 78
End: 2018-02-14

## 2018-02-14 VITALS
BODY MASS INDEX: 29.15 KG/M2 | SYSTOLIC BLOOD PRESSURE: 90 MMHG | TEMPERATURE: 98 F | OXYGEN SATURATION: 97 % | HEIGHT: 66 IN | WEIGHT: 181.4 LBS | HEART RATE: 80 BPM | DIASTOLIC BLOOD PRESSURE: 50 MMHG | RESPIRATION RATE: 14 BRPM

## 2018-02-14 DIAGNOSIS — C44.90 SKIN CANCER: Primary | ICD-10-CM

## 2018-02-14 DIAGNOSIS — C79.51 SECONDARY MALIGNANT NEOPLASM OF BONE (HCC): Primary | ICD-10-CM

## 2018-02-14 PROCEDURE — 99215 OFFICE O/P EST HI 40 MIN: CPT | Performed by: PHYSICIAN ASSISTANT

## 2018-02-14 NOTE — PROGRESS NOTES
Hematology/Oncology Outpatient Follow- up Note  Bart Bey 69 y o  male MRN: @ Encounter: [id number]        Date:  2/14/2018      Assessment / Plan:    1  Stage IV adenocarcinoma, suspected Lung Primary  LLL glucose avid lesion with mediastinal lymphadenopathy identified  There is no evidence of glucose avid lesions other than bony metastases in the abdomen or pelvis  Currently he is on Carboplatin AUC 5, Alimta 500 mg/m2, Keytruda 200 mg IV every 3 weeks  Cycle #1 1/31/2018  3   Bone Metastases  Discussed Xgeva/Zometa  He has no need for invasive dental work  We discussed the rationale for utilization of these medications  Potential side effects could include but may not be limited to:  flu-like symptoms, headache, nausea, vomiting, worsening kidney function, osteonecrosis of the jaw, low magnesium or calcium, bone, muscle or joint pain  He verbalized understanding  Bony met at right femoral head  Will obtain X-Ray to further evaluate for potential pathologic fracture  He is advised to take Ca 4239-4808 mg with Vitamin D daily  4   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  He did have  EGD that was performed on 12/04/2017 with Dr Prudence Pham  5  AMOL  Iron panel on 1/14/18:  Iron 21, iron saturation 11%, TIBC 189, ferritin 444  He was advised to decrease oral iron to 1 tablet daily at his 1/25/2018 office visit  His hemoglobin is still in the 9 range with elevated RDW  If this continues, IV Venofer can be considered in the future  6   Back pain at T10/L1 at site of pathologic compression fracture impairing ADLs  No improvement with short acting narcotic therapy or Ultram   Referred to rad onc for palliative RT  He took meloxicam in the past with some benefit  Caution with this medication with history of GAVE  HPI:  Patient was admitted to [facility] December 22nd through 12/23/2017 secondary to new onset of anemia and back pain        His PCP,   [facility] also ordered a noncontrast CT scan of the chest 1/8/2018 which identified a left lower lobe 2 centimeter spiculated nodule and large T7 and T10 lytic lesions there is suspected pathologic fracture at T10  PET-CT 1/11/2018 identified SUV of 20 9 in the 2 centimeter left lower lobe nodule, there were several FDG avid left perihilar and mediastinal lymph nodes  Left subcarinal lymph node with an SUV of 19  He has multiple bilateral renal cysts and of right parapelvic renal cyst up to 8 4 centimeters  There are multiple FDG avid osseous lesions compatible with osseous metastasis seen along the spine, ribs sacrum pelvis bilateral proximal femora, T3 transverse process, T7 vertebral body, L1 lesion left hilum, left hemisacrum and right femoral neck  He was readmitted 1/13/2018 due to intractable back pain  Biopsy from the left iliac crest identified metastatic adenocarcinoma  The histology is diagnostic of metastatic adenocarcinoma  The immunoprofile is non-specific but can be seen in pancreaticobiliary, urothelial and upper gastrointestinal tract primaries  It can uncommonly be identified in lung primaries although both TTF-1 and Napsin-A are negative      Interval History:    He has no change in his pain  He had increases with movement, coughing or sneezing but is always present  His appetite has been decreased due to the pain  This is his chief complaint at the time  He finds oxycodone makes him groggy and constipated but does not provide much pain relief  He has tried Ultram in the past without improvement  Prior to his December hospitalization he was taking meloxicam daily            Cancer Staging:  Cancer of lower lobe of left lung [facility])    Staging form: Lung, AJCC 8th Edition    - Clinical stage from 1/25/2018: cT1, cN2, cM1 - Signed by Hipolito Fu PA-C on 1/25/2018    Molecular Testing:   Tamiko on specimen S [id number]  ALK by IHC negative  Ros-1 are any sequence indeterminate for fusion  PD L1 0%  EGFR indeterminate      Current Hematologic/ Oncologic Treatment:    Cycle #1 1/31/2018  Carboplatin AUC 5, Alimta 500 mg/m2, Keytruda 200 mg IV every 3 weeks with B12 1000 mcg IM, dexamethasone 4 mg BID day before, day of, and day after chemotherapy and Folic acid 1 mg PO daily  Previous Hematologic/ Oncologic History:    Biopsy Left iliac crest 1/15/2018    Test Results:          Labs:   Lab Results   Component Value Date    WBC 7 50 01/29/2018    HGB 9 2 (L) 01/29/2018    HCT 29 7 (L) 01/29/2018    MCV 82 01/29/2018     (H) 01/29/2018     Lab Results   Component Value Date     01/29/2018    K 4 4 01/29/2018     01/29/2018    CO2 28 01/29/2018    ANIONGAP 7 01/29/2018    BUN 19 01/29/2018    CREATININE 1 17 01/29/2018    GLUCOSE 173 (H) 01/29/2018    CALCIUM 9 5 01/29/2018    AST 19 01/29/2018    ALT 45 01/29/2018    ALKPHOS 89 01/29/2018    PROT 7 6 01/29/2018    BILITOT 0 34 01/29/2018    EGFR 60 01/29/2018           Lab Results   Component Value Date    IRON 21 (L) 01/14/2018    TIBC 189 (L) 01/14/2018    FERRITIN 444 (H) 01/14/2018       Lab Results   Component Value Date    EDBGTINK99 1,785 (H) 01/14/2018         ROS: Review of Systems   Constitutional: Positive for activity change, appetite change and fatigue  Negative for chills, diaphoresis, fever and unexpected weight change  HENT: Negative for congestion, dental problem, facial swelling, hearing loss, mouth sores, nosebleeds, postnasal drip, rhinorrhea, sore throat, trouble swallowing and voice change  Eyes: Negative for photophobia, pain, discharge, redness, itching and visual disturbance  Respiratory: Negative for cough, choking, chest tightness, shortness of breath and wheezing  Cardiovascular: Negative for chest pain, palpitations and leg swelling     Gastrointestinal: Negative for abdominal distention, abdominal pain, anal bleeding, blood in stool, constipation, diarrhea, nausea, rectal pain and vomiting  Endocrine: Negative for cold intolerance and heat intolerance  Genitourinary: Negative for decreased urine volume, difficulty urinating, dysuria, flank pain, frequency, hematuria and urgency  Musculoskeletal: Positive for arthralgias and back pain  Negative for gait problem, joint swelling, myalgias, neck pain and neck stiffness  Skin: Negative for color change, pallor, rash and wound  Allergic/Immunologic: Negative for immunocompromised state  Neurological: Positive for weakness  Negative for dizziness, tremors, seizures, syncope, facial asymmetry, speech difficulty, light-headedness, numbness and headaches  Hematological: Negative for adenopathy  Does not bruise/bleed easily  Psychiatric/Behavioral: Positive for sleep disturbance  Negative for agitation, confusion, decreased concentration and dysphoric mood  The patient is nervous/anxious  All other systems reviewed and are negative  Allergies: No Known Allergies  Current Medications: Reviewed  PMH/FH/SH:  Reviewed      Physical Exam:    Body surface area is 1 91 meters squared  Wt Readings from Last 3 Encounters:   02/14/18 82 3 kg (181 lb 6 4 oz)   01/31/18 82 kg (180 lb 12 4 oz)   01/25/18 81 kg (178 lb 9 6 oz)        Temp Readings from Last 3 Encounters:   02/14/18 98 °F (36 7 °C)   01/31/18 98 3 °F (36 8 °C) (Oral)   01/25/18 98 2 °F (36 8 °C)        BP Readings from Last 3 Encounters:   02/14/18 90/50   01/31/18 119/63   01/25/18 98/58           Physical Exam   Constitutional: He is oriented to person, place, and time  He appears well-developed and well-nourished  No distress  HENT:   Head: Normocephalic and atraumatic  Mouth/Throat: Oropharynx is clear and moist  No oropharyngeal exudate  Eyes: Conjunctivae and EOM are normal  Pupils are equal, round, and reactive to light  Neck: Normal range of motion  Neck supple  No tracheal deviation present  No thyromegaly present     Cardiovascular: Normal rate and regular rhythm  Exam reveals no gallop and no friction rub  No murmur heard  Pulmonary/Chest: Effort normal and breath sounds normal  No respiratory distress  He has no wheezes  He has no rales  He exhibits no tenderness  Abdominal: Soft  Bowel sounds are normal  He exhibits no distension and no mass  There is no tenderness  There is no rebound and no guarding  Musculoskeletal: Normal range of motion  Lymphadenopathy:     He has no cervical adenopathy  Neurological: He is alert and oriented to person, place, and time  Skin: Skin is warm and dry  No rash noted  He is not diaphoretic  No erythema  No pallor  Psychiatric: He has a normal mood and affect  His behavior is normal  Judgment and thought content normal    Vitals reviewed  ECO      Goals and Barriers:  Current Goal:  Prolong Survival from Cancer  Barriers: None  Patient's Capacity to Self Care:  Patient is able to self care      Emergency Contacts:    [facility], [phone],

## 2018-02-15 ENCOUNTER — APPOINTMENT (OUTPATIENT)
Dept: RADIOLOGY | Facility: MEDICAL CENTER | Age: 78
End: 2018-02-15
Payer: COMMERCIAL

## 2018-02-15 ENCOUNTER — TRANSCRIBE ORDERS (OUTPATIENT)
Dept: ADMINISTRATIVE | Facility: HOSPITAL | Age: 78
End: 2018-02-15

## 2018-02-15 ENCOUNTER — TELEPHONE (OUTPATIENT)
Dept: HEMATOLOGY ONCOLOGY | Facility: CLINIC | Age: 78
End: 2018-02-15

## 2018-02-15 DIAGNOSIS — C79.51 SECONDARY MALIGNANT NEOPLASM OF BONE (HCC): ICD-10-CM

## 2018-02-15 PROCEDURE — 73552 X-RAY EXAM OF FEMUR 2/>: CPT

## 2018-02-16 ENCOUNTER — TELEPHONE (OUTPATIENT)
Dept: HEMATOLOGY ONCOLOGY | Facility: CLINIC | Age: 78
End: 2018-02-16

## 2018-02-16 NOTE — TELEPHONE ENCOUNTER
Pt's wife spoke to you yesterday about having an x-nito done and would like to know if the results are in and what they are  Would also like to see if he has to continue to take the dexamethasone  Would like a call back

## 2018-02-16 NOTE — TELEPHONE ENCOUNTER
Called and spoke with the patient in regards to the patient's 2/15/18 Right Hip X-ray  X-ray showed bone lesions in the right femoral neck and trochanter region- no pathological fractures were visibly seen  Patient's wife verbally understood

## 2018-02-19 LAB — SCAN RESULT: NORMAL

## 2018-02-20 ENCOUNTER — APPOINTMENT (OUTPATIENT)
Dept: LAB | Facility: MEDICAL CENTER | Age: 78
End: 2018-02-20
Payer: COMMERCIAL

## 2018-02-20 DIAGNOSIS — C34.32 CANCER OF LOWER LOBE OF LEFT LUNG (HCC): ICD-10-CM

## 2018-02-20 LAB
ALBUMIN SERPL BCP-MCNC: 3.4 G/DL (ref 3.5–5)
ALP SERPL-CCNC: 146 U/L (ref 46–116)
ALT SERPL W P-5'-P-CCNC: 31 U/L (ref 12–78)
ANION GAP SERPL CALCULATED.3IONS-SCNC: 6 MMOL/L (ref 4–13)
AST SERPL W P-5'-P-CCNC: 17 U/L (ref 5–45)
BASOPHILS # BLD AUTO: 0.01 THOUSANDS/ΜL (ref 0–0.1)
BASOPHILS NFR BLD AUTO: 0 % (ref 0–1)
BILIRUB SERPL-MCNC: 0.46 MG/DL (ref 0.2–1)
BUN SERPL-MCNC: 21 MG/DL (ref 5–25)
CALCIUM SERPL-MCNC: 9.5 MG/DL (ref 8.3–10.1)
CHLORIDE SERPL-SCNC: 104 MMOL/L (ref 100–108)
CO2 SERPL-SCNC: 30 MMOL/L (ref 21–32)
CREAT SERPL-MCNC: 1.21 MG/DL (ref 0.6–1.3)
EOSINOPHIL # BLD AUTO: 0.04 THOUSAND/ΜL (ref 0–0.61)
EOSINOPHIL NFR BLD AUTO: 1 % (ref 0–6)
ERYTHROCYTE [DISTWIDTH] IN BLOOD BY AUTOMATED COUNT: 21.2 % (ref 11.6–15.1)
GFR SERPL CREATININE-BSD FRML MDRD: 57 ML/MIN/1.73SQ M
GLUCOSE SERPL-MCNC: 91 MG/DL (ref 65–140)
HCT VFR BLD AUTO: 31.7 % (ref 36.5–49.3)
HGB BLD-MCNC: 10 G/DL (ref 12–17)
LYMPHOCYTES # BLD AUTO: 1 THOUSANDS/ΜL (ref 0.6–4.47)
LYMPHOCYTES NFR BLD AUTO: 26 % (ref 14–44)
MCH RBC QN AUTO: 26.2 PG (ref 26.8–34.3)
MCHC RBC AUTO-ENTMCNC: 31.5 G/DL (ref 31.4–37.4)
MCV RBC AUTO: 83 FL (ref 82–98)
MONOCYTES # BLD AUTO: 0.53 THOUSAND/ΜL (ref 0.17–1.22)
MONOCYTES NFR BLD AUTO: 14 % (ref 4–12)
NEUTROPHILS # BLD AUTO: 2.2 THOUSANDS/ΜL (ref 1.85–7.62)
NEUTS SEG NFR BLD AUTO: 59 % (ref 43–75)
NRBC BLD AUTO-RTO: 0 /100 WBCS
PLATELET # BLD AUTO: 354 THOUSANDS/UL (ref 149–390)
PMV BLD AUTO: 9.3 FL (ref 8.9–12.7)
POTASSIUM SERPL-SCNC: 5.1 MMOL/L (ref 3.5–5.3)
PROT SERPL-MCNC: 7.8 G/DL (ref 6.4–8.2)
RBC # BLD AUTO: 3.82 MILLION/UL (ref 3.88–5.62)
SODIUM SERPL-SCNC: 140 MMOL/L (ref 136–145)
TSH SERPL DL<=0.05 MIU/L-ACNC: 1.42 UIU/ML (ref 0.36–3.74)
WBC # BLD AUTO: 3.84 THOUSAND/UL (ref 4.31–10.16)

## 2018-02-20 PROCEDURE — 36415 COLL VENOUS BLD VENIPUNCTURE: CPT

## 2018-02-20 PROCEDURE — 84443 ASSAY THYROID STIM HORMONE: CPT

## 2018-02-20 PROCEDURE — 80053 COMPREHEN METABOLIC PANEL: CPT

## 2018-02-20 PROCEDURE — 85025 COMPLETE CBC W/AUTO DIFF WBC: CPT

## 2018-02-21 RX ORDER — SODIUM CHLORIDE 9 MG/ML
20 INJECTION, SOLUTION INTRAVENOUS CONTINUOUS
Status: DISCONTINUED | OUTPATIENT
Start: 2018-02-22 | End: 2018-02-25 | Stop reason: HOSPADM

## 2018-02-21 RX ORDER — CYANOCOBALAMIN 1000 UG/ML
1000 INJECTION INTRAMUSCULAR; SUBCUTANEOUS ONCE
Status: COMPLETED | OUTPATIENT
Start: 2018-02-22 | End: 2018-02-22

## 2018-02-22 ENCOUNTER — HOSPITAL ENCOUNTER (OUTPATIENT)
Dept: INFUSION CENTER | Facility: CLINIC | Age: 78
Discharge: HOME | End: 2018-02-22
Payer: COMMERCIAL

## 2018-02-22 VITALS
DIASTOLIC BLOOD PRESSURE: 68 MMHG | SYSTOLIC BLOOD PRESSURE: 124 MMHG | OXYGEN SATURATION: 97 % | WEIGHT: 187.83 LBS | HEART RATE: 68 BPM | TEMPERATURE: 98.6 F | HEIGHT: 67 IN | BODY MASS INDEX: 29.48 KG/M2 | RESPIRATION RATE: 20 BRPM

## 2018-02-22 PROCEDURE — 96372 THER/PROPH/DIAG INJ SC/IM: CPT

## 2018-02-22 PROCEDURE — 96417 CHEMO IV INFUS EACH ADDL SEQ: CPT

## 2018-02-22 PROCEDURE — 96367 TX/PROPH/DG ADDL SEQ IV INF: CPT

## 2018-02-22 PROCEDURE — 96401 CHEMO ANTI-NEOPL SQ/IM: CPT

## 2018-02-22 PROCEDURE — 96411 CHEMO IV PUSH ADDL DRUG: CPT

## 2018-02-22 PROCEDURE — 96413 CHEMO IV INFUSION 1 HR: CPT

## 2018-02-22 RX ORDER — MELOXICAM 7.5 MG/1
7.5 TABLET ORAL EVERY OTHER DAY
Status: ON HOLD | COMMUNITY
End: 2018-03-19 | Stop reason: ALTCHOICE

## 2018-02-22 RX ADMIN — CYANOCOBALAMIN 1000 MCG: 1000 INJECTION, SOLUTION INTRAMUSCULAR at 15:12

## 2018-02-22 RX ADMIN — SODIUM CHLORIDE 200 MG: 9 INJECTION, SOLUTION INTRAVENOUS at 13:19

## 2018-02-22 RX ADMIN — DENOSUMAB 120 MG: 120 INJECTION SUBCUTANEOUS at 15:12

## 2018-02-22 RX ADMIN — CARBOPLATIN 428 MG: 10 INJECTION, SOLUTION INTRAVENOUS at 14:19

## 2018-02-22 RX ADMIN — ONDANSETRON 16 MG: 2 INJECTION INTRAMUSCULAR; INTRAVENOUS at 12:44

## 2018-02-22 RX ADMIN — SODIUM CHLORIDE 955 MG: 9 INJECTION, SOLUTION INTRAVENOUS at 14:00

## 2018-02-22 RX ADMIN — SODIUM CHLORIDE 20 ML/HR: 0.9 INJECTION, SOLUTION INTRAVENOUS at 12:35

## 2018-02-23 ENCOUNTER — TELEPHONE (OUTPATIENT)
Dept: HEMATOLOGY ONCOLOGY | Facility: CLINIC | Age: 78
End: 2018-02-23

## 2018-02-23 ENCOUNTER — APPOINTMENT (OUTPATIENT)
Dept: RADIATION ONCOLOGY | Facility: HOSPITAL | Age: 78
End: 2018-02-23
Attending: RADIOLOGY
Payer: COMMERCIAL

## 2018-02-23 ENCOUNTER — RADIATION ONCOLOGY CONSULT (OUTPATIENT)
Dept: RADIATION ONCOLOGY | Facility: HOSPITAL | Age: 78
End: 2018-02-23

## 2018-02-23 VITALS
WEIGHT: 186.6 LBS | DIASTOLIC BLOOD PRESSURE: 62 MMHG | BODY MASS INDEX: 29.99 KG/M2 | HEART RATE: 56 BPM | SYSTOLIC BLOOD PRESSURE: 110 MMHG | RESPIRATION RATE: 16 BRPM | OXYGEN SATURATION: 96 % | HEIGHT: 66 IN | TEMPERATURE: 97.9 F

## 2018-02-23 DIAGNOSIS — D64.9 ANEMIA, UNSPECIFIED TYPE: Primary | ICD-10-CM

## 2018-02-23 DIAGNOSIS — C79.51 METASTATIC CANCER TO BONE (HCC): Primary | ICD-10-CM

## 2018-02-23 DIAGNOSIS — C34.32 CANCER OF LOWER LOBE OF LEFT LUNG (HCC): ICD-10-CM

## 2018-02-23 LAB — HEMOCCULT STL QL IA: NEGATIVE

## 2018-02-23 PROCEDURE — G0328 FECAL BLOOD SCRN IMMUNOASSAY: HCPCS

## 2018-02-23 PROCEDURE — 99215 OFFICE O/P EST HI 40 MIN: CPT | Performed by: RADIOLOGY

## 2018-02-23 NOTE — PROGRESS NOTES
Matt Mensah    No diagnosis found  Cancer of lower lobe of left lung [facility])    Staging form: Lung, AJCC 8th Edition    - Clinical stage from 1/25/2018: cT1, cN2, cM1 - Signed by Andrea Armendariz PA-C on 1/25/2018  Oncology History    Pt was consulted by Dr Paty Fortune, in [place], for metastatic adenocarcinoma  Pt has a lung mass which was found on a CXR ordered by his PCP, for worsening shortness of breath   Additionally he had c/o mid back pain, and difficulty walking  His past medical history significant for hypothyroidism, hypertension, BPH, spinal stenosis of thoracic and lumbar region  he has a history of anemia  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  Smoking history is significant for roughly 10 cigarettes per day for the past 50 years  PET/CT on 01/11/2018, showed:  1  FDG avid left lower lobe spiculated lesion compatible with hypermetabolic malignancy  2   FDG avid lymph nodes in the left perihilar and mediastinal regions compatible with metastasis  3   Multiple FDG avid osseous lesions compatible with osseous metastasis  Bone biopsy of left iliac crest performed 1/15/18 revealed: Metastatic adenocarcinoma  suspected Lung Primary  Pt is Currently he is on Carboplatin AUC 5, Alimta 500 mg/m2, Keytruda 200 mg IV every 3 weeks  Cycle #1 1/31/2018  Has Back pain at T10/L1 at site of pathologic compression fracture impairing ADLs  He has been taking short acting narcotics and has no improvement in his pain he is being see by [facility] for palliative tx for painful bone mets  Review of PET/CT from 1/11/2018 demonstrates hypermetabolic metastases in the right femoral neck and lesser trochanteric regions  There is no significant bone destruction visible on plain films related to these lesions  Cancer of lower lobe of left lung ([address] )    1/15/2018 Initial Diagnosis     Cancer of lower lobe of left lung ([address] )    A  Bone, Left iliac crest, biopsy:  - Metastatic adenocarcinoma  1/31/2018 -  Chemotherapy     Carboplatin AUC 5, Alimta 500 mg/m2, Keytruda 200 mg IV every 3 weeks  Cycle #1 1/31/2018  Interval History:***    GYN History:***    Patient Active Problem List   Diagnosis    Hypothyroid    Hypertension    Spinal stenosis of thoracolumbar region    BPH (benign prostatic hyperplasia)    GAVE (gastric antral vascular ectasia)    Metastatic cancer to bone (HCC)    Anemia    Spinal stenosis    Tobacco abuse    Cancer associated pain    Constipation    Iron deficiency anemia    Cancer of lower lobe of left lung (HCC)     Past Medical History:   Diagnosis Date    Anemia     BPH (benign prostatic hyperplasia)     Cancer of lung ([address] )     Disease of thyroid gland     GAVE (gastric antral vascular ectasia)     Hypertension     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     Spinal stenosis     Tobacco abuse      History reviewed  No pertinent surgical history    Family History   Problem Relation Age of Onset    Esophageal cancer Mother     Diabetes Father     No Known Problems Sister     No Known Problems Brother      Social History     Social History    Marital status: /Civil Union     Spouse name: Clemente Gold Number of children: 2    Years of education: N/A     Occupational History    retired       Social History Main Topics    Smoking status: Former Smoker     Packs/day: 0 50     Years: 15 00     Types: Cigarettes     Quit date: 1/11/2018    Smokeless tobacco: Never Used      Comment: Quit December 2017    Alcohol use No    Drug use: No    Sexual activity: Not on file     Other Topics Concern    Not on file     Social History Narrative    No narrative on file       Current Outpatient Prescriptions:     acetaminophen (TYLENOL) 325 mg tablet, Take 2 tablets by mouth every 6 (six) hours as needed for mild pain, headaches or fever, Disp: 30 tablet, Rfl: 0    dexamethasone (DECADRON) 4 mg tablet, Take 1 tablet (4 mg total) by mouth 2 (two) times a day as needed (PLEASE SEE SIG NOTES) TAKE 1 TABLET WITH BREAKFAST, 1 TABLET WITH DINNER, DAY BEFORE, DAY OF, AND DAY AFTER CHEMO , Disp: 12 tablet, Rfl: 0    doxazosin (CARDURA) 4 mg tablet, Take 4 mg by mouth daily at bedtime, Disp: , Rfl:     finasteride (PROSCAR) 5 mg tablet, Take 5 mg by mouth daily, Disp: , Rfl:     folic acid (FOLVITE) 1 mg tablet, Take 1 tablet by mouth daily, Disp: 90 tablet, Rfl: 0    levothyroxine 88 mcg tablet, Take 88 mcg by mouth daily, Disp: , Rfl:     losartan (COZAAR) 100 MG tablet, Take 50 mg by mouth daily  , Disp: , Rfl:     meloxicam (MOBIC) 7 5 mg tablet, Take 7 5 mg by mouth every other day, Disp: , Rfl:     ondansetron (ZOFRAN) 4 mg tablet, Take 1 tablet by mouth every 6 (six) hours as needed for nausea or vomiting, Disp: 30 tablet, Rfl: 1  No current facility-administered medications for this visit  Facility-Administered Medications Ordered in Other Visits:     alteplase (CATHFLO) injection 2 mg, 2 mg, Intracatheter, PRN, Yessica Frederick MD    heparin lock flush 100 units/mL injection 300 Units, 300 Units, Intracatheter, PRN, Yessica Frederick MD    sodium chloride 0 9 % infusion, 20 mL/hr, Intravenous, Continuous, Yessica Frederick MD, Stopped at 02/22/18 1522  No Known Allergies    Review of Systems:  Review of Systems    Vitals:    02/23/18 0928   BP: 110/62   BP Location: Left arm   Pulse: 56   Resp: 16   Temp: 97 9 °F (36 6 °C)   TempSrc: Temporal   SpO2: 96%   Weight: 84 6 kg (186 lb 9 6 oz)   Height: 5' 6 45" (1 688 m)       Imaging:Xr Femur 2 Vw Right    Result Date: 2/16/2018  Narrative: RIGHT FEMUR INDICATION:  History of lung cancer with bone metastases  Recent nuclear medicine study demonstrated multiple bone lesions  COMPARISON: PET/CT from 1/11/2018  VIEWS:  AP and lateral; IMAGES:  4 FINDINGS: There is no acute fracture or dislocation   Review of PET/CT from 1/11/2018 demonstrates hypermetabolic metastases in the right femoral neck and lesser trochanteric regions  There is no significant bone destruction visible on plain films related to these lesions  There are no visible bone lesions elsewhere  Soft tissues are unremarkable  Impression: Review of PET/CT from 1/11/2018 demonstrates hypermetabolic metastases in the right femoral neck and lesser trochanteric regions  There is no significant bone destruction visible on plain films related to these lesions  There are no visible bone lesions elsewhere   Workstation performed: [id number]       Teaching:***

## 2018-02-23 NOTE — PROGRESS NOTES
Lana Bauer  1940  Mr Durel Brittle is a 69 y o  male    Chief Complaint   Patient presents with   [address] of lower lobe of left lung [facility])    Staging form: Lung, AJCC 8th Edition    - Clinical stage from 1/25/2018: cT1, cN2, cM1 - Signed by Jose G Hill PA-C on 1/25/2018    Patient was admitted to [address] [place] [address] December 22nd through 12/23/2017 secondary to new onset of anemia and back pain  Patient was d/c     PCP, Dr Jolene Barboza also ordered a noncontrast CT scan of the chest 1/8/2018 which identified a left lower lobe 2 centimeter spiculated nodule and large T7 and T10 lytic lesions there is suspected pathologic fracture at T10         1/13/18- Patient was admitted again for pain  Pt was consulted by Dr Rain Busby while inpatient in Jan, for metastatic adenocarcinoma  Pt has a lung mass which was found on a CXR ordered by his PCP, for worsening shortness of breath   Additionally he had c/o mid back pain, and difficulty walking  His past medical history significant for hypothyroidism, hypertension, BPH, spinal stenosis of thoracic and lumbar region  he has a history of anemia  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  Smoking history: 15 years quit in December 2017  PET/CT on 01/11/2018, showed:  1  FDG avid left lower lobe spiculated lesion compatible with hypermetabolic malignancy  2   FDG avid lymph nodes in the left perihilar and mediastinal regions compatible with metastasis  3   Multiple FDG avid osseous lesions compatible with osseous metastasis  Bone biopsy of left iliac crest performed 1/15/18 revealed: Metastatic adenocarcinoma  suspected Lung Primary  Pt is Currently he is on Carboplatin AUC 5, Alimta 500 mg/m2, Keytruda 200 mg IV every 3 weeks  Cycle #1 1/31/2018  Xgeva every 6 weeks  Has Back pain at T10/L1 at site of pathologic compression fracture impairing ADLs   He has been taking short acting narcotics and has no improvement in his pain he [facility] for painful bone mets  Review of PET/CT from 1/11/2018 demonstrates hypermetabolic metastases in the right femoral neck and lesser trochanteric regions  There is no significant bone destruction visible on plain films related to these lesions  Second chemo was yesterday 2/22/17  He takes Aleve for relief  He is on decadron and meloxicam    Quit Smoking December 2017  He smoked for 15 years  Patient is retired and wishes to be treated at [facility]  Cancer of lower lobe of left lung ([facility] )    1/15/2018 Initial Diagnosis     Cancer of lower lobe of left lung ([facility] )    A  Bone, Left iliac crest, biopsy:  - Metastatic adenocarcinoma  1/31/2018 -  Chemotherapy     Carboplatin AUC 5, Alimta 500 mg/m2, Keytruda 200 mg IV every 3 weeks  Cycle #1 1/31/2018  Health Maintenance   Topic Date Due    DTaP,Tdap,and Td Vaccines (1 - Tdap) 01/28/1947    Depression Screening PHQ-9  01/28/1953    Fall Risk  01/28/2006    PNEUMOCOCCAL POLYSACCHARIDE VACCINE AGE 72 AND OVER  01/28/2006    GLAUCOMA SCREENING 67+ YR  01/28/2008    INFLUENZA VACCINE  09/01/2017       Patient Active Problem List   Diagnosis    Hypothyroid    Hypertension    Spinal stenosis of thoracolumbar region    BPH (benign prostatic hyperplasia)    GAVE (gastric antral vascular ectasia)    Metastatic cancer to bone (HCC)    Anemia    Spinal stenosis    Tobacco abuse    Cancer associated pain    Constipation    Iron deficiency anemia    Cancer of lower lobe of left lung (HCC)     Past Medical History:   Diagnosis Date    Anemia     BPH (benign prostatic hyperplasia)     Cancer of lung ([facility] )     Disease of thyroid gland     GAVE (gastric antral vascular ectasia)     Hypertension     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[facility] )     Spinal stenosis     Tobacco abuse      History reviewed  No pertinent surgical history    Family History   Problem Relation Age of Onset    Esophageal cancer Mother     Diabetes Father     No Known Problems Sister     No Known Problems Brother      Social History     Social History    Marital status: /Civil Union     Spouse name: Lázaro Olvera Number of children: 2    Years of education: N/A     Occupational History    retired       Social History Main Topics    Smoking status: Former Smoker     Packs/day: 0 50     Years: 15 00     Types: Cigarettes     Quit date: 1/11/2018    Smokeless tobacco: Never Used      Comment: Quit December 2017    Alcohol use No    Drug use: No    Sexual activity: Not on file     Other Topics Concern    Not on file     Social History Narrative    No narrative on file       Current Outpatient Prescriptions:     acetaminophen (TYLENOL) 325 mg tablet, Take 2 tablets by mouth every 6 (six) hours as needed for mild pain, headaches or fever, Disp: 30 tablet, Rfl: 0    dexamethasone (DECADRON) 4 mg tablet, Take 1 tablet (4 mg total) by mouth 2 (two) times a day as needed (PLEASE SEE SIG NOTES) TAKE 1 TABLET WITH BREAKFAST, 1 TABLET WITH DINNER, DAY BEFORE, DAY OF, AND DAY AFTER CHEMO , Disp: 12 tablet, Rfl: 0    doxazosin (CARDURA) 4 mg tablet, Take 4 mg by mouth daily at bedtime, Disp: , Rfl:     finasteride (PROSCAR) 5 mg tablet, Take 5 mg by mouth daily, Disp: , Rfl:     folic acid (FOLVITE) 1 mg tablet, Take 1 tablet by mouth daily, Disp: 90 tablet, Rfl: 0    levothyroxine 88 mcg tablet, Take 88 mcg by mouth daily, Disp: , Rfl:     losartan (COZAAR) 100 MG tablet, Take 50 mg by mouth daily  , Disp: , Rfl:     meloxicam (MOBIC) 7 5 mg tablet, Take 7 5 mg by mouth every other day, Disp: , Rfl:     ondansetron (ZOFRAN) 4 mg tablet, Take 1 tablet by mouth every 6 (six) hours as needed for nausea or vomiting, Disp: 30 tablet, Rfl: 1  No current facility-administered medications for this visit       Facility-Administered Medications Ordered in Other Visits:     alteplase (CATHFLO) injection 2 mg, 2 mg, Intracatheter, PRN, Masoud Winter MD    heparin lock flush 100 units/mL injection 300 Units, 300 Units, Intracatheter, PRN, Masoud Winter MD    sodium chloride 0 9 % infusion, 20 mL/hr, Intravenous, Continuous, Masoud Winter MD, Stopped at 02/22/18 1522    No Known Allergies    Review of Systems:  Review of Systems   Constitutional: Positive for fatigue and unexpected weight change (Lost 6 pounds while inpt  Weight is now stable  )  HENT: Negative  Eyes: Negative  Respiratory: Positive for shortness of breath (with exertion)  Cardiovascular: Negative  Gastrointestinal: Negative  Endocrine: Negative  Genitourinary: Positive for frequency  Musculoskeletal: Positive for back pain  Skin: Negative  Allergic/Immunologic: Negative  Neurological: Negative  Hematological: Negative  Psychiatric/Behavioral: Positive for sleep disturbance  Vitals:    02/23/18 0928   BP: 110/62   BP Location: Left arm   Pulse: 56   Resp: 16   Temp: 97 9 °F (36 6 °C)   TempSrc: Temporal   SpO2: 96%   Weight: 84 6 kg (186 lb 9 6 oz)   Height: 5' 6 45" (1 688 m)         Imaging:Xr Femur 2 Vw Right    Result Date: 2/16/2018  Narrative: RIGHT FEMUR INDICATION:  History of lung cancer with bone metastases  Recent nuclear medicine study demonstrated multiple bone lesions  COMPARISON: PET/CT from 1/11/2018  VIEWS:  AP and lateral; IMAGES:  4 FINDINGS: There is no acute fracture or dislocation  Review of PET/CT from 1/11/2018 demonstrates hypermetabolic metastases in the right femoral neck and lesser trochanteric regions  There is no significant bone destruction visible on plain films related to these lesions  There are no visible bone lesions elsewhere  Soft tissues are unremarkable  Impression: Review of PET/CT from 1/11/2018 demonstrates hypermetabolic metastases in the right femoral neck and lesser trochanteric regions    There is no significant bone destruction visible on plain films related to these lesions  There are no visible bone lesions elsewhere  Workstation performed: [id number]       Teaching: RT education provided to patient

## 2018-02-23 NOTE — PROGRESS NOTES
Consultation - Radiation Oncology   Matt Mensah 69 y o  male [id number]      Chief Complaint: Stage IV left lung cancer with bone metastasis  Chief Complaint   Patient presents with    Consult       History of Present Illness   Matt Mensah is a 69y o  year old male who presents with stage IV metastatic left lung adenocarcinoma with a left lower lobe primary with left perihilar and mediastinal lymphadenopathy in addition to bone metastasis involving the thoracic and lumbar spine  Patient was admitted to [address] [place] [address] December 22nd through 12/23/2017 secondary to new onset of anemia and back pain  His back pain was severe at that time and more prominent on the left side  His pain originally started in early December and he denies any trauma or falls  Patient was discharged after his pain improved      PCP, Dr Mayela Garcia also ordered a noncontrast CT scan of the chest 1/8/2018 which identified a left lower lobe 2 centimeter spiculated nodule and large T7 and T10 lytic lesions there is suspected pathologic fracture at T10           1/13/18- Patient was admitted again for pain  Pt was consulted by Dr Paty Fortune while inpatient in Jan, for metastatic adenocarcinoma  Pt has a lung mass which was found on a CXR ordered by his PCP, for worsening shortness of breath   Additionally, he had c/o mid back pain, and difficulty walking  He had a negative head CT as an inpatient on January 14, 2018  His past medical history significant for hypothyroidism, hypertension, BPH, spinal stenosis of thoracic and lumbar region  he has a history of anemia   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  His GAVE was treated by cauterization in early December 2017 by Dr Abdiaziz Longo at [facility]   He had anemia with bleeding with his hemoglobin going from 14 down to 8 from November to December of 2017  His hemoglobin has been stable since the cauterization  Smoking history: 15-20 years and quit in December 2017  PET/CT on 01/11/2018, showed:  1  FDG avid left lower lobe spiculated lesion compatible with hypermetabolic malignancy  2   FDG avid lymph nodes in the left perihilar and mediastinal regions compatible with metastasis  3   Multiple FDG avid osseous lesions compatible with osseous metastasis      Bone biopsy of left iliac crest performed 1/15/18 revealed: Metastatic adenocarcinoma  suspected Lung Primary  Pt is Currently he is on Carboplatin AUC 5, Alimta 500 mg/m2, Keytruda 200 mg IV every 3 weeks   Cycle #1 1/31/2018  Xgeva every 6 weeks started 2/22/18      He has back pain at T10/L1 at site of pathologic compression fracture impairing ADLs  He has been taking short acting narcotics and has no improvement in his pain he is being see by [facility] for palliative tx for painful bone mets  Review of PET/CT from 1/11/2018 demonstrates hypermetabolic metastases in the right femoral neck and lesser trochanteric regions   There is no significant bone destruction visible on plain films related to these lesions      Second chemo was yesterday 2/22/17 with Zhang Reynolds  He takes Aleve for pain relief  He is on decadron and meloxicam      He was taking meloxicam daily which was very helpful for his pain  After his cauterization procedure for GAVE he was told to stop the meloxicam but then his pain became much worse  He has now resume the meloxicam every other day  This is helping his back pain  Quit Smoking December 2017  He smoked for 15-20 years  [facility] [facility] and wishes to be treated at [facility]  His 3rd chemotherapy is scheduled for March 14, 2018 at [facility]  Historical Information      Cancer of lower lobe of left lung ([address] )    1/15/2018 Initial Diagnosis     Cancer of lower lobe of left lung ([address] )    A  Bone, Left iliac crest, biopsy:  - Metastatic adenocarcinoma            1/31/2018 -  Chemotherapy Carboplatin AUC 5, Alimta 500 mg/m2, Keytruda 200 mg IV every 3 weeks  Cycle #1 1/31/2018  Cancer of lower lobe of left lung [facility])    Staging form: Lung, AJCC 8th Edition    - Clinical stage from 1/25/2018: cT1, cN2, cM1 - Signed by Shirley Infante PA-C on 1/25/2018     Previous Radiation History: No prior radiation therapy    Past Medical History:   Diagnosis Date    Anemia     BPH (benign prostatic hyperplasia)     [facility]     Disease of thyroid gland     GAVE (gastric antral vascular ectasia)     Hypertension     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     Spinal stenosis     Tobacco abuse      History reviewed  No pertinent surgical history      Family History   Problem Relation Age of Onset    Esophageal cancer Mother     Diabetes Father     No Known Problems Sister     No Known Problems Brother        Social History   History   Alcohol Use No     History   Drug Use No     History   Smoking Status    Former Smoker    Packs/day: 0 50    Years: 15 00    Types: Cigarettes    Quit date: 1/11/2018   Smokeless Tobacco    Never Used     Comment: Quit December 2017       Meds/Allergies     Current Outpatient Prescriptions:     acetaminophen (TYLENOL) 325 mg tablet, Take 2 tablets by mouth every 6 (six) hours as needed for mild pain, headaches or fever, Disp: 30 tablet, Rfl: 0    dexamethasone (DECADRON) 4 mg tablet, Take 1 tablet (4 mg total) by mouth 2 (two) times a day as needed (PLEASE SEE SIG NOTES) TAKE 1 TABLET WITH BREAKFAST, 1 TABLET WITH DINNER, DAY BEFORE, DAY OF, AND DAY AFTER CHEMO , Disp: 12 tablet, Rfl: 0    doxazosin (CARDURA) 4 mg tablet, Take 4 mg by mouth daily at bedtime, Disp: , Rfl:     finasteride (PROSCAR) 5 mg tablet, Take 5 mg by mouth daily, Disp: , Rfl:     folic acid (FOLVITE) 1 mg tablet, Take 1 tablet by mouth daily, Disp: 90 tablet, Rfl: 0    levothyroxine 88 mcg tablet, Take 88 mcg by mouth daily, Disp: , Rfl:     losartan (COZAAR) 100 MG tablet, Take 50 mg by mouth daily  , Disp: , Rfl:     meloxicam (MOBIC) 7 5 mg tablet, Take 7 5 mg by mouth every other day, Disp: , Rfl:     ondansetron (ZOFRAN) 4 mg tablet, Take 1 tablet by mouth every 6 (six) hours as needed for nausea or vomiting, Disp: 30 tablet, Rfl: 1  No current facility-administered medications for this visit  Facility-Administered Medications Ordered in Other Visits:     alteplase (CATHFLO) injection 2 mg, 2 mg, Intracatheter, PRN, Thor Tim MD    heparin lock flush 100 units/mL injection 300 Units, 300 Units, Intracatheter, PRN, Thor Tim MD    sodium chloride 0 9 % infusion, 20 mL/hr, Intravenous, Continuous, Thor Tim MD, Stopped at 18 1522  No Known Allergies    Review of Systems  Constitutional: Positive for fatigue and unexpected weight change (Lost 6 pounds while inpt  Weight is now stable  )  HENT: Negative  Eyes: Negative  Respiratory: Positive for shortness of breath (with exertion)  Cardiovascular: Negative  Gastrointestinal: Negative  Endocrine: Negative  Genitourinary: Positive for frequency  Musculoskeletal: Positive for back pain  Skin: Negative  Allergic/Immunologic: Negative  Neurological: Negative  Hematological: Negative  Psychiatric/Behavioral: Positive for sleep disturbance  Objective   /62 (BP Location: Left arm)   Pulse 56   Temp 97 9 °F (36 6 °C) (Temporal)   Resp 16   Ht 5' 6 45" (1 688 m)   Wt 84 6 kg (186 lb 9 6 oz)   SpO2 96%   BMI 29 71 kg/m²   ECO - Symptomatic but completely ambulatory    Physical Exam   Constitutional: He is oriented to person, place, and time  He appears well-developed and well-nourished  No distress  HENT:   Head: Normocephalic and atraumatic  Mouth/Throat: No oropharyngeal exudate  Eyes: Conjunctivae and EOM are normal  Pupils are equal, round, and reactive to light  No scleral icterus  Neck: Normal range of motion  Neck supple   No tracheal deviation present  No thyromegaly present  Cardiovascular: Normal rate, regular rhythm and normal heart sounds  Pulmonary/Chest: Effort normal and breath sounds normal  No respiratory distress  He has no wheezes  He has no rales  Abdominal: Soft  Bowel sounds are normal  He exhibits no distension and no mass  There is no tenderness  Musculoskeletal: Normal range of motion  He exhibits no edema or tenderness  Lymphadenopathy:     He has no cervical adenopathy  Neurological: He is alert and oriented to person, place, and time  No cranial nerve deficit  Coordination normal    Skin: Skin is warm and dry  No rash noted  He is not diaphoretic  No erythema  No pallor  Psychiatric: He has a normal mood and affect  His behavior is normal  Judgment and thought content normal    Nursing note and vitals reviewed        RESULTS  Lab Results:  Recent Results (from the past 2016 hour(s))   Basic metabolic panel    Collection Time: 12/22/17  1:48 PM   Result Value Ref Range    Sodium 139 136 - 145 mmol/L    Potassium 4 0 3 5 - 5 3 mmol/L    Chloride 103 100 - 108 mmol/L    CO2 26 21 - 32 mmol/L    Anion Gap 10 4 - 13 mmol/L    BUN 24 5 - 25 mg/dL    Creatinine 1 19 0 60 - 1 30 mg/dL    Glucose 138 65 - 140 mg/dL    Calcium 10 3 (H) 8 3 - 10 1 mg/dL    eGFR 59 ml/min/1 73sq m   CBC and differential    Collection Time: 12/22/17  1:48 PM   Result Value Ref Range    WBC 12 98 (H) 4 31 - 10 16 Thousand/uL    RBC 3 81 (L) 3 88 - 5 62 Million/uL    Hemoglobin 9 7 (L) 12 0 - 17 0 g/dL    Hematocrit 31 1 (L) 36 5 - 49 3 %    MCV 82 82 - 98 fL    MCH 25 5 (L) 26 8 - 34 3 pg    MCHC 31 2 (L) 31 4 - 37 4 g/dL    RDW 15 8 (H) 11 6 - 15 1 %    MPV 9 1 8 9 - 12 7 fL    Platelets 154 059 - 607 Thousands/uL    Neutrophils Relative 88 (H) 43 - 75 %    Lymphocytes Relative 6 (L) 14 - 44 %    Monocytes Relative 6 4 - 12 %    Eosinophils Relative 0 0 - 6 %    Basophils Relative 0 0 - 1 %    Neutrophils Absolute 11 38 (H) 1 85 - 7 62 Thousands/µL Lymphocytes Absolute 0 79 0 60 - 4 47 Thousands/µL    Monocytes Absolute 0 78 0 17 - 1 22 Thousand/µL    Eosinophils Absolute 0 02 0 00 - 0 61 Thousand/µL    Basophils Absolute 0 01 0 00 - 0 10 Thousands/µL   Protime-INR    Collection Time: 12/22/17  1:48 PM   Result Value Ref Range    Protime 14 3 12 1 - 14 4 seconds    INR 1 08 0 86 - 1 16   APTT    Collection Time: 12/22/17  1:48 PM   Result Value Ref Range    PTT 35 23 - 35 seconds   Troponin I    Collection Time: 12/22/17  1:48 PM   Result Value Ref Range    Troponin I <0 02 <=0 04 ng/mL   ECG 12 lead    Collection Time: 12/22/17  2:09 PM   Result Value Ref Range    Ventricular Rate 71 BPM    Atrial Rate 71 BPM    NH Interval 176 ms    QRSD Interval 98 ms    QT Interval 376 ms    QTC Interval 408 ms    P Axis 75 degrees    QRS Axis -9 degrees    T Wave Axis 29 degrees   Blood culture #2    Collection Time: 12/22/17  2:30 PM   Result Value Ref Range    Blood Culture No Growth After 5 Days  Blood culture #1    Collection Time: 12/22/17  2:31 PM   Result Value Ref Range    Blood Culture No Growth After 5 Days      Urinalysis with reflex to microscopic    Collection Time: 12/22/17  3:36 PM   Result Value Ref Range    Color, UA Yellow     Clarity, UA Clear     Specific Gravity, UA 1 020 1 003 - 1 030    pH, UA 5 5 4 5 - 8 0    Leukocytes, UA Negative Negative    Nitrite, UA Negative Negative    Protein, UA Trace (A) Negative mg/dl    Glucose, UA Negative Negative mg/dl    Ketones, UA Negative Negative mg/dl    Urobilinogen, UA 0 2 0 2, 1 0 E U /dl E U /dl    Bilirubin, UA Negative Negative    Blood, UA Trace-Intact (A) Negative   Urine Microscopic    Collection Time: 12/22/17  3:36 PM   Result Value Ref Range    RBC, UA None Seen None Seen, 0-5 /hpf    WBC, UA 1-2 (A) None Seen, 0-5, 5-55, 5-65 /hpf    Epithelial Cells Occasional None Seen, Occasional /hpf    Bacteria, UA Occasional None Seen, Occasional /hpf   CBC (With Platelets)    Collection Time: 12/23/17 5:16 AM   Result Value Ref Range    WBC 11 65 (H) 4 31 - 10 16 Thousand/uL    RBC 3 53 (L) 3 88 - 5 62 Million/uL    Hemoglobin 8 7 (L) 12 0 - 17 0 g/dL    Hematocrit 29 0 (L) 36 5 - 49 3 %    MCV 82 82 - 98 fL    MCH 24 6 (L) 26 8 - 34 3 pg    MCHC 30 0 (L) 31 4 - 37 4 g/dL    RDW 15 9 (H) 11 6 - 15 1 %    Platelets 967 740 - 654 Thousands/uL    MPV 9 2 8 9 - 12 7 fL   Basic metabolic panel    Collection Time: 12/23/17  5:16 AM   Result Value Ref Range    Sodium 138 136 - 145 mmol/L    Potassium 4 1 3 5 - 5 3 mmol/L    Chloride 104 100 - 108 mmol/L    CO2 26 21 - 32 mmol/L    Anion Gap 8 4 - 13 mmol/L    BUN 20 5 - 25 mg/dL    Creatinine 1 22 0 60 - 1 30 mg/dL    Glucose 150 (H) 65 - 140 mg/dL    Calcium 9 4 8 3 - 10 1 mg/dL    eGFR 57 ml/min/1 73sq m   Iron Saturation %    Collection Time: 12/23/17  5:16 AM   Result Value Ref Range    Iron Saturation 8 %    TIBC 200 (L) 250 - 450 ug/dL    Iron 16 (L) 65 - 175 ug/dL   Ferritin    Collection Time: 12/23/17  5:16 AM   Result Value Ref Range    Ferritin 660 (H) 8 - 388 ng/mL   Fingerstick Glucose (POCT)    Collection Time: 01/11/18  9:24 AM   Result Value Ref Range    POC Glucose 101 65 - 140 mg/dl   Complete pulmonary function test    Collection Time: 01/11/18  3:23 PM   Result Value Ref Range    FEV1  liters    FVC  liters    FEV1/FVC  %    TLC  liters    DLCO  ml/mmHg sec   POCT urinalysis dipstick    Collection Time: 01/13/18 10:41 AM   Result Value Ref Range    Color, UA Yellow     Clarity, UA Clear     EXT Glucose, UA Negative     EXT Bilirubin, UA (Ref: Negative) Negative     EXT Ketones, UA (Ref: Negative) Negative     EXT Spec Grav, UA 1 020     EXT Blood, UA (Ref: Negative) Negative     EXT pH, UA 6 0     EXT Protein, UA (Ref: Negative) Trace     EXT Urobilinogen, UA (Ref: 0 2- 1 0) 0 2     EXT Leukocytes, UA (Ref: Negative) Negative     EXT Nitrite, UA (Ref: Negative) Negative    CBC and differential    Collection Time: 01/13/18 10:43 AM   Result Value Ref Range    WBC 11 06 (H) 4 31 - 10 16 Thousand/uL    RBC 3 32 (L) 3 88 - 5 62 Million/uL    Hemoglobin 8 2 (L) 12 0 - 17 0 g/dL    Hematocrit 27 6 (L) 36 5 - 49 3 %    MCV 83 82 - 98 fL    MCH 24 7 (L) 26 8 - 34 3 pg    MCHC 29 7 (L) 31 4 - 37 4 g/dL    RDW 17 3 (H) 11 6 - 15 1 %    MPV 9 0 8 9 - 12 7 fL    Platelets 142 530 - 194 Thousands/uL    Neutrophils Relative 80 (H) 43 - 75 %    Lymphocytes Relative 13 (L) 14 - 44 %    Monocytes Relative 7 4 - 12 %    Eosinophils Relative 0 0 - 6 %    Basophils Relative 0 0 - 1 %    Neutrophils Absolute 8 90 (H) 1 85 - 7 62 Thousands/µL    Lymphocytes Absolute 1 38 0 60 - 4 47 Thousands/µL    Monocytes Absolute 0 74 0 17 - 1 22 Thousand/µL    Eosinophils Absolute 0 03 0 00 - 0 61 Thousand/µL    Basophils Absolute 0 01 0 00 - 0 10 Thousands/µL   Protime-INR    Collection Time: 01/13/18 10:43 AM   Result Value Ref Range    Protime 13 8 12 1 - 14 4 seconds    INR 1 03 0 86 - 1 16   APTT    Collection Time: 01/13/18 10:43 AM   Result Value Ref Range    PTT 38 (H) 23 - 35 seconds   Comprehensive metabolic panel    Collection Time: 01/13/18 10:43 AM   Result Value Ref Range    Sodium 139 136 - 145 mmol/L    Potassium 4 0 3 5 - 5 3 mmol/L    Chloride 105 100 - 108 mmol/L    CO2 26 21 - 32 mmol/L    Anion Gap 8 4 - 13 mmol/L    BUN 20 5 - 25 mg/dL    Creatinine 1 06 0 60 - 1 30 mg/dL    Glucose 164 (H) 65 - 140 mg/dL    Calcium 8 9 8 3 - 10 1 mg/dL    AST 15 5 - 45 U/L    ALT 39 12 - 78 U/L    Alkaline Phosphatase 107 46 - 116 U/L    Total Protein 6 4 6 4 - 8 2 g/dL    Albumin 2 2 (L) 3 5 - 5 0 g/dL    Total Bilirubin 0 20 0 20 - 1 00 mg/dL    eGFR 68 ml/min/1 73sq m   TSH    Collection Time: 01/13/18 10:43 AM   Result Value Ref Range    TSH 3RD GENERATON 1 296 0 358 - 3 740 uIU/mL   CBC (With Platelets)    Collection Time: 01/14/18  9:27 AM   Result Value Ref Range    WBC 8 90 4 31 - 10 16 Thousand/uL    RBC 3 74 (L) 3 88 - 5 62 Million/uL    Hemoglobin 9 3 (L) 12 0 - 17 0 g/dL Hematocrit 31 0 (L) 36 5 - 49 3 %    MCV 83 82 - 98 fL    MCH 24 9 (L) 26 8 - 34 3 pg    MCHC 30 0 (L) 31 4 - 37 4 g/dL    RDW 17 4 (H) 11 6 - 15 1 %    Platelets 575 164 - 769 Thousands/uL    MPV 9 0 8 9 - 12 7 fL   Vitamin B12    Collection Time: 01/14/18  9:27 AM   Result Value Ref Range    Vitamin B-12 1,785 (H) 100 - 900 pg/mL   Haptoglobin    Collection Time: 01/14/18  9:27 AM   Result Value Ref Range    Haptoglobin 454 (HH) 34 - 200 mg/dL   Retic Count    Collection Time: 01/14/18  9:27 AM   Result Value Ref Range    Retic Ct Abs 35,500 14,356 - 105,094    Retic Ct Pct 0 95 0 37 - 1 87 %   Iron Saturation %    Collection Time: 01/14/18  9:27 AM   Result Value Ref Range    Iron Saturation 11 %    TIBC 189 (L) 250 - 450 ug/dL    Iron 21 (L) 65 - 175 ug/dL   Ferritin    Collection Time: 01/14/18  9:27 AM   Result Value Ref Range    Ferritin 444 (H) 8 - 388 ng/mL   Ancillary Pathology Sendout (AP only)    Collection Time: 01/15/18 12:00 AM   Result Value Ref Range    Scan Result  MI PROFILE    Tissue Exam    Collection Time: 01/15/18  3:11 PM   Result Value Ref Range    Case Report       Surgical Pathology Report                         Case: [id number]                                   Authorizing Provider:  Rubio Bradley MD        Collected:           01/15/2018 1511              Ordering Location:     [facility]        Received:            01/15/2018 [address] Med                                                                              Surg Unit                                                                    Pathologist:           Caio Aponte MD                                                             Intraop:               Caio Aponte MD                                                             Specimen:    Bone, L iliac crest                                                                        Addendum 2       - At Dr Gonzalez direction, [facility] Pathology Department sent tumor tissue from paraffin block A4 containing the patient's cancer cells to [facility], Ana Jenkins, for Target Now/MI Profile complete molecular profiling analysis  Upon completion of testing, the [facility] lab report will be directly sent to the requesting physician as well as posted in the Media Tab of the patient's [employer] EMR by the [facility]ology Department  Please note: The [employer] lab's analysis and report is performed independently of [address] [place], [place] [address]Third [facility] nor the Pathology Department screen, review or comment upon [facility] lab report  Because the role of Target Now/MI Profile complete molecular profiling analysis in cancer diagnosis and management is subject to evolving development, usage and interpretation, we strongly urge that the test limitations described in the [facility] lab report be carefully read, appropriately shared with the patient, and critically considered when reaching treatment decisions  Addendum       Immunostain GATA3, typically positive in urothelial primaries, is negative in the metastatic tumor cells  A copy of the addendum is faxed to Georgina Joseph on 1/21/18 @ 1305 hours  Final Diagnosis       A  Bone, Left iliac crest, biopsy:  - Metastatic adenocarcinoma  See Note  Interpretation performed at [facility], [address] [place] [address], [facility]  Note       Intradepartmental consultation (LS) agrees with the above diagnosis  AGUSTIN Burrows notified on 1/19/18 @ 1245 hours  A copy of the final report is also faxed  Sections reveal partial marrow replacement by infiltrative nests of malignant epithelioid cells composed of mildly pleomorphic nuclei containing nucleoli, and pink vacuolated cytoplasm  Gland formation is focally appreciated   Special stain for mucin (mucicarmine) confirms intracytoplasmic and intraluminal mucin positivity  No squamous differentiation is identified  Tumor cell necrosis is present  There is background desmoplasia and osteolytic bone  Immunostains are performed with appropriate controls revealing the following tumor cell staining:  Pankeratin (CK), CK7 (strong, diffuse) and CK20 (weak, focal) positive;  TTF-1, Napsin-A, p40, CDX2, CA19 9 and NKX3 1 negative;  DPC-4 intact;   GATA3 pending; results will be reported in an addendum  The histology is diagnostic of metastatic adenocarcinoma  The immunoprofile is non-specific but can be seen in pancreaticobiliary, urothelial and upper gastrointestinal tract primaries  It can uncommonly be identified in lung primaries although both TTF-1 and Napsin-A are negative  Tumor is present in most of the cores (A2 best), if ancillary testing is requested  Additional Information       All controls performed with the immunohistochemical stains reported above reacted appropriately  These tests were developed and their performance characteristics determined by [address] [place] [address] or [facility]  They may not be cleared or approved by the [place]  Food and Drug Administration  The FDA has determined that such clearance or approval is not necessary  These tests are used for clinical purposes  They should not be regarded as investigational or for research  This laboratory has been approved by [place] [address], designated as a high-complexity laboratory and is qualified to perform these tests  Intraoperative Consultation       TPDXA: Diagnostic cells present  2 slides reviewed on site  Dr Mehrdad Christian present @ diagnosis 1/15/18, 1525 hours  *Electronic SignatureThais Screen M  LINDA Good       Gross Description       A  The specimen is received in formalin, labeled with the patient's name and hospital number, and is designated "left iliac crest    The specimen consists of 4 tan osseous tissue cores, each measuring 0 1-0 2 cm in diameter, with lengths ranging from 1-1 6 cm each  Touch preps are performed  Entirely submitted  Four cassettes  1 core in each cassette  Following decalcification in decal II  Note: The estimated total formalin fixation time based upon information provided by the submitting clinician and the standard processing schedule is under 72 hours  MCrites       Clinical Information       L iliac crest lesion, hx lung CA, possible mets  2 0 cm spiculated nodule in the superior segment left lower lobe, suspicious for malignancy  Large lytic lesions in the T7 and T10 vertebral bodies compatible with metastatic disease, with minimal loss of stature in T10 suggesting pathologic fracture  1   FDG avid left lower lobe spiculated lesion compatible with hypermetabolic malignancy  2   FDG avid lymph nodes in the left perihilar and mediastinal regions compatible with metastasis  3   Multiple FDG avid osseous lesions compatible with osseous metastasis     CBC and differential    Collection Time: 01/29/18 10:09 AM   Result Value Ref Range    WBC 7 50 4 31 - 10 16 Thousand/uL    RBC 3 61 (L) 3 88 - 5 62 Million/uL    Hemoglobin 9 2 (L) 12 0 - 17 0 g/dL    Hematocrit 29 7 (L) 36 5 - 49 3 %    MCV 82 82 - 98 fL    MCH 25 5 (L) 26 8 - 34 3 pg    MCHC 31 0 (L) 31 4 - 37 4 g/dL    RDW 17 3 (H) 11 6 - 15 1 %    MPV 9 4 8 9 - 12 7 fL    Platelets 468 (H) 410 - 390 Thousands/uL    nRBC 0 /100 WBCs    Neutrophils Relative 81 (H) 43 - 75 %    Lymphocytes Relative 12 (L) 14 - 44 %    Monocytes Relative 6 4 - 12 %    Eosinophils Relative 1 0 - 6 %    Basophils Relative 0 0 - 1 %    Neutrophils Absolute 6 02 1 85 - 7 62 Thousands/µL    Lymphocytes Absolute 0 90 0 60 - 4 47 Thousands/µL    Monocytes Absolute 0 48 0 17 - 1 22 Thousand/µL    Eosinophils Absolute 0 05 0 00 - 0 61 Thousand/µL    Basophils Absolute 0 01 0 00 - 0 10 Thousands/µL   Comprehensive metabolic panel    Collection Time: 01/29/18 10:09 AM   Result Value Ref Range    Sodium 137 136 - 145 mmol/L Potassium 4 4 3 5 - 5 3 mmol/L    Chloride 102 100 - 108 mmol/L    CO2 28 21 - 32 mmol/L    Anion Gap 7 4 - 13 mmol/L    BUN 19 5 - 25 mg/dL    Creatinine 1 17 0 60 - 1 30 mg/dL    Glucose 173 (H) 65 - 140 mg/dL    Calcium 9 5 8 3 - 10 1 mg/dL    AST 19 5 - 45 U/L    ALT 45 12 - 78 U/L    Alkaline Phosphatase 89 46 - 116 U/L    Total Protein 7 6 6 4 - 8 2 g/dL    Albumin 2 7 (L) 3 5 - 5 0 g/dL    Total Bilirubin 0 34 0 20 - 1 00 mg/dL    eGFR 60 ml/min/1 73sq m   TSH, 3rd generation with T4 reflex    Collection Time: 01/29/18 10:09 AM   Result Value Ref Range    TSH 3RD GENERATON 2 350 0 358 - 3 740 uIU/mL   CBC and differential    Collection Time: 02/20/18 10:18 AM   Result Value Ref Range    WBC 3 84 (L) 4 31 - 10 16 Thousand/uL    RBC 3 82 (L) 3 88 - 5 62 Million/uL    Hemoglobin 10 0 (L) 12 0 - 17 0 g/dL    Hematocrit 31 7 (L) 36 5 - 49 3 %    MCV 83 82 - 98 fL    MCH 26 2 (L) 26 8 - 34 3 pg    MCHC 31 5 31 4 - 37 4 g/dL    RDW 21 2 (H) 11 6 - 15 1 %    MPV 9 3 8 9 - 12 7 fL    Platelets 596 521 - 576 Thousands/uL    nRBC 0 /100 WBCs    Neutrophils Relative 59 43 - 75 %    Lymphocytes Relative 26 14 - 44 %    Monocytes Relative 14 (H) 4 - 12 %    Eosinophils Relative 1 0 - 6 %    Basophils Relative 0 0 - 1 %    Neutrophils Absolute 2 20 1 85 - 7 62 Thousands/µL    Lymphocytes Absolute 1 00 0 60 - 4 47 Thousands/µL    Monocytes Absolute 0 53 0 17 - 1 22 Thousand/µL    Eosinophils Absolute 0 04 0 00 - 0 61 Thousand/µL    Basophils Absolute 0 01 0 00 - 0 10 Thousands/µL   Comprehensive metabolic panel    Collection Time: 02/20/18 10:18 AM   Result Value Ref Range    Sodium 140 136 - 145 mmol/L    Potassium 5 1 3 5 - 5 3 mmol/L    Chloride 104 100 - 108 mmol/L    CO2 30 21 - 32 mmol/L    Anion Gap 6 4 - 13 mmol/L    BUN 21 5 - 25 mg/dL    Creatinine 1 21 0 60 - 1 30 mg/dL    Glucose 91 65 - 140 mg/dL    Calcium 9 5 8 3 - 10 1 mg/dL    AST 17 5 - 45 U/L    ALT 31 12 - 78 U/L    Alkaline Phosphatase 146 (H) 46 - 116 U/L    Total Protein 7 8 6 4 - 8 2 g/dL    Albumin 3 4 (L) 3 5 - 5 0 g/dL    Total Bilirubin 0 46 0 20 - 1 00 mg/dL    eGFR 57 ml/min/1 73sq m   TSH, 3rd generation with T4 reflex    Collection Time: 02/20/18 10:18 AM   Result Value Ref Range    TSH 3RD GENERATON 1 420 0 358 - 3 740 uIU/mL       Imaging Studies:    Xr Femur 2 Vw Right    Result Date: 2/16/2018  Narrative: RIGHT FEMUR INDICATION:  History of lung cancer with bone metastases  Recent nuclear medicine study demonstrated multiple bone lesions  COMPARISON: PET/CT from 1/11/2018  VIEWS:  AP and lateral; IMAGES:  4 FINDINGS: There is no acute fracture or dislocation  Review of PET/CT from 1/11/2018 demonstrates hypermetabolic metastases in the right femoral neck and lesser trochanteric regions  There is no significant bone destruction visible on plain films related to these lesions  There are no visible bone lesions elsewhere  Soft tissues are unremarkable  Impression: Review of PET/CT from 1/11/2018 demonstrates hypermetabolic metastases in the right femoral neck and lesser trochanteric regions  There is no significant bone destruction visible on plain films related to these lesions  There are no visible bone lesions elsewhere  Workstation performed: [id number]   Procedure: Xr Chest 1 View Portable    Result Date: 1/13/2018  Narrative: CHEST  INDICATION: Back pain  COMPARISON:  3/12/2011  VIEWS:   AP frontal; 1 image FINDINGS:    The cardiomediastinal silhouette is stable  The lungs are clear  No pleural effusion  Osseous structures are age appropriate  Impression: No active disease  Workstation performed: [id number]     Procedure: Xr Spine Lumbar Minimum 4 Views Non Injury    Result Date: 1/8/2018  Narrative: LUMBAR SPINE INDICATION:  M[age]: Lumbago with sciatica, unspecified side  History taken directly from the electronic ordering system       COMPARISON: None VIEWS:  AP, lateral, bilateral oblique and coned down projections IMAGES:  5 FINDINGS: Mild levoscoliosis present  No subluxation  There is no radiographic evidence of acute fracture or destructive osseous lesion  Discogenic degenerative changes of the lumbar spine noted  Endplate spurring is seen most pronounced at L1-L2  There is mild to moderate disc space narrowing at L3-L4  Atherosclerotic vascular calcifications are noted  Visualized soft tissues appear otherwise unremarkable  Impression: 1  Mild levoscoliosis, and multilevel degenerative disc disease of the lumbar spine 2  No compression fracture or subluxation Workstation performed: [id number]     Procedure: Ct Head W Wo Contrast    Result Date: 1/14/2018  Narrative: CT BRAIN - WITH AND WITHOUT CONTRAST INDICATION:  Chronic headache  COMPARISON:  3/12/2011  TECHNIQUE:  CT examination of the brain was performed both prior to and after the administration of intravenous contrast   In addition to axial images, coronal reformatted images were created and submitted for interpretation  Radiation dose length product (DLP) for this visit:  2648 mGy-cm   This examination, like all CT scans performed in the [facility], was performed utilizing techniques to minimize radiation dose exposure, including the use of iterative reconstruction and automated exposure control  IV Contrast:  85 mL of iohexol (OMNIPAQUE)     IMAGE QUALITY:  Diagnostic  FINDINGS: PARENCHYMA:  No mass, mass effect or midline shift  No parenchymal abnormality  No hemorrhage  No signs of acute infarction  No abnormal enhancement   Unremarkable vasculature  VENTRICLES AND EXTRA-AXIAL SPACES:  Normal for patient's age  VISUALIZED ORBITS AND PARANASAL SINUSES:  Unremarkable orbits  Small retention cysts within the inferior right maxillary sinus   CALVARIUM:  Normal      Impression: Normal computed tomography of the brain with contrast  Workstation performed: [id number]     Procedure: Ct Chest Wo Contrast    Result Date: 1/8/2018  Narrative: CT CHEST WITHOUT IV CONTRAST INDICATION:  Pneumonia  Bilateral flank pain  COMPARISON: 8/18/2015, 4/17/2015  TECHNIQUE: CT examination of the chest was performed without intravenous contrast   Reformatted images were created in axial, sagittal, and coronal planes  Radiation dose length product (DLP) for this visit:  324 mGy-cm   This examination, like all CT scans performed in the [facility], was performed utilizing techniques to minimize radiation dose exposure, including the use of iterative reconstruction and automated exposure control  FINDINGS: LUNGS:  There is a lobulated pleural-based nodule with spiculated margins posteriorly in the left lower lobe superior segment, measuring 2 0 x 1 8 x 1 9 cm on series 3 image 26  This is new since the prior study, and suspicious for malignancy  There is  no change in two right middle lobe nodules, both measuring 6 mm, on series 3 image 36 and 44  Also again identified are mild subpleural reticular densities throughout both lungs, which may be related to fibrosis  There is no tracheal or endobronchial lesion  PLEURA:  Unremarkable  HEART/GREAT VESSELS:  Stable mild ectasia of the ascending aorta measuring 4 3 cm  Normal heart size  MEDIASTINUM AND SARI:  No adenopathy  There is a calcified mediastinal lymph nodes suggestive of prior granulomatous disease  CHEST WALL AND LOWER NECK:       Normal  VISUALIZED STRUCTURES IN THE UPPER ABDOMEN:  Partially imaged bilateral renal cysts again noted  No adrenal mass  OSSEOUS STRUCTURES:  There are new large lytic lesions in the T7 and T10 vertebral bodies likely representing metastatic disease, with minimal loss of stature in T10 which may represent pathologic fracture  Impression: 2 0 cm spiculated nodule in the superior segment left lower lobe, suspicious for malignancy   Large lytic lesions in the T7 and T10 vertebral bodies compatible with metastatic disease, with minimal loss of stature in T10 suggesting pathologic fracture  Stable 6 mm nodules in the right middle lobe  Stable mild ectasia of the ascending aorta  I personally discussed this study with Dio Huynh on 1/8/2018 11:55 AM  Workstation performed: [id number]     Procedure: Ct Needle Biopsy Bone    Result Date: 1/16/2018  Narrative: CT-scan guided left iliac crest lesion bone biopsy  History: Lung lesion, multiple lytic osseous lesions  Biopsy requested of osseous lesions  Images: 54 This examination, like all CT scans performed in the [facility], was performed utilizing techniques to minimize radiation dose exposure, including the use of iterative reconstruction and automated exposure control  Sedation: Moderate conscious sedation was utilized under my direct supervision administered by trained independent provider  A total of 30 minutes sedation time was utilized  I was present at the initial dose of sedation medication  Technique: The patient was brought to the CT scanner and placed prone on the CT table  After axial images were obtained through the pelvis, an area of the skin was then marked, prepped, and draped in usual sterile fashion  All elements of maximal sterile  barrier technique were followed (cap, mask, sterile gown, sterile gloves, sterile sheet, hand hygiene, and 2% chlorhexidine for cutaneous antisepsis)  Lidocaine was administered to the skin, and subcutaneous tissues down to the periosteum of the left iliac posterior crest  , and a small skin incision was made  An 11 gauge set needle was advanced through the cortex  Positioning of the needle directed towards the lesion was confirmed with CT images  Core biopsy was taken  This was placed on a slide and then into formalin  A 2nd pass was performed in similar manner  Preliminary interpretation was positive for lesional cells and felt likely to be adequate for evaluation       Impression: Impression: Successful percutaneous left iliac crest lytic lesion biopsy, pulmonary interpretation is positive for lesional cells  Workstation performed: [id number]     Procedure: Nm Pet Ct Skull Base To Mid Thigh    Result Date: 1/11/2018  Narrative: PET/CT SCAN INDICATION: C34 90: Malignant neoplasm of unspecified part of unspecified bronchus or lung  History taken directly from the electronic ordering system  Abnormal chest CT  MODIFIER: PI     COMPARISON: CT chest from 1/8/2018 CELL TYPE:  N/A TECHNIQUE:   8 3 mCi F-18-FDG administered IV  Multiplanar attenuation corrected and non attenuation corrected PET images are available for interpretation, and contiguous, low dose, axial CT sections were obtained from the skull vertex through the femurs  following the administration of oral contrast material (7 5 cc Omnipaque-240 in 300 cc water)  Intravenous contrast material was not utilized  This examination, like all CT scans performed in the [facility], was performed utilizing techniques to minimize radiation dose exposure, including the use of iterative reconstruction and automated exposure control  Fasting serum glucose: 101 mg/dl FINDINGS: VISUALIZED BRAIN:   No acute abnormalities are seen  HEAD/NECK:   There is a physiologic distribution of FDG  No FDG avid cervical adenopathy is seen  CT images: Mucosal thickening noted at the right maxillary sinus inferiorly  CHEST:   The 2 0 cm spiculated left lower lobe nodule is FDG avid, SUV max of 20 9 compatible with hypermetabolic malignancy  Several FDG avid left perihilar and mediastinal lymph nodes identified compatible with metastasis  There are no FDG avid right perihilar lymph nodes  A left perihilar lymph node demonstrates SUV max of 16 1 not well-defined on the low-dose noncontrast CT images  Left subcarinal lymph node demonstrates SUV max of 18 9 and measures 1 6 x 1 2 cm  CT images: Calcified left peribronchial lymph nodes noted  Scattered coronary artery calcification    Underlying peripheral interstitial fibrosis  6 mm nodule in the right middle lobe anteriorly, image 138 series 3, stable, not FDG avid  5 mm nodule more inferiorly in the right middle lobe, image 149 also not FDG avid  ABDOMEN:   No FDG avid soft tissue lesions are seen  CT images: Multiple bilateral renal cysts  Large right parapelvic renal cyst identified measuring up to 8 4 cm  Mildly ectatic abdominal aorta  PELVIS: No FDG avid soft tissue lesions are seen  CT images: Prostate is moderately enlarged  Small right inguinal hernia containing portion of bowel without obstruction  Small fat-containing left inguinal hernia  OSSEOUS STRUCTURES: Multiple FDG avid osseous lesions compatible with osseous metastasis  These are seen along the spine, ribs, sacrum, pelvis and bilateral proximal femora  Lesion at the T3 transverse process on the left demonstrates SUV max of 29 5  The T7 vertebral body lesion demonstrates SUV max of 39 with underlying lytic lesion on CT  L1 lesion demonstrates SUV max of 25 9  A lytic lesion at the left ilium medially demonstrates SUV max of 35 7  Destructive lesion at the left hemisacrum demonstrates SUV max of 51 5  This measures 3 8 x 2 1 cm on CT with underlying soft tissue component  Lesion at the right femoral neck demonstrates SUV max of 39 1 with possible mild lucency here on CT  CT images: No additional findings  Impression: 1  FDG avid left lower lobe spiculated lesion compatible with hypermetabolic malignancy  2   FDG avid lymph nodes in the left perihilar and mediastinal regions compatible with metastasis  3   Multiple FDG avid osseous lesions compatible with osseous metastasis  Workstation performed: [id number]     Procedure: Xr Hip (historical)    Result Date: 1/8/2018  Narrative: LUMBAR SPINE  INDICATION:  M[age]: Lumbago with sciatica, unspecified side  History taken directly from the electronic ordering system        COMPARISON: None  VIEWS:  AP, lateral, bilateral oblique and coned down projections  IMAGES:  5  FINDINGS:  Mild levoscoliosis present  No subluxation  There is no radiographic evidence of acute fracture or destructive osseous lesion  Discogenic degenerative changes of the lumbar spine noted  Endplate spurring is seen most pronounced at L1-L2  There is mild to moderate disc space narrowing at L3-L4  Atherosclerotic vascular calcifications are noted  Visualized soft tissues appear otherwise unremarkable  IMPRESSION:  1  Mild levoscoliosis, and multilevel degenerative disc disease of the lumbar spine  2  No compression fracture or subluxation  Workstation performed: [id number]  Signed by:  Jad Dawson MD  1/8/18    Pathology: See Above    Assessment/Plan    1  Metastatic cancer to bone [facility])  Radiation Simulation Treatment   2  Cancer of lower lobe of left lung [facility])  Radiation Simulation Treatment       Discussion/Summary:  Louisa Rodriguez is a 69y o  year old male with stage IV metastatic left lung adenocarcinoma with a left lower lobe primary with left perihilar and mediastinal lymphadenopathy in addition to bone metastasis involving the thoracic and lumbar spine  His diagnosis was confirmed after a left iliac crest bone biopsy revealing metastatic adenocarcinoma consistent with a lung primary  His tissue was sent for [facility] now molecular profiling  He started chemotherapy with carboplatin, Alimta and Keytruda January 31, 2018  Webster Luke was added with his 2nd cycle chemotherapy February 22, 2018  He is having no symptoms from his lung primary  He is having symptoms from his bone metastasis at the T10 and L1 regions were he does have evidence of a pathologic fracture  His bone pain is partially controlled with Aleve and meloxicam   He has also been on dexamethasone 4 mg twice a day  He would like to stop or reduce these medications  We discussed palliative radiation therapy can help to alleviate his symptoms from his bone metastasis    We discussed treatment to the lower thoracic spine and upper lumbar spine region over 2 weeks/10 fractions  We discussed the acute side effects and the potential chronic complications with the patient and his wife  He agrees to proceed with palliative radiation therapy at [address] [place] [address]  He will be scheduled for simulation next week  Cordelia Mccarthy MD  2/23/2018,10:57 AM    Portions of the record may have been created with voice recognition software   Occasional wrong word or "sound a like" substitutions may have occurred due to the inherent limitations of voice recognition software   Read the chart carefully and recognize, using context, where substitutions have occurred

## 2018-02-26 ENCOUNTER — APPOINTMENT (OUTPATIENT)
Dept: RADIATION ONCOLOGY | Facility: HOSPITAL | Age: 78
End: 2018-02-26
Attending: RADIOLOGY
Payer: COMMERCIAL

## 2018-02-26 PROCEDURE — 77290 THER RAD SIMULAJ FIELD CPLX: CPT | Performed by: RADIOLOGY

## 2018-02-26 PROCEDURE — 77334 RADIATION TREATMENT AID(S): CPT | Performed by: RADIOLOGY

## 2018-02-26 PROCEDURE — 77470 SPECIAL RADIATION TREATMENT: CPT | Performed by: RADIOLOGY

## 2018-02-26 NOTE — MISCELLANEOUS
Message  Spoke with Dr Rudy Waters concerning Francis's PET/CT revealing multiple sites of osseous metastasis  Dr Reed Luther doesn't need to see patient  Per Dr Reed Luther patient should be sent to IR for biopsy of one of his osseous lesions, then see medical oncology  Dr Rudy Waters has already spoken with Dr Ariel Haile about this patient  Offered to assist him with getting a medical oncologist appointment  Dr Rudy Waters declined said he would get him an appointment with Dr Ariel Haile  He will call patient to let him know his appointment with Dr Reed Luther has been cancelled  Active Problems    1  HTN (hypertension) (401 9) (I10)    Current Meds   1  Benicar 40 MG Oral Tablet (Olmesartan Medoxomil); Therapy: (Recorded:10Jan2018) to Recorded   2  Dexamethasone 4 MG Oral Tablet; Therapy: (Recorded:10Jan2018) to Recorded   3  Finasteride 5 MG Oral Tablet; Therapy: (Recorded:10Jan2018) to Recorded   4  Levothyroxine Sodium 88 MCG Oral Tablet; Therapy: (Recorded:10Jan2018) to Recorded   5  Losartan Potassium 100 MG Oral Tablet; Therapy: (Recorded:10Jan2018) to Recorded   6  Meloxicam 15 MG Oral Tablet; Therapy: (Recorded:10Jan2018) to Recorded   7  Norvasc 5 MG Oral Tablet (AmLODIPine Besylate); Therapy: (Recorded:10Jan2018) to Recorded   8  Omeprazole 40 MG Oral Capsule Delayed Release; Therapy: (Recorded:10Jan2018) to Recorded   9  TraMADol HCl - 50 MG Oral Tablet; Therapy: (Recorded:10Jan2018) to Recorded    Allergies    1   No Known Drug Allergies    Signatures   Electronically signed by : Valentina Lynn, ; Jan 12 2018 10:14AM EST                       (Author)

## 2018-02-28 PROCEDURE — 77295 3-D RADIOTHERAPY PLAN: CPT | Performed by: RADIOLOGY

## 2018-02-28 PROCEDURE — 77300 RADIATION THERAPY DOSE PLAN: CPT | Performed by: RADIOLOGY

## 2018-02-28 PROCEDURE — 77334 RADIATION TREATMENT AID(S): CPT | Performed by: RADIOLOGY

## 2018-03-01 ENCOUNTER — APPOINTMENT (OUTPATIENT)
Dept: RADIATION ONCOLOGY | Facility: HOSPITAL | Age: 78
End: 2018-03-01
Attending: RADIOLOGY
Payer: COMMERCIAL

## 2018-03-01 PROCEDURE — 77412 RADIATION TX DELIVERY LVL 3: CPT | Performed by: RADIOLOGY

## 2018-03-01 PROCEDURE — 77280 THER RAD SIMULAJ FIELD SMPL: CPT | Performed by: RADIOLOGY

## 2018-03-01 PROCEDURE — 77387 GUIDANCE FOR RADJ TX DLVR: CPT | Performed by: RADIOLOGY

## 2018-03-05 PROCEDURE — 77412 RADIATION TX DELIVERY LVL 3: CPT | Performed by: RADIOLOGY

## 2018-03-05 PROCEDURE — 77387 GUIDANCE FOR RADJ TX DLVR: CPT | Performed by: RADIOLOGY

## 2018-03-05 PROCEDURE — 77331 SPECIAL RADIATION DOSIMETRY: CPT | Performed by: RADIOLOGY

## 2018-03-06 PROCEDURE — 77387 GUIDANCE FOR RADJ TX DLVR: CPT | Performed by: RADIOLOGY

## 2018-03-06 PROCEDURE — 77412 RADIATION TX DELIVERY LVL 3: CPT | Performed by: RADIOLOGY

## 2018-03-08 ENCOUNTER — OFFICE VISIT (OUTPATIENT)
Dept: HEMATOLOGY ONCOLOGY | Facility: CLINIC | Age: 78
End: 2018-03-08
Payer: COMMERCIAL

## 2018-03-08 VITALS
BODY MASS INDEX: 29.44 KG/M2 | DIASTOLIC BLOOD PRESSURE: 72 MMHG | TEMPERATURE: 98.5 F | WEIGHT: 187.6 LBS | SYSTOLIC BLOOD PRESSURE: 134 MMHG | HEART RATE: 60 BPM | OXYGEN SATURATION: 93 % | RESPIRATION RATE: 17 BRPM | HEIGHT: 67 IN

## 2018-03-08 DIAGNOSIS — C79.51 METASTATIC CANCER TO BONE (HCC): ICD-10-CM

## 2018-03-08 DIAGNOSIS — C77.1 MALIGNANT NEOPLASM METASTATIC TO INTRATHORACIC LYMPH NODE (HCC): ICD-10-CM

## 2018-03-08 DIAGNOSIS — G89.3 CANCER ASSOCIATED PAIN: ICD-10-CM

## 2018-03-08 DIAGNOSIS — C34.32 CANCER OF LOWER LOBE OF LEFT LUNG (HCC): Primary | ICD-10-CM

## 2018-03-08 PROCEDURE — 77387 GUIDANCE FOR RADJ TX DLVR: CPT | Performed by: RADIOLOGY

## 2018-03-08 PROCEDURE — 99214 OFFICE O/P EST MOD 30 MIN: CPT | Performed by: INTERNAL MEDICINE

## 2018-03-08 PROCEDURE — 77412 RADIATION TX DELIVERY LVL 3: CPT | Performed by: RADIOLOGY

## 2018-03-08 NOTE — LETTER
March 9, 2018     Taqueria Peralta MD  [address] [facility]  [address]  [address] [place] [address] [facility]    Patient: Bacilio Becker   YOB: 1940   Date of Visit: 3/8/2018       Dear Dr Sarita Ivy: Thank you for referring Bacilio Becker to me for evaluation  Below are my notes for this consultation  If you have questions, please do not hesitate to call me  I look forward to following your patient along with you  Sincerely,        Phyllis Trejo MD        CC: No Recipients  Phyllis Trejo MD  3/9/2018 12:04 AM  Sign at close encounter    HPI:   Bacilio Becker is a 69 y o  male   He is here with his wife  In December 2017 he was hospitalized with anemia and back pain  In  January 2018 he was diagnosed to have metastatic adenocarcinoma to the bones from lung lesion  PD L1  was 0  Working diagnosis was adenocarcinoma of lung with lymph node and bony metastasis and he has received 2 cycles of carboplatin, Alimta and Keytruda  He still has low back pain and is receiving palliative radiation  Today will be 4  Radiation  He has tiredness  He has exertional dyspnea  He has some pain in left upper chest posteriorly  No cough  No hemoptysis  Appetite is fair  No significant weight loss  He has arthritic symptoms  Dr Sarita Ivy, patient's primary physician ordered a noncontrast CT scan of the chest on 1/8/2018 which identified a left lower lobe 2 centimeter spiculated nodule and large T7 and T10 lytic lesions there is suspected pathologic fracture at T10  PET-CT 1/11/2018 identified SUV of 20 9 in the 2 centimeter left lower lobe nodule, there were several FDG avid left perihilar and mediastinal lymph nodes  Left subcarinal lymph node with an SUV of 19  He has multiple bilateral renal cysts and of right parapelvic renal cyst up to 8 4 centimeters    There are multiple FDG avid osseous lesions compatible with osseous metastasis seen along the spine, ribs sacrum pelvis bilateral proximal femora, T3 transverse process, T7 vertebral body, L1 lesion left hilum, left hemisacrum and right femoral neck      He was readmitted 1/13/2018 due to intractable back pain      Biopsy from the left iliac crest identified metastatic adenocarcinoma  The histology is diagnostic of metastatic adenocarcinoma   The immunoprofile is non-specific but can be seen in pancreaticobiliary, urothelial and upper gastrointestinal tract primaries   It can uncommonly be identified in lung primaries although both TTF-1 and Napsin-A are negative    Current Outpatient Prescriptions:     acetaminophen (TYLENOL) 325 mg tablet, Take 2 tablets by mouth every 6 (six) hours as needed for mild pain, headaches or fever, Disp: 30 tablet, Rfl: 0    dexamethasone (DECADRON) 4 mg tablet, Take 1 tablet (4 mg total) by mouth 2 (two) times a day as needed (PLEASE SEE SIG NOTES) TAKE 1 TABLET WITH BREAKFAST, 1 TABLET WITH DINNER, DAY BEFORE, DAY OF, AND DAY AFTER CHEMO , Disp: 12 tablet, Rfl: 0    doxazosin (CARDURA) 4 mg tablet, Take 4 mg by mouth daily at bedtime, Disp: , Rfl:     finasteride (PROSCAR) 5 mg tablet, Take 5 mg by mouth daily, Disp: , Rfl:     folic acid (FOLVITE) 1 mg tablet, Take 1 tablet by mouth daily, Disp: 90 tablet, Rfl: 0    levothyroxine 88 mcg tablet, Take 88 mcg by mouth daily, Disp: , Rfl:     losartan (COZAAR) 100 MG tablet, Take 50 mg by mouth daily  , Disp: , Rfl:     meloxicam (MOBIC) 7 5 mg tablet, Take 7 5 mg by mouth every other day, Disp: , Rfl:     ondansetron (ZOFRAN) 4 mg tablet, Take 1 tablet by mouth every 6 (six) hours as needed for nausea or vomiting, Disp: 30 tablet, Rfl: 1    No Known Allergies    ROS:  03/08/18 Reviewed 13 systems:  Presently no headaches, seizures, dizziness, diplopia, dysphagia, hoarseness, chest pain, palpitations,  cough, hemoptysis, abdominal pain, nausea, vomiting, change in bowel habits, melena, hematuria, fever, chills, bleeding,  skin rash, weight loss, numbness,  weakness, claudication and gait problem  No frequent infections  No hot flashes  Not unusually sensitive to heat or cold  Patient is anxious     No swelling of the ankles  No swollen glands  Other symptoms are in HPI  /72 (BP Location: Right arm, Cuff Size: Adult)   Pulse 60   Temp 98 5 °F (36 9 °C) (Tympanic)   Resp 17   Ht 5' 6 5" (1 689 m)   Wt 85 1 kg (187 lb 9 6 oz)   SpO2 93%   BMI 29 83 kg/m²      Physical Exam:  Alert, oriented, not in distress, no icterus, no oral thrush, no palpable neck mass, clear lung fields, regular heart rate, abdomen  soft and non tender, no palpable abdominal mass, no ascites, no edema of ankles, no calf tenderness, no focal neurological deficit, no skin rash, no palpable lymphadenopathy, good arterial pulses, no clubbing  Patient is anxious  Performance status 2  IMAGING:  CT chest wo contrast    (Results Pending)       Lab Results  Results for orders placed or performed in visit on 02/23/18   Occult Bloood,Fecal Immunochemical   Result Value Ref Range    OCCULT BLD, FECAL IMMUNOLOGICAL Negative Negative             Reviewed and discussed with patient  Assessment and plan:    Working diagnosis is lung cancer, adenocarcinoma with metastasis to intrathoracic lymph nodes and bones  Patient is on carboplatin, Alimta and Keytruda  Also Xgeva with calcium and vitamin-D  He has received 2 cycles  He will have CT scan of chest to decide continuation of present medications or to make a change  Dali Newer will be continued  Patient is receiving palliative radiation to lower spine  Discussed with patient and his wife  Questions answered  Patient voiced understanding and agreement in the discussion  Counseling / Coordination of Care   Greater than 50% of total time was spent with the patient and / or family counseling and / or coordination of care

## 2018-03-09 ENCOUNTER — HOSPITAL ENCOUNTER (OUTPATIENT)
Dept: CT IMAGING | Facility: HOSPITAL | Age: 78
Discharge: HOME | End: 2018-03-09
Attending: INTERNAL MEDICINE
Payer: COMMERCIAL

## 2018-03-09 PROCEDURE — 71250 CT THORAX DX C-: CPT

## 2018-03-09 PROCEDURE — 77387 GUIDANCE FOR RADJ TX DLVR: CPT | Performed by: RADIOLOGY

## 2018-03-09 PROCEDURE — 77336 RADIATION PHYSICS CONSULT: CPT | Performed by: RADIOLOGY

## 2018-03-09 PROCEDURE — 77412 RADIATION TX DELIVERY LVL 3: CPT | Performed by: RADIOLOGY

## 2018-03-09 NOTE — PROGRESS NOTES
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  PD L1  was 0  Working diagnosis was adenocarcinoma of lung with lymph node and bony metastasis and he has received 2 cycles of carboplatin, Alimta and Keytruda  He still has low back pain and is receiving palliative radiation  Today will be 4  Radiation  He has tiredness  He has exertional dyspnea  He has some pain in left upper chest posteriorly  No cough  No hemoptysis  Appetite is fair  No significant weight loss  He has arthritic symptoms  Dr Kiera Zendejas, patient's primary physician ordered a noncontrast CT scan of the chest on 1/8/2018 which identified a left lower lobe 2 centimeter spiculated nodule and large T7 and T10 lytic lesions there is suspected pathologic fracture at T10  PET-CT 1/11/2018 identified SUV of 20 9 in the 2 centimeter left lower lobe nodule, there were several FDG avid left perihilar and mediastinal lymph nodes  Left subcarinal lymph node with an SUV of 19  He has multiple bilateral renal cysts and of right parapelvic renal cyst up to 8 4 centimeters  There are multiple FDG avid osseous lesions compatible with osseous metastasis seen along the spine, ribs sacrum pelvis bilateral proximal femora, T3 transverse process, T7 vertebral body, L1 lesion left hilum, left hemisacrum and right femoral neck      He was readmitted 1/13/2018 due to intractable back pain      Biopsy from the left iliac crest identified metastatic adenocarcinoma  The histology is diagnostic of metastatic adenocarcinoma   The immunoprofile is non-specific but can be seen in pancreaticobiliary, urothelial and upper gastrointestinal tract primaries   It can uncommonly be identified in lung primaries although both TTF-1 and Napsin-A are negative    Current Outpatient Prescriptions:   acetaminophen (TYLENOL) 325 mg tablet, Take 2 tablets by mouth every 6 (six) hours as needed for mild pain, headaches or fever, Disp: 30 tablet, Rfl: 0    dexamethasone (DECADRON) 4 mg tablet, Take 1 tablet (4 mg total) by mouth 2 (two) times a day as needed (PLEASE SEE SIG NOTES) TAKE 1 TABLET WITH BREAKFAST, 1 TABLET WITH DINNER, DAY BEFORE, DAY OF, AND DAY AFTER CHEMO , Disp: 12 tablet, Rfl: 0    doxazosin (CARDURA) 4 mg tablet, Take 4 mg by mouth daily at bedtime, Disp: , Rfl:     finasteride (PROSCAR) 5 mg tablet, Take 5 mg by mouth daily, Disp: , Rfl:     folic acid (FOLVITE) 1 mg tablet, Take 1 tablet by mouth daily, Disp: 90 tablet, Rfl: 0    levothyroxine 88 mcg tablet, Take 88 mcg by mouth daily, Disp: , Rfl:     losartan (COZAAR) 100 MG tablet, Take 50 mg by mouth daily  , Disp: , Rfl:     meloxicam (MOBIC) 7 5 mg tablet, Take 7 5 mg by mouth every other day, Disp: , Rfl:     ondansetron (ZOFRAN) 4 mg tablet, Take 1 tablet by mouth every 6 (six) hours as needed for nausea or vomiting, Disp: 30 tablet, Rfl: 1    No Known Allergies    ROS:  03/08/18 Reviewed 13 systems:  Presently no headaches, seizures, dizziness, diplopia, dysphagia, hoarseness, chest pain, palpitations,  cough, hemoptysis, abdominal pain, nausea, vomiting, change in bowel habits, melena, hematuria, fever, chills, bleeding,  skin rash, weight loss,  numbness,  weakness, claudication and gait problem  No frequent infections  No hot flashes  Not unusually sensitive to heat or cold  Patient is anxious     No swelling of the ankles  No swollen glands  Other symptoms are in HPI        /72 (BP Location: Right arm, Cuff Size: Adult)   Pulse 60   Temp 98 5 °F (36 9 °C) (Tympanic)   Resp 17   Ht 5' 6 5" (1 689 m)   Wt 85 1 kg (187 lb 9 6 oz)   SpO2 93%   BMI 29 83 kg/m²     Physical Exam:  Alert, oriented, not in distress, no icterus, no oral thrush, no palpable neck mass, clear lung fields, regular heart rate, abdomen soft and non tender, no palpable abdominal mass, no ascites, no edema of ankles, no calf tenderness, no focal neurological deficit, no skin rash, no palpable lymphadenopathy, good arterial pulses, no clubbing  Patient is anxious  Performance status 2  IMAGING:  CT chest wo contrast    (Results Pending)       Lab Results  Results for orders placed or performed in visit on 02/23/18   Occult Bloood,Fecal Immunochemical   Result Value Ref Range    OCCULT BLD, FECAL IMMUNOLOGICAL Negative Negative             Reviewed and discussed with patient  Assessment and plan:    Working diagnosis is lung cancer, adenocarcinoma with metastasis to intrathoracic lymph nodes and bones  Patient is on carboplatin, Alimta and Keytruda  Also Xgeva with calcium and vitamin-D  He has received 2 cycles  He will have CT scan of chest to decide continuation of present medications or to make a change  Cherene Duos will be continued  Patient is receiving palliative radiation to lower spine  Discussed with patient and his wife  Questions answered  Patient voiced understanding and agreement in the discussion  Counseling / Coordination of Care   Greater than 50% of total time was spent with the patient and / or family counseling and / or coordination of care

## 2018-03-12 ENCOUNTER — APPOINTMENT (OUTPATIENT)
Dept: LAB | Facility: CLINIC | Age: 78
End: 2018-03-12
Payer: COMMERCIAL

## 2018-03-12 DIAGNOSIS — C34.32 CANCER OF LOWER LOBE OF LEFT LUNG (HCC): ICD-10-CM

## 2018-03-12 LAB
ALBUMIN SERPL BCP-MCNC: 3.5 G/DL (ref 3.5–5)
ALP SERPL-CCNC: 121 U/L (ref 46–116)
ALT SERPL W P-5'-P-CCNC: 61 U/L (ref 12–78)
ANION GAP SERPL CALCULATED.3IONS-SCNC: 7 MMOL/L (ref 4–13)
AST SERPL W P-5'-P-CCNC: 36 U/L (ref 5–45)
BASOPHILS # BLD AUTO: 0 THOUSANDS/ΜL (ref 0–0.1)
BASOPHILS NFR BLD AUTO: 0 % (ref 0–1)
BILIRUB SERPL-MCNC: 0.4 MG/DL (ref 0.2–1)
BUN SERPL-MCNC: 17 MG/DL (ref 5–25)
CALCIUM SERPL-MCNC: 8.7 MG/DL (ref 8.3–10.1)
CHLORIDE SERPL-SCNC: 106 MMOL/L (ref 100–108)
CO2 SERPL-SCNC: 27 MMOL/L (ref 21–32)
CREAT SERPL-MCNC: 1.09 MG/DL (ref 0.6–1.3)
EOSINOPHIL # BLD AUTO: 0.04 THOUSAND/ΜL (ref 0–0.61)
EOSINOPHIL NFR BLD AUTO: 2 % (ref 0–6)
ERYTHROCYTE [DISTWIDTH] IN BLOOD BY AUTOMATED COUNT: 23.2 % (ref 11.6–15.1)
GFR SERPL CREATININE-BSD FRML MDRD: 65 ML/MIN/1.73SQ M
GLUCOSE SERPL-MCNC: 120 MG/DL (ref 65–140)
HCT VFR BLD AUTO: 32.1 % (ref 36.5–49.3)
HGB BLD-MCNC: 10.1 G/DL (ref 12–17)
LYMPHOCYTES # BLD AUTO: 0.18 THOUSANDS/ΜL (ref 0.6–4.47)
LYMPHOCYTES NFR BLD AUTO: 10 % (ref 14–44)
MCH RBC QN AUTO: 26.9 PG (ref 26.8–34.3)
MCHC RBC AUTO-ENTMCNC: 31.5 G/DL (ref 31.4–37.4)
MCV RBC AUTO: 85 FL (ref 82–98)
MONOCYTES # BLD AUTO: 0.34 THOUSAND/ΜL (ref 0.17–1.22)
MONOCYTES NFR BLD AUTO: 18 % (ref 4–12)
NEUTROPHILS # BLD AUTO: 1.32 THOUSANDS/ΜL (ref 1.85–7.62)
NEUTS SEG NFR BLD AUTO: 70 % (ref 43–75)
PLATELET # BLD AUTO: 181 THOUSANDS/UL (ref 149–390)
PMV BLD AUTO: 9.7 FL (ref 8.9–12.7)
POTASSIUM SERPL-SCNC: 5.2 MMOL/L (ref 3.5–5.3)
PROT SERPL-MCNC: 7.3 G/DL (ref 6.4–8.2)
RBC # BLD AUTO: 3.76 MILLION/UL (ref 3.88–5.62)
SODIUM SERPL-SCNC: 140 MMOL/L (ref 136–145)
TSH SERPL DL<=0.05 MIU/L-ACNC: 0.68 UIU/ML (ref 0.36–3.74)
WBC # BLD AUTO: 1.88 THOUSAND/UL (ref 4.31–10.16)

## 2018-03-12 PROCEDURE — 80053 COMPREHEN METABOLIC PANEL: CPT

## 2018-03-12 PROCEDURE — 84443 ASSAY THYROID STIM HORMONE: CPT

## 2018-03-12 PROCEDURE — 77412 RADIATION TX DELIVERY LVL 3: CPT | Performed by: RADIOLOGY

## 2018-03-12 PROCEDURE — 85025 COMPLETE CBC W/AUTO DIFF WBC: CPT

## 2018-03-12 PROCEDURE — 77387 GUIDANCE FOR RADJ TX DLVR: CPT | Performed by: RADIOLOGY

## 2018-03-12 PROCEDURE — 36415 COLL VENOUS BLD VENIPUNCTURE: CPT

## 2018-03-12 PROCEDURE — 77417 THER RADIOLOGY PORT IMAGE(S): CPT | Performed by: RADIOLOGY

## 2018-03-12 RX ORDER — DEXAMETHASONE 4 MG/1
4 TABLET ORAL 2 TIMES DAILY PRN
Qty: 12 TABLET | Refills: 0 | Status: SHIPPED | OUTPATIENT
Start: 2018-03-12 | End: 2018-04-16 | Stop reason: SDUPTHER

## 2018-03-12 RX ORDER — DEXAMETHASONE 4 MG/1
4 TABLET ORAL 2 TIMES DAILY PRN
Qty: 12 TABLET | Refills: 0 | Status: SHIPPED | OUTPATIENT
Start: 2018-03-12 | End: 2018-03-12 | Stop reason: SDUPTHER

## 2018-03-12 NOTE — TELEPHONE ENCOUNTER
Patient's wife called stating that Marry Fang is scheduled for Chemo on Wednesday and does not have his dexamethasone that he needs to take before, day of, and day after chemo treat  If you have any questions you can call Damian Shearer at the home number

## 2018-03-12 NOTE — TELEPHONE ENCOUNTER
Dr Ariel Haile made aware that patient's WBC was 1 88 on 3/12/18  Per Dr Ariel Haile, patient's Relative Neutrophil count was 70%  No new orders received at this time

## 2018-03-13 DIAGNOSIS — T45.1X5A CHEMOTHERAPY-INDUCED NEUTROPENIA (HCC): Primary | ICD-10-CM

## 2018-03-13 DIAGNOSIS — D70.1 CHEMOTHERAPY-INDUCED NEUTROPENIA (HCC): Primary | ICD-10-CM

## 2018-03-13 RX ORDER — SODIUM CHLORIDE 9 MG/ML
20 INJECTION, SOLUTION INTRAVENOUS CONTINUOUS
Status: DISCONTINUED | OUTPATIENT
Start: 2018-03-14 | End: 2018-03-17 | Stop reason: HOSPADM

## 2018-03-13 RX ORDER — CYANOCOBALAMIN 1000 UG/ML
1000 INJECTION INTRAMUSCULAR; SUBCUTANEOUS ONCE
Status: COMPLETED | OUTPATIENT
Start: 2018-03-14 | End: 2018-03-14

## 2018-03-14 ENCOUNTER — TELEPHONE (OUTPATIENT)
Dept: HEMATOLOGY ONCOLOGY | Facility: CLINIC | Age: 78
End: 2018-03-14

## 2018-03-14 ENCOUNTER — HOSPITAL ENCOUNTER (OUTPATIENT)
Dept: INFUSION CENTER | Facility: CLINIC | Age: 78
Discharge: HOME | End: 2018-03-14
Payer: COMMERCIAL

## 2018-03-14 VITALS
RESPIRATION RATE: 20 BRPM | HEART RATE: 78 BPM | OXYGEN SATURATION: 97 % | SYSTOLIC BLOOD PRESSURE: 118 MMHG | BODY MASS INDEX: 29.35 KG/M2 | DIASTOLIC BLOOD PRESSURE: 68 MMHG | WEIGHT: 187 LBS | TEMPERATURE: 97.9 F | HEIGHT: 67 IN

## 2018-03-14 DIAGNOSIS — D70.1 CHEMOTHERAPY-INDUCED NEUTROPENIA (HCC): ICD-10-CM

## 2018-03-14 DIAGNOSIS — R11.0 NAUSEA: Primary | ICD-10-CM

## 2018-03-14 DIAGNOSIS — T45.1X5A CHEMOTHERAPY-INDUCED NEUTROPENIA (HCC): ICD-10-CM

## 2018-03-14 LAB
BASOPHILS # BLD AUTO: 0 THOUSANDS/ΜL (ref 0–0.1)
BASOPHILS NFR BLD AUTO: 0 % (ref 0–1)
EOSINOPHIL # BLD AUTO: 0 THOUSAND/ΜL (ref 0–0.61)
EOSINOPHIL NFR BLD AUTO: 0 % (ref 0–6)
ERYTHROCYTE [DISTWIDTH] IN BLOOD BY AUTOMATED COUNT: 22.5 % (ref 11.6–15.1)
HCT VFR BLD AUTO: 30.7 % (ref 36.5–49.3)
HGB BLD-MCNC: 9.9 G/DL (ref 12–17)
LYMPHOCYTES # BLD AUTO: 0.19 THOUSANDS/ΜL (ref 0.6–4.47)
LYMPHOCYTES NFR BLD AUTO: 6 % (ref 14–44)
MCH RBC QN AUTO: 27.5 PG (ref 26.8–34.3)
MCHC RBC AUTO-ENTMCNC: 32.2 G/DL (ref 31.4–37.4)
MCV RBC AUTO: 85 FL (ref 82–98)
MONOCYTES # BLD AUTO: 0.32 THOUSAND/ΜL (ref 0.17–1.22)
MONOCYTES NFR BLD AUTO: 10 % (ref 4–12)
NEUTROPHILS # BLD AUTO: 2.58 THOUSANDS/ΜL (ref 1.85–7.62)
NEUTS SEG NFR BLD AUTO: 84 % (ref 43–75)
PLATELET # BLD AUTO: 213 THOUSANDS/UL (ref 149–390)
PMV BLD AUTO: 8.8 FL (ref 8.9–12.7)
RBC # BLD AUTO: 3.6 MILLION/UL (ref 3.88–5.62)
WBC # BLD AUTO: 3.09 THOUSAND/UL (ref 4.31–10.16)

## 2018-03-14 PROCEDURE — 85025 COMPLETE CBC W/AUTO DIFF WBC: CPT

## 2018-03-14 PROCEDURE — 96413 CHEMO IV INFUSION 1 HR: CPT

## 2018-03-14 PROCEDURE — 96417 CHEMO IV INFUS EACH ADDL SEQ: CPT

## 2018-03-14 PROCEDURE — 96367 TX/PROPH/DG ADDL SEQ IV INF: CPT

## 2018-03-14 PROCEDURE — 96411 CHEMO IV PUSH ADDL DRUG: CPT

## 2018-03-14 PROCEDURE — 96372 THER/PROPH/DIAG INJ SC/IM: CPT

## 2018-03-14 PROCEDURE — 77387 GUIDANCE FOR RADJ TX DLVR: CPT | Performed by: RADIOLOGY

## 2018-03-14 PROCEDURE — 77412 RADIATION TX DELIVERY LVL 3: CPT | Performed by: RADIOLOGY

## 2018-03-14 RX ORDER — ONDANSETRON HYDROCHLORIDE 8 MG/1
8 TABLET, FILM COATED ORAL EVERY 8 HOURS PRN
Qty: 20 TABLET | Refills: 1 | Status: SHIPPED | OUTPATIENT
Start: 2018-03-14 | End: 2018-06-11

## 2018-03-14 RX ADMIN — CARBOPLATIN 466 MG: 10 INJECTION, SOLUTION INTRAVENOUS at 11:24

## 2018-03-14 RX ADMIN — SODIUM CHLORIDE 975 MG: 9 INJECTION, SOLUTION INTRAVENOUS at 11:03

## 2018-03-14 RX ADMIN — SODIUM CHLORIDE 20 ML/HR: 0.9 INJECTION, SOLUTION INTRAVENOUS at 09:37

## 2018-03-14 RX ADMIN — SODIUM CHLORIDE 200 MG: 9 INJECTION, SOLUTION INTRAVENOUS at 12:26

## 2018-03-14 RX ADMIN — ONDANSETRON 16 MG: 2 INJECTION INTRAMUSCULAR; INTRAVENOUS at 10:28

## 2018-03-14 RX ADMIN — CYANOCOBALAMIN 1000 MCG: 1000 INJECTION, SOLUTION INTRAMUSCULAR at 13:08

## 2018-03-14 NOTE — PROGRESS NOTES
Pt is here for chemo  He complains of watery and itchy eyes  He feels its allergies  He has taken claritin and it hasn't helped  He spoke to radiation and they feel its the same  He states he will notify his eye Dr next week at his appointment  Pt's [address] is 1 32 from the blood work on 3/12/18  CBC drawn to recheck [address] per dr's orders   Will wait for results

## 2018-03-15 PROCEDURE — 77387 GUIDANCE FOR RADJ TX DLVR: CPT | Performed by: RADIOLOGY

## 2018-03-15 PROCEDURE — 77412 RADIATION TX DELIVERY LVL 3: CPT | Performed by: RADIOLOGY

## 2018-03-16 PROCEDURE — 77412 RADIATION TX DELIVERY LVL 3: CPT | Performed by: RADIOLOGY

## 2018-03-16 PROCEDURE — 77387 GUIDANCE FOR RADJ TX DLVR: CPT | Performed by: RADIOLOGY

## 2018-03-19 ENCOUNTER — APPOINTMENT (EMERGENCY)
Dept: RADIOLOGY | Facility: HOSPITAL | Age: 78
DRG: 392 | End: 2018-03-19
Payer: COMMERCIAL

## 2018-03-19 ENCOUNTER — HOSPITAL ENCOUNTER (INPATIENT)
Facility: HOSPITAL | Age: 78
LOS: 3 days | Discharge: HOME | DRG: 392 | End: 2018-03-22
Attending: EMERGENCY MEDICINE | Admitting: INTERNAL MEDICINE
Payer: COMMERCIAL

## 2018-03-19 ENCOUNTER — APPOINTMENT (EMERGENCY)
Dept: CT IMAGING | Facility: HOSPITAL | Age: 78
DRG: 392 | End: 2018-03-19
Payer: COMMERCIAL

## 2018-03-19 DIAGNOSIS — K20.90 ESOPHAGITIS: Primary | ICD-10-CM

## 2018-03-19 DIAGNOSIS — R62.7 ADULT FAILURE TO THRIVE: ICD-10-CM

## 2018-03-19 PROBLEM — C34.32 CANCER OF LOWER LOBE OF LEFT LUNG (HCC): Status: RESOLVED | Noted: 2018-01-25 | Resolved: 2018-03-19

## 2018-03-19 PROBLEM — Q99.8: Status: ACTIVE | Noted: 2018-03-19

## 2018-03-19 PROBLEM — T66.XXXA RADIATION-INDUCED ESOPHAGITIS: Status: ACTIVE | Noted: 2018-03-19

## 2018-03-19 PROBLEM — R62.51 FAILURE TO THRIVE (0-17): Chronic | Status: ACTIVE | Noted: 2018-03-19

## 2018-03-19 PROBLEM — C77.1 MALIGNANT NEOPLASM METASTATIC TO INTRATHORACIC LYMPH NODE (HCC): Status: RESOLVED | Noted: 2018-03-08 | Resolved: 2018-03-19

## 2018-03-19 PROBLEM — K20.80 RADIATION-INDUCED ESOPHAGITIS: Status: ACTIVE | Noted: 2018-03-19

## 2018-03-19 PROBLEM — M48.05 SPINAL STENOSIS OF THORACOLUMBAR REGION: Chronic | Status: RESOLVED | Noted: 2017-12-22 | Resolved: 2018-03-19

## 2018-03-19 PROBLEM — D50.9 IRON DEFICIENCY ANEMIA: Status: RESOLVED | Noted: 2018-01-25 | Resolved: 2018-03-19

## 2018-03-19 PROBLEM — R17 TOTAL BILIRUBIN, ELEVATED: Status: ACTIVE | Noted: 2018-03-19

## 2018-03-19 PROBLEM — D72.819 LEUKOPENIA: Chronic | Status: ACTIVE | Noted: 2018-03-19

## 2018-03-19 PROBLEM — G89.3 CANCER ASSOCIATED PAIN: Status: RESOLVED | Noted: 2018-01-13 | Resolved: 2018-03-19

## 2018-03-19 LAB
ALBUMIN SERPL BCP-MCNC: 3.5 G/DL (ref 3.5–5)
ALP SERPL-CCNC: 91 U/L (ref 46–116)
ALT SERPL W P-5'-P-CCNC: 41 U/L (ref 12–78)
ANION GAP SERPL CALCULATED.3IONS-SCNC: 10 MMOL/L (ref 4–13)
AST SERPL W P-5'-P-CCNC: 24 U/L (ref 5–45)
BASOPHILS # BLD AUTO: 0 THOUSANDS/ΜL (ref 0–0.1)
BASOPHILS NFR BLD AUTO: 0 % (ref 0–1)
BILIRUB DIRECT SERPL-MCNC: 0.3 MG/DL (ref 0–0.2)
BILIRUB SERPL-MCNC: 1.2 MG/DL (ref 0.2–1)
BUN SERPL-MCNC: 25 MG/DL (ref 5–25)
CALCIUM SERPL-MCNC: 8.2 MG/DL (ref 8.3–10.1)
CHLORIDE SERPL-SCNC: 105 MMOL/L (ref 100–108)
CO2 SERPL-SCNC: 25 MMOL/L (ref 21–32)
CREAT SERPL-MCNC: 1.21 MG/DL (ref 0.6–1.3)
EOSINOPHIL # BLD AUTO: 0.03 THOUSAND/ΜL (ref 0–0.61)
EOSINOPHIL NFR BLD AUTO: 1 % (ref 0–6)
ERYTHROCYTE [DISTWIDTH] IN BLOOD BY AUTOMATED COUNT: 21.8 % (ref 11.6–15.1)
GFR SERPL CREATININE-BSD FRML MDRD: 57 ML/MIN/1.73SQ M
GLUCOSE SERPL-MCNC: 121 MG/DL (ref 65–140)
HCT VFR BLD AUTO: 32.3 % (ref 36.5–49.3)
HGB BLD-MCNC: 10.5 G/DL (ref 12–17)
LIPASE SERPL-CCNC: 83 U/L (ref 73–393)
LYMPHOCYTES # BLD AUTO: 0.1 THOUSANDS/ΜL (ref 0.6–4.47)
LYMPHOCYTES NFR BLD AUTO: 5 % (ref 14–44)
MCH RBC QN AUTO: 27.8 PG (ref 26.8–34.3)
MCHC RBC AUTO-ENTMCNC: 32.5 G/DL (ref 31.4–37.4)
MCV RBC AUTO: 85 FL (ref 82–98)
MONOCYTES # BLD AUTO: 0.12 THOUSAND/ΜL (ref 0.17–1.22)
MONOCYTES NFR BLD AUTO: 6 % (ref 4–12)
NEUTROPHILS # BLD AUTO: 1.83 THOUSANDS/ΜL (ref 1.85–7.62)
NEUTS SEG NFR BLD AUTO: 88 % (ref 43–75)
PLATELET # BLD AUTO: 206 THOUSANDS/UL (ref 149–390)
PMV BLD AUTO: 8.9 FL (ref 8.9–12.7)
POTASSIUM SERPL-SCNC: 4.6 MMOL/L (ref 3.5–5.3)
PROT SERPL-MCNC: 7 G/DL (ref 6.4–8.2)
RBC # BLD AUTO: 3.78 MILLION/UL (ref 3.88–5.62)
SODIUM SERPL-SCNC: 140 MMOL/L (ref 136–145)
WBC # BLD AUTO: 2.08 THOUSAND/UL (ref 4.31–10.16)

## 2018-03-19 PROCEDURE — C9113 INJ PANTOPRAZOLE SODIUM, VIA: HCPCS | Performed by: PHYSICIAN ASSISTANT

## 2018-03-19 PROCEDURE — 85025 COMPLETE CBC W/AUTO DIFF WBC: CPT | Performed by: PHYSICIAN ASSISTANT

## 2018-03-19 PROCEDURE — 99285 EMERGENCY DEPT VISIT HI MDM: CPT

## 2018-03-19 PROCEDURE — 71046 X-RAY EXAM CHEST 2 VIEWS: CPT

## 2018-03-19 PROCEDURE — 96361 HYDRATE IV INFUSION ADD-ON: CPT

## 2018-03-19 PROCEDURE — 82248 BILIRUBIN DIRECT: CPT | Performed by: INTERNAL MEDICINE

## 2018-03-19 PROCEDURE — 96375 TX/PRO/DX INJ NEW DRUG ADDON: CPT

## 2018-03-19 PROCEDURE — 96374 THER/PROPH/DIAG INJ IV PUSH: CPT

## 2018-03-19 PROCEDURE — 99222 1ST HOSP IP/OBS MODERATE 55: CPT | Performed by: INTERNAL MEDICINE

## 2018-03-19 PROCEDURE — 74177 CT ABD & PELVIS W/CONTRAST: CPT

## 2018-03-19 PROCEDURE — 99223 1ST HOSP IP/OBS HIGH 75: CPT | Performed by: INTERNAL MEDICINE

## 2018-03-19 PROCEDURE — 83690 ASSAY OF LIPASE: CPT | Performed by: PHYSICIAN ASSISTANT

## 2018-03-19 PROCEDURE — 80053 COMPREHEN METABOLIC PANEL: CPT | Performed by: PHYSICIAN ASSISTANT

## 2018-03-19 PROCEDURE — 36415 COLL VENOUS BLD VENIPUNCTURE: CPT | Performed by: PHYSICIAN ASSISTANT

## 2018-03-19 RX ORDER — DOCUSATE SODIUM 100 MG/1
100 CAPSULE, LIQUID FILLED ORAL 2 TIMES DAILY
Status: DISCONTINUED | OUTPATIENT
Start: 2018-03-19 | End: 2018-03-22 | Stop reason: HOSPADM

## 2018-03-19 RX ORDER — MORPHINE SULFATE 4 MG/ML
4 INJECTION, SOLUTION INTRAMUSCULAR; INTRAVENOUS
Status: DISCONTINUED | OUTPATIENT
Start: 2018-03-19 | End: 2018-03-19

## 2018-03-19 RX ORDER — OXYCODONE HCL 5 MG/5 ML
5 SOLUTION, ORAL ORAL EVERY 4 HOURS PRN
Status: DISCONTINUED | OUTPATIENT
Start: 2018-03-19 | End: 2018-03-22 | Stop reason: HOSPADM

## 2018-03-19 RX ORDER — FOLIC ACID 1 MG/1
1 TABLET ORAL DAILY
Status: DISCONTINUED | OUTPATIENT
Start: 2018-03-19 | End: 2018-03-22 | Stop reason: HOSPADM

## 2018-03-19 RX ORDER — PANTOPRAZOLE SODIUM 40 MG/1
40 INJECTION, POWDER, FOR SOLUTION INTRAVENOUS EVERY 12 HOURS
Status: DISCONTINUED | OUTPATIENT
Start: 2018-03-19 | End: 2018-03-22 | Stop reason: HOSPADM

## 2018-03-19 RX ORDER — MORPHINE SULFATE 4 MG/ML
4 INJECTION, SOLUTION INTRAMUSCULAR; INTRAVENOUS
Status: DISCONTINUED | OUTPATIENT
Start: 2018-03-19 | End: 2018-03-22 | Stop reason: HOSPADM

## 2018-03-19 RX ORDER — SUCRALFATE ORAL 1 G/10ML
1000 SUSPENSION ORAL EVERY 6 HOURS SCHEDULED
Status: DISCONTINUED | OUTPATIENT
Start: 2018-03-19 | End: 2018-03-22 | Stop reason: HOSPADM

## 2018-03-19 RX ORDER — ACETAMINOPHEN 325 MG/1
650 TABLET ORAL EVERY 6 HOURS PRN
Status: DISCONTINUED | OUTPATIENT
Start: 2018-03-19 | End: 2018-03-22 | Stop reason: HOSPADM

## 2018-03-19 RX ORDER — OXYCODONE HCL 5 MG/5 ML
2.5 SOLUTION, ORAL ORAL EVERY 4 HOURS PRN
Status: DISCONTINUED | OUTPATIENT
Start: 2018-03-19 | End: 2018-03-22 | Stop reason: HOSPADM

## 2018-03-19 RX ORDER — MAGNESIUM HYDROXIDE/ALUMINUM HYDROXICE/SIMETHICONE 120; 1200; 1200 MG/30ML; MG/30ML; MG/30ML
30 SUSPENSION ORAL EVERY 4 HOURS PRN
Status: DISCONTINUED | OUTPATIENT
Start: 2018-03-19 | End: 2018-03-21

## 2018-03-19 RX ORDER — ONDANSETRON 2 MG/ML
4 INJECTION INTRAMUSCULAR; INTRAVENOUS ONCE
Status: COMPLETED | OUTPATIENT
Start: 2018-03-19 | End: 2018-03-19

## 2018-03-19 RX ORDER — FINASTERIDE 5 MG/1
5 TABLET, FILM COATED ORAL DAILY
Status: DISCONTINUED | OUTPATIENT
Start: 2018-03-19 | End: 2018-03-22 | Stop reason: HOSPADM

## 2018-03-19 RX ORDER — LOSARTAN POTASSIUM 50 MG/1
50 TABLET ORAL DAILY
Status: DISCONTINUED | OUTPATIENT
Start: 2018-03-19 | End: 2018-03-19

## 2018-03-19 RX ORDER — PANTOPRAZOLE SODIUM 40 MG/1
40 TABLET, DELAYED RELEASE ORAL
Status: DISCONTINUED | OUTPATIENT
Start: 2018-03-19 | End: 2018-03-19

## 2018-03-19 RX ORDER — DOXAZOSIN MESYLATE 4 MG/1
4 TABLET ORAL
Status: DISCONTINUED | OUTPATIENT
Start: 2018-03-19 | End: 2018-03-22 | Stop reason: HOSPADM

## 2018-03-19 RX ORDER — ONDANSETRON 2 MG/ML
4 INJECTION INTRAMUSCULAR; INTRAVENOUS EVERY 6 HOURS PRN
Status: DISCONTINUED | OUTPATIENT
Start: 2018-03-19 | End: 2018-03-22 | Stop reason: HOSPADM

## 2018-03-19 RX ORDER — BISACODYL 10 MG
10 SUPPOSITORY, RECTAL RECTAL DAILY PRN
Status: DISCONTINUED | OUTPATIENT
Start: 2018-03-19 | End: 2018-03-22 | Stop reason: HOSPADM

## 2018-03-19 RX ORDER — LEVOTHYROXINE SODIUM 88 UG/1
88 TABLET ORAL
Status: DISCONTINUED | OUTPATIENT
Start: 2018-03-20 | End: 2018-03-22 | Stop reason: HOSPADM

## 2018-03-19 RX ORDER — SODIUM CHLORIDE 9 MG/ML
75 INJECTION, SOLUTION INTRAVENOUS CONTINUOUS
Status: DISCONTINUED | OUTPATIENT
Start: 2018-03-19 | End: 2018-03-22 | Stop reason: HOSPADM

## 2018-03-19 RX ORDER — MORPHINE SULFATE 4 MG/ML
4 INJECTION, SOLUTION INTRAMUSCULAR; INTRAVENOUS ONCE
Status: COMPLETED | OUTPATIENT
Start: 2018-03-19 | End: 2018-03-19

## 2018-03-19 RX ADMIN — IOHEXOL 100 ML: 350 INJECTION, SOLUTION INTRAVENOUS at 10:12

## 2018-03-19 RX ADMIN — SUCRALFATE 1000 MG: 1 SUSPENSION ORAL at 17:32

## 2018-03-19 RX ADMIN — ALUMINUM HYDROXIDE, MAGNESIUM HYDROXIDE, AND SIMETHICONE 30 ML: 200; 200; 20 SUSPENSION ORAL at 10:29

## 2018-03-19 RX ADMIN — SODIUM CHLORIDE 125 ML/HR: 0.9 INJECTION, SOLUTION INTRAVENOUS at 16:46

## 2018-03-19 RX ADMIN — MORPHINE SULFATE 4 MG: 4 INJECTION INTRAVENOUS at 09:05

## 2018-03-19 RX ADMIN — ONDANSETRON 4 MG: 2 INJECTION INTRAMUSCULAR; INTRAVENOUS at 09:05

## 2018-03-19 RX ADMIN — ENOXAPARIN SODIUM 40 MG: 40 INJECTION SUBCUTANEOUS at 16:39

## 2018-03-19 RX ADMIN — BISACODYL 10 MG: 10 SUPPOSITORY RECTAL at 16:39

## 2018-03-19 RX ADMIN — LIDOCAINE HYDROCHLORIDE 15 ML: 20 SOLUTION ORAL; TOPICAL at 10:29

## 2018-03-19 RX ADMIN — PANTOPRAZOLE SODIUM 40 MG: 40 INJECTION, POWDER, FOR SOLUTION INTRAVENOUS at 16:50

## 2018-03-19 RX ADMIN — SODIUM CHLORIDE 1000 ML: 0.9 INJECTION, SOLUTION INTRAVENOUS at 09:00

## 2018-03-19 RX ADMIN — LIDOCAINE HYDROCHLORIDE 15 ML: 20 SOLUTION ORAL; TOPICAL at 16:39

## 2018-03-19 NOTE — CONSULTS
Consultation -  [facility]  Formerly [facility] 69 y o  male MRN: [id number]  Unit/Bed#: -01 Encounter: [id number]        Consults    Reason for Consult / Principal Problem: odynophagia, dysphagia    ASSESSMENT and PLAN:    Principal Problem:    Radiation-induced esophagitis  Active Problems:    Hypertension    BPH (benign prostatic hyperplasia)    GAVE (gastric antral vascular ectasia)    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 ([address] )    Anemia    Constipation    Failure to thrive (0-17)    Leukopenia    Total bilirubin, elevated    Esophagitis    #1  Dysphagia and odynophagia:  Patient currently getting will with radiation and chemo for lung cancer  Patient reports pain started on Thursday and has worsened since that time  He had his last radiation on Friday  Unable to swallow even water without significant burning in the esophagus  Differential includes radiation induced esophagitis, CMV esophagitis, HSV esophagitis, or Candida esophagitis  -IV Protonix b i d   -Carafate 4 times daily if he tolerates  -viscous lidocaine 3 times daily  -clear liquids if he tolerates with Ensure clears  -NPO after midnight  -IV fluid hydration  -pain control as needed  -plan for upper endoscopy tomorrow to further assess for source of pain    #2  Constipation:  Patient reports not having a bowel movement for several days and is unable to take anything such as MiraLax or stool softener which he usually takes  Constipation is typical for him on his chemotherapy  -Dulcolax suppository for now  Monitor bowel movements    -hopefully transition to oral regimen with Colace and MiraLax once patient is tolerating p o     -------------------------------------------------------------------------------------------------------------------    HPI:  This is a 41-year-old male with a history of hypertension, GAVE, and lung cancer on chemotherapy and radiation who presented to the emergency room due to the inability to swallow without significant pain  The patient reports that he is undergoing radiation and had his 9th treatment on Friday  He reports he started to have difficulty with swallowing last Thursday  Since that time the pain has progressively gotten worse  At this point he can no longer take a sip of water without significant burning in the esophagus  He did have a GI cocktail with viscous lidocaine in the emergency room with no improvement  He reports that he feels better lying down  His pain is worse with sitting upright and walking  He does report 2 episodes of vomiting yellow bile yesterday  He denies any abdominal pain  He does report that he is constipated  This is normal for him on his chemotherapy however he has been unable to take his oral bowel regimen over the last few days due to the pain  He has never had an upper endoscopy before  He reports losing 7 lb in the last 5 days  REVIEW OF SYSTEMS:    CONSTITUTIONAL: Denies any fever, chills, or rigors  Decreased appetite, and recent weight loss  HEENT: No earache or tinnitus  Denies hearing loss or visual disturbances  CARDIOVASCULAR: No chest pain or palpitations  RESPIRATORY: Denies any cough, hemoptysis, shortness of breath or dyspnea on exertion  GASTROINTESTINAL: As noted in the History of Present Illness  GENITOURINARY: No problems with urination  Denies any hematuria or dysuria   +dark urine  NEUROLOGIC: No dizziness or vertigo, denies headaches  MUSCULOSKELETAL: Denies any muscle or joint pain  SKIN: Denies skin rashes or itching  ENDOCRINE: Denies excessive thirst  Denies intolerance to heat or cold  PSYCHOSOCIAL: Denies depression or anxiety  Denies any recent memory loss         Historical Information   Past Medical History:   Diagnosis Date    Anemia     BPH (benign prostatic hyperplasia)     Cancer of lung [facility] [address]     Disease of thyroid gland     GAVE (gastric antral vascular ectasia)     Hypertension     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[address] )     Spinal stenosis     Tobacco abuse      History reviewed  No pertinent surgical history    Social History   History   Alcohol Use No     History   Drug Use No     History   Smoking Status    Former Smoker    Packs/day: 0 50    Years: 15 00    Types: Cigarettes    Quit date: 1/11/2018   Smokeless Tobacco    Former User     Comment: Quit December 2017     Family History   Problem Relation Age of Onset    Esophageal cancer Mother     Diabetes Father     No Known Problems Sister     No Known Problems Brother        Meds/Allergies     Prescriptions Prior to Admission   Medication    acetaminophen (TYLENOL) 325 mg tablet    dexamethasone (DECADRON) 4 mg tablet    doxazosin (CARDURA) 4 mg tablet    finasteride (PROSCAR) 5 mg tablet    folic acid (FOLVITE) 1 mg tablet    levothyroxine 88 mcg tablet    losartan (COZAAR) 100 MG tablet    ondansetron (ZOFRAN) 8 mg tablet     Current Facility-Administered Medications   Medication Dose Route Frequency    acetaminophen (TYLENOL) tablet 650 mg  650 mg Oral Q6H PRN    aluminum-magnesium hydroxide-simethicone (MYLANTA) 200-200-20 mg/5 mL oral suspension 30 mL  30 mL Oral Q4H PRN    bisacodyl (DULCOLAX) rectal suppository 10 mg  10 mg Rectal Daily PRN    docusate sodium (COLACE) capsule 100 mg  100 mg Oral BID    doxazosin (CARDURA) tablet 4 mg  4 mg Oral HS    enoxaparin (LOVENOX) subcutaneous injection 40 mg  40 mg Subcutaneous Daily    finasteride (PROSCAR) tablet 5 mg  5 mg Oral Daily    folic acid (FOLVITE) tablet 1 mg  1 mg Oral Daily    [START ON 3/20/2018] levothyroxine tablet 88 mcg  88 mcg Oral Early Morning    lidocaine viscous (XYLOCAINE) 2 % mucosal solution 15 mL  15 mL Swish & Swallow TID    morphine (PF) 4 mg/mL injection 4 mg  4 mg Intravenous Q3H PRN    ondansetron (ZOFRAN) injection 4 mg  4 mg Intravenous Q6H PRN    oxyCODONE (ROXICODONE) oral solution 2 5 mg  2 5 mg Oral Q4H PRN    oxyCODONE (ROXICODONE) oral solution 5 mg  5 mg Oral Q4H PRN    pantoprazole (PROTONIX) injection 40 mg  40 mg Intravenous Q12H    sodium chloride 0 9 % infusion  125 mL/hr Intravenous Continuous    sucralfate (CARAFATE) oral suspension 1,000 mg  1,000 mg Oral Q6H [address]       No Known Allergies        Objective     Blood pressure 111/59, pulse 68, temperature 98 4 °F (36 9 °C), temperature source Oral, resp  rate 18, height 5' 7" (1 702 m), weight 83 kg (182 lb 15 7 oz), SpO2 97 %  Intake/Output Summary (Last 24 hours) at 03/19/18 1624  Last data filed at 03/19/18 1358   Gross per 24 hour   Intake                0 ml   Output              300 ml   Net             -300 ml         PHYSICAL EXAM:      General Appearance:   Alert, cooperative, no distress, appears stated age    HEENT:   Normocephalic, atraumatic, anicteric, no oropharyngeal thrush present      Neck:  Supple, symmetrical, trachea midline, no adenopathy;    thyroid: no enlargement/tenderness/nodules; no carotid  bruit or JVD    Lungs:   Clear to auscultation bilaterally; no rales, rhonchi or wheezing; respirations unlabored    Heart[de-identified]   S1 and S2 normal; regular rate and rhythm; no murmur, rub, or gallop     Abdomen:   Soft, non-tender, non-distended; normal bowel sounds; no masses, no organomegaly    Genitalia:   Deferred    Rectal:   Deferred    Extremities:  No cyanosis, clubbing or edema    Pulses:  2+ and symmetric all extremities    Skin:  Skin color, texture, turgor normal, no rashes or lesions    Lymph nodes:  No palpable cervical, axillary or inguinal lymphadenopathy        Lab Results:     Results from last 7 days  Lab Units 03/19/18  0909   WBC Thousand/uL 2 08*   HEMOGLOBIN g/dL 10 5*   HEMATOCRIT % 32 3*   PLATELETS Thousands/uL 206   NEUTROS PCT % 88*   LYMPHS PCT % 5*   MONOS PCT % 6   EOS PCT % 1       Results from last 7 days  Lab Units 03/19/18  0909   SODIUM mmol/L 140   POTASSIUM mmol/L 4 6   CHLORIDE mmol/L 105   CO2 mmol/L 25   BUN mg/dL 25   CREATININE mg/dL 1 21   CALCIUM mg/dL 8 2*   TOTAL PROTEIN g/dL 7 0   BILIRUBIN TOTAL mg/dL 1 20*   ALK PHOS U/L 91   ALT U/L 41   AST U/L 24   GLUCOSE RANDOM mg/dL 121           Results from last 7 days  Lab Units 03/19/18  0909   LIPASE u/L 83       Imaging Studies: I have personally reviewed pertinent imaging studies  Xr Chest 2 Views    Result Date: 3/19/2018  Impression: No acute cardiopulmonary disease  Similar to prior chest x-ray Several findings identified by chest CT are again not visualized Findings are consistent with emergency room physician's preliminary reading Workstation performed: [id number]     Ct Abdomen Pelvis With Contrast    Result Date: 3/19/2018  Impression: No acute intra-abdominal or pelvic pathology  Osseous metastatic disease again identified with lytic destruction of T10 although no significant vertebral collapse  Sclerosis and healing of other osseous lesions particularly the sacrum  Stable 6 mm right middle lobe pulmonary nodule  Subcentimeter hepatic and renal hypodensities too small to characterize  Descending and sigmoid colon diverticulosis without evidence of diverticulitis  Workstation performed: [id number]           Patient was seen and examined by Dr Adriane Torres  All lerma medical decisions were made by Dr Adriane Torres  Thank you for allowing us to participate in the care of this present patient  We will follow-up with you closely

## 2018-03-19 NOTE — ASSESSMENT & PLAN NOTE
· In setting of poor PO intake 2/2 radiation esophagitis  · Pt not interested in any PEG tube at this time but does agree he needs means of nutrition/pain control  · Nutrition consult appreciated  · GI consult appreciated

## 2018-03-19 NOTE — ASSESSMENT & PLAN NOTE
· Stage IV  Dx 12/2017 after presenting with back pain  · Follows with Dr Mag Hernandez and Dr Anne Rojas  · On chemo q3 weeks with Ashlie Breana, paraplatin, alimta   Last dose 3/14/18

## 2018-03-19 NOTE — H&P
H&P- Lorice Plants 1940, 69 y o  male MRN: [id number]    Unit/Bed#: -01 Encounter: [id number]    Primary Care Provider: Vanessa Echeverria MD   Date and time admitted to hospital: 3/19/2018  8:47 AM    * Radiation-induced esophagitis   Assessment & Plan    · Patient has completed 9/10 radiation treatments for osseous spinal mets  · Start TID viscous lidocaine, PRN oral oxycodone for moderate/severe pain with IV morphine for breakthrough  · Consider palliative consult if pain unable to be effectively managed   · GI consultation  Start clears and advance as tolerated  Ensure clears TID for added nutrition        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 [facility])   Assessment & Plan    · Stage IV  Dx 12/2017 after presenting with back pain  · Follows with Dr Jama Staff and Dr Ginger Mccord  · On chemo q3 weeks with Eliana Graham, paraplatin, alimta  Last dose 3/14/18        Total bilirubin, elevated   Assessment & Plan    · Unclear etiology  CT does show hypo densities too small to characterize  · Check direct bili  · Appreciate GI input         Leukopenia   Assessment & Plan    · Likely 2/2 chemotherapy  · Not neutropenic  · Monitor counts closely         Failure to thrive (0-17)   Assessment & Plan    · In setting of poor PO intake 2/2 radiation esophagitis  · Pt not interested in any PEG tube at this time but does agree he needs means of nutrition/pain control  · Nutrition consult appreciated  · GI consult appreciated         Constipation   Assessment & Plan    · Bowel regimen        Anemia   Assessment & Plan    · Likely 2/2 chronic disease and chemotherapy  · Not currently bleeding        GAVE (gastric antral vascular ectasia)   Assessment & Plan    · Hx of   No current issues  · Pt reports he is no off mobic        BPH (benign prostatic hyperplasia)   Assessment & Plan    · Continue home medications         Hypertension   Assessment & Plan    · Hold home cozaar for now given poor oral intake and dehydration  · Continue IVF VTE Prophylaxis: Enoxaparin (Lovenox)  / sequential compression device   Code Status: FULL CODE  POLST: POLST form is not discussed and not completed at this time  Discussion with family: wife at bedside  Anticipated Length of Stay:  Patient will be admitted on an Inpatient basis with an anticipated length of stay of  > 2 midnights  Justification for Hospital Stay: radiation esophagitis, inability to take PO    Total Time for Visit, including Counseling / Coordination of Care: 1 hour  Greater than 50% of this total time spent on direct patient counseling and coordination of care  Chief Complaint:   "I can't swallow"    History of Present Illness:    Tao Judge is a 69 y o  male with PMHx of recently diagnosed 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, HTN, BPH, and Fe deficiency anemia who presents with difficulty swallowing for the past 3-4 days  Pt was recently diagnosed with lung cancer in December after having back pain and being found to have bony mets  He was been following with Dr Kishore Gutierres and undergoing chemo  He has completed 3 cycles of alimta, paraplatin and keytruda with last dose 3/14  He also has been getting radiation therapy with Dr Edna Russell and completed 9/10 treatments  He was due to have his 10th treatment today but did not want to go secondary to his severe chest burning  Pt reports a burning in chest that is present primarily when he eats or drinks anything  He has not been able to stay hydrated or take medications  He has felt very dehydrated  He reports vomiting up mucous anytime food does go down, though often feels like it is "getting stuck " Denies any fevers, chills, sore throat, abdominal pain, nausea or diarrhea  He reports ongoing constipation since starting chemo  He states the only thing that helps his pain is laying flat  He reports losing 7 lbs in 4 days 2/2 not eating  Has tried magic mouthwash without any relief   He is very frustrated with his symptoms and does not want to have his last radiation treatment 2/2 symptoms  He states that his back pain has almost completely resolved since starting radiation  Review of Systems:    Review of Systems   Constitutional: Positive for unexpected weight change  Negative for chills, fatigue (7 lbs in 4 days 2/2 not eating) and fever  HENT: Positive for trouble swallowing  Negative for mouth sores and sore throat  Respiratory: Negative for cough and shortness of breath  Cardiovascular: Positive for chest pain (chest burning )  Negative for palpitations and leg swelling  Gastrointestinal: Positive for constipation and vomiting  Negative for abdominal pain, diarrhea and nausea  +hiccups and vomiting up mucous   Genitourinary: Negative for dysuria, frequency and urgency  Skin: Negative for rash  Neurological: Negative for dizziness, syncope, weakness, light-headedness and headaches  All other systems reviewed and are negative  Past Medical and Surgical History:     Past Medical History:   Diagnosis Date    Anemia     BPH (benign prostatic hyperplasia)     Cancer [facility] lung ([facility] [address] )     Disease of thyroid gland     GAVE (gastric antral vascular ectasia)     Hypertension     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HCC)     Spinal stenosis     Tobacco abuse        History reviewed  No pertinent surgical history  Meds/Allergies:    Prior to Admission medications    Medication Sig Start Date End Date Taking?  Authorizing Provider   acetaminophen (TYLENOL) 325 mg tablet Take 2 tablets by mouth every 6 (six) hours as needed for mild pain, headaches or fever 1/15/18   Tamiko Fischer MD   dexamethasone (DECADRON) 4 mg tablet Take 1 tablet (4 mg total) by mouth 2 (two) times a day as needed (PLEASE SEE SIG NOTES) TAKE 1 TABLET WITH BREAKFAST, 1 TABLET WITH DINNER, DAY BEFORE, DAY OF, AND DAY AFTER CHEMO  3/12/18   Nicole Vee MD   doxazosin (CARDURA) 4 mg tablet Take 4 mg by mouth daily at bedtime Historical Provider, MD   finasteride (PROSCAR) 5 mg tablet Take 5 mg by mouth daily    Historical Provider, MD   folic acid (FOLVITE) 1 mg tablet Take 1 tablet by mouth daily 1/25/18   Leonel Bautista PA-C   levothyroxine 88 mcg tablet Take 88 mcg by mouth daily    Historical Provider, MD   losartan (COZAAR) 100 MG tablet Take 50 mg by mouth daily      Historical Provider, MD   ondansetron (ZOFRAN) 8 mg tablet Take 1 tablet (8 mg total) by mouth every 8 (eight) hours as needed for nausea or vomiting 3/14/18   Marci Louise MD   meloxicam (MOBIC) 7 5 mg tablet Take 7 5 mg by mouth every other day    3/19/18  Historical Provider, MD     I have reviewed home medications with patient personally  Allergies: No Known Allergies    Social History:     Marital Status: /Civil Union   Occupation: unsure  Patient Pre-hospital Living Situation: at home with wife  Patient Pre-hospital Level of Mobility: independent  Patient Pre-hospital Diet Restrictions: none  Substance Use History:   History   Alcohol Use No     History   Smoking Status    Former Smoker    Packs/day: 0 50    Years: 15 00    Types: Cigarettes    Quit date: 1/11/2018   Smokeless Tobacco    Former User     Comment: Quit December 2017     History   Drug Use No       Family History:    non-contributory    Physical Exam:     Vitals:   Blood Pressure: 111/59 (03/19/18 1427)  Pulse: 68 (03/19/18 1427)  Temperature: 98 4 °F (36 9 °C) (03/19/18 1427)  Temp Source: Oral (03/19/18 1427)  Respirations: 18 (03/19/18 1427)  Height: 5' 7" (170 2 cm) (03/19/18 1256)  Weight - Scale: 83 kg (182 lb 15 7 oz) (03/19/18 1256)  SpO2: 97 % (03/19/18 1427)    Physical Exam   Constitutional: He is oriented to person, place, and time  He appears well-developed and well-nourished  No distress  HENT:   Mouth/Throat: No oropharyngeal exudate  Eyes: No scleral icterus  Cardiovascular: Normal rate and regular rhythm      Pulmonary/Chest: Breath sounds normal  He exhibits no tenderness  Abdominal: Soft  Bowel sounds are normal  Distention: mild epigastric region  There is tenderness  Musculoskeletal: He exhibits no edema  Lymphadenopathy:     He has no cervical adenopathy  Neurological: He is alert and oriented to person, place, and time  Skin: Skin is warm  He is not diaphoretic  Psychiatric:   Slightly irritable    Nursing note and vitals reviewed  Additional Data:     Lab Results: I have personally reviewed pertinent reports  Results from last 7 days  Lab Units 03/19/18  0909   WBC Thousand/uL 2 08*   HEMOGLOBIN g/dL 10 5*   HEMATOCRIT % 32 3*   PLATELETS Thousands/uL 206   NEUTROS PCT % 88*   LYMPHS PCT % 5*   MONOS PCT % 6   EOS PCT % 1       Results from last 7 days  Lab Units 03/19/18  0909   SODIUM mmol/L 140   POTASSIUM mmol/L 4 6   CHLORIDE mmol/L 105   CO2 mmol/L 25   BUN mg/dL 25   CREATININE mg/dL 1 21   CALCIUM mg/dL 8 2*   TOTAL PROTEIN g/dL 7 0   BILIRUBIN TOTAL mg/dL 1 20*   ALK PHOS U/L 91   ALT U/L 41   AST U/L 24   GLUCOSE RANDOM mg/dL 121           Imaging: I have personally reviewed pertinent reports  XR chest 2 views   ED Interpretation by Benito Angulo PA-C (03/19 1051)   Clear lungs      Final Result by Jose Ramon Renteria MD (03/19 1157)   No acute cardiopulmonary disease  Similar to prior chest x-ray      Several findings identified by chest CT are again not visualized       Findings are consistent with emergency room physician's preliminary reading                  Workstation performed: [id number]         CT abdomen pelvis with contrast   Final Result by Tabitha Vasquez DO (03/19 1101)      No acute intra-abdominal or pelvic pathology  Osseous metastatic disease again identified with lytic destruction of T10 although no significant vertebral collapse  Sclerosis and healing of other osseous lesions particularly the sacrum  Stable 6 mm right middle lobe pulmonary nodule        Subcentimeter hepatic and renal hypodensities too small to characterize  Descending and sigmoid colon diverticulosis without evidence of diverticulitis  Workstation performed: [id number]             EKG, Pathology, and Other Studies Reviewed on Admission:   · EKG: none available    Allscripts / Epic Records Reviewed: Yes     ** Please Note: This note has been constructed using a voice recognition system   **

## 2018-03-19 NOTE — ED PROVIDER NOTES
History  Chief Complaint   Patient presents with    Difficulty Swallowing     pt sent from cancer center with worsening esophagitis  unable to take po meds or food/water  c/o sob mainly from inability to take pain meds  room aire spo2 99%  26-year-old male presents to the emergency department with complaints of weakness  Currently on chemotherapy and radiation for metastatic adenocarcinoma and has been having symptoms of esophagitis  States that he has had 9 radiation treatments and has had increasing pain with swallowing over the past week or so  States that over the past 3 days symptoms have been worse than he has been unable to eat or drink much of anything  Reports that he was able to drink a bottle of water yesterday and eat a mall amoutn of food  States that he went to radiation therapy this morning for what would have been his temp treatment but states that due to his symptoms he was sent to the emergency department  Currently gets chemotherapy every 3rd week  He is followed by Dr Dasha Scott  History provided by:  Patient and spouse   used: No        Prior to Admission Medications   Prescriptions Last Dose Informant Patient Reported? Taking?   acetaminophen (TYLENOL) 325 mg tablet  Self No No   Sig: Take 2 tablets by mouth every 6 (six) hours as needed for mild pain, headaches or fever   dexamethasone (DECADRON) 4 mg tablet   No No   Sig: Take 1 tablet (4 mg total) by mouth 2 (two) times a day as needed (PLEASE SEE SIG NOTES) TAKE 1 TABLET WITH BREAKFAST, 1 TABLET WITH DINNER, DAY BEFORE, DAY OF, AND DAY AFTER CHEMO     doxazosin (CARDURA) 4 mg tablet  Self Yes No   Sig: Take 4 mg by mouth daily at bedtime   finasteride (PROSCAR) 5 mg tablet  Self Yes No   Sig: Take 5 mg by mouth daily   folic acid (FOLVITE) 1 mg tablet  Self No No   Sig: Take 1 tablet by mouth daily   levothyroxine 88 mcg tablet  Self Yes No   Sig: Take 88 mcg by mouth daily   losartan (COZAAR) 100 MG tablet  Self Yes No   Sig: Take 50 mg by mouth daily     meloxicam (MOBIC) 7 5 mg tablet  Self Yes No   Sig: Take 7 5 mg by mouth every other day   ondansetron (ZOFRAN) 8 mg tablet   No No   Sig: Take 1 tablet (8 mg total) by mouth every 8 (eight) hours as needed for nausea or vomiting      Facility-Administered Medications: None       Past Medical History:   Diagnosis Date    Anemia     BPH (benign prostatic hyperplasia)     Cancer of lung (HCC)     Disease of thyroid gland     GAVE (gastric antral vascular ectasia)     Hypertension     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[address] )     Spinal stenosis     Tobacco abuse        History reviewed  No pertinent surgical history  Family History   Problem Relation Age of Onset    Esophageal cancer Mother     Diabetes Father     No Known Problems Sister     No Known Problems Brother      I have reviewed and agree with the history as documented  Social History   Substance Use Topics    Smoking status: Former Smoker     Packs/day: 0 50     Years: 15 00     Types: Cigarettes     Quit date: 1/11/2018    Smokeless tobacco: Former User      Comment: Quit December 2017    Alcohol use No        Review of Systems   Constitutional: Negative for activity change, appetite change, chills and fever  HENT: Negative for congestion, dental problem, drooling, ear discharge, ear pain, mouth sores, nosebleeds, rhinorrhea, sore throat and trouble swallowing  Eyes: Negative for pain, discharge and itching  Respiratory: Negative for cough, chest tightness, shortness of breath and wheezing  Cardiovascular: Negative for chest pain and palpitations  Gastrointestinal: Positive for abdominal pain, nausea and vomiting  Negative for blood in stool, constipation and diarrhea  Endocrine: Negative for cold intolerance and heat intolerance  Genitourinary: Negative for difficulty urinating, dysuria, flank pain, frequency and urgency  Skin: Negative for rash and wound  Allergic/Immunologic: Negative for food allergies and immunocompromised state  Neurological: Positive for weakness  Negative for dizziness, seizures, syncope, numbness and headaches  Psychiatric/Behavioral: Negative for agitation, behavioral problems and confusion  Physical Exam  ED Triage Vitals [03/19/18 0852]   Temperature Pulse Respirations Blood Pressure SpO2   97 7 °F (36 5 °C) 69 19 115/59 100 %      Temp Source Heart Rate Source Patient Position - Orthostatic VS BP Location FiO2 (%)   Oral Monitor Sitting Right arm --      Pain Score       --           Orthostatic Vital Signs  Vitals:    03/19/18 0852 03/19/18 0915 03/19/18 0930   BP: 115/59     Pulse: 69 60 58   Patient Position - Orthostatic VS: Sitting         Physical Exam   Constitutional: He is oriented to person, place, and time  He appears well-developed and well-nourished  No distress  HENT:   Head: Normocephalic and atraumatic  Right Ear: External ear normal    Left Ear: External ear normal    Mouth/Throat: Oropharynx is clear and moist  No oropharyngeal exudate  Eyes: Conjunctivae are normal    Neck: No JVD present  No tracheal deviation present  Cardiovascular: Normal rate, regular rhythm and normal heart sounds  Exam reveals no gallop and no friction rub  No murmur heard  Pulmonary/Chest: Effort normal and breath sounds normal  No respiratory distress  He has no wheezes  He has no rales  He exhibits no tenderness  Abdominal: Soft  Bowel sounds are normal  He exhibits no distension  There is no tenderness  There is no guarding  Musculoskeletal: Normal range of motion  He exhibits no edema, tenderness or deformity  Lymphadenopathy:     He has no cervical adenopathy  Neurological: He is alert and oriented to person, place, and time  Skin: Skin is warm and dry  No rash noted  He is not diaphoretic  No erythema  Psychiatric: He has a normal mood and affect   His behavior is normal    Nursing note and vitals reviewed  ED Medications  Medications   aluminum-magnesium hydroxide-simethicone (MYLANTA) 200-200-20 mg/5 mL oral suspension 30 mL (30 mL Oral Given 3/19/18 1029)   sodium chloride 0 9 % bolus 1,000 mL (0 mL Intravenous Stopped 3/19/18 1029)   morphine (PF) 4 mg/mL injection 4 mg (4 mg Intravenous Given 3/19/18 0905)   ondansetron (ZOFRAN) injection 4 mg (4 mg Intravenous Given 3/19/18 0905)   lidocaine viscous (XYLOCAINE) 2 % mucosal solution 15 mL (15 mL Swish & Spit Given 3/19/18 1029)   iohexol (OMNIPAQUE) 350 MG/ML injection (MULTI-DOSE) 100 mL (100 mL Intravenous Given 3/19/18 1012)       Diagnostic Studies  Results Reviewed     Procedure Component Value Units Date/Time    Comprehensive metabolic panel [[id number]]  (Abnormal) Collected:  03/19/18 0909    Lab Status:  Final result Specimen:  Blood from Arm, Left Updated:  03/19/18 0935     Sodium 140 mmol/L      Potassium 4 6 mmol/L      Chloride 105 mmol/L      CO2 25 mmol/L      Anion Gap 10 mmol/L      BUN 25 mg/dL      Creatinine 1 21 mg/dL      Glucose 121 mg/dL      Calcium 8 2 (L) mg/dL      AST 24 U/L      ALT 41 U/L      Alkaline Phosphatase 91 U/L      Total Protein 7 0 g/dL      Albumin 3 5 g/dL      Total Bilirubin 1 20 (H) mg/dL      eGFR 57 ml/min/1 73sq m     Narrative:         National Kidney Disease Education Program recommendations are as follows:  GFR calculation is accurate only with a steady state creatinine  Chronic Kidney disease less than 60 ml/min/1 73 sq  meters  Kidney failure less than 15 ml/min/1 73 sq  meters      Lipase [[id number]]  (Normal) Collected:  03/19/18 0909    Lab Status:  Final result Specimen:  Blood from Arm, Left Updated:  03/19/18 0935     Lipase 83 u/L     CBC and differential [[id number]]  (Abnormal) Collected:  03/19/18 0909    Lab Status:  Final result Specimen:  Blood from Arm, Left Updated:  03/19/18 0917     WBC 2 08 (L) Thousand/uL      RBC 3 78 (L) Million/uL      Hemoglobin 10 5 (L) g/dL      Hematocrit 32 3 (L) %      MCV 85 fL      MCH 27 8 pg      MCHC 32 5 g/dL      RDW 21 8 (H) %      MPV 8 9 fL      Platelets 833 Thousands/uL      Neutrophils Relative 88 (H) %      Lymphocytes Relative 5 (L) %      Monocytes Relative 6 %      Eosinophils Relative 1 %      Basophils Relative 0 %      Neutrophils Absolute 1 83 (L) Thousands/µL      Lymphocytes Absolute 0 10 (L) Thousands/µL      Monocytes Absolute 0 12 (L) Thousand/µL      Eosinophils Absolute 0 03 Thousand/µL      Basophils Absolute 0 00 Thousands/µL                  CT abdomen pelvis with contrast   Final Result by Shivam Rouse DO (03/19 1101)      No acute intra-abdominal or pelvic pathology  Osseous metastatic disease again identified with lytic destruction of T10 although no significant vertebral collapse  Sclerosis and healing of other osseous lesions particularly the sacrum  Stable 6 mm right middle lobe pulmonary nodule  Subcentimeter hepatic and renal hypodensities too small to characterize  Descending and sigmoid colon diverticulosis without evidence of diverticulitis        Workstation performed: [id number]         XR chest 2 views   ED Interpretation by Elham Mack PA-C (03/19 1051)   Clear lungs                 Procedures  Procedures       Phone Contacts  ED Phone Contact    ED Course  ED Course                                MDM  Number of Diagnoses or Management Options  Adult failure to thrive:   Esophagitis:   Diagnosis management comments:   Differential diagnosis includes but not limited to:     Gastritis, esophagitis, dehydration       Amount and/or Complexity of Data Reviewed  Clinical lab tests: ordered and reviewed  Tests in the radiology section of CPT®: ordered and reviewed  Discuss the patient with other providers: yes  Independent visualization of images, tracings, or specimens: yes      CritCare Time    Disposition  Final diagnoses:   Esophagitis   Adult failure to thrive     Time reflects when diagnosis was documented in both MDM as applicable and the Disposition within this note     Time User Action Codes Description Comment    3/19/2018 11:15 AM Julien Curl Add [K20 9] Esophagitis     3/19/2018 11:15 AM Julien Curl Add [R62 7] Adult failure to thrive       ED Disposition     ED Disposition Condition Comment    Admit  Case was discussed with SANDHYA and the patient's admission status was agreed to be Admission Status: inpatient status to the service of Dr Faustino Acharya   Follow-up Information    None       Patient's Medications   Discharge Prescriptions    No medications on file     No discharge procedures on file      ED Provider  Electronically Signed by           Hilario Ruth PA-C  03/19/18 6985

## 2018-03-19 NOTE — PLAN OF CARE
Problem: PAIN - ADULT  Goal: Verbalizes/displays adequate comfort level or baseline comfort level  Interventions:  - Encourage patient to monitor pain and request assistance  - Assess pain using appropriate pain scale  - Administer analgesics based on type and severity of pain and evaluate response  - Implement non-pharmacological measures as appropriate and evaluate response  - Consider cultural and social influences on pain and pain management  - Notify physician/advanced practitioner if interventions unsuccessful or patient reports new pain  Outcome: Progressing      Problem: Knowledge Deficit  Goal: Patient/family/caregiver demonstrates understanding of disease process, treatment plan, medications, and discharge instructions  Complete learning assessment and assess knowledge base    Interventions:  - Provide teaching at level of understanding  - Provide teaching via preferred learning methods  Outcome: Progressing      Problem: GASTROINTESTINAL - ADULT  Goal: Minimal or absence of nausea and/or vomiting  INTERVENTIONS:  - Administer IV fluids as ordered to ensure adequate hydration  - Maintain NPO status until nausea and vomiting are resolved  - Nasogastric tube as ordered  - Administer ordered antiemetic medications as needed  - Provide nonpharmacologic comfort measures as appropriate  - Advance diet as tolerated, if ordered  - Nutrition services referral to assist patient with adequate nutrition and appropriate food choices  Outcome: Progressing    Goal: Maintains or returns to baseline bowel function  INTERVENTIONS:  - Assess bowel function  - Encourage oral fluids to ensure adequate hydration  - Administer IV fluids as ordered to ensure adequate hydration  - Administer ordered medications as needed  - Encourage mobilization and activity  - Nutrition services referral to assist patient with appropriate food choices  Outcome: Progressing    Goal: Maintains adequate nutritional intake  INTERVENTIONS:  - Monitor percentage of each meal consumed  - Identify factors contributing to decreased intake, treat as appropriate  - Assist with meals as needed  - Monitor I&O, WT and lab values  - Obtain nutrition services referral as needed  Outcome: Progressing

## 2018-03-19 NOTE — ASSESSMENT & PLAN NOTE
· Unclear etiology   CT does show hypo densities too small to characterize  · Check direct bili  · Appreciate GI input

## 2018-03-19 NOTE — ASSESSMENT & PLAN NOTE
· Patient has completed 9/10 radiation treatments for osseous spinal mets  · Start TID viscous lidocaine, PRN oral oxycodone for moderate/severe pain with IV morphine for breakthrough  · Consider palliative consult if pain unable to be effectively managed   · GI consultation  Start clears and advance as tolerated   Ensure clears TID for added nutrition

## 2018-03-20 ENCOUNTER — ANESTHESIA EVENT (INPATIENT)
Dept: GASTROENTEROLOGY | Facility: HOSPITAL | Age: 78
DRG: 392 | End: 2018-03-20
Payer: COMMERCIAL

## 2018-03-20 ENCOUNTER — ANESTHESIA (INPATIENT)
Dept: GASTROENTEROLOGY | Facility: HOSPITAL | Age: 78
DRG: 392 | End: 2018-03-20
Payer: COMMERCIAL

## 2018-03-20 PROBLEM — R13.10 DYSPHAGIA: Status: ACTIVE | Noted: 2018-03-19

## 2018-03-20 LAB
ALBUMIN SERPL BCP-MCNC: 2.9 G/DL (ref 3.5–5)
ALP SERPL-CCNC: 75 U/L (ref 46–116)
ALT SERPL W P-5'-P-CCNC: 34 U/L (ref 12–78)
ANION GAP SERPL CALCULATED.3IONS-SCNC: 8 MMOL/L (ref 4–13)
AST SERPL W P-5'-P-CCNC: 20 U/L (ref 5–45)
BASOPHILS # BLD AUTO: 0 THOUSANDS/ΜL (ref 0–0.1)
BASOPHILS NFR BLD AUTO: 0 % (ref 0–1)
BILIRUB SERPL-MCNC: 0.6 MG/DL (ref 0.2–1)
BUN SERPL-MCNC: 22 MG/DL (ref 5–25)
CALCIUM SERPL-MCNC: 6.8 MG/DL (ref 8.3–10.1)
CHLORIDE SERPL-SCNC: 109 MMOL/L (ref 100–108)
CO2 SERPL-SCNC: 22 MMOL/L (ref 21–32)
CREAT SERPL-MCNC: 1.01 MG/DL (ref 0.6–1.3)
EOSINOPHIL # BLD AUTO: 0.02 THOUSAND/ΜL (ref 0–0.61)
EOSINOPHIL NFR BLD AUTO: 2 % (ref 0–6)
ERYTHROCYTE [DISTWIDTH] IN BLOOD BY AUTOMATED COUNT: 21.9 % (ref 11.6–15.1)
GFR SERPL CREATININE-BSD FRML MDRD: 71 ML/MIN/1.73SQ M
GLUCOSE SERPL-MCNC: 103 MG/DL (ref 65–140)
HCT VFR BLD AUTO: 27.5 % (ref 36.5–49.3)
HGB BLD-MCNC: 8.8 G/DL (ref 12–17)
INR PPP: 0.99 (ref 0.86–1.16)
LYMPHOCYTES # BLD AUTO: 0.11 THOUSANDS/ΜL (ref 0.6–4.47)
LYMPHOCYTES NFR BLD AUTO: 10 % (ref 14–44)
MCH RBC QN AUTO: 27.9 PG (ref 26.8–34.3)
MCHC RBC AUTO-ENTMCNC: 32 G/DL (ref 31.4–37.4)
MCV RBC AUTO: 87 FL (ref 82–98)
MONOCYTES # BLD AUTO: 0.17 THOUSAND/ΜL (ref 0.17–1.22)
MONOCYTES NFR BLD AUTO: 15 % (ref 4–12)
NEUTROPHILS # BLD AUTO: 0.86 THOUSANDS/ΜL (ref 1.85–7.62)
NEUTS SEG NFR BLD AUTO: 73 % (ref 43–75)
PLATELET # BLD AUTO: 158 THOUSANDS/UL (ref 149–390)
PMV BLD AUTO: 9.5 FL (ref 8.9–12.7)
POTASSIUM SERPL-SCNC: 4.8 MMOL/L (ref 3.5–5.3)
PROT SERPL-MCNC: 5.9 G/DL (ref 6.4–8.2)
PROTHROMBIN TIME: 13.4 SECONDS (ref 12.1–14.4)
RBC # BLD AUTO: 3.15 MILLION/UL (ref 3.88–5.62)
SODIUM SERPL-SCNC: 139 MMOL/L (ref 136–145)
WBC # BLD AUTO: 1.16 THOUSAND/UL (ref 4.31–10.16)

## 2018-03-20 PROCEDURE — 80053 COMPREHEN METABOLIC PANEL: CPT | Performed by: INTERNAL MEDICINE

## 2018-03-20 PROCEDURE — 85025 COMPLETE CBC W/AUTO DIFF WBC: CPT | Performed by: INTERNAL MEDICINE

## 2018-03-20 PROCEDURE — 88112 CYTOPATH CELL ENHANCE TECH: CPT | Performed by: PATHOLOGY

## 2018-03-20 PROCEDURE — 43239 EGD BIOPSY SINGLE/MULTIPLE: CPT | Performed by: INTERNAL MEDICINE

## 2018-03-20 PROCEDURE — C9113 INJ PANTOPRAZOLE SODIUM, VIA: HCPCS | Performed by: PHYSICIAN ASSISTANT

## 2018-03-20 PROCEDURE — 88342 IMHCHEM/IMCYTCHM 1ST ANTB: CPT | Performed by: PATHOLOGY

## 2018-03-20 PROCEDURE — 88365 INSITU HYBRIDIZATION (FISH): CPT | Performed by: PATHOLOGY

## 2018-03-20 PROCEDURE — 88305 TISSUE EXAM BY PATHOLOGIST: CPT | Performed by: PATHOLOGY

## 2018-03-20 PROCEDURE — 0DD58ZX EXTRACTION OF ESOPHAGUS, VIA NATURAL OR ARTIFICIAL OPENING ENDOSCOPIC, DIAGNOSTIC: ICD-10-PCS | Performed by: INTERNAL MEDICINE

## 2018-03-20 PROCEDURE — 0DD78ZX EXTRACTION OF STOMACH, PYLORUS, VIA NATURAL OR ARTIFICIAL OPENING ENDOSCOPIC, DIAGNOSTIC: ICD-10-PCS | Performed by: INTERNAL MEDICINE

## 2018-03-20 PROCEDURE — 99232 SBSQ HOSP IP/OBS MODERATE 35: CPT | Performed by: INTERNAL MEDICINE

## 2018-03-20 PROCEDURE — 88341 IMHCHEM/IMCYTCHM EA ADD ANTB: CPT | Performed by: PATHOLOGY

## 2018-03-20 PROCEDURE — 85610 PROTHROMBIN TIME: CPT | Performed by: PHYSICIAN ASSISTANT

## 2018-03-20 RX ORDER — PROPOFOL 10 MG/ML
INJECTION, EMULSION INTRAVENOUS AS NEEDED
Status: DISCONTINUED | OUTPATIENT
Start: 2018-03-20 | End: 2018-03-20 | Stop reason: SURG

## 2018-03-20 RX ADMIN — SUCRALFATE 1000 MG: 1 SUSPENSION ORAL at 17:53

## 2018-03-20 RX ADMIN — DOXAZOSIN 4 MG: 4 TABLET ORAL at 21:06

## 2018-03-20 RX ADMIN — PROPOFOL 70 MG: 10 INJECTION, EMULSION INTRAVENOUS at 16:09

## 2018-03-20 RX ADMIN — MORPHINE SULFATE 4 MG: 4 INJECTION, SOLUTION INTRAMUSCULAR; INTRAVENOUS at 20:22

## 2018-03-20 RX ADMIN — SODIUM CHLORIDE 125 ML/HR: 0.9 INJECTION, SOLUTION INTRAVENOUS at 20:27

## 2018-03-20 RX ADMIN — LIDOCAINE HYDROCHLORIDE 15 ML: 20 SOLUTION ORAL; TOPICAL at 17:52

## 2018-03-20 RX ADMIN — NYSTATIN 500000 UNITS: 100000 SUSPENSION ORAL at 21:06

## 2018-03-20 RX ADMIN — LIDOCAINE HYDROCHLORIDE 15 ML: 20 SOLUTION ORAL; TOPICAL at 21:06

## 2018-03-20 RX ADMIN — LEVOTHYROXINE SODIUM 88 MCG: 88 TABLET ORAL at 10:02

## 2018-03-20 RX ADMIN — FOLIC ACID 1 MG: 1 TABLET ORAL at 10:02

## 2018-03-20 RX ADMIN — PROPOFOL 30 MG: 10 INJECTION, EMULSION INTRAVENOUS at 16:13

## 2018-03-20 RX ADMIN — PANTOPRAZOLE SODIUM 40 MG: 40 INJECTION, POWDER, FOR SOLUTION INTRAVENOUS at 17:20

## 2018-03-20 RX ADMIN — OXYCODONE HYDROCHLORIDE 5 MG: 5 SOLUTION ORAL at 17:52

## 2018-03-20 RX ADMIN — SODIUM CHLORIDE 125 ML/HR: 0.9 INJECTION, SOLUTION INTRAVENOUS at 08:50

## 2018-03-20 RX ADMIN — PROPOFOL 50 MG: 10 INJECTION, EMULSION INTRAVENOUS at 16:11

## 2018-03-20 RX ADMIN — NYSTATIN 500000 UNITS: 100000 SUSPENSION ORAL at 19:14

## 2018-03-20 RX ADMIN — LIDOCAINE HYDROCHLORIDE 15 ML: 20 SOLUTION ORAL; TOPICAL at 10:02

## 2018-03-20 RX ADMIN — PANTOPRAZOLE SODIUM 40 MG: 40 INJECTION, POWDER, FOR SOLUTION INTRAVENOUS at 03:33

## 2018-03-20 NOTE — ANESTHESIA POSTPROCEDURE EVALUATION
Post-Op Assessment Note      CV Status:  Stable    Mental Status:  Alert and awake    Hydration Status:  Euvolemic    PONV Controlled:  Controlled    Airway Patency:  Patent    Post Op Vitals Reviewed: Yes          Staff: CRNA           BP   129/66   Temp      Pulse  69   Resp   15   SpO2   100

## 2018-03-20 NOTE — CASE MANAGEMENT
[address] [facility] in the [facility] by Cody Porter for 2017  Network Utilization Review Department  Phone: [phone]; Fax [phone]  ATTENTION: The Network Utilization Review Department is now centralized for our 7 Facilities  Please call with any questions or concerns to [phone] and carefully follow the prompts so that you are directed to the right person  All voicemails are confidential  Fax any determinations, approvals, denials, and requests for initial or continue stay review clinical to [phone]  Due to HIGH CALL volume, it would be easier if you could please send faxed requests to expedite your requests and in part, help us provide discharge notifications faster   /////////////////////////////////////////////////////////////////////////////////////////////////////////////////      Initial Clinical Review    Admission: Date/Time/Statement: 3/19/18 @ 1117     Orders Placed This Encounter   Procedures    Inpatient Admission (expected length of stay for this patient is greater than two midnights)     Standing Status:   Standing     Number of Occurrences:   1     Order Specific Question:   Admitting Physician     Answer:   Rafia Aguirre     Order Specific Question:   Level of Care     Answer:   Med Surg [16]     Order Specific Question:   Estimated length of stay     Answer:   More than 2 Midnights     Order Specific Question:   Certification     Answer:   I certify that inpatient services are medically necessary for this patient for a duration of greater than two midnights  See H&P and MD Progress Notes for additional information about the patient's course of treatment           ED: Date/Time/Mode of Arrival:   ED Arrival Information     Expected Arrival Acuity Means of Arrival Escorted By Service Admission Type    - 3/19/2018 08:47 Urgent [address] [facility] General Medicine Urgent    Arrival Complaint    WEAKNESS          Chief Complaint: Chief Complaint   Patient presents with    Difficulty Swallowing     pt sent from cancer center with worsening esophagitis  unable to take po meds or food/water  c/o sob mainly from inability to take pain meds  room aire spo2 99%  History of Illness:78 y o  male with PMHx of recently diagnosed 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, HTN, BPH, and Fe deficiency anemia who presents with difficulty swallowing for the past 3-4 days  Pt was recently diagnosed with lung cancer in December and undergoing chemo  He has completed 3 cycles of alimta, paraplatin and keytruda with last dose 3/14  He also has been getting radiation therapy with Dr Tiana Simmons and completed 9/10 treatments  He was due to have his 10th treatment today but did not want to go secondary to his severe chest burning      Pt reports a burning in chest that is present primarily when he eats or drinks anything  He has not been able to stay hydrated or take medications  He reports vomiting up mucous anytime food does go down, though often feels like it is "getting stuck "  He reports losing 7 lbs in 4 days 2/2 not eating  Has tried magic mouthwash without any relief       ED Vital Signs:   ED Triage Vitals   Temperature Pulse Respirations Blood Pressure SpO2   03/19/18 0852 03/19/18 0852 03/19/18 0852 03/19/18 0852 03/19/18 0852   97 7 °F (36 5 °C) 69 19 115/59 100 %      Temp Source Heart Rate Source Patient Position - Orthostatic VS BP Location FiO2 (%)   03/19/18 0852 03/19/18 0852 03/19/18 0852 03/19/18 0852 --   Oral Monitor Sitting Right arm       Pain Score       03/19/18 2208       2        Wt Readings from Last 1 Encounters:   03/19/18 83 kg (182 lb 15 7 oz)     Abnormal Labs/Diagnostic Test Results:   Wbc  2 08  >  1 16  anc  1 83  >  0 86  hgb  10 5  >  8 8  hct  32 3  >  27 5  Total Bili  1 20  >  0 60  Ca  8 2  >  6 8    cxr - no active disease  ctap - No acute intra-abdominal or pelvic pathology      ED Treatment: Medication Administration from 03/19/2018 0847 to 03/19/2018 1252       Date/Time Order Dose Route Action Action by Comments                03/19/2018 0900 sodium chloride 0 9 % bolus 1,000 mL 1,000 mL Intravenous Susy [facility], [address] [place] [address]      03/19/2018 7115 morphine (PF) 4 mg/mL injection 4 mg 4 mg Intravenous Given [facility], RN      03/19/2018 0905 ondansetron (ZOFRAN) injection 4 mg 4 mg Intravenous Given [facility], RN      03/19/2018 1029 aluminum-magnesium hydroxide-simethicone (MYLANTA) 200-200-20 mg/5 mL oral suspension 30 mL 30 mL Oral Given [facility], RN      03/19/2018 1029 lidocaine viscous (XYLOCAINE) 2 % mucosal solution 15 mL 15 mL Swish & Spit Given [facility], RN                      Past Medical/Surgical History:    Active Ambulatory Problems     Diagnosis Date Noted    Hypothyroid     Hypertension     BPH (benign prostatic hyperplasia) 12/22/2017    GAVE (gastric antral vascular ectasia) 12/22/2017    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 )     Anemia     Constipation 01/13/2018     Resolved Ambulatory Problems     Diagnosis Date Noted    Spinal stenosis of thoracolumbar region 12/22/2017    SOB (shortness of breath) 12/22/2017    Acute tracheobronchitis 12/22/2017    Cancer of lung ([facility] [address] [facility]     Spinal stenosis     Tobacco abuse     Cancer associated pain 01/13/2018    Iron deficiency anemia 01/25/2018    Cancer of lower lobe of left lung ([address] ) 01/25/2018    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 ([address] ) 03/08/2018     Past Medical History:   Diagnosis Date    Anemia     BPH (benign prostatic hyperplasia)     Cancer of lung (HCC)     Disease of thyroid gland     GAVE (gastric antral vascular ectasia)     Hypertension     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[address] )     Spinal stenosis     Tobacco abuse        Admitting Diagnosis: Adult failure to thrive [R62 7]  Weakness [R53 1]  Esophagitis [K20 9]    Assessment/Plan:   73-year-old gentleman with stage IV lung adenocarcinoma with mets to bone with  chemotherapy and radiation therapy, hypertension, BPH presented with dysphagia and odynophagia  Unable to tolerate p o  intake  With sensation of food getting stuck     GI evaluation for possible EGD  Likely radiation induced esophagitis  PPI/Carafate/supportive care      Moiz Mikabhargav Radiation-induced esophagitis   Assessment & Plan     · Patient has completed 9/10 radiation treatments for osseous spinal mets  · Start TID viscous lidocaine, PRN oral oxycodone for moderate/severe pain with IV morphine for breakthrough  ? Consider palliative consult if pain unable to be effectively managed   · GI consultation  Start clears and advance as tolerated  Ensure clears TID for added nutrition          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)   Assessment & Plan     · Stage IV  Dx 12/2017 after presenting with back pain  · Follows with Dr Mi Hills and Dr Khadra Staples  · On chemo q3 weeks with Vallerie Vandana, paraplatin, alimta  Last dose 3/14/18          Total bilirubin, elevated   Assessment & Plan     · Unclear etiology  CT does show hypo densities too small to characterize  · Check direct bili  · Appreciate GI input           Leukopenia   Assessment & Plan     · Likely 2/2 chemotherapy  · Not neutropenic  · Monitor counts closely           Failure to thrive (0-17)   Assessment & Plan     · In setting of poor PO intake 2/2 radiation esophagitis  · Pt not interested in any PEG tube at this time but does agree he needs means of nutrition/pain control  · Nutrition consult appreciated  · GI consult appreciated           Constipation   Assessment & Plan     · Bowel regimen          Anemia   Assessment & Plan     · Likely 2/2 chronic disease and chemotherapy  · Not currently bleeding          GAVE (gastric antral vascular ectasia)   Assessment & Plan     · Hx of   No current issues  · Pt reports he is no off mobic          BPH (benign prostatic hyperplasia)   Assessment & Plan     · Continue home medications           Hypertension   Assessment & Plan     · Hold home cozaar for now given poor oral intake and dehydration  · Continue IVF              VTE Prophylaxis: Enoxaparin (Lovenox)  / sequential compression device     Anticipated Length of Stay:  Patient will be admitted on an Inpatient basis with an anticipated length of stay of  > 2 midnights     Justification for Hospital Stay: radiation esophagitis, inability to take PO    Admission Orders:  Admit medical  NPO - sips w/meds  IVF @ 125  Speech swallow eval  Nutrition consult  Out of bed as tolerated    Scheduled Meds:   Current Facility-Administered Medications:  acetaminophen 650 mg Oral Q6H PRN John Schafer PA-C    aluminum-magnesium hydroxide-simethicone 30 mL Oral Q4H PRN Solo Lozano PA-C    bisacodyl 10 mg Rectal Daily PRN John Schafer PA-C    docusate sodium 100 mg Oral BID John Schafer PA-C    doxazosin 4 mg Oral HS Sara Paris PA-C    enoxaparin 40 mg Subcutaneous Daily Sara Paris PA-C    finasteride 5 mg Oral Daily Sara Paris PA-C    folic acid 1 mg Oral Daily John Schafer PA-C    levothyroxine 88 mcg Oral Early Morning Sara Paris PA-C    lidocaine viscous 15 mL Swish & Swallow TID John Schafer PA-C    morphine injection 4 mg Intravenous Q3H PRN aSra Paris PA-C    ondansetron 4 mg Intravenous Q6H PRN John Schafer PA-C    oxyCODONE 2 5 mg Oral Q4H PRN John Schafer PA-C    oxyCODONE 5 mg Oral Q4H PRN John Schafer PA-C    pantoprazole 40 mg Intravenous Q12H Ivonne Fniley PA-C    sodium chloride 125 mL/hr Intravenous Continuous Sara Paris PA-C Last Rate: 125 mL/hr (03/20/18 0850)   sucralfate 1,000 mg Oral Q6H [address] Ivonne Finley PA-C      Continuous Infusions:   sodium chloride 125 mL/hr Last Rate: 125 mL/hr (03/20/18 0850)     3/20/2018  Per nsg note -  Having much trouble even taking sips - Unable to keep down    * Dysphagia   Assessment & Plan     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 2/2 immunosuppression   ? Continue TID viscous lidocaine, PRN oral oxycodone for moderate/severe pain with IV morphine for breakthrough  ? On IV PPI BID, carafate QID   · Continue IVF hydration  · GI following, input appreciated  NPO for EGD today to evaluate           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 [facility])   Assessment & Plan     · Stage IV  Dx 12/2017 after presenting with back pain  · Follows with Dr Milena Buckley and Dr Elva Barkley  · On chemo q3 weeks with Florenceadrianne López, paraplatin, alimta  Last dose 3/14/18          Total bilirubin, elevated   Assessment & Plan     · Unclear etiology  CT does show hypo densities too small to characterize  · Per GI may be related to chemo  · Level improved today  · If were to worsen, check RUQ U/S          Leukopenia   Assessment & Plan     · Likely 2/2 chemotherapy  · Not neutropenic but ANC did decrease today  · Monitor counts closely           Failure to thrive (0-17)   Assessment & Plan     · In setting of poor PO intake 2/2 radiation esophagitis  · Pt not interested in any PEG tube at this time but does agree he needs means of nutrition/pain control  · Nutrition consult appreciated  · GI consult appreciated           Constipation   Assessment & Plan     · Bowel regimen          Anemia   Assessment & Plan     · Likely 2/2 chronic disease and chemotherapy  · Not currently bleeding  · Drop today likely 2/2 dilution           GAVE (gastric antral vascular ectasia)   Assessment & Plan     · Hx of   No current issues  · Pt reports he is now off mobic          BPH (benign prostatic hyperplasia)   Assessment & Plan     · Continue home medications           Hypertension   Assessment & Plan     · Hold home cozaar for now given poor oral intake and dehydration  · Continue IVF

## 2018-03-20 NOTE — PLAN OF CARE
GASTROINTESTINAL - ADULT     Minimal or absence of nausea and/or vomiting Progressing     Maintains or returns to baseline bowel function Progressing     Maintains adequate nutritional intake Progressing        Knowledge Deficit     Patient/family/caregiver demonstrates understanding of disease process, treatment plan, medications, and discharge instructions Progressing        Nutrition/Hydration-ADULT     Nutrient/Hydration intake appropriate for improving, restoring or maintaining nutritional needs Progressing        PAIN - ADULT     Verbalizes/displays adequate comfort level or baseline comfort level Progressing        Prexisting or High Potential for Compromised Skin Integrity     Skin integrity is maintained or improved Progressing

## 2018-03-20 NOTE — ANESTHESIA PREPROCEDURE EVALUATION
Review of Systems/Medical History  Patient summary reviewed        Cardiovascular  Hypertension ,   Comment: STRESS SUMMARY: 02 sat at rest 96% and 02 sat at peak stress 96%  Duration of   exercise was 9 min  The patient exercised to protocol stage 3  Maximal work   rate was 10 1 METs  Maximal heart rate during stress was 139 bpm ( 95 % of   maximal predicted heart rate)  The heart rate response to stress was normal    There was normal resting blood pressure with an appropriate response to    stress  The rate-pressure product for the peak heart rate and blood pressure was    24229  There was no chest pain during stress  The stress test was terminated due to   achievement of maximal (symptom limited) exercise and achievement of target   heart rate  The stress ECG was negative for ischemia  There were no stress   arrhythmias or conduction abnormalities  SUMMARY:   -  Stress results: Duration of exercise was 9 min  Target heart rate was   achieved  There was no chest pain during stress  -  ECG conclusions: The stress ECG was negative for ischemia  IMPRESSIONS: Normal study after maximal exercise without reproduction of   symptoms  ,  Pulmonary  Smoker ex-smoker  ,   Comment: Lung CA     GI/Hepatic  Dysphagia,          Negative  ROS        Endo/Other  History of thyroid disease , hypothyroidism,      GYN  Negative gynecology ROS          Hematology  Anemia ,     Musculoskeletal  Negative musculoskeletal ROS        Neurology  Negative neurology ROS      Psychology   Negative psychology ROS              Physical Exam    Airway    Mallampati score: II  TM Distance: >3 FB  Neck ROM: full     Dental       Cardiovascular  Cardiovascular exam normal    Pulmonary  Pulmonary exam normal     Other Findings        Anesthesia Plan  ASA Score- 3     Anesthesia Type- IV sedation with anesthesia with ASA Monitors  Additional Monitors:   Airway Plan:         Plan Factors-    Induction- intravenous      Postoperative Plan-     Informed Consent- Anesthetic plan and risks discussed with patient  I personally reviewed this patient with the CRNA  Discussed and agreed on the Anesthesia Plan with the CRNA  Danis Sage

## 2018-03-20 NOTE — ASSESSMENT & PLAN NOTE
· Unclear etiology   CT does show hypo densities too small to characterize  · Per GI may be related to chemo  · Level improved today  · If were to worsen, check RUQ U/S

## 2018-03-20 NOTE — OP NOTE
ESOPHAGOGASTRODUODENOSCOPY    PROCEDURE: EGD    SEDATION: Monitored anesthesia care, check anesthesia records    ASA Class: 3    INDICATIONS:  Dysphagia/odynophagia  CONSENT:  Informed consent was obtained for the procedure, including sedation after explaining the risks and benefits of the procedure  Risks including but not limited to bleeding, perforation, infection, and missed lesion  PREPARATION:   Telemetry, pulse oximetry, blood pressure were monitored throughout the procedure  Patient was identified by myself both verbally and by visual inspection of ID band  DESCRIPTION:   Patient was placed in the left lateral decubitus position and was sedated with the above medication  The gastroscope was introduced in to the oropharynx and the esophagus was intubated under direct visualization  Scope was passed down the esophagus up to 2nd part of the duodenum  A careful inspection was made as the gastroscope was withdrawn, including a retroflexed view of the stomach; findings and interventions are described below  FINDINGS:    #1  Esophagus- severe esophagitis noted starting from 25-40 cm suggestive of radiation esophagitis  There were whitish plaques a therefore brushings were obtained to assess for Candida  Biopsies were obtained to also evaluate for CMV/HSV although no discrete ulcers noted  #2  Stomach- patchy erythema noted in the gastric antrum but no evidence of peptic ulcer disease  Biopsies obtained to assess for H pylori  Retroflexion was performed and was unremarkable  #3  Duodenum- duodenal mucosa was unremarkable  IMPRESSIONS:      1   LA grade D esophagitis -likely due to radiation  There were whitish plaques suggestive of possibly Candida  2   Moderate gastritis  RECOMMENDATIONS:     1  Follow-up biopsy and brushing results in 2-3 weeks  2   Avoid NSAIDs  3   Start on nystatin swish and swallow  4   Continue pantoprazole 40 mg IV b i d  in addition to Carafate    5  Clear liquid diet as tolerated  6   Would recommend pain control  COMPLICATIONS:  None; patient tolerated the procedure well            DISPOSITION: PACU           CONDITION: Stable

## 2018-03-20 NOTE — ASSESSMENT & PLAN NOTE
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 2/2 immunosuppression   · Continue TID viscous lidocaine, PRN oral oxycodone for moderate/severe pain with IV morphine for breakthrough  · On IV PPI BID, carafate QID   · Continue IVF hydration  · GI following, input appreciated   NPO for EGD today to evaluate

## 2018-03-20 NOTE — PLAN OF CARE
GASTROINTESTINAL - ADULT     Minimal or absence of nausea and/or vomiting Progressing     Maintains or returns to baseline bowel function Progressing     Maintains adequate nutritional intake Progressing        Knowledge Deficit     Patient/family/caregiver demonstrates understanding of disease process, treatment plan, medications, and discharge instructions Progressing        Nutrition/Hydration-ADULT     Nutrient/Hydration intake appropriate for improving, restoring or maintaining nutritional needs Progressing        PAIN - ADULT     Verbalizes/displays adequate comfort level or baseline comfort level Progressing        Potential for Falls     Patient will remain free of falls Progressing        Prexisting or High Potential for Compromised Skin Integrity     Skin integrity is maintained or improved Progressing

## 2018-03-20 NOTE — SPEECH THERAPY NOTE
Speech Language/Pathology    Speech/Language Pathology Progress Note    Patient Name: Sharon Felipe  NDIUK'M Date: 3/20/2018     Consult rec'd for swallow eval  Pt NPO for EGD later today  Will complete bedside swallow as able       Tonia Trejo, [address] [facility]  Speech Pathologist  Pager: [phone]

## 2018-03-20 NOTE — NUTRITION
03/20/18 1737   Assessment   Timepoint Initial  (Consult)   Labs   List Completed Labs (3/20/18 Alb 2 9, Ca 6 8, Glu 103    Meds reviewed)   Feeding Route   PO Independent   Nutritional Supplement Ensure (specify type)  (Ensure Clear TID)   Adequacy of Intake   Nutrition Modality PO  (Clear Liquid)   Intake Meals 0-25%   Intake Supplements 0-25%  (per patient)   Estimated Calorie Intake 0-25%   Estimated Protein Intake  0-25%   Estimated Fluid Intake 0-25%   Estimated calorie intake compared to estimated need Not meeting estimated energy needs   Nutrition Prognosis   Potential Guarded   Nutrition Concerns Elderly; Dysphagia; Other (Comment)  (severe esophagitis)   Comorbid Concerns Other (Comment)  (HTN, Metastatic CA, Failure To Thrive)   Nutrition Considerations Social support  (Spoke with pt and wife suggested tips but no formal education warranted at this time)   PES Statement   Problem Intake   Oral or Nutritional Support Intake (2) Inadequate oral intake NI-2 1   Related to Swallowing difficulty   As evidenced by: Scheduled Procedures/therapy; Intake < 25%   Patient Nutrition Goals   Goal tolerate PO diet;adequate hydration;transition to PO diet   Goal Status initiated   Timeframe to complete goal by next f/u   Recommendations/Interventions   Summary Patient states he does have an appetite  He is challenged to consume adequate nutrition due to severe esophagitis r/t radiation and possibly Candida present  Pain meds, swish swallow ordered  Wife present at bedside  RD assisted in [employer]  Provided tips and suggestions for supplements r/t patients taste bud changes  No weight loss noted, no fat/muscle loss noted  Does not meet characteristics for malnutrition at this time however, with potential so nutrition management  is beneficial  Patient is a candidate for OP MNT [facility]  Malnutrition/BMI Present No   Interventions Diet: to Advance; Supplement continue;MNT via Outpatient Nutrition Recommendations Continue diet as ordered;Coordination of care  (Advance diet as medically able)   Nutrition Complexity Risk   Nutrition complexity level Moderate risk   Nutrition review: 03/24/18  (chk diet advance and PO)   Follow up date 03/27/18

## 2018-03-20 NOTE — ASSESSMENT & PLAN NOTE
· Likely 2/2 chronic disease and chemotherapy  · Not currently bleeding  · Drop today likely 2/2 dilution

## 2018-03-20 NOTE — PROGRESS NOTES
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 2/2 immunosuppression   · Continue TID viscous lidocaine, PRN oral oxycodone for moderate/severe pain with IV morphine for breakthrough  · On IV PPI BID, carafate QID   · Continue IVF hydration  · GI following, input appreciated  NPO for EGD today to evaluate         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 [facility])   Assessment & Plan    · Stage IV  Dx 12/2017 after presenting with back pain  · Follows with Dr Inna Conrad and Dr Chirag Banks  · On chemo q3 weeks with Pippa Luong, paraplatin, alimta  Last dose 3/14/18        Total bilirubin, elevated   Assessment & Plan    · Unclear etiology  CT does show hypo densities too small to characterize  · Per GI may be related to chemo  · Level improved today  · If were to worsen, check RUQ U/S        Leukopenia   Assessment & Plan    · Likely 2/2 chemotherapy  · Not neutropenic but ANC did decrease today  · Monitor counts closely         Failure to thrive (0-17)   Assessment & Plan    · In setting of poor PO intake 2/2 radiation esophagitis  · Pt not interested in any PEG tube at this time but does agree he needs means of nutrition/pain control  · Nutrition consult appreciated  · GI consult appreciated         Constipation   Assessment & Plan    · Bowel regimen        Anemia   Assessment & Plan    · Likely 2/2 chronic disease and chemotherapy  · Not currently bleeding  · Drop today likely 2/2 dilution         GAVE (gastric antral vascular ectasia)   Assessment & Plan    · Hx of   No current issues  · Pt reports he is now off mobic        BPH (benign prostatic hyperplasia)   Assessment & Plan · Continue home medications         Hypertension   Assessment & Plan    · Hold home cozaar for now given poor oral intake and dehydration  · Continue IVF           VTE Pharmacologic Prophylaxis:   Pharmacologic: Enoxaparin (Lovenox)  Mechanical VTE Prophylaxis in Place: Yes    Patient Centered Rounds: I have performed bedside rounds with nursing staff today  Discussions with Specialists or Other Care Team Provider: appreciate GI input  Education and Discussions with Family / Patient: patient  Time Spent for Care: 30 minutes  More than 50% of total time spent on counseling and coordination of care as described above  Current Length of Stay: 1 day(s)    Current Patient Status: Inpatient   Certification Statement: The patient will continue to require additional inpatient hospital stay due to needs EGD, pain control     Discharge Plan: await EGD  Tentative tomorrow if doing well  Code Status: Level 1 - Full Code      Subjective:   Pt reports feeling same  He again is irritable and seems to have inconsistent goals as he wants to get better yet he also wants to go home  Objective:     Vitals:   Temp (24hrs), Av °F (36 7 °C), Min:97 4 °F (36 3 °C), Max:98 6 °F (37 °C)    HR:  [63-80] 67  Resp:  [18-19] 18  BP: (111-133)/(59-68) 118/65  SpO2:  [97 %-98 %] 97 %  Body mass index is 28 66 kg/m²  Input and Output Summary (last 24 hours): Intake/Output Summary (Last 24 hours) at 18 1053  Last data filed at 18 0850   Gross per 24 hour   Intake            33 ml   Output              300 ml   Net          170 33 ml       Physical Exam:     Physical Exam   Constitutional: He is oriented to person, place, and time  No distress  Cardiovascular: Normal rate and regular rhythm  Pulmonary/Chest: Effort normal and breath sounds normal    Abdominal: Soft  Bowel sounds are normal  He exhibits no distension  There is no tenderness  Musculoskeletal: He exhibits no edema  Neurological: He is alert and oriented to person, place, and time  Skin: Skin is warm and dry  He is not diaphoretic  Psychiatric:   Irritable    Nursing note and vitals reviewed  Additional Data:     Labs:      Results from last 7 days  Lab Units 03/20/18  0425   WBC Thousand/uL 1 16*   HEMOGLOBIN g/dL 8 8*   HEMATOCRIT % 27 5*   PLATELETS Thousands/uL 158   NEUTROS PCT % 73   LYMPHS PCT % 10*   MONOS PCT % 15*   EOS PCT % 2       Results from last 7 days  Lab Units 03/20/18  0425   SODIUM mmol/L 139   POTASSIUM mmol/L 4 8   CHLORIDE mmol/L 109*   CO2 mmol/L 22   BUN mg/dL 22   CREATININE mg/dL 1 01   CALCIUM mg/dL 6 8*   TOTAL PROTEIN g/dL 5 9*   BILIRUBIN TOTAL mg/dL 0 60   ALK PHOS U/L 75   ALT U/L 34   AST U/L 20   GLUCOSE RANDOM mg/dL 103       Results from last 7 days  Lab Units 03/20/18  0425   INR  0 99       * I Have Reviewed All Lab Data Listed Above  * Additional Pertinent Lab Tests Reviewed:  All Labs Within Last 24 Hours Reviewed    Imaging:    Imaging Reports Reviewed Today Include: none  Imaging Personally Reviewed by Myself Includes:  none    Recent Cultures (last 7 days):           Last 24 Hours Medication List:     Current Facility-Administered Medications:  acetaminophen 650 mg Oral Q6H PRN Irish Acuna PA-C    aluminum-magnesium hydroxide-simethicone 30 mL Oral Q4H PRN Donedeloris Lozano PA-C    bisacodyl 10 mg Rectal Daily PRN Irish Acuna PA-C    docusate sodium 100 mg Oral BID Irish Acuna PA-C    doxazosin 4 mg Oral HS Sara Paris PA-C    enoxaparin 40 mg Subcutaneous Daily Sara Paris PA-C    finasteride 5 mg Oral Daily Sara CorKRISTINA chavez    folic acid 1 mg Oral Daily Saraopal Paris PA-C    levothyroxine 88 mcg Oral Early Morning Sara Paris PA-C    lidocaine viscous 15 mL Swish & Swallow TID Irish Acuna PA-C    morphine injection 4 mg Intravenous Q3H PRN Sara Paris PA-C    ondansetron 4 mg Intravenous Q6H PRN Irish Acuna PA-C    oxyCODONE 2 5 mg Oral Q4H PRN Evita Vogel PA-C    oxyCODONE 5 mg Oral Q4H PRN Evita Vogel PA-C    pantoprazole 40 mg Intravenous Q12H Ivonne Finley PA-C    sodium chloride 125 mL/hr Intravenous Continuous Evita Vogel PA-C Last Rate: 125 mL/hr (03/20/18 0850)   sucralfate 1,000 mg Oral Q6H [facility] Umm Ocasio PA-C         Today, Patient Was Seen By: Evita Vogel PA-C    ** Please Note: Dictation voice to text software may have been used in the creation of this document   **

## 2018-03-20 NOTE — ASSESSMENT & PLAN NOTE
· Stage IV  Dx 12/2017 after presenting with back pain  · Follows with Dr Ariel Haile and Dr Fonseca Section  · On chemo q3 weeks with gabe Baker, alimta   Last dose 3/14/18

## 2018-03-21 LAB
ANION GAP SERPL CALCULATED.3IONS-SCNC: 7 MMOL/L (ref 4–13)
BASOPHILS # BLD AUTO: 0 THOUSANDS/ΜL (ref 0–0.1)
BASOPHILS NFR BLD AUTO: 0 % (ref 0–1)
BUN SERPL-MCNC: 13 MG/DL (ref 5–25)
CALCIUM SERPL-MCNC: 6.7 MG/DL (ref 8.3–10.1)
CHLORIDE SERPL-SCNC: 110 MMOL/L (ref 100–108)
CO2 SERPL-SCNC: 21 MMOL/L (ref 21–32)
CREAT SERPL-MCNC: 0.97 MG/DL (ref 0.6–1.3)
EOSINOPHIL # BLD AUTO: 0.01 THOUSAND/ΜL (ref 0–0.61)
EOSINOPHIL NFR BLD AUTO: 1 % (ref 0–6)
ERYTHROCYTE [DISTWIDTH] IN BLOOD BY AUTOMATED COUNT: 20.7 % (ref 11.6–15.1)
GFR SERPL CREATININE-BSD FRML MDRD: 75 ML/MIN/1.73SQ M
GLUCOSE SERPL-MCNC: 107 MG/DL (ref 65–140)
HCT VFR BLD AUTO: 25.2 % (ref 36.5–49.3)
HGB BLD-MCNC: 8.4 G/DL (ref 12–17)
LYMPHOCYTES # BLD AUTO: 0.08 THOUSANDS/ΜL (ref 0.6–4.47)
LYMPHOCYTES NFR BLD AUTO: 6 % (ref 14–44)
MCH RBC QN AUTO: 28.2 PG (ref 26.8–34.3)
MCHC RBC AUTO-ENTMCNC: 33.3 G/DL (ref 31.4–37.4)
MCV RBC AUTO: 85 FL (ref 82–98)
MONOCYTES # BLD AUTO: 0.23 THOUSAND/ΜL (ref 0.17–1.22)
MONOCYTES NFR BLD AUTO: 18 % (ref 4–12)
NEUTROPHILS # BLD AUTO: 0.95 THOUSANDS/ΜL (ref 1.85–7.62)
NEUTS SEG NFR BLD AUTO: 75 % (ref 43–75)
PLATELET # BLD AUTO: 118 THOUSANDS/UL (ref 149–390)
PMV BLD AUTO: 8.9 FL (ref 8.9–12.7)
POTASSIUM SERPL-SCNC: 4.7 MMOL/L (ref 3.5–5.3)
RBC # BLD AUTO: 2.98 MILLION/UL (ref 3.88–5.62)
SODIUM SERPL-SCNC: 138 MMOL/L (ref 136–145)
WBC # BLD AUTO: 1.27 THOUSAND/UL (ref 4.31–10.16)

## 2018-03-21 PROCEDURE — 99232 SBSQ HOSP IP/OBS MODERATE 35: CPT | Performed by: INTERNAL MEDICINE

## 2018-03-21 PROCEDURE — 80048 BASIC METABOLIC PNL TOTAL CA: CPT | Performed by: INTERNAL MEDICINE

## 2018-03-21 PROCEDURE — C9113 INJ PANTOPRAZOLE SODIUM, VIA: HCPCS | Performed by: PHYSICIAN ASSISTANT

## 2018-03-21 PROCEDURE — 77336 RADIATION PHYSICS CONSULT: CPT | Performed by: RADIOLOGY

## 2018-03-21 PROCEDURE — 92610 EVALUATE SWALLOWING FUNCTION: CPT

## 2018-03-21 PROCEDURE — 85025 COMPLETE CBC W/AUTO DIFF WBC: CPT | Performed by: INTERNAL MEDICINE

## 2018-03-21 RX ADMIN — ENOXAPARIN SODIUM 40 MG: 40 INJECTION SUBCUTANEOUS at 08:47

## 2018-03-21 RX ADMIN — PANTOPRAZOLE SODIUM 40 MG: 40 INJECTION, POWDER, FOR SOLUTION INTRAVENOUS at 17:29

## 2018-03-21 RX ADMIN — SODIUM CHLORIDE 125 ML/HR: 0.9 INJECTION, SOLUTION INTRAVENOUS at 04:11

## 2018-03-21 RX ADMIN — SUCRALFATE 1000 MG: 1 SUSPENSION ORAL at 23:31

## 2018-03-21 RX ADMIN — PANTOPRAZOLE SODIUM 40 MG: 40 INJECTION, POWDER, FOR SOLUTION INTRAVENOUS at 04:11

## 2018-03-21 RX ADMIN — SUCRALFATE 1000 MG: 1 SUSPENSION ORAL at 17:29

## 2018-03-21 RX ADMIN — FINASTERIDE 5 MG: 5 TABLET, FILM COATED ORAL at 08:47

## 2018-03-21 RX ADMIN — LIDOCAINE HYDROCHLORIDE 10 ML: 20 SOLUTION ORAL; TOPICAL at 13:05

## 2018-03-21 RX ADMIN — BISACODYL 10 MG: 10 SUPPOSITORY RECTAL at 17:29

## 2018-03-21 RX ADMIN — NYSTATIN 500000 UNITS: 100000 SUSPENSION ORAL at 17:29

## 2018-03-21 RX ADMIN — SUCRALFATE 1000 MG: 1 SUSPENSION ORAL at 01:41

## 2018-03-21 RX ADMIN — NYSTATIN 500000 UNITS: 100000 SUSPENSION ORAL at 23:24

## 2018-03-21 RX ADMIN — ONDANSETRON 4 MG: 2 INJECTION INTRAMUSCULAR; INTRAVENOUS at 10:55

## 2018-03-21 RX ADMIN — MORPHINE SULFATE 4 MG: 4 INJECTION, SOLUTION INTRAMUSCULAR; INTRAVENOUS at 07:51

## 2018-03-21 RX ADMIN — DOCUSATE SODIUM 100 MG: 100 CAPSULE, LIQUID FILLED ORAL at 08:47

## 2018-03-21 RX ADMIN — FOLIC ACID 1 MG: 1 TABLET ORAL at 08:47

## 2018-03-21 RX ADMIN — SUCRALFATE 1000 MG: 1 SUSPENSION ORAL at 12:55

## 2018-03-21 RX ADMIN — DOXAZOSIN 4 MG: 4 TABLET ORAL at 23:23

## 2018-03-21 RX ADMIN — LIDOCAINE HYDROCHLORIDE 15 ML: 20 SOLUTION ORAL; TOPICAL at 07:54

## 2018-03-21 RX ADMIN — NYSTATIN 500000 UNITS: 100000 SUSPENSION ORAL at 12:55

## 2018-03-21 RX ADMIN — SODIUM CHLORIDE 75 ML/HR: 0.9 INJECTION, SOLUTION INTRAVENOUS at 23:31

## 2018-03-21 RX ADMIN — NYSTATIN 500000 UNITS: 100000 SUSPENSION ORAL at 08:44

## 2018-03-21 RX ADMIN — SUCRALFATE 1000 MG: 1 SUSPENSION ORAL at 06:01

## 2018-03-21 RX ADMIN — LIDOCAINE HYDROCHLORIDE 15 ML: 20 SOLUTION ORAL; TOPICAL at 17:29

## 2018-03-21 RX ADMIN — LEVOTHYROXINE SODIUM 88 MCG: 88 TABLET ORAL at 06:01

## 2018-03-21 RX ADMIN — DOCUSATE SODIUM 100 MG: 100 CAPSULE, LIQUID FILLED ORAL at 17:29

## 2018-03-21 NOTE — SPEECH THERAPY NOTE
Speech-Language Pathology Bedside Swallow Evaluation        Patient Name: Angelito Serna    YVZAG'X Date: 3/21/2018     Problem List  Patient Active Problem List   Diagnosis    Hypothyroid    Hypertension    BPH (benign prostatic hyperplasia)    GAVE (gastric antral vascular ectasia)    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 ([facility] )    Anemia    Constipation    Failure to thrive (0-17)    Dysphagia    Leukopenia    Total bilirubin, elevated    Esophagitis       Past Medical History  Past Medical History:   Diagnosis Date    Anemia     BPH (benign prostatic hyperplasia)     Cancer of lung [facility]     Disease of thyroid gland     GAVE (gastric antral vascular ectasia)     Hypertension     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[facility] )     Spinal stenosis     Tobacco abuse        Past Surgical History  History reviewed  No pertinent surgical history  Current Medical Status  Pt is a 69 y o  male who [facility]/19/18 with dysphagia  Pt reports a burning in chest that is present primarily when he eats or drinks anything  He has not been able to stay hydrated or take medications  He has felt very dehydrated  He reports vomiting up mucous anytime food does go down, though often feels like it is "getting stuck " Denies any fevers, chills, sore throat, abdominal pain, nausea or diarrhea  He reports ongoing constipation since starting chemo  He states the only thing that helps his pain is laying flat  He reports losing 7 lbs in 4 days 2/2 not eating     Has tried magic mouthwash without any relief  He is very frustrated with his symptoms and does not want to have his last radiation treatment 2/2 symptoms  He states that his back pain has almost completely resolved since starting radiation         Past medical history:   Please see H&P for details      Special Studies:  CXR: 3/19 no acute pulmonary disease  EGD: 3/20 showed severe esophagitis, likely due to radiation   There were whitish plaques suggestive of possilby Candida  Moderate gastritis  Social/Education/Vocational Hx:  Pt lives with family      Swallow Information   Current Risks for Dysphagia & Aspiration: s/p radiation for lung ca     Current Symptoms/Concerns: poor po intake w/ wt loss, odynophagia    Current Diet: clears      Baseline Diet: regular diet and thin liquids      Baseline Assessment   Behavior/Cognition: alert    Speech/Language Status: able to participate in conversation and able to follow commands    Patient Positioning: reclined- pt stated less pain when reclined  Pain increased when seated upright  Swallow Mechanism Exam   Facial: symmetrical  Labial: WFL  Lingual: WFL  Velum: unable to visualize  Mandible: adequate ROM  Dentition: adequate  Vocal quality:clear/adequate   Volitional Cough: strong/productive   Respiratory: RA      Consistencies Assessed and Performance   Consistencies Administered: pudding, cheerios, water- pt prefers room temp or slightly warm  Stated cold or too hot caused increased pain  Oral Stage: pt w/ adequate bolus retrieval, excessive mastication, manipulation-which he stated he does on purpose  Pharyngeal Stage: swallows were timely and complete  No coughing or choking  Pt denied feeling food sticking in throat or pain on throat area w/ swallowing  Esophageal Concerns: pt c/o pain and discomfort in mid chest, as food goes down esophagus  c/o Increased pain w/ cold food and drinks  Pt stated liquids are worse than foods  Summary   Swallow Summary: Pt presents w/ esophageal dysphagia due to severe esophagitis and moderate gastritis  No oropharyngeal symptoms  We discussed diet options and prep to ease discomfort w/ swallowing  Pt is also ordered magic mouthwash, instructed to take when meal tray arrives         Recommendations: mechanically altered/level 2 diet and thin liquids     Recommended Form of Meds: whole with puree (pudding-room temp)      Results Reviewed with: patient, RN, BETTY and family     Treatment Recommendations: will follow up for diet tolerance and provide further recs as needed

## 2018-03-21 NOTE — ASSESSMENT & PLAN NOTE
· Likely 2/2 chronic disease and chemotherapy  · Not currently bleeding  · Drops likely 2/2 dilution

## 2018-03-21 NOTE — PLAN OF CARE
Problem: SLP ADULT - SWALLOWING, IMPAIRED  Goal: Initial SLP swallow eval performed  Outcome: Completed Date Met: 03/21/18

## 2018-03-21 NOTE — PROGRESS NOTES
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 2/2 immunosuppression   · Continue TID viscous lidocaine, PRN oral oxycodone for moderate/severe pain with IV morphine for breakthrough  · On IV PPI BID, carafate QID   · EGD yesterday showing SEVERE inflammation 2/2 radiation esophagitis  D/W pt at bedside that unfortunately there is no quick fix for this and it will take time to heal the severe inflammation   · Continue IVF hydration  Advance diet as tolerated        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 [facility])   Assessment & Plan    · Stage IV  Dx 12/2017 after presenting with back pain  · Follows with Dr Toby Burrell and Dr Arturo Garcia  · On chemo q3 weeks with gabe Gavin alimta  Last dose 3/14/18  · Not currently neutropenic  Discussed counts curbside with oncology  PA-C, counts expected         Total bilirubin, elevated   Assessment & Plan    · Unclear etiology  CT does show hypo densities too small to characterize  · Per GI may be related to chemo  · Level improved as of yesterday  · If were to worsen, check RUQ U/S        Leukopenia   Assessment & Plan    · Likely 2/2 chemotherapy  · Not neutropenic  · Monitor counts closely         Failure to thrive (0-17)   Assessment & Plan    · In setting of poor PO intake 2/2 radiation esophagitis  Encourage oral intake as tolerated   · Nutrition consult appreciated  · GI consult appreciated         Constipation   Assessment & Plan    · Bowel regimen   Can try suppository if needed         Anemia   Assessment & Plan    · Likely 2/2 chronic disease and chemotherapy  · Not currently bleeding  · Drops likely 2/2 dilution GAVE (gastric antral vascular ectasia)   Assessment & Plan    · Hx of  No current issues  · Pt reports he is now off mobic        BPH (benign prostatic hyperplasia)   Assessment & Plan    · Continue home medications         Hypertension   Assessment & Plan    · Hold home cozaar for now given poor oral intake and dehydration  · Continue IVF           VTE Pharmacologic Prophylaxis:   Pharmacologic: Enoxaparin (Lovenox)  Mechanical VTE Prophylaxis in Place: Yes    Patient Centered Rounds: I have performed bedside rounds with nursing staff today  Discussions with Specialists or Other Care Team Provider: NARESH ACEVEDO    Education and Discussions with Family / Patient: patient  Time Spent for Care: 30 minutes  More than 50% of total time spent on counseling and coordination of care as described above  Current Length of Stay: 2 day(s)    Current Patient Status: Inpatient   Certification Statement: The patient will continue to require additional inpatient hospital stay due to monitoring of symptoms and diet     Discharge Plan: 24 hours if able to tolerate more PO    Code Status: Level 1 - Full Code      Subjective:   Feeling about the same  Understands results of EGD and that his symptoms will take time to fix  There is not a quick fix for the degree of inflammation noted  Objective:     Vitals:   Temp (24hrs), Av 9 °F (36 6 °C), Min:97 5 °F (36 4 °C), Max:98 4 °F (36 9 °C)    HR:  [57-76] 67  Resp:  [16-18] 18  BP: (117-165)/(63-74) 133/72  SpO2:  [94 %-100 %] 98 %  Body mass index is 28 66 kg/m²  Input and Output Summary (last 24 hours): Intake/Output Summary (Last 24 hours) at 18 1055  Last data filed at 18 0900   Gross per 24 hour   Intake          2161 67 ml   Output              900 ml   Net          1261 67 ml       Physical Exam:     Physical Exam   Constitutional: He is oriented to person, place, and time  No distress  Cardiovascular: Normal rate and regular rhythm  Pulmonary/Chest: Effort normal and breath sounds normal  No respiratory distress  He has no wheezes  Abdominal: Soft  Bowel sounds are normal  He exhibits no distension  There is no tenderness  Musculoskeletal: He exhibits no edema  Neurological: He is alert and oriented to person, place, and time  Skin: He is not diaphoretic  No pallor  Nursing note and vitals reviewed  Additional Data:     Labs:      Results from last 7 days  Lab Units 03/21/18  0733   WBC Thousand/uL 1 27*   HEMOGLOBIN g/dL 8 4*   HEMATOCRIT % 25 2*   PLATELETS Thousands/uL 118*   NEUTROS PCT % 75   LYMPHS PCT % 6*   MONOS PCT % 18*   EOS PCT % 1       Results from last 7 days  Lab Units 03/21/18  0733 03/20/18  0425   SODIUM mmol/L 138 139   POTASSIUM mmol/L 4 7 4 8   CHLORIDE mmol/L 110* 109*   CO2 mmol/L 21 22   BUN mg/dL 13 22   CREATININE mg/dL 0 97 1 01   CALCIUM mg/dL 6 7* 6 8*   TOTAL PROTEIN g/dL  --  5 9*   BILIRUBIN TOTAL mg/dL  --  0 60   ALK PHOS U/L  --  75   ALT U/L  --  34   AST U/L  --  20   GLUCOSE RANDOM mg/dL 107 103       Results from last 7 days  Lab Units 03/20/18  0425   INR  0 99       * I Have Reviewed All Lab Data Listed Above  * Additional Pertinent Lab Tests Reviewed:  All Labs Within Last 24 Hours Reviewed    Imaging:    Imaging Reports Reviewed Today Include: EGD report  Imaging Personally Reviewed by Myself Includes:  none    Recent Cultures (last 7 days):           Last 24 Hours Medication List:     Current Facility-Administered Medications:  acetaminophen 650 mg Oral Q6H PRN Roopa Eldridge PA-C    al mag oxide-diphenhydramine-lidocaine viscous 10 mL Swish & Swallow Q6H PRN Roopa Eldridge PA-C    bisacodyl 10 mg Rectal Daily PRN Roopa Eldridge PA-C    docusate sodium 100 mg Oral BID Roopa Eldridge PA-C    doxazosin 4 mg Oral HS Saraopal Paris PA-C    enoxaparin 40 mg Subcutaneous Daily Sara CorKRISTINA chavez    finasteride 5 mg Oral Daily Saraopal Paris PA-C    folic acid 1 mg Oral Daily Carlyn Vasquez PA-C    levothyroxine 88 mcg Oral Early Morning Sara Paris PA-C    lidocaine viscous 15 mL Swish & Swallow TID Carlyn Vasquez PA-C    morphine injection 4 mg Intravenous Q3H PRN Carlyn Vasquez PA-C    nystatin 500,000 Units Swish & Swallow 4x Daily Ivonne Finley PA-C    ondansetron 4 mg Intravenous Q6H PRN Carlyn Vasquez PA-C    oxyCODONE 2 5 mg Oral Q4H PRN Carlyn Vasquez PA-C    oxyCODONE 5 mg Oral Q4H PRN Carlyn Vasquez PA-C    pantoprazole 40 mg Intravenous Q12H Ivonne Finley PA-C    sodium chloride 75 mL/hr Intravenous Continuous Carlyn Vasquez PA-C Last Rate: 125 mL/hr (03/21/18 0411)   sucralfate 1,000 mg Oral Q6H [facility] Tom Coker PA-C         Today, Patient Was Seen By: Carlyn Vasquez PA-C    ** Please Note: Dictation voice to text software may have been used in the creation of this document   **

## 2018-03-21 NOTE — PROGRESS NOTES
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 ([facility])    Anemia    Constipation    Failure to thrive (0-17)    Leukopenia    Total bilirubin, elevated    Esophagitis    #1  Dysphagia and odynophagia secondary to severe radiation induced esophagitis:  Question some possible underlying candidal esophagitis  Patient continues to have pain with swallowing  -continue viscous lidocaine  -continue Carafate  -continue Protonix b i d   -continue nystatin swish and swallow for possible treatment of Candida  Follow up biopsies and consider Diflucan if positive  -pain control  -discussed with the patient that unfortunately this is going to take some time to heal due to the severity of his inflammation and is not going to feel better overnight  -advance diet as patient tolerates    ----------------------------------------------------------------------------------------------------------------    Subjective:     Patient continues to have esophageal discomfort  He has not had a bowel movement yet  Denies any nausea or vomiting  Is anxious to try some solid foods if he can    Objective:     Vitals: Blood pressure 133/72, pulse 67, temperature 97 9 °F (36 6 °C), temperature source Oral, resp  rate 18, height 5' 7" (1 702 m), weight 83 kg (182 lb 15 7 oz), SpO2 98 %  ,Body mass index is 28 66 kg/m²        Intake/Output Summary (Last 24 hours) at 03/21/18 1002  Last data filed at 03/21/18 0900   Gross per 24 hour   Intake          2161 67 ml   Output              900 ml   Net          1261 67 ml       Physical Exam:     General Appearance: Alert, appears stated age and cooperative  Lungs: Clear to auscultation bilaterally, no rales or rhonchi, no labored breathing/accessory muscle use  Heart: Regular rate and rhythm, S1, S2 normal, no murmur, click, rub or gallop  Abdomen: Soft, non-tender, non-distended; bowel sounds normal; no masses or no organomegaly  Extremities: No cyanosis, clubbing, or edema    Invasive Devices     Peripheral Intravenous Line            Peripheral IV 03/20/18 Left Arm less than 1 day                Lab Results:    Results from last 7 days  Lab Units 03/21/18  0733   WBC Thousand/uL 1 27*   HEMOGLOBIN g/dL 8 4*   HEMATOCRIT % 25 2*   PLATELETS Thousands/uL 118*   NEUTROS PCT % 75   LYMPHS PCT % 6*   MONOS PCT % 18*   EOS PCT % 1       Results from last 7 days  Lab Units 03/21/18  0733 03/20/18  0425   SODIUM mmol/L 138 139   POTASSIUM mmol/L 4 7 4 8   CHLORIDE mmol/L 110* 109*   CO2 mmol/L 21 22   BUN mg/dL 13 22   CREATININE mg/dL 0 97 1 01   CALCIUM mg/dL 6 7* 6 8*   TOTAL PROTEIN g/dL  --  5 9*   BILIRUBIN TOTAL mg/dL  --  0 60   ALK PHOS U/L  --  75   ALT U/L  --  34   AST U/L  --  20   GLUCOSE RANDOM mg/dL 107 103       Results from last 7 days  Lab Units 03/20/18  0425   INR  0 99       Results from last 7 days  Lab Units 03/19/18  0909   LIPASE u/L 83       Imaging Studies: I have personally reviewed pertinent imaging studies  Xr Chest 2 Views    Result Date: 3/19/2018  Impression: No acute cardiopulmonary disease  Similar to prior chest x-ray Several findings identified by chest CT are again not visualized Findings are consistent with emergency room physician's preliminary reading Workstation performed: [id number]     Ct Abdomen Pelvis With Contrast    Result Date: 3/19/2018  Impression: No acute intra-abdominal or pelvic pathology  Osseous metastatic disease again identified with lytic destruction of T10 although no significant vertebral collapse  Sclerosis and healing of other osseous lesions particularly the sacrum  Stable 6 mm right middle lobe pulmonary nodule   Subcentimeter hepatic and renal hypodensities too small to characterize  Descending and sigmoid colon diverticulosis without evidence of diverticulitis   Workstation performed: [id number]

## 2018-03-21 NOTE — ASSESSMENT & PLAN NOTE
· Unclear etiology   CT does show hypo densities too small to characterize  · Per GI may be related to chemo  · Level improved as of yesterday  · If were to worsen, check RUQ U/S

## 2018-03-21 NOTE — ASSESSMENT & PLAN NOTE
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

## 2018-03-21 NOTE — ASSESSMENT & PLAN NOTE
· Stage IV  Dx 12/2017 after presenting with back pain  · Follows with Dr Yannick De Santiago and Dr Tiana Simmons  · On chemo q3 weeks with gabe Lopez alimta  Last dose 3/14/18  · Not currently neutropenic   Discussed counts curbside with oncology  PA-C, counts expected

## 2018-03-21 NOTE — ASSESSMENT & PLAN NOTE
· In setting of poor PO intake 2/2 radiation esophagitis   Encourage oral intake as tolerated   · Nutrition consult appreciated  · GI consult appreciated

## 2018-03-22 VITALS
TEMPERATURE: 98.1 F | SYSTOLIC BLOOD PRESSURE: 133 MMHG | WEIGHT: 182.98 LBS | BODY MASS INDEX: 28.72 KG/M2 | HEART RATE: 64 BPM | OXYGEN SATURATION: 97 % | HEIGHT: 67 IN | RESPIRATION RATE: 18 BRPM | DIASTOLIC BLOOD PRESSURE: 63 MMHG

## 2018-03-22 PROCEDURE — C9113 INJ PANTOPRAZOLE SODIUM, VIA: HCPCS | Performed by: PHYSICIAN ASSISTANT

## 2018-03-22 PROCEDURE — 99239 HOSP IP/OBS DSCHRG MGMT >30: CPT | Performed by: INTERNAL MEDICINE

## 2018-03-22 PROCEDURE — 92526 ORAL FUNCTION THERAPY: CPT

## 2018-03-22 RX ORDER — OXYCODONE HCL 5 MG/5 ML
5 SOLUTION, ORAL ORAL EVERY 4 HOURS PRN
Qty: 473 ML | Refills: 0 | Status: SHIPPED | OUTPATIENT
Start: 2018-03-22 | End: 2018-03-28 | Stop reason: ALTCHOICE

## 2018-03-22 RX ORDER — PANTOPRAZOLE SODIUM 40 MG/1
40 TABLET, DELAYED RELEASE ORAL 2 TIMES DAILY
Qty: 360 TABLET | Refills: 0 | Status: SHIPPED | OUTPATIENT
Start: 2018-03-22 | End: 2018-04-24 | Stop reason: SDUPTHER

## 2018-03-22 RX ORDER — SUCRALFATE ORAL 1 G/10ML
1000 SUSPENSION ORAL EVERY 6 HOURS SCHEDULED
Qty: 420 ML | Refills: 0 | Status: SHIPPED | OUTPATIENT
Start: 2018-03-22 | End: 2018-04-03 | Stop reason: SDUPTHER

## 2018-03-22 RX ADMIN — FINASTERIDE 5 MG: 5 TABLET, FILM COATED ORAL at 07:59

## 2018-03-22 RX ADMIN — NYSTATIN 500000 UNITS: 100000 SUSPENSION ORAL at 12:04

## 2018-03-22 RX ADMIN — LEVOTHYROXINE SODIUM 88 MCG: 88 TABLET ORAL at 05:08

## 2018-03-22 RX ADMIN — NYSTATIN 500000 UNITS: 100000 SUSPENSION ORAL at 07:58

## 2018-03-22 RX ADMIN — LIDOCAINE HYDROCHLORIDE 15 ML: 20 SOLUTION ORAL; TOPICAL at 07:58

## 2018-03-22 RX ADMIN — LIDOCAINE HYDROCHLORIDE 15 ML: 20 SOLUTION ORAL; TOPICAL at 12:06

## 2018-03-22 RX ADMIN — DOCUSATE SODIUM 100 MG: 100 CAPSULE, LIQUID FILLED ORAL at 07:58

## 2018-03-22 RX ADMIN — SUCRALFATE 1000 MG: 1 SUSPENSION ORAL at 12:04

## 2018-03-22 RX ADMIN — FOLIC ACID 1 MG: 1 TABLET ORAL at 07:58

## 2018-03-22 RX ADMIN — ENOXAPARIN SODIUM 40 MG: 40 INJECTION SUBCUTANEOUS at 07:58

## 2018-03-22 RX ADMIN — MORPHINE SULFATE 4 MG: 4 INJECTION, SOLUTION INTRAMUSCULAR; INTRAVENOUS at 05:16

## 2018-03-22 RX ADMIN — SUCRALFATE 1000 MG: 1 SUSPENSION ORAL at 05:08

## 2018-03-22 RX ADMIN — PANTOPRAZOLE SODIUM 40 MG: 40 INJECTION, POWDER, FOR SOLUTION INTRAVENOUS at 05:09

## 2018-03-22 NOTE — DISCHARGE SUMMARY
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  D/W pt at bedside that unfortunately there is no quick fix for this and it will take time to heal the severe inflammation  · GI will call him with the results of the biopsy and inform him on whether not he will need to switch nystatin rinse to Diflucan, though it is unlikely that any candida will actually be present  · Diet as tolerated, speech therapy discussed with patient to use trial and error  · Wife concerned he may get dehydrated and would like him to remain inpatient until symptoms resolve however I stated this is unrealistic and he could consider as needed IV fluid infusions as an outpatient at the infusion center, however he will need to discuss this with his oncologist          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 [facility])   Assessment & Plan    · Stage IV  Dx 12/2017 after presenting with back pain  · Follows with Dr Ariel Haile and Dr Fonseca Section  · On chemo q3 weeks with Steven Floss, paraplatin, alimta  Last dose 3/14/18  · Not currently neutropenic  Discussed counts curbside with oncology  PA-C, counts expected         Total bilirubin, elevated   Assessment & Plan    · Unclear etiology   CT does show hypo densities too small to characterize  · Per GI may be related to chemo  · Level improved as of yesterday  · If were to worsen, check RUQ U/S        Leukopenia   Assessment & Plan    · Likely 2/2 chemotherapy  · Not neutropenic  · Monitor counts closely         Failure to thrive (0-17)   Assessment & Plan    · In setting of poor PO intake 2/2 radiation esophagitis  Encourage oral intake as tolerated   · Nutrition consult appreciated  · GI consult appreciated         Constipation   Assessment & Plan    · Bowel regimen  Can try suppository if needed         Anemia   Assessment & Plan    · Likely 2/2 chronic disease and chemotherapy  · Not currently bleeding  · Drops likely 2/2 dilution         GAVE (gastric antral vascular ectasia)   Assessment & Plan    · Hx of  No current issues  · Pt reports he is now off mobic        BPH (benign prostatic hyperplasia)   Assessment & Plan    · Continue home medications         Hypertension   Assessment & Plan    · Resume all home medications at discharge              Resolved Problems  Date Reviewed: 3/22/2018    None          Consultations During Hospital Stay:  · GI    Procedures Performed:     · EGD 3/20:   · 1  LA grade D esophagitis -likely due to radiation  There were whitish plaques suggestive of possibly Candida  · 2  Moderate gastritis  · CT A/P:   · No acute intra-abdominal or pelvic pathology  Osseous metastatic disease again identified with lytic destruction of T10 although no significant vertebral collapse   Sclerosis and healing of other osseous lesions particularly the sacrum  Stable 6 mm right middle lobe pulmonary nodule  Subcentimeter hepatic and renal hypodensities too small to characterize  Descending and sigmoid colon diverticulosis without evidence of diverticulitis  · CXR: No acute cardiopulmonary disease  Similar to prior chest x-ray    Significant Findings / Test Results:     · See above    Incidental Findings:   · See above     Test Results Pending at Discharge (will require follow up):    · Biopsy from EGD     Outpatient Tests Requested:  · OP f/u with oncologist  · OP f/u with PCP     Complications:  none    Reason for Admission: Dysphagia and [facility] [place] [facility] Course:     Ani Nuñez is a 69 y o  male patient with PMHx of recently diagnosed 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, HTN, BPH, and Fe deficiency anemia who originally presented to the hospital on 3/19/2018 due to  difficulty swallowing for the past 3-4 days  Pt was recently diagnosed with lung cancer in December after having back pain and being found to have bony mets  He was been following with Dr Anatoly Wilkins and undergoing chemo  He has completed 3 cycles of alimta, paraplatin and keytruda with last dose 3/14  He also has been getting radiation therapy with Dr Luis Jaramillo and completed 9/10 treatments  He was due to have his 10th treatment on the day of admission but did not want to go secondary to his severe chest burning      Pt reported a burning in chest that is present primarily when he eats or drinks anything  He has not been able to stay hydrated or take medications  He has felt very dehydrated  He reported vomiting up mucous anytime food does go down, though often feels like it is "getting stuck " Denies any fevers, chills, sore throat, abdominal pain, nausea or diarrhea  He reports ongoing constipation since starting chemo  He stated the only thing that helps his pain is laying flat  He reported losing 7 lbs in 4 days 2/2 not eating       Has tried magic mouthwash without any relief  He is very frustrated with his symptoms and does not want to have his last radiation treatment 2/2 symptoms  He states that his back pain has almost completely resolved since starting radiation  He was admitted in imaging did not reveal any obvious source for his pain  It was felt that this was likely secondary to radiation induced esophagitis  Gastroenterology was consulted and he underwent EGD on 03/20 which showed grade D esophagitis likely due to radiation  There were also a few whitish plaques suggestive of possibly Candida  This was taken for biopsy      Patient was treated supportively and was quite frustrated that there was no obvious quick fix for his pain  He was educated on radiation esophagitis in the fact that it will take time to heal his inflammation  He was given a prescription for Carafate, ppi, viscous lidocaine, and liquid oxycodone for pain relief  He also was given oral nystatin in case a whitish plaques do reveal Candida  GI will call him to follow up on these results  Patient was instructed to eat as tolerated  Him and his wife should monitor for any dehydration and if this is a concern he should return to the ER and/or contact his primary oncologist as perhaps p r n  IV fluid infusions could be arranged at the infusion center as an outpatient  Please see above list of diagnoses and related plan for additional information  Condition at Discharge: stable     Discharge Day Visit / Exam:     Subjective:  Patient reports still the same amount of pain with swallowing  He is using ice on his chest which she feels helps the burning in his throat and esophagus  We again discussed with his wife present that there is no quick fix for this and unfortunately he just needs to continue taking the medications and eating as tolerated until time heals his inflammation  Vitals: Blood Pressure: 133/63 (03/22/18 0814)  Pulse: 64 (03/22/18 0814)  Temperature: 98 1 °F (36 7 °C) (03/22/18 0814)  Temp Source: Oral (03/22/18 0814)  Respirations: 18 (03/22/18 0814)  Height: 5' 7" (170 2 cm) (03/20/18 1735)  Weight - Scale: 83 kg (182 lb 15 7 oz) (03/19/18 1256)  SpO2: 97 % (03/22/18 0814)  Exam:   Physical Exam   Constitutional: He is oriented to person, place, and time  No distress  HENT:   Mouth/Throat: No oropharyngeal exudate  Cardiovascular: Normal rate and regular rhythm  Pulmonary/Chest: Effort normal and breath sounds normal  No respiratory distress  He has no wheezes  He exhibits no tenderness  Abdominal: Soft  Bowel sounds are normal  He exhibits no distension   There is no tenderness  Musculoskeletal: He exhibits no edema  Neurological: He is alert and oriented to person, place, and time  Skin: Skin is warm and dry  He is not diaphoretic  Psychiatric:   Slightly irritable   Nursing note and vitals reviewed  Discussion with Family:  Wife at bedside updated extensively    Discharge instructions/Information to patient and family:   See after visit summary for information provided to patient and family  Provisions for Follow-Up Care:  See after visit summary for information related to follow-up care and any pertinent home health orders  Disposition:     Home    For Discharges to [facility] SNF:   · Not Applicable to this Patient - Not Applicable to this Patient    Planned Readmission: no     Discharge Statement:  I spent 45 minutes discharging the patient  This time was spent on the day of discharge  I had direct contact with the patient on the day of discharge  Greater than 50% of the total time was spent examining patient, answering all patient questions, arranging and discussing plan of care with patient as well as directly providing post-discharge instructions  Additional time then spent on discharge activities  Discharge Medications:  See after visit summary for reconciled discharge medications provided to patient and family        ** Please Note: This note has been constructed using a voice recognition system **

## 2018-03-22 NOTE — SPEECH THERAPY NOTE
Speech Language/Pathology    Speech/Language Pathology Progress Note    Patient Name: Angelito Serna  TJJYT'V Date: 3/22/2018       Subjective:  Pt seen for dysphagia tx at lunch  Overall po seems to be improving  Pt stated the food goes down better than liquids  Wife at bedside  Pt and wife concerned that pt is being discharged and will get dehydrated again  Requested dietician, who provided a list of foods and hydration value as well as goal    Objective:  Pt stated an ice bag in his chest helps to ease discomfort  Pt mostly reclined, stated less painful  Pt took magic mouthwash and karafate before eating, but feels the magic mouthwash does not last too long  Pt also reported nothing takes good  Pt ate a few bites of chopped meatloaf, mashed sweet potatoes and pureed peas  Facial grimacing w/ initial swallows, but then decreased  Pt drank a few sips of water, c/o some pain going down  Discussed food options to try at home for optimal nutrition and hydration, including canned fruit, puddings, things that can be kept room temperature  Assessment:  Pt w/ slightly improving po intake  Food suggestions provided, as well as contact info for questions  Plan/Recommendations:  Pt for discharge today

## 2018-03-22 NOTE — ASSESSMENT & PLAN NOTE
· Stage IV  Dx 12/2017 after presenting with back pain  · Follows with Dr Ariel Haile and Dr Fonseca Section  · On chemo q3 weeks with Steven Christian, paraplatin, alimta  Last dose 3/14/18  · Not currently neutropenic   Discussed counts curbside with oncology  PA-C, counts expected

## 2018-03-22 NOTE — PLAN OF CARE
GASTROINTESTINAL - ADULT     Minimal or absence of nausea and/or vomiting Adequate for Discharge     Maintains or returns to baseline bowel function Adequate for Discharge     Maintains adequate nutritional intake Adequate for Discharge        Knowledge Deficit     Patient/family/caregiver demonstrates understanding of disease process, treatment plan, medications, and discharge instructions Adequate for Discharge        Nutrition/Hydration-ADULT     Nutrient/Hydration intake appropriate for improving, restoring or maintaining nutritional needs Adequate for Discharge        PAIN - ADULT     Verbalizes/displays adequate comfort level or baseline comfort level Adequate for Discharge        Potential for Falls     Patient will remain free of falls Adequate for Discharge        Prexisting or High Potential for Compromised Skin Integrity     Skin integrity is maintained or improved Adequate for Discharge

## 2018-03-22 NOTE — ASSESSMENT & PLAN NOTE
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   D/W pt at bedside that unfortunately there is no quick fix for this and it will take time to heal the severe inflammation  · GI will call him with the results of the biopsy and inform him on whether not he will need to switch nystatin rinse to Diflucan, though it is unlikely that any candida will actually be present  · Diet as tolerated, speech therapy discussed with patient to use trial and error  · Wife concerned he may get dehydrated and would like him to remain inpatient until symptoms resolve however I stated this is unrealistic and he could consider as needed IV fluid infusions as an outpatient at the infusion center, however he will need to discuss this with his oncologist

## 2018-03-22 NOTE — NURSING NOTE
Patient left prior to receiving D/C papers  Went to [address] to  prescriptions and forgot D/C packet   Left message to let patient know

## 2018-03-22 NOTE — DISCHARGE INSTRUCTIONS
Please take medications as directed  As we discussed, this will take time to improve and there is no quick fix nor easy treatment  GI will call you if the results of your biopsy need any further treatment or medication adjustment  You do not need to follow up with them otherwise unless you are not getting better in a few weeks to months  Esophagitis   WHAT YOU NEED TO KNOW:   Esophagitis is inflammation or irritation of the lining of the esophagus  DISCHARGE INSTRUCTIONS:   Call 911 for any of the following:   · You have chest pain that does not go away within a few minutes or gets worse  Seek care immediately if:   · You feel like you have food stuck in your throat and you cannot cough it out  Contact your healthcare provider if:   · You have new or worsening symptoms, even after treatment  · You have questions or concerns about your condition or care  Medicines:   · Medicines  may be given to fight an infection or to control stomach acid  · Take your medicine as directed  Contact your healthcare provider if you think your medicine is not helping or if you have side effects  Tell him or her if you are allergic to any medicine  Keep a list of the medicines, vitamins, and herbs you take  Include the amounts, and when and why you take them  Bring the list or the pill bottles to follow-up visits  Carry your medicine list with you in case of an emergency  Follow up with your healthcare provider as directed: You may need ongoing tests or treatment  Write down your questions so you remember to ask them during your visits  Do not smoke:  Nicotine and other chemicals in cigarettes and cigars can cause blood vessel and lung damage  Ask your healthcare provider for information if you currently smoke and need help to quit  E-cigarettes or smokeless tobacco still contain nicotine  Talk to your healthcare provider before you use these products     Do not drink alcohol:  Alcohol can irritate your esophagus  Talk to your healthcare provider if you need help to stop drinking  Keep batteries and similar objects out of the reach of children:  Babies often put items in their mouths to explore them  Button batteries are easy to swallow and can cause serious damage  Keep the battery covers of electronic devices such as remote controls taped closed  Store all batteries and toxic materials where children cannot get to them  Use childproof locks to keep children away from dangerous materials  Manage or prevent esophagitis:   · Limit or do not eat foods that can lead to esophagitis  Foods such as oranges and salsa can irritate your esophagus  Caffeine and chocolate can cause acid reflux  High-fat and fried foods make your stomach digest food more slowly  This increases the amount of stomach acid your esophagus is exposed to  Eat small meals, and drink water with your meals  Soft foods such as yogurt and applesauce may help soothe your throat  Do not eat for at least 3 hours before you go to bed  · Prevent acid reflux  Do not bend over unless it is necessary  Acid may back up into your esophagus when you bend over  If possible, keep the head of your bed elevated while you sleep  This will help keep acid from backing up  Manage stress  Stress can make your symptoms worse or cause stomach acid to back up  · Drink more liquid when you take pills  Drink a full glass of water when you take your pills  Ask your healthcare provider if you can take your pills at least an hour before you go to bed  © 2017 [employer] Information is for End User's use only and may not be sold, redistributed or otherwise used for commercial purposes  All illustrations and images included in CareNotes® are the copyrighted property of [employer]  or [employer]  The above information is an  only  It is not intended as medical advice for individual conditions or treatments   Talk to your doctor, nurse or pharmacist before following any medical regimen to see if it is safe and effective for you

## 2018-03-23 NOTE — CASE MANAGEMENT
Notification of Discharge  This is a Notification of Discharge from our facility [address]  Please be advised that this patient has been discharge from our facility  Below you will find the admission and discharge date and time including the patients disposition  PRESENTATION DATE: 3/19/2018  8:47 AM  IP ADMISSION DATE: 3/19/18 1117  DISCHARGE DATE: 3/22/2018  3:10 PM  DISPOSITION: [address] [place] [address] in the [facility] by Cody Porter for 2017  Network Utilization Review Department  Phone: [phone]; Fax [phone]  ATTENTION: The Network Utilization Review Department is now centralized for our 7 Facilities  Make a note that we have a new phone and fax numbers for our Department  Please call with any questions or concerns to [phone] and carefully follow the prompts so that you are directed to the right person  All voicemails are confidential  Fax any determinations, approvals, denials, and requests for initial or continue stay review clinical to [phone]  Due to HIGH CALL volume, it would be easier if you could please send faxed requests to expedite your requests and in part, help us provide discharge notifications faster        Reference #[id number]

## 2018-03-27 ENCOUNTER — TELEPHONE (OUTPATIENT)
Dept: HEMATOLOGY ONCOLOGY | Facility: CLINIC | Age: 78
End: 2018-03-27

## 2018-03-27 NOTE — TELEPHONE ENCOUNTER
Dr Marylene Bottom made aware  Patient's 4/4/18 chemotherapy is to be cancelled  Called and spoke with the patient's wife in regards to the patient's recent hospital admission for esophagitis  Patient was put on Sucralfate and Viscous Lidocaine and is still finding it hard to eat solid or soft foods  Patient is only drinking two 8 oz glasses of fluid daily  Patient was offered IV hydration and is declining at this time

## 2018-03-28 ENCOUNTER — TELEPHONE (OUTPATIENT)
Dept: HEMATOLOGY ONCOLOGY | Facility: CLINIC | Age: 78
End: 2018-03-28

## 2018-03-28 ENCOUNTER — HOSPITAL ENCOUNTER (OUTPATIENT)
Dept: INFUSION CENTER | Facility: CLINIC | Age: 78
Discharge: HOME | End: 2018-03-28
Payer: COMMERCIAL

## 2018-03-28 VITALS
OXYGEN SATURATION: 97 % | TEMPERATURE: 98.3 F | HEART RATE: 62 BPM | DIASTOLIC BLOOD PRESSURE: 60 MMHG | SYSTOLIC BLOOD PRESSURE: 106 MMHG | RESPIRATION RATE: 20 BRPM

## 2018-03-28 LAB
ALBUMIN SERPL BCP-MCNC: 3.2 G/DL (ref 3.5–5)
ALP SERPL-CCNC: 80 U/L (ref 46–116)
ALT SERPL W P-5'-P-CCNC: 24 U/L (ref 12–78)
ANION GAP SERPL CALCULATED.3IONS-SCNC: 7 MMOL/L (ref 4–13)
AST SERPL W P-5'-P-CCNC: 16 U/L (ref 5–45)
BILIRUB SERPL-MCNC: 0.4 MG/DL (ref 0.2–1)
BUN SERPL-MCNC: 11 MG/DL (ref 5–25)
CALCIUM SERPL-MCNC: 8.1 MG/DL (ref 8.3–10.1)
CHLORIDE SERPL-SCNC: 106 MMOL/L (ref 100–108)
CO2 SERPL-SCNC: 28 MMOL/L (ref 21–32)
CREAT SERPL-MCNC: 1.19 MG/DL (ref 0.6–1.3)
GFR SERPL CREATININE-BSD FRML MDRD: 59 ML/MIN/1.73SQ M
GLUCOSE SERPL-MCNC: 96 MG/DL (ref 65–140)
MAGNESIUM SERPL-MCNC: 2 MG/DL (ref 1.6–2.6)
POTASSIUM SERPL-SCNC: 3.9 MMOL/L (ref 3.5–5.3)
PROT SERPL-MCNC: 6.6 G/DL (ref 6.4–8.2)
SODIUM SERPL-SCNC: 141 MMOL/L (ref 136–145)

## 2018-03-28 PROCEDURE — 80053 COMPREHEN METABOLIC PANEL: CPT | Performed by: INTERNAL MEDICINE

## 2018-03-28 PROCEDURE — 83735 ASSAY OF MAGNESIUM: CPT | Performed by: INTERNAL MEDICINE

## 2018-03-28 RX ADMIN — SODIUM CHLORIDE 1000 ML: 0.9 INJECTION, SOLUTION INTRAVENOUS at 13:10

## 2018-03-28 NOTE — PROGRESS NOTES
Patient tolerated hydration without incident and discharged  He voided once in bathroom prior to discharge and will RTO on Friday for additional hydration

## 2018-03-28 NOTE — PROGRESS NOTES
Patient here for labs and hydration  He is doing okay, was just discharged from hospital   He is having difficulty with hydration and caloric intake, dietician advised them on intake prior to discharge

## 2018-03-28 NOTE — TELEPHONE ENCOUNTER
Patient was scheduled for 1 L of NSS today, 3/28/18 and 3/20/18 at [facility]  Patient was made aware

## 2018-03-30 ENCOUNTER — HOSPITAL ENCOUNTER (OUTPATIENT)
Dept: INFUSION CENTER | Facility: CLINIC | Age: 78
Discharge: HOME | End: 2018-03-30
Payer: COMMERCIAL

## 2018-03-30 VITALS
HEART RATE: 71 BPM | OXYGEN SATURATION: 98 % | SYSTOLIC BLOOD PRESSURE: 114 MMHG | DIASTOLIC BLOOD PRESSURE: 70 MMHG | TEMPERATURE: 98.5 F | RESPIRATION RATE: 20 BRPM

## 2018-03-30 PROCEDURE — 96361 HYDRATE IV INFUSION ADD-ON: CPT

## 2018-03-30 PROCEDURE — 96360 HYDRATION IV INFUSION INIT: CPT

## 2018-03-30 RX ADMIN — SODIUM CHLORIDE 1000 ML: 0.9 INJECTION, SOLUTION INTRAVENOUS at 10:45

## 2018-04-03 ENCOUNTER — OFFICE VISIT (OUTPATIENT)
Dept: HEMATOLOGY ONCOLOGY | Facility: CLINIC | Age: 78
End: 2018-04-03
Payer: COMMERCIAL

## 2018-04-03 VITALS
HEIGHT: 67 IN | WEIGHT: 179 LBS | DIASTOLIC BLOOD PRESSURE: 82 MMHG | SYSTOLIC BLOOD PRESSURE: 110 MMHG | RESPIRATION RATE: 18 BRPM | HEART RATE: 105 BPM | BODY MASS INDEX: 28.09 KG/M2 | TEMPERATURE: 98.6 F

## 2018-04-03 DIAGNOSIS — C79.51 METASTATIC ADENOCARCINOMA TO BONE (HCC): Primary | Chronic | ICD-10-CM

## 2018-04-03 DIAGNOSIS — C34.92 ADENOCARCINOMA OF LEFT LUNG (HCC): ICD-10-CM

## 2018-04-03 DIAGNOSIS — K20.90 ESOPHAGITIS: ICD-10-CM

## 2018-04-03 PROCEDURE — 99215 OFFICE O/P EST HI 40 MIN: CPT | Performed by: PHYSICIAN ASSISTANT

## 2018-04-03 RX ORDER — SUCRALFATE ORAL 1 G/10ML
1000 SUSPENSION ORAL EVERY 6 HOURS SCHEDULED
Qty: 420 ML | Refills: 0 | Status: SHIPPED | OUTPATIENT
Start: 2018-04-03 | End: 2018-06-11

## 2018-04-03 NOTE — PROGRESS NOTES
Hematology/Oncology Outpatient Follow-up  Hermila Renteria 69 y o  male 1940 [id number]    Date:  4/3/2018      Assessment and Plan:  1  Adenocarcinoma of left lung [facility])    Patient completed 3 cycles of chemotherapy on 03/14/2018  He was also receiving palliative radiation to the spine due to bone metastases  After his 9th out of 10 radiation treatment, he developed severe radiation esophagitis for which she was hospitalized  He was discharged on PPI b i d , Carafate, nystatin  Review of biopsies from EGD was negative for fungal infection  Instructed to finish current nystatin supply  Carafate and PPI will be continued at this time  Patient received IV fluid hydration x2 last week at the infusion center  Since then, he is able to eat and drink on his own  Pain is much improved in regards to esophagitis  In order to provide increase in strength with now eating and drinking after not for many days, no chemotherapy this week or next week  We will resume chemotherapy the week of April 16th  He states that he does have constipation following chemotherapy  Previously he had not had a bowel movement following chemotherapy for 1 week  Stated this is not  acceptable  He is to continue to  To stool softeners daily as well as 2 capsules of MiraLax  If needed, then we will proceed with magnesium citrate  2  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 [facility])    He received radiation therapy to the spine  He has had great improvement of pain  He has not needed to take any pain medication  3  Esophagitis    See above  - sucralfate (CARAFATE) 1 g/10 mL suspension; Take 10 mL (1,000 mg total) by mouth every 6 (six) hours  Dispense: 420 mL; Refill: 0    HPI:       Cancer of lower lobe of left lung ([address] ) (Resolved)    1/15/2018 Initial Diagnosis     Cancer of lower lobe of left lung ([address] )    A  Bone, Left iliac crest, biopsy:  - Metastatic adenocarcinoma            1/31/2018 -  Chemotherapy     Carboplatin AUC 5, Alimta 500 mg/m2, Keytruda 200 mg IV every 3 weeks  Cycle #1 1/31/2018  Interval history:   3/03/19/2022 patient was admitted to the hospital due to severe radiation esophagitis  He had completed   9/10 radiation treatments  to bone metastases  He follows with Dr Kwan Boland  while inpatient, he had an EGD with gastroenterology which showed severe inflammation  There was no of white patches  In the esophagus on exam   Patient was started on nystatin swish and swallow  Also, Carafate  Also, Protonix 40 mg b i d     he was still having poor p o  Intake  He was given intravenous fluids at the infusion center on  3/28 and 3/30  At this time, his oral intake is improving  He is able to eat  He is able to drink water  ROS: Review of Systems   Constitutional: Positive for activity change, appetite change and fatigue  Negative for chills, fever and unexpected weight change  HENT: Negative for mouth sores and nosebleeds  Respiratory: Negative for cough, chest tightness, shortness of breath and wheezing  Cardiovascular: Negative for chest pain, palpitations and leg swelling  Gastrointestinal: Positive for constipation  Negative for abdominal pain, blood in stool, diarrhea, nausea and vomiting  Genitourinary: Negative for difficulty urinating, dysuria and hematuria  Musculoskeletal: Negative for arthralgias, back pain, joint swelling and myalgias  Skin: Negative  Neurological: Positive for weakness  Negative for dizziness, light-headedness, numbness and headaches  Hematological: Negative  Psychiatric/Behavioral: Negative          Past Medical History:   Diagnosis Date    Anemia     BPH (benign prostatic hyperplasia)     Cancer of lung ([address] )     Disease of thyroid gland     GAVE (gastric antral vascular ectasia)     Hypertension     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HCC)     Spinal stenosis     Tobacco abuse        Past Surgical History:   Procedure Laterality Date    ESOPHAGOGASTRODUODENOSCOPY N/A 3/20/2018    Procedure: ESOPHAGOGASTRODUODENOSCOPY (EGD); Surgeon: Hannah Olivares MD;  Location: [facility];   Service: Gastroenterology       Social History     Social History    Marital status: /Civil Union     Spouse name: Dilip Truong Number of children: 2    Years of education: N/A     Occupational History    retired       Social History Main Topics    Smoking status: Former Smoker     Packs/day: 0 50     Years: 15 00     Types: Cigarettes     Quit date: 1/11/2018    Smokeless tobacco: Never Used      Comment: Quit December 2017    Alcohol use No    Drug use: No    Sexual activity: Not Asked     Other Topics Concern    None     Social History Narrative    None       Family History   Problem Relation Age of Onset    Esophageal cancer Mother     Diabetes Father     No Known Problems Sister     No Known Problems Brother        No Known Allergies      Current Outpatient Prescriptions:     dexamethasone (DECADRON) 4 mg tablet, Take 1 tablet (4 mg total) by mouth 2 (two) times a day as needed (PLEASE SEE SIG NOTES) TAKE 1 TABLET WITH BREAKFAST, 1 TABLET WITH DINNER, DAY BEFORE, DAY OF, AND DAY AFTER CHEMO , Disp: 12 tablet, Rfl: 0    folic acid (FOLVITE) 1 mg tablet, Take 1 tablet by mouth daily, Disp: 90 tablet, Rfl: 0    levothyroxine 88 mcg tablet, Take 88 mcg by mouth daily, Disp: , Rfl:     losartan (COZAAR) 100 MG tablet, Take 50 mg by mouth daily  , Disp: , Rfl:     nystatin (MYCOSTATIN) 100,000 units/mL suspension, Swish and swallow 5 mL (500,000 Units total) 4 (four) times a day, Disp: 473 mL, Rfl: 0    ondansetron (ZOFRAN) 8 mg tablet, Take 1 tablet (8 mg total) by mouth every 8 (eight) hours as needed for nausea or vomiting, Disp: 20 tablet, Rfl: 1    pantoprazole (PROTONIX) 40 mg tablet, Take 1 tablet (40 mg total) by mouth 2 (two) times a day, Disp: 360 tablet, Rfl: 0    sucralfate (CARAFATE) 1 g/10 mL suspension, Take 10 mL (1,000 mg total) by mouth every 6 (six) hours, Disp: 420 mL, Rfl: 0    acetaminophen (TYLENOL) 325 mg tablet, Take 2 tablets by mouth every 6 (six) hours as needed for mild pain, headaches or fever, Disp: 30 tablet, Rfl: 0    doxazosin (CARDURA) 4 mg tablet, Take 4 mg by mouth daily at bedtime, Disp: , Rfl:     finasteride (PROSCAR) 5 mg tablet, Take 5 mg by mouth daily, Disp: , Rfl:       Physical Exam:  /82 (BP Location: Left arm, Patient Position: Sitting, Cuff Size: Adult)   Pulse 105   Temp 98 6 °F (37 °C)   Resp 18   Ht 5' 7" (1 702 m)   Wt 81 2 kg (179 lb)   BMI 28 04 kg/m²     Physical Exam   Constitutional: He is oriented to person, place, and time  He appears well-developed and well-nourished  No distress  HENT:   Head: Normocephalic and atraumatic    moist mucous membranes, no evidence of  Oral thrush   Eyes: Conjunctivae are normal  No scleral icterus  Neck: Normal range of motion  Neck supple  Cardiovascular: Normal rate, regular rhythm and normal heart sounds  No murmur heard  Pulmonary/Chest: Effort normal and breath sounds normal  No respiratory distress  Abdominal: Soft  There is no tenderness  Musculoskeletal: Normal range of motion  He exhibits no edema or tenderness  Lymphadenopathy:     He has no cervical adenopathy  Neurological: He is alert and oriented to person, place, and time  No cranial nerve deficit  Skin: Skin is warm and dry  Psychiatric: He has a normal mood and affect  Vitals reviewed          Labs:  Lab Results   Component Value Date    WBC 1 27 (LL) 03/21/2018    HGB 8 4 (L) 03/21/2018    HCT 25 2 (L) 03/21/2018    MCV 85 03/21/2018     (L) 03/21/2018     Lab Results   Component Value Date     03/28/2018    K 3 9 03/28/2018     03/28/2018    CO2 28 03/28/2018    ANIONGAP 7 03/28/2018    BUN 11 03/28/2018    CREATININE 1 19 03/28/2018    GLUCOSE 96 03/28/2018    CALCIUM 8 1 (L) 03/28/2018    AST 16 03/28/2018    ALT 24 03/28/2018 ALKPHOS 80 03/28/2018    PROT 6 6 03/28/2018    BILITOT 0 40 03/28/2018    EGFR 59 03/28/2018     @RESULTFAST(TSH)@    Patient voiced understanding and agreement in the above discussion  Aware to contact our office with questions/symptoms in the interim

## 2018-04-16 ENCOUNTER — APPOINTMENT (OUTPATIENT)
Dept: LAB | Facility: MEDICAL CENTER | Age: 78
End: 2018-04-16
Payer: COMMERCIAL

## 2018-04-16 DIAGNOSIS — C34.32 CANCER OF LOWER LOBE OF LEFT LUNG (HCC): ICD-10-CM

## 2018-04-16 PROCEDURE — 36415 COLL VENOUS BLD VENIPUNCTURE: CPT | Performed by: PHYSICIAN ASSISTANT

## 2018-04-16 PROCEDURE — 80053 COMPREHEN METABOLIC PANEL: CPT | Performed by: PHYSICIAN ASSISTANT

## 2018-04-16 PROCEDURE — 84443 ASSAY THYROID STIM HORMONE: CPT | Performed by: PHYSICIAN ASSISTANT

## 2018-04-16 PROCEDURE — 85025 COMPLETE CBC W/AUTO DIFF WBC: CPT | Performed by: PHYSICIAN ASSISTANT

## 2018-04-16 RX ORDER — DEXAMETHASONE 4 MG/1
4 TABLET ORAL 2 TIMES DAILY PRN
Qty: 12 TABLET | Refills: 1 | Status: SHIPPED | OUTPATIENT
Start: 2018-04-16 | End: 2018-06-21 | Stop reason: HOSPADM

## 2018-04-16 RX ORDER — DEXAMETHASONE 4 MG/1
4 TABLET ORAL 2 TIMES DAILY PRN
Qty: 30 TABLET | Refills: 1 | OUTPATIENT
Start: 2018-04-16

## 2018-04-16 RX ORDER — DEXAMETHASONE 4 MG/1
TABLET ORAL
Qty: 6 TABLET | Refills: 0 | OUTPATIENT
Start: 2018-04-16

## 2018-04-17 RX ORDER — SODIUM CHLORIDE 9 MG/ML
20 INJECTION, SOLUTION INTRAVENOUS CONTINUOUS
Status: DISCONTINUED | OUTPATIENT
Start: 2018-04-18 | End: 2018-04-21 | Stop reason: HOSPADM

## 2018-04-17 RX ORDER — CYANOCOBALAMIN 1000 UG/ML
1000 INJECTION INTRAMUSCULAR; SUBCUTANEOUS ONCE
Status: COMPLETED | OUTPATIENT
Start: 2018-04-18 | End: 2018-04-18

## 2018-04-18 ENCOUNTER — HOSPITAL ENCOUNTER (OUTPATIENT)
Dept: INFUSION CENTER | Facility: CLINIC | Age: 78
Discharge: HOME | End: 2018-04-18
Payer: COMMERCIAL

## 2018-04-18 VITALS
DIASTOLIC BLOOD PRESSURE: 64 MMHG | RESPIRATION RATE: 16 BRPM | TEMPERATURE: 97.7 F | HEIGHT: 66 IN | OXYGEN SATURATION: 98 % | WEIGHT: 178.5 LBS | SYSTOLIC BLOOD PRESSURE: 122 MMHG | HEART RATE: 65 BPM | BODY MASS INDEX: 28.69 KG/M2

## 2018-04-18 PROCEDURE — 96401 CHEMO ANTI-NEOPL SQ/IM: CPT

## 2018-04-18 PROCEDURE — 96367 TX/PROPH/DG ADDL SEQ IV INF: CPT

## 2018-04-18 PROCEDURE — 96417 CHEMO IV INFUS EACH ADDL SEQ: CPT

## 2018-04-18 PROCEDURE — 96372 THER/PROPH/DIAG INJ SC/IM: CPT

## 2018-04-18 PROCEDURE — 96413 CHEMO IV INFUSION 1 HR: CPT

## 2018-04-18 PROCEDURE — 96411 CHEMO IV PUSH ADDL DRUG: CPT

## 2018-04-18 RX ADMIN — SODIUM CHLORIDE 200 MG: 9 INJECTION, SOLUTION INTRAVENOUS at 12:56

## 2018-04-18 RX ADMIN — ONDANSETRON 16 MG: 2 INJECTION INTRAMUSCULAR; INTRAVENOUS at 11:05

## 2018-04-18 RX ADMIN — CYANOCOBALAMIN 1000 MCG: 1000 INJECTION, SOLUTION INTRAMUSCULAR at 13:30

## 2018-04-18 RX ADMIN — SODIUM CHLORIDE 955 MG: 9 INJECTION, SOLUTION INTRAVENOUS at 11:30

## 2018-04-18 RX ADMIN — SODIUM CHLORIDE 20 ML/HR: 0.9 INJECTION, SOLUTION INTRAVENOUS at 10:35

## 2018-04-18 RX ADMIN — DENOSUMAB 120 MG: 120 INJECTION SUBCUTANEOUS at 13:30

## 2018-04-18 RX ADMIN — CARBOPLATIN 422 MG: 10 INJECTION, SOLUTION INTRAVENOUS at 11:45

## 2018-04-18 NOTE — PROGRESS NOTES
Patient to [facility]fusion [facility] for Alimta / Carboplatin / Mohsen Garcia / Vitamin B12 / Josefina Pope: Offers no complaints at present time: Lab work ( 04/16/18 ) reviewed:  Within parameters to treat: Right FA PIV initiated without incident

## 2018-04-18 NOTE — PROGRESS NOTES
Vitamin B12 / Eyal Click per MD order: Lab work ( 04/16/18 ) reviewed: Calcium - 8 7: Confirms taking oral Calcium / Vitamin D at home: Injections given in [facility] / [place] [facility] without incident: No adverse reactions noted

## 2018-04-18 NOTE — PROGRESS NOTES
Tolerated infusion without incident: No adverse reactions noted: Verified follow up appt with patient: AVS given per patient request

## 2018-04-23 DIAGNOSIS — R06.00 DYSPNEA, UNSPECIFIED TYPE: ICD-10-CM

## 2018-04-23 DIAGNOSIS — C34.92 MALIGNANT NEOPLASM OF LEFT LUNG, UNSPECIFIED PART OF LUNG (HCC): Primary | ICD-10-CM

## 2018-04-24 ENCOUNTER — TRANSCRIBE ORDERS (OUTPATIENT)
Dept: LAB | Facility: CLINIC | Age: 78
End: 2018-04-24

## 2018-04-24 ENCOUNTER — APPOINTMENT (OUTPATIENT)
Dept: LAB | Facility: CLINIC | Age: 78
End: 2018-04-24
Payer: COMMERCIAL

## 2018-04-24 ENCOUNTER — OFFICE VISIT (OUTPATIENT)
Dept: HEMATOLOGY ONCOLOGY | Facility: CLINIC | Age: 78
End: 2018-04-24
Payer: COMMERCIAL

## 2018-04-24 ENCOUNTER — TELEPHONE (OUTPATIENT)
Dept: HEMATOLOGY ONCOLOGY | Facility: CLINIC | Age: 78
End: 2018-04-24

## 2018-04-24 VITALS
HEIGHT: 67 IN | BODY MASS INDEX: 27.94 KG/M2 | WEIGHT: 178 LBS | DIASTOLIC BLOOD PRESSURE: 70 MMHG | SYSTOLIC BLOOD PRESSURE: 116 MMHG | TEMPERATURE: 98.4 F | RESPIRATION RATE: 16 BRPM | HEART RATE: 75 BPM | OXYGEN SATURATION: 94 %

## 2018-04-24 DIAGNOSIS — C79.51 METASTATIC ADENOCARCINOMA TO BONE (HCC): Chronic | ICD-10-CM

## 2018-04-24 DIAGNOSIS — C34.32 CANCER OF LOWER LOBE OF LEFT LUNG (HCC): Primary | ICD-10-CM

## 2018-04-24 DIAGNOSIS — K20.90 ESOPHAGITIS: ICD-10-CM

## 2018-04-24 DIAGNOSIS — C34.32 CANCER OF LOWER LOBE OF LEFT LUNG (HCC): ICD-10-CM

## 2018-04-24 DIAGNOSIS — N40.1 BENIGN PROSTATIC HYPERPLASIA WITH LOWER URINARY TRACT SYMPTOMS, SYMPTOM DETAILS UNSPECIFIED: ICD-10-CM

## 2018-04-24 LAB
BASOPHILS # BLD AUTO: 0.01 THOUSANDS/ΜL (ref 0–0.1)
BASOPHILS NFR BLD AUTO: 1 % (ref 0–1)
EOSINOPHIL # BLD AUTO: 0.01 THOUSAND/ΜL (ref 0–0.61)
EOSINOPHIL NFR BLD AUTO: 1 % (ref 0–6)
ERYTHROCYTE [DISTWIDTH] IN BLOOD BY AUTOMATED COUNT: 20.5 % (ref 11.6–15.1)
HCT VFR BLD AUTO: 25.4 % (ref 36.5–49.3)
HGB BLD-MCNC: 8.4 G/DL (ref 12–17)
LYMPHOCYTES # BLD AUTO: 0.22 THOUSANDS/ΜL (ref 0.6–4.47)
LYMPHOCYTES NFR BLD AUTO: 13 % (ref 14–44)
MCH RBC QN AUTO: 30.3 PG (ref 26.8–34.3)
MCHC RBC AUTO-ENTMCNC: 33.1 G/DL (ref 31.4–37.4)
MCV RBC AUTO: 92 FL (ref 82–98)
MONOCYTES # BLD AUTO: 0.11 THOUSAND/ΜL (ref 0.17–1.22)
MONOCYTES NFR BLD AUTO: 7 % (ref 4–12)
NEUTROPHILS # BLD AUTO: 1.31 THOUSANDS/ΜL (ref 1.85–7.62)
NEUTS SEG NFR BLD AUTO: 78 % (ref 43–75)
PLATELET # BLD AUTO: 159 THOUSANDS/UL (ref 149–390)
PMV BLD AUTO: 9.1 FL (ref 8.9–12.7)
RBC # BLD AUTO: 2.77 MILLION/UL (ref 3.88–5.62)
WBC # BLD AUTO: 1.66 THOUSAND/UL (ref 4.31–10.16)

## 2018-04-24 PROCEDURE — 85025 COMPLETE CBC W/AUTO DIFF WBC: CPT

## 2018-04-24 PROCEDURE — 36415 COLL VENOUS BLD VENIPUNCTURE: CPT

## 2018-04-24 PROCEDURE — 99214 OFFICE O/P EST MOD 30 MIN: CPT | Performed by: PHYSICIAN ASSISTANT

## 2018-04-24 RX ORDER — FOLIC ACID 1 MG/1
1 TABLET ORAL DAILY
Qty: 90 TABLET | Refills: 0 | Status: SHIPPED | OUTPATIENT
Start: 2018-04-24 | End: 2018-07-25 | Stop reason: SDUPTHER

## 2018-04-24 RX ORDER — PANTOPRAZOLE SODIUM 40 MG/1
40 TABLET, DELAYED RELEASE ORAL DAILY
Qty: 30 TABLET | Refills: 2 | Status: SHIPPED | OUTPATIENT
Start: 2018-04-24 | End: 2018-06-11

## 2018-04-24 NOTE — PROGRESS NOTES
Hematology/Oncology Outpatient Follow-up  Kerry Samuel 69 y o  male 1940 [id number]    Date:  4/24/2018      Assessment and Plan:  1  Cancer of lower lobe of left lung ([address] )  -Patient completed 2 cycles of chemotherapy  He then had repeat CT chest scan on 3/9/18  This showed disease improvement  He then recieved palliative radiation to the spine due to bone metastases  After his 9th out of 10 radiation treatment, he developed severe radiation esophagitis for which she was hospitalized  -He had cycle 4 of chemotherapy on 4/18/18     -Following chemotherapy, he had worsening fatigue  He states that this is improving however he still feels tired  Discussed that this is unfortunately normal side effect of chemotherapy  -CBC today showed a hemoglobin of 8 4  No PRBC transfusion needed at this time     -He also has continued shortness of breath with exertion however this is unchanged since start of therapy  Provided handicap placard form for patient today due to difficulty walking long distances     -We will plan for repeat CT scan following 5th cycle of chemotherapy, 3 cycles since last CT scan  Discussed that if CT scan shows improvement or stability, recommendation would be to continue with current chemotherapy     -Patient states he is unsure if he will want to continue with chemotherapy due to quality of life on chemotherapy  He will further decide this after CT scan is completed  Discussed that if the scan shows progression of disease, a new therapy would be started  -Patient and wife asked many questions in regards to prognosis  I reviewed that this is not curable but treatable  They asked questions in regards to a different chemotherapy  Discussed that if chemotherapy is working, we continue with the chemotherapy  2  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 ([address] )    See above    3   Benign prostatic hyperplasia with lower urinary tract symptoms, symptom details unspecified    Patient followed with urologist in [place] up until last year  He would like to establish care with a local urologist for his BPH  I have ordered a PSA prior to his consultation     - Ambulatory referral to Urology; Future  - PSA Total, Diagnostic; Future    4  Esophagitis  Patient continues to have acid reflux symptoms, however much improved compared to prior  He did stop taking PPI  He did not have much benefit with Carafate  Advised to initiate PPI 40 mg p o  In a m  Prior to eating   - pantoprazole (PROTONIX) 40 mg tablet; Take 1 tablet (40 mg total) by mouth daily In AM prior to eating  Dispense: 30 tablet; Refill: 2    HPI:  52-year-old male with past medical history significant for hypothyroidism, hypertension, BPH, spinal stenosis of thoracic and lumbar region  He presented to the [facility] on 12/22  He was having worsening shortness of breath in mid back pain  He was having difficulty walking  He saw his PCP also on 12/22 ordered a chest x-ray  This showed a suspected consolidation on the left upper lobe  Smoking history is significant for roughly 10 cigarettes per day for the past 50 years      Also, he has a history of anemia  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 that was performed on 12/04/2017 with Dr Jodie Merrill      He had PET/CT on 01/11/2018  This showed left lower lobe lesion, 2 cm, SUV 20 9  Also, left perihilar, mediastinal, left subcarinal lymph node (1 6 x 1 2 cm) lymph nodes  No disease in the abdomen or pelvis  Multiple osseous metastases, lesion on transverse process of T3, T7, L1; lytic lesion on the left ilium; destructive lesion on the left bryson sacrum --with soft tissue component; lesion of right femoral neck       CT of the head on 1/14/18 was normal       Dr Matteo Guerrero noted patient to have hemoglobin dropped from 14-10  He was referred to GI   He was placed on oral iron TID, then he was decreased to BID when he was in the hospital       He started having worsening pain of the back around [place]       He quit smoking (Dec 2017)    3/03/19/2022 patient was admitted to the hospital due to severe radiation esophagitis  He had completed   9/10 radiation treatments  to bone metastases  He follows with Dr France Nunez  while inpatient, he had an EGD with gastroenterology which showed severe inflammation  There was no of white patches  In the esophagus on exam   Patient was started on nystatin swish and swallow  Also, Carafate  Also, Protonix 40 mg b i d     he was still having poor p o  Intake  He was given intravenous fluids at the infusion center on  3/28 and 3/30        At this time, his oral intake is improving  He is able to eat  He is able to drink water  Cancer of lower lobe of left lung ([address] ) (Resolved)    1/15/2018 Initial Diagnosis     Cancer of lower lobe of left lung ([address] )    A  Bone, Left iliac crest, biopsy:  - Metastatic adenocarcinoma  1/31/2018 -  Chemotherapy     Carboplatin AUC 5, Alimta 500 mg/m2, Keytruda 200 mg IV every 3 weeks  Cycle #1 1/31/2018  Xgeva 120 mg subcu every 6 weeks            ROS: Review of Systems   Constitutional: Positive for fatigue  Negative for appetite change, chills, fever and unexpected weight change  HENT: Negative for mouth sores and nosebleeds  Respiratory: Positive for shortness of breath  Negative for cough, chest tightness and wheezing  Cardiovascular: Negative for chest pain, palpitations and leg swelling  Gastrointestinal: Positive for constipation  Negative for abdominal pain, blood in stool, diarrhea, nausea and vomiting  Genitourinary: Negative for difficulty urinating, dysuria and hematuria  Musculoskeletal: Negative for arthralgias, joint swelling and myalgias  Skin: Negative  Neurological: Positive for weakness  Negative for dizziness, light-headedness, numbness and headaches  Hematological: Negative  Psychiatric/Behavioral: Negative          Past Medical History:   Diagnosis Date    Anemia     BPH (benign prostatic hyperplasia)     Cancer of lung ([address] )     Disease of thyroid gland     GAVE (gastric antral vascular ectasia)     Hypertension     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HCC)     Spinal stenosis     Tobacco abuse        Past Surgical History:   Procedure Laterality Date    ESOPHAGOGASTRODUODENOSCOPY N/A 3/20/2018    Procedure: ESOPHAGOGASTRODUODENOSCOPY (EGD); Surgeon: Mindy Herrera MD;  Location: [facility];   Service: Gastroenterology       Social History     Social History    Marital status: /Civil Union     Spouse name: Virginia Weston Number of children: 2    Years of education: N/A     Occupational History    retired       Social History Main Topics    Smoking status: Former Smoker     Packs/day: 0 50     Years: 15 00     Types: Cigarettes     Quit date: 1/11/2018    Smokeless tobacco: Never Used      Comment: Quit December 2017    Alcohol use No    Drug use: No    Sexual activity: Not Asked     Other Topics Concern    None     Social History Narrative    None       Family History   Problem Relation Age of Onset    Esophageal cancer Mother     Diabetes Father     No Known Problems Sister     No Known Problems Brother        No Known Allergies      Current Outpatient Prescriptions:     acetaminophen (TYLENOL) 325 mg tablet, Take 2 tablets by mouth every 6 (six) hours as needed for mild pain, headaches or fever, Disp: 30 tablet, Rfl: 0    dexamethasone (DECADRON) 4 mg tablet, Take 1 tablet (4 mg total) by mouth 2 (two) times a day as needed (PLEASE SEE SIG NOTES) TAKE 1 TABLET WITH BREAKFAST, 1 TABLET WITH DINNER, DAY BEFORE, DAY OF, AND DAY AFTER CHEMO , Disp: 12 tablet, Rfl: 1    doxazosin (CARDURA) 4 mg tablet, Take 4 mg by mouth daily at bedtime, Disp: , Rfl:     finasteride (PROSCAR) 5 mg tablet, Take 5 mg by mouth daily, Disp: , Rfl:     folic acid (FOLVITE) 1 mg tablet, Take 1 tablet (1 mg total) by mouth daily, Disp: 90 tablet, Rfl: 0    levothyroxine 88 mcg tablet, Take 88 mcg by mouth daily, Disp: , Rfl:     losartan (COZAAR) 100 MG tablet, Take 50 mg by mouth daily  , Disp: , Rfl:     nystatin (MYCOSTATIN) 100,000 units/mL suspension, Swish and swallow 5 mL (500,000 Units total) 4 (four) times a day, Disp: 473 mL, Rfl: 0    ondansetron (ZOFRAN) 8 mg tablet, Take 1 tablet (8 mg total) by mouth every 8 (eight) hours as needed for nausea or vomiting, Disp: 20 tablet, Rfl: 1    pantoprazole (PROTONIX) 40 mg tablet, Take 1 tablet (40 mg total) by mouth daily In AM prior to eating, Disp: 30 tablet, Rfl: 2    sucralfate (CARAFATE) 1 g/10 mL suspension, Take 10 mL (1,000 mg total) by mouth every 6 (six) hours, Disp: 420 mL, Rfl: 0      Physical Exam:  /70 (BP Location: Left arm, Cuff Size: Adult)   Pulse 75   Temp 98 4 °F (36 9 °C) (Tympanic)   Resp 16   Ht 5' 7" (1 702 m)   Wt 80 7 kg (178 lb)   SpO2 94%   BMI 27 88 kg/m²     Physical Exam   Constitutional: He is oriented to person, place, and time  He appears well-developed and well-nourished  No distress  HENT:   Head: Normocephalic and atraumatic  Eyes: Conjunctivae are normal  No scleral icterus  Neck: Normal range of motion  Neck supple  Cardiovascular: Normal rate, regular rhythm and normal heart sounds  No murmur heard  Pulmonary/Chest: Effort normal and breath sounds normal  No respiratory distress  Abdominal: Soft  There is no tenderness  Musculoskeletal: Normal range of motion  He exhibits no edema or tenderness  Lymphadenopathy:     He has no cervical adenopathy  Neurological: He is alert and oriented to person, place, and time  No cranial nerve deficit  Skin: Skin is warm and dry  There is pallor  Psychiatric: He has a normal mood and affect  Vitals reviewed          Labs:  Lab Results   Component Value Date    WBC 1 66 (LL) 04/24/2018    HGB 8 4 (L) 04/24/2018    HCT 25 4 (L) 04/24/2018    MCV 92 04/24/2018     04/24/2018     Lab Results   Component Value Date     04/16/2018    K 3 9 04/16/2018     04/16/2018    CO2 27 04/16/2018    ANIONGAP 7 04/16/2018    BUN 16 04/16/2018    CREATININE 1 09 04/16/2018    GLUCOSE 114 04/16/2018    CALCIUM 8 7 04/16/2018    AST 31 04/16/2018    ALT 31 04/16/2018    ALKPHOS 77 04/16/2018    PROT 7 0 04/16/2018    BILITOT 0 66 04/16/2018    EGFR 65 04/16/2018     @RESULTFAST(TSH)@    Patient voiced understanding and agreement in the above discussion  Aware to contact our office with questions/symptoms in the interim

## 2018-04-24 NOTE — LETTER
April 24, 2018     Sasha Enamorado MD  [address] [facility]  [address]  [address] [facility]    Patient: Jeremiah Viera   YOB: 1940   Date of Visit: 4/24/2018       Dear Dr Lyndsey Parisi: Thank you for referring Jeremiah Viera to me for evaluation  Below are my notes for this consultation  If you have questions, please do not hesitate to call me  I look forward to following your patient along with you  Sincerely,        Narinder Harden PA-C        CC: No Recipients  Gene Horne  4/24/2018  6:34 PM  Sign at close encounter  Hematology/Oncology Outpatient Follow-up  Jeremiah Viera 69 y o  male 1940 [id number]    Date:  4/24/2018      Assessment and Plan:  1  Cancer of lower lobe of left lung ([facility] [address] )  -Patient completed 2 cycles of chemotherapy  He then had repeat CT chest scan on 3/9/18  This showed disease improvement  He  then recieved palliative radiation to the spine due to bone metastases  After his 9th out of 10 radiation treatment, he developed severe radiation esophagitis for which she was hospitalized  -He had cycle 4 of chemotherapy on 4/18/18     -Following chemotherapy, he had worsening fatigue  He states that this is improving however he still feels tired  Discussed that this is unfortunately normal side effect of chemotherapy  -CBC today showed a hemoglobin of 8 4  No PRBC transfusion needed at this time     -He also has continued shortness of breath with exertion however this is unchanged since start of therapy  Provided handicap placard form for patient today due to difficulty walking long distances     -We will plan for repeat CT scan following 5th cycle of chemotherapy, 3 cycles since last CT scan    Discussed that if CT scan shows improvement or stability, recommendation would be to continue with current chemotherapy     -Patient states he is unsure if he will want to continue with chemotherapy due to quality of life on chemotherapy  He will further decide this after CT scan is completed  Discussed that if the scan shows progression of disease, a new therapy would be started  -Patient and wife asked many questions in regards to prognosis  I reviewed that this is not curable but treatable  They asked questions in regards to a different chemotherapy  Discussed that if chemotherapy is working, we continue with the chemotherapy  2  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 ([facility] [address] )    See above    3  Benign prostatic hyperplasia with lower urinary tract symptoms, symptom details unspecified    Patient followed with urologist in [place] up until last year  He would like to establish care with a local urologist for his BPH  I have ordered a PSA prior to his consultation     - Ambulatory referral to Urology; Future  - PSA Total, Diagnostic; Future    4  Esophagitis  Patient continues to have acid reflux symptoms, however much improved compared to prior  He did stop taking PPI  He did not have much benefit with Carafate  Advised to initiate PPI 40 mg p o  In a m  Prior to eating   - pantoprazole (PROTONIX) 40 mg tablet; Take 1 tablet (40 mg total) by mouth daily In AM prior to eating  Dispense: 30 tablet; Refill: 2    HPI:  52-year-old male with past medical history significant for hypothyroidism, hypertension, BPH, spinal stenosis of thoracic and lumbar region  He presented to the [facility] on 12/22  He was having worsening shortness of breath in mid back pain  He was having difficulty walking  He saw his PCP also on 12/22 ordered a chest x-ray  This showed a suspected consolidation on the left upper lobe  Smoking history is significant for roughly 10 cigarettes per day for the past 50 years      Also, he has a history of anemia  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 that was performed on 12/04/2017 with Dr Shivani Draper      He had PET/CT on 01/11/2018    This showed left lower lobe lesion, 2 cm, SUV 20 9  Also, left perihilar, mediastinal, left subcarinal lymph node (1 6 x 1 2 cm) lymph nodes  No disease in the abdomen or pelvis  Multiple osseous metastases, lesion on transverse process of T3, T7, L1; lytic lesion on the left ilium; destructive lesion on the left bryson sacrum --with soft tissue component; lesion of right femoral neck       CT of the head on 1/14/18 was normal       Dr Maged Horner noted patient to have hemoglobin dropped from 14-10  He was referred to GI  He was placed on oral iron TID, then he was decreased to BID when he was in the hospital       He started having worsening pain of the back around [place]       He quit smoking (Dec 2017)    3/03/19/2022 patient was admitted to the hospital due to severe radiation esophagitis  He had completed   9/10 radiation treatments  to bone metastases  He follows with Dr Noel Ragland  while inpatient, he had an EGD with gastroenterology which showed severe inflammation  There was no of white patches  In the esophagus on exam   Patient was started on nystatin swish and swallow  Also, Carafate  Also, Protonix 40 mg b i d     he was still having poor p o  Intake  He was given intravenous fluids at the infusion center on  3/28 and 3/30        At this time, his oral intake is improving  He is able to eat  He is able to drink water  Cancer of lower lobe of left lung ([address] ) (Resolved)    1/15/2018 Initial Diagnosis     Cancer of lower lobe of left lung ([address] )    A  Bone, Left iliac crest, biopsy:  - Metastatic adenocarcinoma  1/31/2018 -  Chemotherapy     Carboplatin AUC 5, Alimta 500 mg/m2, Keytruda 200 mg IV every 3 weeks  Cycle #1 1/31/2018  Xgeva 120 mg subcu every 6 weeks            ROS: Review of Systems   Constitutional: Positive for fatigue  Negative for appetite change, chills, fever and unexpected weight change  HENT: Negative for mouth sores and nosebleeds  Respiratory: Positive for shortness of breath   Negative for cough, chest tightness and wheezing  Cardiovascular: Negative for chest pain, palpitations and leg swelling  Gastrointestinal: Positive for constipation  Negative for abdominal pain, blood in stool, diarrhea, nausea and vomiting  Genitourinary: Negative for difficulty urinating, dysuria and hematuria  Musculoskeletal: Negative for arthralgias, joint swelling and myalgias  Skin: Negative  Neurological: Positive for weakness  Negative for dizziness, light-headedness, numbness and headaches  Hematological: Negative  Psychiatric/Behavioral: Negative  Past Medical History:   Diagnosis Date    Anemia     BPH (benign prostatic hyperplasia)     Cancer of lung ([address] )     Disease of thyroid gland     GAVE (gastric antral vascular ectasia)     Hypertension     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HCC)     Spinal stenosis     Tobacco abuse        Past Surgical History:   Procedure Laterality Date    ESOPHAGOGASTRODUODENOSCOPY N/A 3/20/2018    Procedure: ESOPHAGOGASTRODUODENOSCOPY (EGD); Surgeon: Deb Bar MD;  Location: [facility];   Service: Gastroenterology       Social History     Social History    Marital status: /Civil Union     Spouse name: Sean Jay Number of children: 2    Years of education: N/A     Occupational History    retired       Social History Main Topics    Smoking status: Former Smoker     Packs/day: 0 50     Years: 15 00     Types: Cigarettes     Quit date: 1/11/2018    Smokeless tobacco: Never Used      Comment: Quit December 2017    Alcohol use No    Drug use: No    Sexual activity: Not Asked     Other Topics Concern    None     Social History Narrative    None       Family History   Problem Relation Age of Onset    Esophageal cancer Mother     Diabetes Father     No Known Problems Sister     No Known Problems Brother        No Known Allergies      Current Outpatient Prescriptions:     acetaminophen (TYLENOL) 325 mg tablet, Take 2 tablets by mouth every 6 (six) hours as needed for mild pain, headaches or fever, Disp: 30 tablet, Rfl: 0    dexamethasone (DECADRON) 4 mg tablet, Take 1 tablet (4 mg total) by mouth 2 (two) times a day as needed (PLEASE SEE SIG NOTES) TAKE 1 TABLET WITH BREAKFAST, 1 TABLET WITH DINNER, DAY BEFORE, DAY OF, AND DAY AFTER CHEMO , Disp: 12 tablet, Rfl: 1    doxazosin (CARDURA) 4 mg tablet, Take 4 mg by mouth daily at bedtime, Disp: , Rfl:     finasteride (PROSCAR) 5 mg tablet, Take 5 mg by mouth daily, Disp: , Rfl:     folic acid (FOLVITE) 1 mg tablet, Take 1 tablet (1 mg total) by mouth daily, Disp: 90 tablet, Rfl: 0    levothyroxine 88 mcg tablet, Take 88 mcg by mouth daily, Disp: , Rfl:     losartan (COZAAR) 100 MG tablet, Take 50 mg by mouth daily  , Disp: , Rfl:     nystatin (MYCOSTATIN) 100,000 units/mL suspension, Swish and swallow 5 mL (500,000 Units total) 4 (four) times a day, Disp: 473 mL, Rfl: 0    ondansetron (ZOFRAN) 8 mg tablet, Take 1 tablet (8 mg total) by mouth every 8 (eight) hours as needed for nausea or vomiting, Disp: 20 tablet, Rfl: 1    pantoprazole (PROTONIX) 40 mg tablet, Take 1 tablet (40 mg total) by mouth daily In AM prior to eating, Disp: 30 tablet, Rfl: 2    sucralfate (CARAFATE) 1 g/10 mL suspension, Take 10 mL (1,000 mg total) by mouth every 6 (six) hours, Disp: 420 mL, Rfl: 0      Physical Exam:  /70 (BP Location: Left arm, Cuff Size: Adult)   Pulse 75   Temp 98 4 °F (36 9 °C) (Tympanic)   Resp 16   Ht 5' 7" (1 702 m)   Wt 80 7 kg (178 lb)   SpO2 94%   BMI 27 88 kg/m²      Physical Exam   Constitutional: He is oriented to person, place, and time  He appears well-developed and well-nourished  No distress  HENT:   Head: Normocephalic and atraumatic  Eyes: Conjunctivae are normal  No scleral icterus  Neck: Normal range of motion  Neck supple  Cardiovascular: Normal rate, regular rhythm and normal heart sounds  No murmur heard    Pulmonary/Chest: Effort normal and breath sounds normal  No respiratory distress  Abdominal: Soft  There is no tenderness  Musculoskeletal: Normal range of motion  He exhibits no edema or tenderness  Lymphadenopathy:     He has no cervical adenopathy  Neurological: He is alert and oriented to person, place, and time  No cranial nerve deficit  Skin: Skin is warm and dry  There is pallor  Psychiatric: He has a normal mood and affect  Vitals reviewed  Labs:  Lab Results   Component Value Date    WBC 1 66 (LL) 04/24/2018    HGB 8 4 (L) 04/24/2018    HCT 25 4 (L) 04/24/2018    MCV 92 04/24/2018     04/24/2018     Lab Results   Component Value Date     04/16/2018    K 3 9 04/16/2018     04/16/2018    CO2 27 04/16/2018    ANIONGAP 7 04/16/2018    BUN 16 04/16/2018    CREATININE 1 09 04/16/2018    GLUCOSE 114 04/16/2018    CALCIUM 8 7 04/16/2018    AST 31 04/16/2018    ALT 31 04/16/2018    ALKPHOS 77 04/16/2018    PROT 7 0 04/16/2018    BILITOT 0 66 04/16/2018    EGFR 65 04/16/2018     @RESULTFAST(TSH)@    Patient voiced understanding and agreement in the above discussion  Aware to contact our office with questions/symptoms in the interim

## 2018-04-30 ENCOUNTER — APPOINTMENT (OUTPATIENT)
Dept: RADIOLOGY | Facility: MEDICAL CENTER | Age: 78
End: 2018-04-30
Payer: COMMERCIAL

## 2018-04-30 ENCOUNTER — TELEPHONE (OUTPATIENT)
Dept: UROLOGY | Facility: AMBULATORY SURGERY CENTER | Age: 78
End: 2018-04-30

## 2018-04-30 ENCOUNTER — OFFICE VISIT (OUTPATIENT)
Dept: HEMATOLOGY ONCOLOGY | Facility: CLINIC | Age: 78
End: 2018-04-30
Payer: COMMERCIAL

## 2018-04-30 ENCOUNTER — TELEPHONE (OUTPATIENT)
Dept: HEMATOLOGY ONCOLOGY | Facility: CLINIC | Age: 78
End: 2018-04-30

## 2018-04-30 VITALS
SYSTOLIC BLOOD PRESSURE: 118 MMHG | OXYGEN SATURATION: 96 % | HEIGHT: 67 IN | WEIGHT: 180.6 LBS | RESPIRATION RATE: 16 BRPM | TEMPERATURE: 97.3 F | BODY MASS INDEX: 28.35 KG/M2 | HEART RATE: 80 BPM | DIASTOLIC BLOOD PRESSURE: 66 MMHG

## 2018-04-30 DIAGNOSIS — R06.00 DYSPNEA, UNSPECIFIED TYPE: Primary | ICD-10-CM

## 2018-04-30 DIAGNOSIS — C34.92 MALIGNANT NEOPLASM OF LEFT LUNG, UNSPECIFIED PART OF LUNG (HCC): ICD-10-CM

## 2018-04-30 DIAGNOSIS — C34.92 ADENOCARCINOMA OF LEFT LUNG (HCC): ICD-10-CM

## 2018-04-30 DIAGNOSIS — N40.1 BENIGN PROSTATIC HYPERPLASIA WITH LOWER URINARY TRACT SYMPTOMS, SYMPTOM DETAILS UNSPECIFIED: Chronic | ICD-10-CM

## 2018-04-30 DIAGNOSIS — C79.51 METASTATIC ADENOCARCINOMA TO BONE (HCC): Primary | Chronic | ICD-10-CM

## 2018-04-30 DIAGNOSIS — R06.00 DYSPNEA, UNSPECIFIED TYPE: ICD-10-CM

## 2018-04-30 PROCEDURE — 99214 OFFICE O/P EST MOD 30 MIN: CPT | Performed by: INTERNAL MEDICINE

## 2018-04-30 PROCEDURE — 71046 X-RAY EXAM CHEST 2 VIEWS: CPT

## 2018-04-30 RX ORDER — FINASTERIDE 5 MG/1
5 TABLET, FILM COATED ORAL DAILY
Qty: 30 TABLET | Refills: 0 | Status: SHIPPED | OUTPATIENT
Start: 2018-04-30

## 2018-04-30 RX ORDER — DOXAZOSIN MESYLATE 4 MG/1
4 TABLET ORAL
Qty: 30 TABLET | Refills: 0 | Status: SHIPPED | OUTPATIENT
Start: 2018-04-30 | End: 2018-06-16 | Stop reason: SDUPTHER

## 2018-04-30 NOTE — TELEPHONE ENCOUNTER
Pat calling, requesting to talk with Dr Ning Wiggins  States her  is not doing well  Wondering if there is another kind of chemo or something different that can be done  He is out of breath, can't swallow much, nausea when tries to eat  He is only existing, not really living  Is on 3 kinds of chemo now  Next visit with Dr Ning Wiggins is June 6th

## 2018-04-30 NOTE — LETTER
April 30, 2018     Roopa Nichole MD  [address] [facility] [address]  [address] [facility]    Patient: Marta Monson   YOB: 1940   Date of Visit: 4/30/2018       Dear Dr Burgess Snare: Thank you for referring Marta Monson to me for evaluation  Below are my notes for this consultation  If you have questions, please do not hesitate to call me  I look forward to following your patient along with you  Sincerely,        Rosa Cunningham MD        CC: No Recipients  Rosa Cunningham MD  4/30/2018 11:24 PM  Sign at close encounter  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  This was diagnosed in January 2018  PD L1  was 0  He is in the midst of cycle 4   He has also received palliative radiation therapy to thoracic spine and had developed esophagitis that has improved  His mouth is dry  He has noticed it decreased appetite, weakness and tiredness, some cough and exertional dyspnea  He is not sure if he will continue taking medications like this  Back pain has improved     The diagnosis was made by the biopsy  from the left iliac crest identified metastatic adenocarcinoma  He has responded to treatments based on follow-up CT scan and today's chest x-ray      Current Outpatient Prescriptions:     acetaminophen (TYLENOL) 325 mg tablet, Take 2 tablets by mouth every 6 (six) hours as needed for mild pain, headaches or fever, Disp: 30 tablet, Rfl: 0    dexamethasone (DECADRON) 4 mg tablet, Take 1 tablet (4 mg total) by mouth 2 (two) times a day as needed (PLEASE SEE SIG NOTES) TAKE 1 TABLET WITH BREAKFAST, 1 TABLET WITH DINNER, DAY BEFORE, DAY OF, AND DAY AFTER CHEMO , Disp: 12 tablet, Rfl: 1    doxazosin (CARDURA) 4 mg tablet, Take 4 mg by mouth daily at bedtime, Disp: , Rfl:     finasteride (PROSCAR) 5 mg tablet, Take 5 mg by mouth daily, Disp: , Rfl:     folic acid (FOLVITE) 1 mg tablet, Take 1 tablet (1 mg total) by mouth daily, Disp: 90 tablet, Rfl: 0    levothyroxine 88 mcg tablet, Take 88 mcg by mouth daily, Disp: , Rfl:     losartan (COZAAR) 100 MG tablet, Take 50 mg by mouth daily  , Disp: , Rfl:     nystatin (MYCOSTATIN) 100,000 units/mL suspension, Swish and swallow 5 mL (500,000 Units total) 4 (four) times a day, Disp: 473 mL, Rfl: 0    ondansetron (ZOFRAN) 8 mg tablet, Take 1 tablet (8 mg total) by mouth every 8 (eight) hours as needed for nausea or vomiting, Disp: 20 tablet, Rfl: 1    pantoprazole (PROTONIX) 40 mg tablet, Take 1 tablet (40 mg total) by mouth daily In AM prior to eating, Disp: 30 tablet, Rfl: 2    sucralfate (CARAFATE) 1 g/10 mL suspension, Take 10 mL (1,000 mg total) by mouth every 6 (six) hours, Disp: 420 mL, Rfl: 0    No Known Allergies    ROS:  04/30/18 Reviewed 13 systems:  Presently no headaches, seizures, dizziness, diplopia, dysphagia, hoarseness, chest pain, palpitations,  hemoptysis, abdominal pain, nausea, vomiting, change in bowel habits, melena, hematuria, fever, chills, bleeding,  skin rash,  arthritic symptoms, numbness,  claudication and gait problem  No frequent infections  Not unusually sensitive to heat or cold  No swelling of the ankles  No swollen glands  Patient is anxious  Other symptoms are in HPI        /66 (BP Location: Left arm, Cuff Size: Adult)   Pulse 80   Temp (!) 97 3 °F (36 3 °C) (Tympanic)   Resp 16   Ht 5' 7" (1 702 m)   Wt 81 9 kg (180 lb 9 6 oz)   SpO2 96%   BMI 28 29 kg/m²      Physical Exam:  Alert, oriented, not in distress, no icterus, no oral thrush, no palpable neck mass, clear lung fields, regular heart rate, abdomen  soft and non tender, no palpable abdominal mass, no ascites, no edema of ankles, no calf tenderness, no focal neurological deficit, no skin rash, no palpable lymphadenopathy, good arterial pulses, no clubbing  Patient is anxious  Performance status 1  IMAGING:  No orders to display       LABS:  Results for orders placed or performed in visit on 04/24/18   CBC and differential   Result Value Ref Range    WBC 1 66 (LL) 4 31 - 10 16 Thousand/uL    RBC 2 77 (L) 3 88 - 5 62 Million/uL    Hemoglobin 8 4 (L) 12 0 - 17 0 g/dL    Hematocrit 25 4 (L) 36 5 - 49 3 %    MCV 92 82 - 98 fL    MCH 30 3 26 8 - 34 3 pg    MCHC 33 1 31 4 - 37 4 g/dL    RDW 20 5 (H) 11 6 - 15 1 %    MPV 9 1 8 9 - 12 7 fL    Platelets 398 458 - 808 Thousands/uL    Neutrophils Relative 78 (H) 43 - 75 %    Lymphocytes Relative 13 (L) 14 - 44 %    Monocytes Relative 7 4 - 12 %    Eosinophils Relative 1 0 - 6 %    Basophils Relative 1 0 - 1 %    Neutrophils Absolute 1 31 (L) 1 85 - 7 62 Thousands/µL    Lymphocytes Absolute 0 22 (L) 0 60 - 4 47 Thousands/µL    Monocytes Absolute 0 11 (L) 0 17 - 1 22 Thousand/µL    Eosinophils Absolute 0 01 0 00 - 0 61 Thousand/µL    Basophils Absolute 0 01 0 00 - 0 10 Thousands/µL     Labs, Imaging, & Other studies:   All pertinent labs and imaging studies were personally reviewed    Lab Results   Component Value Date     04/16/2018    K 3 9 04/16/2018     04/16/2018    CO2 27 04/16/2018    ANIONGAP 7 04/16/2018    BUN 16 04/16/2018    CREATININE 1 09 04/16/2018    GLUCOSE 114 04/16/2018    CALCIUM 8 7 04/16/2018    AST 31 04/16/2018    ALT 31 04/16/2018    ALKPHOS 77 04/16/2018    PROT 7 0 04/16/2018    BILITOT 0 66 04/16/2018    EGFR 65 04/16/2018     Lab Results   Component Value Date    WBC 1 66 (LL) 04/24/2018    HGB 8 4 (L) 04/24/2018    HCT 25 4 (L) 04/24/2018    MCV 92 04/24/2018     04/24/2018   No results found for: SEDRATE    Reviewed and discussed with patient  Assessment and plan:  Patient is here with his wife  He has been on carboplatin, Alimta and Keytruda for 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  This was diagnosed in January 2018  PD L1  was 0  He is in the midst of cycle 4  He has also received palliative radiation therapy to thoracic spine and had developed esophagitis that has improved  His mouth is dry  He has noticed it decreased appetite, weakness and tiredness, some cough and exertional dyspnea  He is not sure if he will continue taking medications like this  Back pain has improved     The diagnosis was made by the biopsy  from the left iliac crest identified metastatic adenocarcinoma  He has responded to treatments based on follow-up CT scan and today's chest x-ray  Physical examination and test results are as recorded and discussed  He will take an extra week off from chemotherapy and will call before the next cycle of chemotherapy  If he did not feel better he will go straight to maintenance chemotherapy and that could be Alimta plus Keytruda  Also he has low hemoglobin 8 4 and if that dropped further he could have a unit of packed RBC  Patient and his wife agree with this plan  All discussed in detail  Questions answered  He has follow-up office appointment  He is scheduled to have a follow-up CT scan at the end of May 2018    1  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 ([facility] [address] )      2  Adenocarcinoma of left lung ([facility] [address] )      3  Benign prostatic hyperplasia with lower urinary tract symptoms, symptom details unspecified    - Ambulatory referral to Urology; Future  - finasteride (PROSCAR) 5 mg tablet; Take 1 tablet (5 mg total) by mouth daily  Dispense: 30 tablet; Refill: 0  - doxazosin (CARDURA) 4 mg tablet; Take 1 tablet (4 mg total) by mouth daily at bedtime  Dispense: 30 tablet; Refill: 0  Addendum:  History of BPH and his urologist is in [place]  He was running out of above 2 medications  I have renewed these 2 medications and he will be referred to a urologist locally  Patient voiced understanding and agreement in the discussion         Counseling / Coordination of Care   Greater than 50% of total time was spent with the patient and / or family counseling and / or coordination of care

## 2018-04-30 NOTE — TELEPHONE ENCOUNTER
Per Dr Jeanna Patel, patient should go to the ER for further evaluation of the SOB  Patient declines going to the ER at this time  Patient is agreeable to going for a Chest X-ray and for coming in for a sick visit today at our [facility] office

## 2018-05-03 DIAGNOSIS — D64.9 ANEMIA, UNSPECIFIED TYPE: Primary | ICD-10-CM

## 2018-05-04 ENCOUNTER — TELEPHONE (OUTPATIENT)
Dept: HEMATOLOGY ONCOLOGY | Facility: CLINIC | Age: 78
End: 2018-05-04

## 2018-05-04 ENCOUNTER — LAB (OUTPATIENT)
Dept: LAB | Facility: CLINIC | Age: 78
End: 2018-05-04
Payer: COMMERCIAL

## 2018-05-04 DIAGNOSIS — D64.9 ANEMIA, UNSPECIFIED TYPE: ICD-10-CM

## 2018-05-04 DIAGNOSIS — D64.9 ANEMIA, UNSPECIFIED TYPE: Primary | ICD-10-CM

## 2018-05-04 LAB
ABO GROUP BLD: NORMAL
BASOPHILS # BLD AUTO: 0 THOUSANDS/ΜL (ref 0–0.1)
BASOPHILS NFR BLD AUTO: 0 % (ref 0–1)
BLD GP AB SCN SERPL QL: NEGATIVE
EOSINOPHIL # BLD AUTO: 0.02 THOUSAND/ΜL (ref 0–0.61)
EOSINOPHIL NFR BLD AUTO: 1 % (ref 0–6)
ERYTHROCYTE [DISTWIDTH] IN BLOOD BY AUTOMATED COUNT: 21.5 % (ref 11.6–15.1)
HCT VFR BLD AUTO: 22.3 % (ref 36.5–49.3)
HGB BLD-MCNC: 7.4 G/DL (ref 12–17)
LYMPHOCYTES # BLD AUTO: 0.19 THOUSANDS/ΜL (ref 0.6–4.47)
LYMPHOCYTES NFR BLD AUTO: 12 % (ref 14–44)
MCH RBC QN AUTO: 31.2 PG (ref 26.8–34.3)
MCHC RBC AUTO-ENTMCNC: 33.2 G/DL (ref 31.4–37.4)
MCV RBC AUTO: 94 FL (ref 82–98)
MONOCYTES # BLD AUTO: 0.26 THOUSAND/ΜL (ref 0.17–1.22)
MONOCYTES NFR BLD AUTO: 16 % (ref 4–12)
NEUTROPHILS # BLD AUTO: 1.14 THOUSANDS/ΜL (ref 1.85–7.62)
NEUTS SEG NFR BLD AUTO: 71 % (ref 43–75)
PLATELET # BLD AUTO: 117 THOUSANDS/UL (ref 149–390)
PMV BLD AUTO: 9.7 FL (ref 8.9–12.7)
RBC # BLD AUTO: 2.37 MILLION/UL (ref 3.88–5.62)
RH BLD: POSITIVE
SPECIMEN EXPIRATION DATE: NORMAL
WBC # BLD AUTO: 1.61 THOUSAND/UL (ref 4.31–10.16)

## 2018-05-04 PROCEDURE — 36415 COLL VENOUS BLD VENIPUNCTURE: CPT

## 2018-05-04 PROCEDURE — 86901 BLOOD TYPING SEROLOGIC RH(D): CPT

## 2018-05-04 PROCEDURE — 86900 BLOOD TYPING SEROLOGIC ABO: CPT

## 2018-05-04 PROCEDURE — 86920 COMPATIBILITY TEST SPIN: CPT

## 2018-05-04 PROCEDURE — 85025 COMPLETE CBC W/AUTO DIFF WBC: CPT

## 2018-05-04 PROCEDURE — 86850 RBC ANTIBODY SCREEN: CPT

## 2018-05-04 RX ORDER — ACETAMINOPHEN 325 MG/1
650 TABLET ORAL ONCE
Status: COMPLETED | OUTPATIENT
Start: 2018-05-05 | End: 2018-05-05

## 2018-05-04 RX ORDER — SODIUM CHLORIDE 9 MG/ML
20 INJECTION, SOLUTION INTRAVENOUS CONTINUOUS
Status: DISCONTINUED | OUTPATIENT
Start: 2018-05-05 | End: 2018-05-08 | Stop reason: HOSPADM

## 2018-05-04 NOTE — TELEPHONE ENCOUNTER
Patient's chemotherapy treatment scheduled for the week of 5/7/18 for neutropenia  Patient also scheduled for 1 unit PRBC's on 5/5/18 at [facility] for a hgb of 7 4

## 2018-05-05 ENCOUNTER — HOSPITAL ENCOUNTER (OUTPATIENT)
Dept: INFUSION CENTER | Facility: CLINIC | Age: 78
Discharge: HOME | End: 2018-05-05
Payer: COMMERCIAL

## 2018-05-05 VITALS
RESPIRATION RATE: 18 BRPM | DIASTOLIC BLOOD PRESSURE: 61 MMHG | HEART RATE: 60 BPM | SYSTOLIC BLOOD PRESSURE: 108 MMHG | TEMPERATURE: 97.4 F

## 2018-05-05 PROCEDURE — 36430 TRANSFUSION BLD/BLD COMPNT: CPT

## 2018-05-05 PROCEDURE — P9016 RBC LEUKOCYTES REDUCED: HCPCS

## 2018-05-05 RX ADMIN — ACETAMINOPHEN 650 MG: 325 TABLET, FILM COATED ORAL at 08:52

## 2018-05-05 RX ADMIN — SODIUM CHLORIDE 20 ML/HR: 0.9 INJECTION, SOLUTION INTRAVENOUS at 08:51

## 2018-05-06 LAB
ABO GROUP BLD BPU: NORMAL
BPU ID: NORMAL
CROSSMATCH: NORMAL
UNIT DISPENSE STATUS: NORMAL
UNIT PRODUCT CODE: NORMAL
UNIT RH: NORMAL

## 2018-05-14 ENCOUNTER — APPOINTMENT (OUTPATIENT)
Dept: LAB | Facility: MEDICAL CENTER | Age: 78
End: 2018-05-14
Payer: COMMERCIAL

## 2018-05-14 ENCOUNTER — TELEPHONE (OUTPATIENT)
Dept: HEMATOLOGY ONCOLOGY | Facility: CLINIC | Age: 78
End: 2018-05-14

## 2018-05-14 DIAGNOSIS — C34.32 CANCER OF LOWER LOBE OF LEFT LUNG (HCC): ICD-10-CM

## 2018-05-14 DIAGNOSIS — N40.1 BENIGN PROSTATIC HYPERPLASIA WITH LOWER URINARY TRACT SYMPTOMS, SYMPTOM DETAILS UNSPECIFIED: ICD-10-CM

## 2018-05-14 LAB
ALBUMIN SERPL BCP-MCNC: 3.5 G/DL (ref 3.5–5)
ALP SERPL-CCNC: 85 U/L (ref 46–116)
ALT SERPL W P-5'-P-CCNC: 103 U/L (ref 12–78)
ANION GAP SERPL CALCULATED.3IONS-SCNC: 6 MMOL/L (ref 4–13)
AST SERPL W P-5'-P-CCNC: 69 U/L (ref 5–45)
BASOPHILS # BLD AUTO: 0.02 THOUSANDS/ΜL (ref 0–0.1)
BASOPHILS NFR BLD AUTO: 1 % (ref 0–1)
BILIRUB SERPL-MCNC: 0.6 MG/DL (ref 0.2–1)
BUN SERPL-MCNC: 16 MG/DL (ref 5–25)
CALCIUM SERPL-MCNC: 9.1 MG/DL (ref 8.3–10.1)
CHLORIDE SERPL-SCNC: 106 MMOL/L (ref 100–108)
CO2 SERPL-SCNC: 28 MMOL/L (ref 21–32)
CREAT SERPL-MCNC: 1.18 MG/DL (ref 0.6–1.3)
EOSINOPHIL # BLD AUTO: 0.03 THOUSAND/ΜL (ref 0–0.61)
EOSINOPHIL NFR BLD AUTO: 1 % (ref 0–6)
ERYTHROCYTE [DISTWIDTH] IN BLOOD BY AUTOMATED COUNT: 20.6 % (ref 11.6–15.1)
GFR SERPL CREATININE-BSD FRML MDRD: 59 ML/MIN/1.73SQ M
GLUCOSE P FAST SERPL-MCNC: 106 MG/DL (ref 65–99)
HCT VFR BLD AUTO: 31 % (ref 36.5–49.3)
HGB BLD-MCNC: 9.7 G/DL (ref 12–17)
LYMPHOCYTES # BLD AUTO: 0.4 THOUSANDS/ΜL (ref 0.6–4.47)
LYMPHOCYTES NFR BLD AUTO: 11 % (ref 14–44)
MCH RBC QN AUTO: 30.6 PG (ref 26.8–34.3)
MCHC RBC AUTO-ENTMCNC: 31.3 G/DL (ref 31.4–37.4)
MCV RBC AUTO: 98 FL (ref 82–98)
MONOCYTES # BLD AUTO: 0.57 THOUSAND/ΜL (ref 0.17–1.22)
MONOCYTES NFR BLD AUTO: 16 % (ref 4–12)
NEUTROPHILS # BLD AUTO: 2.61 THOUSANDS/ΜL (ref 1.85–7.62)
NEUTS SEG NFR BLD AUTO: 71 % (ref 43–75)
NRBC BLD AUTO-RTO: 0 /100 WBCS
PLATELET # BLD AUTO: 204 THOUSANDS/UL (ref 149–390)
PMV BLD AUTO: 9.2 FL (ref 8.9–12.7)
POTASSIUM SERPL-SCNC: 4.3 MMOL/L (ref 3.5–5.3)
PROT SERPL-MCNC: 7.4 G/DL (ref 6.4–8.2)
PSA SERPL-MCNC: 3.8 NG/ML (ref 0–4)
RBC # BLD AUTO: 3.17 MILLION/UL (ref 3.88–5.62)
SODIUM SERPL-SCNC: 140 MMOL/L (ref 136–145)
WBC # BLD AUTO: 3.66 THOUSAND/UL (ref 4.31–10.16)

## 2018-05-14 PROCEDURE — 85025 COMPLETE CBC W/AUTO DIFF WBC: CPT

## 2018-05-14 PROCEDURE — 80053 COMPREHEN METABOLIC PANEL: CPT

## 2018-05-14 PROCEDURE — 84153 ASSAY OF PSA TOTAL: CPT

## 2018-05-14 PROCEDURE — 36415 COLL VENOUS BLD VENIPUNCTURE: CPT

## 2018-05-14 NOTE — TELEPHONE ENCOUNTER
Patient's wife, Lavetta Soulier is calling for lab results from this morning--still processing SL  She wanted to know results after the transfusion  She states he is also having trouble swallowing and wants to know if they should cut one of his chemo's    Please [facility] [phone]

## 2018-05-14 NOTE — TELEPHONE ENCOUNTER
Called and reviewed patient's most recent blood work results with the patient and the patient's wife  Patient's CBC and CMP levels are ok for patient to receive chemotherapy on 5/16/18  In regards to the chemotherapy and the patient having trouble swallowing, Dr Marika Hernandez is ok with the chemotherapy being postponed 1 week  After some discussion with the patient, the patient would like to proceed with his chemotherapy on 5/16/18

## 2018-05-15 RX ORDER — CYANOCOBALAMIN 1000 UG/ML
1000 INJECTION INTRAMUSCULAR; SUBCUTANEOUS ONCE
Status: COMPLETED | OUTPATIENT
Start: 2018-05-16 | End: 2018-05-16

## 2018-05-15 RX ORDER — SODIUM CHLORIDE 9 MG/ML
20 INJECTION, SOLUTION INTRAVENOUS CONTINUOUS
Status: DISCONTINUED | OUTPATIENT
Start: 2018-05-16 | End: 2018-05-19 | Stop reason: HOSPADM

## 2018-05-15 RX ORDER — SODIUM CHLORIDE 9 MG/ML
20 INJECTION, SOLUTION INTRAVENOUS CONTINUOUS
Status: DISCONTINUED | OUTPATIENT
Start: 2018-05-15 | End: 2018-05-15

## 2018-05-16 ENCOUNTER — HOSPITAL ENCOUNTER (OUTPATIENT)
Dept: INFUSION CENTER | Facility: CLINIC | Age: 78
Discharge: HOME | End: 2018-05-16
Payer: COMMERCIAL

## 2018-05-16 VITALS
HEIGHT: 66 IN | HEART RATE: 65 BPM | SYSTOLIC BLOOD PRESSURE: 128 MMHG | DIASTOLIC BLOOD PRESSURE: 62 MMHG | TEMPERATURE: 98.3 F | WEIGHT: 179.5 LBS | BODY MASS INDEX: 28.85 KG/M2 | RESPIRATION RATE: 18 BRPM

## 2018-05-16 PROCEDURE — 96367 TX/PROPH/DG ADDL SEQ IV INF: CPT

## 2018-05-16 PROCEDURE — 96411 CHEMO IV PUSH ADDL DRUG: CPT

## 2018-05-16 PROCEDURE — 96413 CHEMO IV INFUSION 1 HR: CPT

## 2018-05-16 PROCEDURE — 96372 THER/PROPH/DIAG INJ SC/IM: CPT

## 2018-05-16 RX ADMIN — ONDANSETRON 16 MG: 2 INJECTION INTRAMUSCULAR; INTRAVENOUS at 13:23

## 2018-05-16 RX ADMIN — SODIUM CHLORIDE 1000 MG: 9 INJECTION, SOLUTION INTRAVENOUS at 13:49

## 2018-05-16 RX ADMIN — SODIUM CHLORIDE 20 ML/HR: 0.9 INJECTION, SOLUTION INTRAVENOUS at 13:24

## 2018-05-16 RX ADMIN — SODIUM CHLORIDE 200 MG: 9 INJECTION, SOLUTION INTRAVENOUS at 14:04

## 2018-05-16 RX ADMIN — CYANOCOBALAMIN 1000 MCG: 1000 INJECTION, SOLUTION INTRAMUSCULAR at 14:56

## 2018-05-16 NOTE — PROGRESS NOTES
Patient tolerated infusion without incidence  IV removed  Patient aware of next appointment   Patient declined AVS

## 2018-05-16 NOTE — PROGRESS NOTES
Patient her for Alimta and Keytruda infusion  As per Ilsa Saint RN, carboplatin discontinued  Labs within parameters to treat  Peripheral IV inserted into right wrist without incident

## 2018-05-29 ENCOUNTER — HOSPITAL ENCOUNTER (OUTPATIENT)
Dept: CT IMAGING | Facility: HOSPITAL | Age: 78
Discharge: HOME | End: 2018-05-29
Payer: COMMERCIAL

## 2018-05-29 DIAGNOSIS — C34.32 CANCER OF LOWER LOBE OF LEFT LUNG (HCC): ICD-10-CM

## 2018-05-29 DIAGNOSIS — C79.51 METASTATIC ADENOCARCINOMA TO BONE (HCC): Chronic | ICD-10-CM

## 2018-05-29 PROCEDURE — 74177 CT ABD & PELVIS W/CONTRAST: CPT

## 2018-05-29 PROCEDURE — 71260 CT THORAX DX C+: CPT

## 2018-05-29 RX ADMIN — IOHEXOL 100 ML: 350 INJECTION, SOLUTION INTRAVENOUS at 08:46

## 2018-05-31 ENCOUNTER — TELEPHONE (OUTPATIENT)
Dept: HEMATOLOGY ONCOLOGY | Facility: CLINIC | Age: 78
End: 2018-05-31

## 2018-06-01 ENCOUNTER — TELEPHONE (OUTPATIENT)
Dept: HEMATOLOGY ONCOLOGY | Facility: CLINIC | Age: 78
End: 2018-06-01

## 2018-06-01 ENCOUNTER — APPOINTMENT (OUTPATIENT)
Dept: LAB | Facility: CLINIC | Age: 78
End: 2018-06-01
Payer: COMMERCIAL

## 2018-06-01 DIAGNOSIS — R53.83 OTHER FATIGUE: ICD-10-CM

## 2018-06-01 DIAGNOSIS — D64.9 ANEMIA, UNSPECIFIED TYPE: Primary | ICD-10-CM

## 2018-06-01 DIAGNOSIS — R06.02 SOB (SHORTNESS OF BREATH): ICD-10-CM

## 2018-06-01 DIAGNOSIS — R06.02 SOB (SHORTNESS OF BREATH): Primary | ICD-10-CM

## 2018-06-01 LAB
BASOPHILS # BLD AUTO: 0.01 THOUSANDS/ΜL (ref 0–0.1)
BASOPHILS NFR BLD AUTO: 0 % (ref 0–1)
EOSINOPHIL # BLD AUTO: 0.12 THOUSAND/ΜL (ref 0–0.61)
EOSINOPHIL NFR BLD AUTO: 3 % (ref 0–6)
ERYTHROCYTE [DISTWIDTH] IN BLOOD BY AUTOMATED COUNT: 17.9 % (ref 11.6–15.1)
HCT VFR BLD AUTO: 23.6 % (ref 36.5–49.3)
HGB BLD-MCNC: 7.6 G/DL (ref 12–17)
LYMPHOCYTES # BLD AUTO: 0.3 THOUSANDS/ΜL (ref 0.6–4.47)
LYMPHOCYTES NFR BLD AUTO: 7 % (ref 14–44)
MCH RBC QN AUTO: 30.6 PG (ref 26.8–34.3)
MCHC RBC AUTO-ENTMCNC: 32.2 G/DL (ref 31.4–37.4)
MCV RBC AUTO: 95 FL (ref 82–98)
MONOCYTES # BLD AUTO: 0.6 THOUSAND/ΜL (ref 0.17–1.22)
MONOCYTES NFR BLD AUTO: 15 % (ref 4–12)
NEUTROPHILS # BLD AUTO: 3.02 THOUSANDS/ΜL (ref 1.85–7.62)
NEUTS SEG NFR BLD AUTO: 75 % (ref 43–75)
PLATELET # BLD AUTO: 228 THOUSANDS/UL (ref 149–390)
PMV BLD AUTO: 8.9 FL (ref 8.9–12.7)
RBC # BLD AUTO: 2.48 MILLION/UL (ref 3.88–5.62)
WBC # BLD AUTO: 4.05 THOUSAND/UL (ref 4.31–10.16)

## 2018-06-01 PROCEDURE — 85025 COMPLETE CBC W/AUTO DIFF WBC: CPT

## 2018-06-01 RX ORDER — SODIUM CHLORIDE 9 MG/ML
20 INJECTION, SOLUTION INTRAVENOUS CONTINUOUS
Status: DISCONTINUED | OUTPATIENT
Start: 2018-06-04 | End: 2018-06-07 | Stop reason: HOSPADM

## 2018-06-01 RX ORDER — ACETAMINOPHEN 325 MG/1
650 TABLET ORAL ONCE
Status: COMPLETED | OUTPATIENT
Start: 2018-06-02 | End: 2018-06-02

## 2018-06-01 RX ORDER — SODIUM CHLORIDE 9 MG/ML
20 INJECTION, SOLUTION INTRAVENOUS CONTINUOUS
Status: DISCONTINUED | OUTPATIENT
Start: 2018-06-02 | End: 2018-06-05 | Stop reason: HOSPADM

## 2018-06-01 RX ORDER — ACETAMINOPHEN 325 MG/1
650 TABLET ORAL ONCE
Status: COMPLETED | OUTPATIENT
Start: 2018-06-04 | End: 2018-06-04

## 2018-06-01 NOTE — TELEPHONE ENCOUNTER
Called and spoke with patient's wife, Zina Barajas, in regards to patient's recent CT scan  She was made aware that the CT scan results have not resulted yet  The patient and his wife will discuss appetite stimulants with Dr Danielle Richter on 6/5/18  Patient is having increased SOB and fatigue  Zina Barajas states that the patient's symptoms are similar to when the patient required a blood transfusion previously  Patient will go for a CBC and a blood tube on hold today

## 2018-06-01 NOTE — TELEPHONE ENCOUNTER
Called about ct results-told will get at office appt Tuesday  She is concerned about his weakness and loss of appetite--may need transfusion    Call [phone]

## 2018-06-02 ENCOUNTER — HOSPITAL ENCOUNTER (OUTPATIENT)
Dept: INFUSION CENTER | Facility: CLINIC | Age: 78
Discharge: HOME | End: 2018-06-02
Payer: COMMERCIAL

## 2018-06-02 VITALS
RESPIRATION RATE: 18 BRPM | HEART RATE: 64 BPM | TEMPERATURE: 97.6 F | DIASTOLIC BLOOD PRESSURE: 52 MMHG | SYSTOLIC BLOOD PRESSURE: 112 MMHG

## 2018-06-02 DIAGNOSIS — D64.9 ANEMIA, UNSPECIFIED TYPE: ICD-10-CM

## 2018-06-02 LAB
ABO GROUP BLD: NORMAL
ABO GROUP BLD: NORMAL
BLD GP AB SCN SERPL QL: NEGATIVE
BLD GP AB SCN SERPL QL: NEGATIVE
RH BLD: POSITIVE
RH BLD: POSITIVE
SPECIMEN EXPIRATION DATE: NORMAL
SPECIMEN EXPIRATION DATE: NORMAL

## 2018-06-02 PROCEDURE — 86850 RBC ANTIBODY SCREEN: CPT | Performed by: INTERNAL MEDICINE

## 2018-06-02 PROCEDURE — P9016 RBC LEUKOCYTES REDUCED: HCPCS

## 2018-06-02 PROCEDURE — 86900 BLOOD TYPING SEROLOGIC ABO: CPT

## 2018-06-02 PROCEDURE — 36430 TRANSFUSION BLD/BLD COMPNT: CPT

## 2018-06-02 PROCEDURE — 86920 COMPATIBILITY TEST SPIN: CPT

## 2018-06-02 PROCEDURE — 86923 COMPATIBILITY TEST ELECTRIC: CPT

## 2018-06-02 PROCEDURE — 86901 BLOOD TYPING SEROLOGIC RH(D): CPT | Performed by: INTERNAL MEDICINE

## 2018-06-02 PROCEDURE — 86901 BLOOD TYPING SEROLOGIC RH(D): CPT

## 2018-06-02 PROCEDURE — 86900 BLOOD TYPING SEROLOGIC ABO: CPT | Performed by: INTERNAL MEDICINE

## 2018-06-02 PROCEDURE — 86850 RBC ANTIBODY SCREEN: CPT

## 2018-06-02 RX ADMIN — SODIUM CHLORIDE 20 ML/HR: 0.9 INJECTION, SOLUTION INTRAVENOUS at 12:42

## 2018-06-02 RX ADMIN — ACETAMINOPHEN 650 MG: 325 TABLET ORAL at 12:38

## 2018-06-02 NOTE — PROGRESS NOTES
Pt offers no c/o --- he feels Tired and SOB with exertion , here for one unit PRBCS     pt PIV initiated in his Right forearm, a 6 ml sample type and screen    drawn from the PIV and sent for pt, as he will return Monday for another unit PRBCs    Pt has call bell within reach  Will continue to monitor

## 2018-06-02 NOTE — PLAN OF CARE
Problem: Potential for Falls  Goal: Patient will remain free of falls  INTERVENTIONS:  - Assess patient frequently for physical needs  -  Identify cognitive and physical deficits and behaviors that affect risk of falls    -  [place] fall precautions as indicated by assessment   - Educate patient/family on patient safety including physical limitations  - Instruct patient to call for assistance with activity based on assessment  - Modify environment to reduce risk of injury  - Consider OT/PT consult to assist with strengthening/mobility   Outcome: Progressing

## 2018-06-02 NOTE — PLAN OF CARE
Problem: Potential for Falls  Goal: Patient will remain free of falls  INTERVENTIONS:  - Assess patient frequently for physical needs  -  Identify cognitive and physical deficits and behaviors that affect risk of falls    -  [place] fall precautions as indicated by assessment   - Educate patient/family on patient safety including physical limitations  - Instruct patient to call for assistance with activity based on assessment  - Modify environment to reduce risk of injury  - Consider OT/PT consult to assist with strengthening/mobility   Outcome: Progressing

## 2018-06-02 NOTE — PLAN OF CARE
Problem: Potential for Falls  Goal: Patient will remain free of falls  INTERVENTIONS:  - Assess patient frequently for physical needs  -  Identify cognitive and physical deficits and behaviors that affect risk of falls    -  [place] fall precautions as indicated by assessment   - Educate patient/family on patient safety including physical limitations  - Instruct patient to call for assistance with activity based on assessment  - Modify environment to reduce risk of injury  - Consider OT/PT consult to assist with strengthening/mobility   Outcome: Progressing

## 2018-06-03 LAB
ABO GROUP BLD BPU: NORMAL
BPU ID: NORMAL
UNIT DISPENSE STATUS: NORMAL
UNIT PRODUCT CODE: NORMAL
UNIT RH: NORMAL

## 2018-06-04 ENCOUNTER — HOSPITAL ENCOUNTER (OUTPATIENT)
Dept: INFUSION CENTER | Facility: CLINIC | Age: 78
Discharge: HOME | End: 2018-06-04
Payer: COMMERCIAL

## 2018-06-04 VITALS
DIASTOLIC BLOOD PRESSURE: 52 MMHG | TEMPERATURE: 98.2 F | SYSTOLIC BLOOD PRESSURE: 106 MMHG | BODY MASS INDEX: 28.42 KG/M2 | OXYGEN SATURATION: 98 % | RESPIRATION RATE: 19 BRPM | HEART RATE: 71 BPM | WEIGHT: 176 LBS

## 2018-06-04 PROCEDURE — 36430 TRANSFUSION BLD/BLD COMPNT: CPT

## 2018-06-04 PROCEDURE — P9016 RBC LEUKOCYTES REDUCED: HCPCS

## 2018-06-04 RX ADMIN — ACETAMINOPHEN 650 MG: 325 TABLET ORAL at 12:07

## 2018-06-04 RX ADMIN — SODIUM CHLORIDE 20 ML/HR: 0.9 INJECTION, SOLUTION INTRAVENOUS at 12:05

## 2018-06-04 NOTE — PROGRESS NOTES
Pt's temp ariana to 99 4 orally  Pt states that he feels fine and denies complaints  Pt was very cold when he arrived here and was wrapped in 3 pre-warmed blankets including over the head and shoulders  Remaining vital signs WNL  Removed all but one blanket which is covering his waist and legs  Will recheck  Instructed patient to call if any change in status  Will continue to monitor

## 2018-06-05 ENCOUNTER — APPOINTMENT (OUTPATIENT)
Dept: LAB | Facility: CLINIC | Age: 78
End: 2018-06-05
Payer: COMMERCIAL

## 2018-06-05 ENCOUNTER — OFFICE VISIT (OUTPATIENT)
Dept: HEMATOLOGY ONCOLOGY | Facility: CLINIC | Age: 78
End: 2018-06-05
Payer: COMMERCIAL

## 2018-06-05 ENCOUNTER — TELEPHONE (OUTPATIENT)
Dept: HEMATOLOGY ONCOLOGY | Facility: CLINIC | Age: 78
End: 2018-06-05

## 2018-06-05 VITALS
SYSTOLIC BLOOD PRESSURE: 112 MMHG | WEIGHT: 176 LBS | TEMPERATURE: 98.9 F | RESPIRATION RATE: 17 BRPM | DIASTOLIC BLOOD PRESSURE: 66 MMHG | BODY MASS INDEX: 28.28 KG/M2 | HEART RATE: 86 BPM | HEIGHT: 66 IN | OXYGEN SATURATION: 91 %

## 2018-06-05 DIAGNOSIS — N28.1 BILATERAL RENAL CYSTS: ICD-10-CM

## 2018-06-05 DIAGNOSIS — D64.9 ANEMIA, UNSPECIFIED TYPE: ICD-10-CM

## 2018-06-05 DIAGNOSIS — C79.51 METASTATIC ADENOCARCINOMA TO BONE (HCC): Chronic | ICD-10-CM

## 2018-06-05 DIAGNOSIS — C34.92 ADENOCARCINOMA OF LEFT LUNG (HCC): Primary | ICD-10-CM

## 2018-06-05 DIAGNOSIS — D64.9 ANEMIA, UNSPECIFIED TYPE: Primary | ICD-10-CM

## 2018-06-05 DIAGNOSIS — R09.02 ANOXIA: ICD-10-CM

## 2018-06-05 LAB
ABO GROUP BLD BPU: NORMAL
BASOPHILS # BLD AUTO: 0.01 THOUSANDS/ΜL (ref 0–0.1)
BASOPHILS NFR BLD AUTO: 0 % (ref 0–1)
BPU ID: NORMAL
CROSSMATCH: NORMAL
EOSINOPHIL # BLD AUTO: 0.2 THOUSAND/ΜL (ref 0–0.61)
EOSINOPHIL NFR BLD AUTO: 4 % (ref 0–6)
ERYTHROCYTE [DISTWIDTH] IN BLOOD BY AUTOMATED COUNT: 18.1 % (ref 11.6–15.1)
HCT VFR BLD AUTO: 30.1 % (ref 36.5–49.3)
HGB BLD-MCNC: 10.1 G/DL (ref 12–17)
LYMPHOCYTES # BLD AUTO: 0.64 THOUSANDS/ΜL (ref 0.6–4.47)
LYMPHOCYTES NFR BLD AUTO: 12 % (ref 14–44)
MCH RBC QN AUTO: 30.5 PG (ref 26.8–34.3)
MCHC RBC AUTO-ENTMCNC: 33.6 G/DL (ref 31.4–37.4)
MCV RBC AUTO: 91 FL (ref 82–98)
MONOCYTES # BLD AUTO: 0.76 THOUSAND/ΜL (ref 0.17–1.22)
MONOCYTES NFR BLD AUTO: 14 % (ref 4–12)
NEUTROPHILS # BLD AUTO: 3.68 THOUSANDS/ΜL (ref 1.85–7.62)
NEUTS SEG NFR BLD AUTO: 70 % (ref 43–75)
PLATELET # BLD AUTO: 306 THOUSANDS/UL (ref 149–390)
PMV BLD AUTO: 8.8 FL (ref 8.9–12.7)
RBC # BLD AUTO: 3.31 MILLION/UL (ref 3.88–5.62)
UNIT DISPENSE STATUS: NORMAL
UNIT PRODUCT CODE: NORMAL
UNIT RH: NORMAL
WBC # BLD AUTO: 5.29 THOUSAND/UL (ref 4.31–10.16)

## 2018-06-05 PROCEDURE — 99214 OFFICE O/P EST MOD 30 MIN: CPT | Performed by: INTERNAL MEDICINE

## 2018-06-05 PROCEDURE — 85025 COMPLETE CBC W/AUTO DIFF WBC: CPT | Performed by: INTERNAL MEDICINE

## 2018-06-05 PROCEDURE — 36415 COLL VENOUS BLD VENIPUNCTURE: CPT | Performed by: INTERNAL MEDICINE

## 2018-06-05 RX ORDER — CYANOCOBALAMIN 1000 UG/ML
1000 INJECTION INTRAMUSCULAR; SUBCUTANEOUS ONCE
Status: COMPLETED | OUTPATIENT
Start: 2018-06-06 | End: 2018-06-06

## 2018-06-05 RX ORDER — LOSARTAN POTASSIUM 50 MG/1
TABLET ORAL
COMMUNITY
Start: 2018-06-01 | End: 2018-06-11

## 2018-06-05 RX ORDER — SODIUM CHLORIDE 9 MG/ML
20 INJECTION, SOLUTION INTRAVENOUS CONTINUOUS
Status: DISCONTINUED | OUTPATIENT
Start: 2018-06-06 | End: 2018-06-09 | Stop reason: HOSPADM

## 2018-06-05 RX ORDER — MEGESTROL ACETATE 40 MG/ML
400 SUSPENSION ORAL DAILY
Qty: 240 ML | Refills: 1 | Status: SHIPPED | OUTPATIENT
Start: 2018-06-05 | End: 2018-06-13 | Stop reason: ALTCHOICE

## 2018-06-05 NOTE — LETTER
June 5, 2018     Arron Garrido MD  [address] [facility] [place] [address]  [address] [facility]    Patient: Mateo Jeffers   YOB: 1940   Date of Visit: 6/5/2018       Dear Dr Villasenor Alt: Thank you for referring Mateo Jeffers to me for evaluation  Below are my notes for this consultation  If you have questions, please do not hesitate to call me  I look forward to following your patient along with you  Sincerely,        Cisco Trivedi MD        CC: No Recipients  Cisco Trivedi MD  6/5/2018  3:36 PM  Sign at close encounter    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 diagnosed in January 2018  Status post 4 cycles of carboplatin plus Alimta plus Keytruda and 5th cycle without carboplatin  Cycle 6 will be tomorrow  He has received palliative radiation to thoracic spine  He is responding  Lung mass is smaller  Lymph nodes are not enlarged in the mediastinum anymore  Bone lesion is stable  Also he gets Xgeva once every 3 months  PD L1 level was 0  She gets hematological toxicities and has required  blood transfusions  He has  decreased appetite, weakness and tiredness, some cough and exertional dyspnea  He would like to have medication for appetite  Back pain has improved    Arthritis  The diagnosis was made by the biopsy  from the left iliac crest  that identified metastatic adenocarcinoma          Current Outpatient Prescriptions:     acetaminophen (TYLENOL) 325 mg tablet, Take 2 tablets by mouth every 6 (six) hours as needed for mild pain, headaches or fever, Disp: 30 tablet, Rfl: 0    dexamethasone (DECADRON) 4 mg tablet, Take 1 tablet (4 mg total) by mouth 2 (two) times a day as needed (PLEASE SEE SIG NOTES) TAKE 1 TABLET WITH BREAKFAST, 1 TABLET WITH DINNER, DAY BEFORE, DAY OF, AND DAY AFTER CHEMO , Disp: 12 tablet, Rfl: 1    doxazosin (CARDURA) 4 mg tablet, Take 1 tablet (4 mg total) by mouth daily at bedtime, Disp: 30 tablet, Rfl: 0    finasteride (PROSCAR) 5 mg tablet, Take 1 tablet (5 mg total) by mouth daily, Disp: 30 tablet, Rfl: 0    folic acid (FOLVITE) 1 mg tablet, Take 1 tablet (1 mg total) by mouth daily, Disp: 90 tablet, Rfl: 0    levothyroxine 88 mcg tablet, Take 88 mcg by mouth daily, Disp: , Rfl:     losartan (COZAAR) 50 mg tablet, , Disp: , Rfl:     nystatin (MYCOSTATIN) 100,000 units/mL suspension, Swish and swallow 5 mL (500,000 Units total) 4 (four) times a day, Disp: 473 mL, Rfl: 0    ondansetron (ZOFRAN) 8 mg tablet, Take 1 tablet (8 mg total) by mouth every 8 (eight) hours as needed for nausea or vomiting, Disp: 20 tablet, Rfl: 1    pantoprazole (PROTONIX) 40 mg tablet, Take 1 tablet (40 mg total) by mouth daily In AM prior to eating, Disp: 30 tablet, Rfl: 2    sucralfate (CARAFATE) 1 g/10 mL suspension, Take 10 mL (1,000 mg total) by mouth every 6 (six) hours, Disp: 420 mL, Rfl: 0    losartan (COZAAR) 100 MG tablet, Take 50 mg by mouth daily  , Disp: , Rfl:   No current facility-administered medications for this visit  Facility-Administered Medications Ordered in Other Visits:     sodium chloride 0 9 % infusion, 20 mL/hr, Intravenous, Continuous, Salo Boudreaux MD, Stopped at 06/04/18 1530    No Known Allergies    ROS:  06/05/18 Reviewed 13 systems:  Presently no headaches, seizures, dizziness, diplopia, dysphagia, hoarseness, chest pain, palpitations,  hemoptysis, abdominal pain, nausea, vomiting, change in bowel habits, melena, hematuria, fever, chills, bleeding,  skin rash, weight loss,  numbness,  claudication and gait problem  No frequent infections  Not unusually sensitive to heat or cold  No swelling of the ankles  No swollen glands  Patient is anxious   Other symptoms are in HPI        /66 (BP Location: Right arm, Cuff Size: Adult)   Pulse 86   Temp 98 9 °F (37 2 °C) (Tympanic)   Resp 17   Ht 5' 6" (1 676 m)   Wt 79 8 kg (176 lb)   SpO2 91% BMI 28 41 kg/m²      Physical Exam:  Alert, oriented, not in distress, no icterus, no oral thrush, no palpable neck mass, clear lung fields, regular heart rate, systolic murmur, abdomen  soft and non tender, no palpable abdominal mass, no ascites, no edema of ankles, no calf tenderness, no focal neurological deficit, no skin rash, no palpable lymphadenopathy, good arterial pulses, no clubbing  Patient is anxious  Performance status 1  IMAGING:  IMPRESSION:     1  Stable lytic and sclerotic spinal metastasis  No new or enlarging metastatic lesions      2   Decreased primary lung lesion and resolution of mediastinal/hilar lymphadenopathy      3   Large cystic structure arising from the right collecting system, parapelvic cyst versus severely dilated extrarenal pelvis due to UPJ obstruction  This would be better delineated by delayed imaging on follow-up      4   Multiple additional renal cysts are seen including a complex septated cyst on the left      5  Moderate pulmonary fibrosis which has progressed from earlier CTs  Stable nodules      6   Right inguinal hernia containing nonobstructed sigmoid colon               Workstation performed: [id number]      Imaging     CT chest abdomen pelvis w contrast (Order #[id number]) on 5/29/2018 - Imaging Information       LABS:  Results for orders placed or performed during the hospital encounter of 06/04/18   Prepare RBC:Special Requirements: Leukoreduced; Has consent been obtained?  Yes, 1 Units   Result Value Ref Range    Unit Product Code X9159F00     Unit Number [id number]     Unit ABO O     Unit RH POS     Crossmatch Compatible     Unit Dispense Status Presumed Trans      Labs, Imaging, & Other studies:   All pertinent labs and imaging studies were personally reviewed    Lab Results   Component Value Date     05/14/2018    K 4 3 05/14/2018     05/14/2018    CO2 28 05/14/2018    ANIONGAP 6 05/14/2018    BUN 16 05/14/2018    CREATININE 1 18 05/14/2018    GLUCOSE 171 (H) 05/04/2018    GLUF 106 (H) 05/14/2018    CALCIUM 9 1 05/14/2018    AST 69 (H) 05/14/2018     (H) 05/14/2018    ALKPHOS 85 05/14/2018    PROT 7 4 05/14/2018    BILITOT 0 60 05/14/2018    EGFR 59 05/14/2018     Lab Results   Component Value Date    WBC 4 05 (L) 06/01/2018    HGB 7 6 (L) 06/01/2018    HCT 23 6 (L) 06/01/2018    MCV 95 06/01/2018     06/01/2018   No results found for: [address] [place] [address] and discussed with patient  Assessment and plan: Follow-up visit for 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 diagnosed in January 2018  Status post 4 cycles of carboplatin plus Alimta plus Keytruda and 5th cycle without carboplatin  Cycle 6 will be tomorrow  He has received palliative radiation to thoracic spine  He is responding  Lung mass is smaller  Lymph nodes are not enlarged in the mediastinum anymore  Bone lesion is stable  Also he gets Xgeva once every 3 months  PD L1 level was 0  She gets hematological toxicities and has required  blood transfusions  He has  decreased appetite, weakness and tiredness, some cough and exertional dyspnea  He would like to have medication for appetite  Back pain has improved    Arthritis  The diagnosis was made by the biopsy  from the left iliac crest  that identified metastatic adenocarcinoma     Physical examination and test results are as recorded and discussed  He is responding to treatments  He is being continued on maintenance Alimta plus Keytruda plus Xgeva  Transfusions as needed  He will have ultrasound of kidneys for cysts  Condition discussed and explained  Questions answered  Goal is prolongation of survival   1  Adenocarcinoma of left lung (HCC)    - CBC and differential  - Ambulatory referral to Interventional Radiology; Future    2  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 ([facility] [address] )      3  Anoxia    - megestrol (MEGACE) 40 MG/ML suspension;  Take 10 mL (400 mg total) by mouth daily  Dispense: 240 mL; Refill: 1    4  Bilateral renal cysts    - US kidney and bladder; Future        Patient voiced understanding and agreement in the discussion  Counseling / Coordination of Care   Greater than 50% of total time was spent with the patient and / or family counseling and / or coordination of care

## 2018-06-05 NOTE — PROGRESS NOTES
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 diagnosed in January 2018  Status post 4 cycles of carboplatin plus Alimta plus Keytruda and 5th cycle without carboplatin  Cycle 6 will be tomorrow  He has received palliative radiation to thoracic spine  He is responding  Lung mass is smaller  Lymph nodes are not enlarged in the mediastinum anymore  Bone lesion is stable  Also he gets Xgeva once every 3 months  PD L1 level was 0  She gets hematological toxicities and has required  blood transfusions  He has  decreased appetite, weakness and tiredness, some cough and exertional dyspnea  He would like to have medication for appetite  Back pain has improved    Arthritis  The diagnosis was made by the biopsy  from the left iliac crest  that identified metastatic adenocarcinoma          Current Outpatient Prescriptions:     acetaminophen (TYLENOL) 325 mg tablet, Take 2 tablets by mouth every 6 (six) hours as needed for mild pain, headaches or fever, Disp: 30 tablet, Rfl: 0    dexamethasone (DECADRON) 4 mg tablet, Take 1 tablet (4 mg total) by mouth 2 (two) times a day as needed (PLEASE SEE SIG NOTES) TAKE 1 TABLET WITH BREAKFAST, 1 TABLET WITH DINNER, DAY BEFORE, DAY OF, AND DAY AFTER CHEMO , Disp: 12 tablet, Rfl: 1    doxazosin (CARDURA) 4 mg tablet, Take 1 tablet (4 mg total) by mouth daily at bedtime, Disp: 30 tablet, Rfl: 0    finasteride (PROSCAR) 5 mg tablet, Take 1 tablet (5 mg total) by mouth daily, Disp: 30 tablet, Rfl: 0    folic acid (FOLVITE) 1 mg tablet, Take 1 tablet (1 mg total) by mouth daily, Disp: 90 tablet, Rfl: 0    levothyroxine 88 mcg tablet, Take 88 mcg by mouth daily, Disp: , Rfl:     losartan (COZAAR) 50 mg tablet, , Disp: , Rfl:     nystatin (MYCOSTATIN) 100,000 units/mL suspension, Swish and swallow 5 mL (500,000 Units total) 4 (four) times a day, Disp: 473 mL, Rfl: 0    ondansetron (ZOFRAN) 8 mg tablet, Take 1 tablet (8 mg total) by mouth every 8 (eight) hours as needed for nausea or vomiting, Disp: 20 tablet, Rfl: 1    pantoprazole (PROTONIX) 40 mg tablet, Take 1 tablet (40 mg total) by mouth daily In AM prior to eating, Disp: 30 tablet, Rfl: 2    sucralfate (CARAFATE) 1 g/10 mL suspension, Take 10 mL (1,000 mg total) by mouth every 6 (six) hours, Disp: 420 mL, Rfl: 0    losartan (COZAAR) 100 MG tablet, Take 50 mg by mouth daily  , Disp: , Rfl:   No current facility-administered medications for this visit  Facility-Administered Medications Ordered in Other Visits:     sodium chloride 0 9 % infusion, 20 mL/hr, Intravenous, Continuous, Susanne Mcintosh MD, Stopped at 06/04/18 1530    No Known Allergies    ROS:  06/05/18 Reviewed 13 systems:  Presently no headaches, seizures, dizziness, diplopia, dysphagia, hoarseness, chest pain, palpitations,  hemoptysis, abdominal pain, nausea, vomiting, change in bowel habits, melena, hematuria, fever, chills, bleeding,  skin rash, weight loss,  numbness,  claudication and gait problem  No frequent infections  Not unusually sensitive to heat or cold  No swelling of the ankles  No swollen glands  Patient is anxious  Other symptoms are in HPI        /66 (BP Location: Right arm, Cuff Size: Adult)   Pulse 86   Temp 98 9 °F (37 2 °C) (Tympanic)   Resp 17   Ht 5' 6" (1 676 m)   Wt 79 8 kg (176 lb)   SpO2 91%   BMI 28 41 kg/m²     Physical Exam:  Alert, oriented, not in distress, no icterus, no oral thrush, no palpable neck mass, clear lung fields, regular heart rate, systolic murmur, abdomen  soft and non tender, no palpable abdominal mass, no ascites, no edema of ankles, no calf tenderness, no focal neurological deficit, no skin rash, no palpable lymphadenopathy, good arterial pulses, no clubbing  Patient is anxious  Performance status 1  IMAGING:  IMPRESSION:     1  Stable lytic and sclerotic spinal metastasis  No new or enlarging metastatic lesions      2  Decreased primary lung lesion and resolution of mediastinal/hilar lymphadenopathy      3   Large cystic structure arising from the right collecting system, parapelvic cyst versus severely dilated extrarenal pelvis due to UPJ obstruction  This would be better delineated by delayed imaging on follow-up      4   Multiple additional renal cysts are seen including a complex septated cyst on the left      5  Moderate pulmonary fibrosis which has progressed from earlier CTs  Stable nodules      6   Right inguinal hernia containing nonobstructed sigmoid colon               Workstation performed: [id number]      Imaging     CT chest abdomen pelvis w contrast (Order #[id number]) on 5/29/2018 - Imaging Information       LABS:  Results for orders placed or performed during the hospital encounter of 06/04/18   Prepare RBC:Special Requirements: Leukoreduced; Has consent been obtained? Yes, 1 Units   Result Value Ref Range    Unit Product Code [id number]     Unit Number [id number]     Unit ABO O     Unit RH POS     Crossmatch Compatible     Unit Dispense Status Presumed Trans      Labs, Imaging, & Other studies:   All pertinent labs and imaging studies were personally reviewed    Lab Results   Component Value Date     05/14/2018    K 4 3 05/14/2018     05/14/2018    CO2 28 05/14/2018    ANIONGAP 6 05/14/2018    BUN 16 05/14/2018    CREATININE 1 18 05/14/2018    GLUCOSE 171 (H) 05/04/2018    GLUF 106 (H) 05/14/2018    CALCIUM 9 1 05/14/2018    AST 69 (H) 05/14/2018     (H) 05/14/2018    ALKPHOS 85 05/14/2018    PROT 7 4 05/14/2018    BILITOT 0 60 05/14/2018    EGFR 59 05/14/2018     Lab Results   Component Value Date    WBC 4 05 (L) 06/01/2018    HGB 7 6 (L) 06/01/2018    HCT 23 6 (L) 06/01/2018    MCV 95 06/01/2018     06/01/2018   No results found for: SEDRATE    Reviewed and discussed with patient  Assessment and plan:   Follow-up visit for 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 diagnosed in January 2018  Status post 4 cycles of carboplatin plus Alimta plus Keytruda and 5th cycle without carboplatin  Cycle 6 will be tomorrow  He has received palliative radiation to thoracic spine  He is responding  Lung mass is smaller  Lymph nodes are not enlarged in the mediastinum anymore  Bone lesion is stable  Also he gets Xgeva once every 3 months  PD L1 level was 0  She gets hematological toxicities and has required  blood transfusions  He has  decreased appetite, weakness and tiredness, some cough and exertional dyspnea  He would like to have medication for appetite  Back pain has improved    Arthritis  The diagnosis was made by the biopsy  from the left iliac crest  that identified metastatic adenocarcinoma     Physical examination and test results are as recorded and discussed  He is responding to treatments  He is being continued on maintenance Alimta plus Keytruda plus Xgeva  Transfusions as needed  He will have ultrasound of kidneys for cysts  Condition discussed and explained  Questions answered  Goal is prolongation of survival   1  Adenocarcinoma of left lung (HCC)    - CBC and differential  - Ambulatory referral to Interventional Radiology; Future    2  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 ([facility] [address] )      3  Anoxia    - megestrol (MEGACE) 40 MG/ML suspension; Take 10 mL (400 mg total) by mouth daily  Dispense: 240 mL; Refill: 1    4  Bilateral renal cysts    - US kidney and bladder; Future        Patient voiced understanding and agreement in the discussion  Counseling / Coordination of Care   Greater than 50% of total time was spent with the patient and / or family counseling and / or coordination of care

## 2018-06-06 ENCOUNTER — HOSPITAL ENCOUNTER (OUTPATIENT)
Dept: INFUSION CENTER | Facility: CLINIC | Age: 78
Discharge: HOME | End: 2018-06-06
Payer: COMMERCIAL

## 2018-06-06 VITALS
OXYGEN SATURATION: 94 % | WEIGHT: 173.94 LBS | TEMPERATURE: 98.1 F | DIASTOLIC BLOOD PRESSURE: 57 MMHG | SYSTOLIC BLOOD PRESSURE: 110 MMHG | RESPIRATION RATE: 20 BRPM | HEART RATE: 66 BPM | HEIGHT: 66 IN | BODY MASS INDEX: 27.95 KG/M2

## 2018-06-06 LAB
ALBUMIN SERPL BCP-MCNC: 3 G/DL (ref 3.5–5)
ALP SERPL-CCNC: 88 U/L (ref 46–116)
ALT SERPL W P-5'-P-CCNC: 55 U/L (ref 12–78)
ANION GAP SERPL CALCULATED.3IONS-SCNC: 9 MMOL/L (ref 4–13)
AST SERPL W P-5'-P-CCNC: 59 U/L (ref 5–45)
BILIRUB SERPL-MCNC: 0.6 MG/DL (ref 0.2–1)
BUN SERPL-MCNC: 24 MG/DL (ref 5–25)
CALCIUM SERPL-MCNC: 9.2 MG/DL (ref 8.3–10.1)
CHLORIDE SERPL-SCNC: 102 MMOL/L (ref 100–108)
CO2 SERPL-SCNC: 26 MMOL/L (ref 21–32)
CREAT SERPL-MCNC: 1.24 MG/DL (ref 0.6–1.3)
GFR SERPL CREATININE-BSD FRML MDRD: 56 ML/MIN/1.73SQ M
GLUCOSE SERPL-MCNC: 173 MG/DL (ref 65–140)
POTASSIUM SERPL-SCNC: 4.8 MMOL/L (ref 3.5–5.3)
PROT SERPL-MCNC: 7.8 G/DL (ref 6.4–8.2)
SODIUM SERPL-SCNC: 137 MMOL/L (ref 136–145)

## 2018-06-06 PROCEDURE — 96413 CHEMO IV INFUSION 1 HR: CPT

## 2018-06-06 PROCEDURE — 80053 COMPREHEN METABOLIC PANEL: CPT | Performed by: INTERNAL MEDICINE

## 2018-06-06 PROCEDURE — 96367 TX/PROPH/DG ADDL SEQ IV INF: CPT

## 2018-06-06 PROCEDURE — 96372 THER/PROPH/DIAG INJ SC/IM: CPT

## 2018-06-06 PROCEDURE — 96411 CHEMO IV PUSH ADDL DRUG: CPT

## 2018-06-06 RX ADMIN — CYANOCOBALAMIN 1000 MCG: 1000 INJECTION, SOLUTION INTRAMUSCULAR at 14:34

## 2018-06-06 RX ADMIN — SODIUM CHLORIDE 20 ML/HR: 0.9 INJECTION, SOLUTION INTRAVENOUS at 12:40

## 2018-06-06 RX ADMIN — ONDANSETRON 16 MG: 2 INJECTION INTRAMUSCULAR; INTRAVENOUS at 14:15

## 2018-06-06 RX ADMIN — SODIUM CHLORIDE 900 MG: 9 INJECTION, SOLUTION INTRAVENOUS at 14:39

## 2018-06-06 RX ADMIN — SODIUM CHLORIDE 200 MG: 9 INJECTION, SOLUTION INTRAVENOUS at 13:40

## 2018-06-06 NOTE — PROGRESS NOTES
Pt tolerated all infusions well without any issues  B12 injection given in right arm without any complaints   Pt declined avs

## 2018-06-07 ENCOUNTER — TELEPHONE (OUTPATIENT)
Dept: HEMATOLOGY ONCOLOGY | Facility: CLINIC | Age: 78
End: 2018-06-07

## 2018-06-07 DIAGNOSIS — D64.9 ANEMIA, UNSPECIFIED TYPE: Primary | ICD-10-CM

## 2018-06-07 NOTE — TELEPHONE ENCOUNTER
Patient's hgb level was 10 1 on 6/5/18  Patient does not require a blood transfusion at this time  Patient has a wedding on 6/16/18 and wants to have his blood level checked prior to the 16th  Patient's wife will take the patient for a CBC and blood tube on hold on 6/11/18 to see if he requires a blood transfusion prior to the wedding  Patient's wife is agreeable to this plan

## 2018-06-07 NOTE — TELEPHONE ENCOUNTER
Calling to check about getting next blood count and possible transfusion  Can it be set up on the 13th? They talked to Dr Jyoti Christensen about this on Tuesday  They want to do this next week because they have a wedding to go to

## 2018-06-11 ENCOUNTER — TELEPHONE (OUTPATIENT)
Dept: HEMATOLOGY ONCOLOGY | Facility: CLINIC | Age: 78
End: 2018-06-11

## 2018-06-11 ENCOUNTER — APPOINTMENT (EMERGENCY)
Dept: RADIOLOGY | Facility: HOSPITAL | Age: 78
End: 2018-06-11
Payer: COMMERCIAL

## 2018-06-11 ENCOUNTER — TRANSCRIBE ORDERS (OUTPATIENT)
Dept: LAB | Facility: CLINIC | Age: 78
End: 2018-06-11

## 2018-06-11 ENCOUNTER — APPOINTMENT (OUTPATIENT)
Dept: LAB | Facility: CLINIC | Age: 78
End: 2018-06-11
Payer: COMMERCIAL

## 2018-06-11 ENCOUNTER — HOSPITAL ENCOUNTER (EMERGENCY)
Facility: HOSPITAL | Age: 78
Discharge: LEFT BEFORE DISCHARGE | End: 2018-06-11
Attending: EMERGENCY MEDICINE
Payer: COMMERCIAL

## 2018-06-11 VITALS
RESPIRATION RATE: 16 BRPM | HEART RATE: 80 BPM | TEMPERATURE: 99.4 F | DIASTOLIC BLOOD PRESSURE: 60 MMHG | BODY MASS INDEX: 28.63 KG/M2 | SYSTOLIC BLOOD PRESSURE: 110 MMHG | WEIGHT: 176 LBS | OXYGEN SATURATION: 95 %

## 2018-06-11 DIAGNOSIS — D64.9 ANEMIA, UNSPECIFIED TYPE: ICD-10-CM

## 2018-06-11 DIAGNOSIS — R06.02 SHORTNESS OF BREATH: Primary | ICD-10-CM

## 2018-06-11 DIAGNOSIS — R53.83 FATIGUE: ICD-10-CM

## 2018-06-11 LAB
ALBUMIN SERPL BCP-MCNC: 2.9 G/DL (ref 3.5–5)
ALP SERPL-CCNC: 79 U/L (ref 46–116)
ALT SERPL W P-5'-P-CCNC: 56 U/L (ref 12–78)
ANION GAP SERPL CALCULATED.3IONS-SCNC: 10 MMOL/L (ref 4–13)
AST SERPL W P-5'-P-CCNC: 38 U/L (ref 5–45)
BACTERIA UR QL AUTO: ABNORMAL /HPF
BASOPHILS # BLD MANUAL: 0 THOUSAND/UL (ref 0–0.1)
BASOPHILS # BLD MANUAL: 0 THOUSAND/UL (ref 0–0.1)
BASOPHILS NFR MAR MANUAL: 0 % (ref 0–1)
BASOPHILS NFR MAR MANUAL: 0 % (ref 0–1)
BILIRUB SERPL-MCNC: 0.6 MG/DL (ref 0.2–1)
BILIRUB UR QL STRIP: NEGATIVE
BUN SERPL-MCNC: 32 MG/DL (ref 5–25)
CALCIUM SERPL-MCNC: 9.1 MG/DL (ref 8.3–10.1)
CHLORIDE SERPL-SCNC: 99 MMOL/L (ref 100–108)
CLARITY UR: CLEAR
CO2 SERPL-SCNC: 26 MMOL/L (ref 21–32)
COLOR UR: YELLOW
CREAT SERPL-MCNC: 1.37 MG/DL (ref 0.6–1.3)
EOSINOPHIL # BLD MANUAL: 0.04 THOUSAND/UL (ref 0–0.4)
EOSINOPHIL # BLD MANUAL: 0.09 THOUSAND/UL (ref 0–0.4)
EOSINOPHIL NFR BLD MANUAL: 1 % (ref 0–6)
EOSINOPHIL NFR BLD MANUAL: 2 % (ref 0–6)
ERYTHROCYTE [DISTWIDTH] IN BLOOD BY AUTOMATED COUNT: 16.7 % (ref 11.6–15.1)
ERYTHROCYTE [DISTWIDTH] IN BLOOD BY AUTOMATED COUNT: 16.8 % (ref 11.6–15.1)
GFR SERPL CREATININE-BSD FRML MDRD: 49 ML/MIN/1.73SQ M
GLUCOSE SERPL-MCNC: 141 MG/DL (ref 65–140)
GLUCOSE UR STRIP-MCNC: NEGATIVE MG/DL
HCT VFR BLD AUTO: 31.2 % (ref 36.5–49.3)
HCT VFR BLD AUTO: 31.9 % (ref 36.5–49.3)
HGB BLD-MCNC: 10.3 G/DL (ref 12–17)
HGB BLD-MCNC: 10.5 G/DL (ref 12–17)
HGB UR QL STRIP.AUTO: NEGATIVE
KETONES UR STRIP-MCNC: NEGATIVE MG/DL
LACTATE SERPL-SCNC: 1.2 MMOL/L (ref 0.5–2)
LEUKOCYTE ESTERASE UR QL STRIP: NEGATIVE
LIPASE SERPL-CCNC: 75 U/L (ref 73–393)
LYMPHOCYTES # BLD AUTO: 0.08 THOUSAND/UL (ref 0.6–4.47)
LYMPHOCYTES # BLD AUTO: 0.19 THOUSAND/UL (ref 0.6–4.47)
LYMPHOCYTES # BLD AUTO: 2 % (ref 14–44)
LYMPHOCYTES # BLD AUTO: 4 % (ref 14–44)
MCH RBC QN AUTO: 29.9 PG (ref 26.8–34.3)
MCH RBC QN AUTO: 30.2 PG (ref 26.8–34.3)
MCHC RBC AUTO-ENTMCNC: 32.9 G/DL (ref 31.4–37.4)
MCHC RBC AUTO-ENTMCNC: 33 G/DL (ref 31.4–37.4)
MCV RBC AUTO: 90 FL (ref 82–98)
MCV RBC AUTO: 92 FL (ref 82–98)
MONOCYTES # BLD AUTO: 0 THOUSAND/UL (ref 0–1.22)
MONOCYTES # BLD AUTO: 0.14 THOUSAND/UL (ref 0–1.22)
MONOCYTES NFR BLD: 0 % (ref 4–12)
MONOCYTES NFR BLD: 3 % (ref 4–12)
NEUTROPHILS # BLD MANUAL: 4.04 THOUSAND/UL (ref 1.85–7.62)
NEUTROPHILS # BLD MANUAL: 4.26 THOUSAND/UL (ref 1.85–7.62)
NEUTS SEG NFR BLD AUTO: 91 % (ref 43–75)
NEUTS SEG NFR BLD AUTO: 97 % (ref 43–75)
NITRITE UR QL STRIP: NEGATIVE
NON-SQ EPI CELLS URNS QL MICRO: ABNORMAL /HPF
NT-PROBNP SERPL-MCNC: 183 PG/ML
PH UR STRIP.AUTO: 6.5 [PH] (ref 4.5–8)
PLATELET # BLD AUTO: 239 THOUSANDS/UL (ref 149–390)
PLATELET # BLD AUTO: 249 THOUSANDS/UL (ref 149–390)
PLATELET BLD QL SMEAR: ADEQUATE
PLATELET BLD QL SMEAR: ADEQUATE
PMV BLD AUTO: 8.6 FL (ref 8.9–12.7)
PMV BLD AUTO: 8.9 FL (ref 8.9–12.7)
POTASSIUM SERPL-SCNC: 4.1 MMOL/L (ref 3.5–5.3)
PROT SERPL-MCNC: 7.4 G/DL (ref 6.4–8.2)
PROT UR STRIP-MCNC: ABNORMAL MG/DL
RBC # BLD AUTO: 3.45 MILLION/UL (ref 3.88–5.62)
RBC # BLD AUTO: 3.48 MILLION/UL (ref 3.88–5.62)
RBC #/AREA URNS AUTO: ABNORMAL /HPF
SODIUM SERPL-SCNC: 135 MMOL/L (ref 136–145)
SP GR UR STRIP.AUTO: 1.01 (ref 1–1.03)
TOTAL CELLS COUNTED SPEC: 100
TOTAL CELLS COUNTED SPEC: 100
TROPONIN I SERPL-MCNC: <0.02 NG/ML
UROBILINOGEN UR QL STRIP.AUTO: 2 E.U./DL
WBC # BLD AUTO: 4.17 THOUSAND/UL (ref 4.31–10.16)
WBC # BLD AUTO: 4.68 THOUSAND/UL (ref 4.31–10.16)
WBC #/AREA URNS AUTO: ABNORMAL /HPF

## 2018-06-11 PROCEDURE — 85007 BL SMEAR W/DIFF WBC COUNT: CPT

## 2018-06-11 PROCEDURE — 99285 EMERGENCY DEPT VISIT HI MDM: CPT

## 2018-06-11 PROCEDURE — 83605 ASSAY OF LACTIC ACID: CPT | Performed by: EMERGENCY MEDICINE

## 2018-06-11 PROCEDURE — 36415 COLL VENOUS BLD VENIPUNCTURE: CPT

## 2018-06-11 PROCEDURE — 83880 ASSAY OF NATRIURETIC PEPTIDE: CPT | Performed by: EMERGENCY MEDICINE

## 2018-06-11 PROCEDURE — 81001 URINALYSIS AUTO W/SCOPE: CPT | Performed by: EMERGENCY MEDICINE

## 2018-06-11 PROCEDURE — 85027 COMPLETE CBC AUTOMATED: CPT

## 2018-06-11 PROCEDURE — 85027 COMPLETE CBC AUTOMATED: CPT | Performed by: EMERGENCY MEDICINE

## 2018-06-11 PROCEDURE — 94640 AIRWAY INHALATION TREATMENT: CPT

## 2018-06-11 PROCEDURE — 71046 X-RAY EXAM CHEST 2 VIEWS: CPT

## 2018-06-11 PROCEDURE — 80053 COMPREHEN METABOLIC PANEL: CPT | Performed by: EMERGENCY MEDICINE

## 2018-06-11 PROCEDURE — 93005 ELECTROCARDIOGRAM TRACING: CPT

## 2018-06-11 PROCEDURE — 85007 BL SMEAR W/DIFF WBC COUNT: CPT | Performed by: EMERGENCY MEDICINE

## 2018-06-11 PROCEDURE — 83690 ASSAY OF LIPASE: CPT | Performed by: EMERGENCY MEDICINE

## 2018-06-11 PROCEDURE — 84484 ASSAY OF TROPONIN QUANT: CPT | Performed by: EMERGENCY MEDICINE

## 2018-06-11 PROCEDURE — 87040 BLOOD CULTURE FOR BACTERIA: CPT | Performed by: EMERGENCY MEDICINE

## 2018-06-11 RX ORDER — IPRATROPIUM BROMIDE AND ALBUTEROL SULFATE 2.5; .5 MG/3ML; MG/3ML
3 SOLUTION RESPIRATORY (INHALATION) ONCE
Status: COMPLETED | OUTPATIENT
Start: 2018-06-11 | End: 2018-06-11

## 2018-06-11 RX ADMIN — IPRATROPIUM BROMIDE AND ALBUTEROL SULFATE 3 ML: .5; 3 SOLUTION RESPIRATORY (INHALATION) at 15:39

## 2018-06-11 NOTE — ED PROVIDER NOTES
History  Chief Complaint   Patient presents with    Shortness of Breath     pt family informed rn pt became increasingly SOB  mosly with exertion  undergoing chemo treatments currently  pt c/o presure on right side chest, worse with deep breath  c/o no productive cough  family also concerned with "low blood" unsure what test     81-year-old male with past medical history significant for stage IV metastatic adenocarcinoma of the lung, currently on chemo, presents today complaining of worsening shortness of breath and fatigue particularly with exertion  Was sent here by his oncologist for evaluation  Has had symptomatic anemia previously, however he had his hemoglobin checked this morning and it was 10 4  History provided by:  Patient  Shortness of Breath   Severity:  Severe  Onset quality:  Sudden  Timing:  Constant  Progression:  Worsening  Chronicity:  New  Context comment:  See HPI  Relieved by:  None tried  Worsened by:  Nothing  Ineffective treatments:  None tried  Associated symptoms: diaphoresis    Associated symptoms: no abdominal pain, no chest pain, no claudication, no cough, no ear pain, no fever, no headaches, no hemoptysis, no neck pain, no PND, no rash, no sore throat, no sputum production, no syncope, no swollen glands, no vomiting and no wheezing    Risk factors: hx of cancer        Prior to Admission Medications   Prescriptions Last Dose Informant Patient Reported? Taking?   dexamethasone (DECADRON) 4 mg tablet 6/7/2018 Spouse/Significant Other No Yes   Sig: Take 1 tablet (4 mg total) by mouth 2 (two) times a day as needed (PLEASE SEE SIG NOTES) TAKE 1 TABLET WITH BREAKFAST, 1 TABLET WITH DINNER, DAY BEFORE, DAY OF, AND DAY AFTER CHEMO     doxazosin (CARDURA) 4 mg tablet 6/10/2018 at 2100 Spouse/Significant Other No Yes   Sig: Take 1 tablet (4 mg total) by mouth daily at bedtime   finasteride (PROSCAR) 5 mg tablet 6/10/2018 at 2100 Spouse/Significant Other No Yes   Sig: Take 1 tablet (5 mg total) by mouth daily   folic acid (FOLVITE) 1 mg tablet 6/11/2018 at 0900 Spouse/Significant Other No Yes   Sig: Take 1 tablet (1 mg total) by mouth daily   levothyroxine 88 mcg tablet 6/11/2018 at 0900 Spouse/Significant Other Yes Yes   Sig: Take 88 mcg by mouth daily   megestrol (MEGACE) 40 MG/ML suspension 6/11/2018 at 1230  No Yes   Sig: Take 10 mL (400 mg total) by mouth daily      Facility-Administered Medications: None       Past Medical History:   Diagnosis Date    Anemia     BPH (benign prostatic hyperplasia)     Cancer of lung (HCC)     Disease of thyroid gland     GAVE (gastric antral vascular ectasia)     Hypertension     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[address] )     Spinal stenosis     Tobacco abuse        Past Surgical History:   Procedure Laterality Date    ESOPHAGOGASTRODUODENOSCOPY N/A 3/20/2018    Procedure: ESOPHAGOGASTRODUODENOSCOPY (EGD); Surgeon: Pina Maloney MD;  Location: [facility]; Service: Gastroenterology       Family History   Problem Relation Age of Onset    Esophageal cancer Mother     Diabetes Father     No Known Problems Sister     No Known Problems Brother      I have reviewed and agree with the history as documented  Social History   Substance Use Topics    Smoking status: Former Smoker     Packs/day: 0 50     Years: 15 00     Types: Cigarettes     Quit date: 1/11/2018    Smokeless tobacco: Never Used      Comment: Quit December 2017    Alcohol use No        Review of Systems   Constitutional: Positive for chills, diaphoresis and fatigue  Negative for fever  HENT: Negative for ear pain and sore throat  Eyes: Negative for visual disturbance  Respiratory: Positive for shortness of breath  Negative for cough, hemoptysis, sputum production and wheezing  Cardiovascular: Negative for chest pain, claudication, syncope and PND  Gastrointestinal: Negative for abdominal pain and vomiting  Genitourinary: Negative for difficulty urinating     Musculoskeletal: Negative for back pain and neck pain  Skin: Negative for rash  Allergic/Immunologic: Positive for immunocompromised state  Neurological: Negative for headaches  Psychiatric/Behavioral: Negative for confusion  Physical Exam  Physical Exam   Constitutional: He is oriented to person, place, and time  HENT:   Head: Normocephalic and atraumatic  Mouth/Throat: Uvula is midline, oropharynx is clear and moist and mucous membranes are normal  No tonsillar exudate  Eyes: Pupils are equal, round, and reactive to light  Neck: Normal range of motion  Neck supple  Cardiovascular: Normal rate and regular rhythm  Pulmonary/Chest: Effort normal and breath sounds normal    Abdominal: Soft  Bowel sounds are normal  There is no tenderness  There is no rebound and no guarding  Musculoskeletal: Normal range of motion  Neurological: He is alert and oriented to person, place, and time  Patient moving all extremities equally, no focal neuro deficits noted  Skin: Skin is warm and dry  Capillary refill takes less than 2 seconds  He is not diaphoretic  There is pallor  Psychiatric: He has a normal mood and affect  Nursing note and vitals reviewed        Vital Signs  ED Triage Vitals [06/11/18 1455]   Temperature Pulse Respirations Blood Pressure SpO2   100 2 °F (37 9 °C) 95 20 109/55 94 %      Temp Source Heart Rate Source Patient Position - Orthostatic VS BP Location FiO2 (%)   Oral Monitor Sitting Left arm --      Pain Score       7           Vitals:    06/11/18 1455 06/11/18 1602 06/11/18 1617 06/11/18 1728   BP: 109/55 120/58 117/61 108/61   Pulse: 95 87 89 85   Patient Position - Orthostatic VS: Sitting Sitting Lying Lying       Visual Acuity      ED Medications  Medications   ipratropium-albuterol (DUO-NEB) 0 5-2 5 mg/3 mL inhalation solution 3 mL (3 mL Nebulization Given 6/11/18 1539)       Diagnostic Studies  Results Reviewed     Procedure Component Value Units Date/Time    Urine Microscopic [[id number]] (Abnormal) Collected:  06/11/18 1632    Lab Status:  Final result Specimen:  Urine from Urine, Other Updated:  06/11/18 1733     RBC, UA 0-1 (A) /hpf      WBC, UA 0-1 (A) /hpf      Epithelial Cells None Seen /hpf      Bacteria, UA None Seen /hpf     B-type natriuretic peptide [[id number]]  (Normal) Collected:  06/11/18 1528    Lab Status:  Final result Specimen:  Blood from Arm, Right Updated:  06/11/18 1721     NT-proBNP 183 pg/mL     Troponin I [[id number]]  (Normal) Collected:  06/11/18 1528    Lab Status:  Final result Specimen:  Blood from Arm, Right Updated:  06/11/18 1708     Troponin I <0 02 ng/mL     Narrative:         [employer] Chemistry analyzer 99% cutoff is > 0 04 ng/mL in network labs    o cTnI 99% cutoff is useful only when applied to patients in the clinical setting of myocardial ischemia  o cTnI 99% cutoff should be interpreted in the context of clinical history, ECG findings and possibly cardiac imaging to establish correct diagnosis  o cTnI 99% cutoff may be suggestive but clearly not indicative of a coronary event without the clinical setting of myocardial ischemia  [place] draw [[id number]] Collected:  06/11/18 1528    Lab Status: In process Specimen:  Blood Updated:  06/11/18 1701    Narrative: The following orders were created for panel order [place] draw  Procedure                               Abnormality         Status                     ---------                               -----------         ------                     Honobia Mend Top on BSGK[[id number]]                            Final result               Gold top on ZWOY[[id number]]                                  In process                 Green / Black tube on JBDK[[id number]]                        In process                   Please view results for these tests on the individual orders      UA w Reflex to Microscopic [[id number]]  (Abnormal) Collected:  06/11/18 1632    Lab Status:  Final result Specimen:  Urine from Urine, Other Updated:  06/11/18 1638     Color, UA Yellow     Clarity, UA Clear     Specific Gravity, UA 1 015     pH, UA 6 5     Leukocytes, UA Negative     Nitrite, UA Negative     Protein, UA Trace (A) mg/dl      Glucose, UA Negative mg/dl      Ketones, UA Negative mg/dl      Urobilinogen, UA 2 0 (A) E U /dl      Bilirubin, UA Negative     Blood, UA Negative    CBC and differential [[id number]]  (Abnormal) Collected:  06/11/18 1523    Lab Status:  Final result Specimen:  Blood from Arm, Right Updated:  06/11/18 1628     WBC 4 17 (L) Thousand/uL      RBC 3 45 (L) Million/uL      Hemoglobin 10 3 (L) g/dL      Hematocrit 31 2 (L) %      MCV 90 fL      MCH 29 9 pg      MCHC 33 0 g/dL      RDW 16 8 (H) %      MPV 8 9 fL      Platelets 663 Thousands/uL     Narrative: This is an appended report  These results have been appended to a previously verified report  Lactic acid, plasma [[id number]]  (Normal) Collected:  06/11/18 1523    Lab Status:  Final result Specimen:  Blood from Arm, Right Updated:  06/11/18 1601     LACTIC ACID 1 2 mmol/L     Narrative:         Result may be elevated if tourniquet was used during collection      Comprehensive metabolic panel [[id number]]  (Abnormal) Collected:  06/11/18 1523    Lab Status:  Final result Specimen:  Blood from Arm, Right Updated:  06/11/18 1558     Sodium 135 (L) mmol/L      Potassium 4 1 mmol/L      Chloride 99 (L) mmol/L      CO2 26 mmol/L      Anion Gap 10 mmol/L      BUN 32 (H) mg/dL      Creatinine 1 37 (H) mg/dL      Glucose 141 (H) mg/dL      Calcium 9 1 mg/dL      AST 38 U/L      ALT 56 U/L      Alkaline Phosphatase 79 U/L      Total Protein 7 4 g/dL      Albumin 2 9 (L) g/dL      Total Bilirubin 0 60 mg/dL      eGFR 49 ml/min/1 73sq m     Narrative:         National Kidney Disease Education Program recommendations are as follows:  GFR calculation is accurate only with a steady state creatinine  Chronic Kidney disease less than 60 ml/min/1 73 sq  meters  Kidney failure less than 15 ml/min/1 73 sq  meters  Lipase [[id number]]  (Normal) Collected:  06/11/18 1523    Lab Status:  Final result Specimen:  Blood from Arm, Right Updated:  06/11/18 1558     Lipase 75 u/L     Blood culture #1 [[id number]] Collected:  06/11/18 1546    Lab Status: In process Specimen:  Blood from Arm, Left Updated:  06/11/18 1553    Blood culture #2 [[id number]] Collected:  06/11/18 1523    Lab Status: In process Specimen:  Blood from Arm, Right Updated:  06/11/18 1534                 XR chest 2 views   Final Result by Lucy Rodriguez MD (06/11 1540)      Stable appearance of pulmonary fibrosis  Known lung nodules better seen on prior CT  Otherwise no new consolidation  Workstation performed: [id number]                    Procedures  ECG 12 Lead Documentation  Date/Time: 6/11/2018 5:27 PM  Performed by: Winnie Daniel  Authorized by: Winnie Daniel     Indications / Diagnosis:  Shortness of breath  ECG reviewed by me, the ED Provider: yes    Patient location:  ED  Previous ECG:     Previous ECG:  Compared to current  Comments:      Normal sinus rhythm at 90 beats per minute  Left axis deviation slow R progression, no ST T wave abnormalities consistent with ischemia  No significant change when compared to prior from 12/22/2017           Phone Contacts  ED Phone Contact    ED Course                               MDM  Number of Diagnoses or Management Options  Fatigue: new and requires workup  Shortness of breath: new and requires workup  Diagnosis management comments: 5:44 PM  Reviewed labs and CXR findings with patient and spouse  Recommended admission for further workup since we don't have an obvious cause for his shortness of breath  I told him that I cant exclude cardiac cause even though his workup here has been negative so far  He is currently refusing admission    The patient insists on leaving against medical advice, despite my recommendation to remain for ongoing treatment     1: Capacity: I have determined that the patient has capacity to make the decision to leave against medical advice based on the following:    A  Ability to express a choice: The patient is able to express his or her choice and communicate that choice  Jenn Gonsalez to understand relevant information: The patient is able to verbalize their diagnosis, understand information about the purpose of treatment, remember the information, and show that he or she can be part of the decision-making process     C  Ability to appreciate the significance of the information and its consequences: The patient understands the consequences of treatment refusal and the risks and benefits of accepting or refusing treatment     D  Ability to manipulate information: The patient is able to engage in reasoning as it applies to making treatment decisions   2: Psychiatric Consultation: There is not an indication to call psychiatry consultation to determine capacity    3  Alternative Treatment: I have discussed the recommended course of treatment and available alternatives  4  Risks: I have discussed the specific risks of that patient refusing treatment death and or permanent disability   5  Follow-up Care: I have discussed the follow-up care and advised to see patient's PCP immediately or return here for worsening  6  ED Option: I have emphasized that the patient has the option to return to the ED  Reviewed that we can have continuation of the workup at any time and that we are always open            Amount and/or Complexity of Data Reviewed  Clinical lab tests: ordered and reviewed  Tests in the radiology section of CPT®: ordered and reviewed  Tests in the medicine section of CPT®: reviewed and ordered  Decide to obtain previous medical records or to obtain history from someone other than the patient: yes  Review and summarize past medical records: yes  Independent visualization of images, tracings, or specimens: yes    Risk of Complications, Morbidity, and/or Mortality  Presenting problems: high  Diagnostic procedures: high  Management options: high    Patient Progress  Patient progress: stable    CritCare Time    Disposition  Final diagnoses:   Shortness of breath   Fatigue     Time reflects when diagnosis was documented in both MDM as applicable and the Disposition within this note     Time User Action Codes Description Comment    6/11/2018  5:43 PM Yusuf Jacobson Add [R06 02] Shortness of breath     6/11/2018  5:43 PM Dewar, Jerlean Burkitt Add [R53 83] Fatigue       ED Disposition     ED Disposition Condition Comment    AMA  Date: 6/11/2018  Patient: Hermila Renteria  Admitted: 6/11/2018  3:04 PM  Attending Provider: Adelaida Jama DO    Hermila Renteria or his authorized caregiver has made the decision for the patient to leave the emergency department against the advice of h is attending physician  He or his authorized caregiver has been informed and understands the inherent risks, including death, or permanent disability  He or his authorized caregiver has decided to accept the responsibility for this decision  The [employer] and all necessary parties have been advised that he may return for further evaluation or treatment  His condition at time of discharge was fait  Hermila Renteria had current vital signs as follows:  /61 (BP Location: Right arm)   Pulse 85    Temp 99 4 °F (37 4 °C) (Oral)   Resp 18   Wt 79 8 kg (176 lb)         Follow-up Information     Follow up With Specialties Details Why Contact Info Additional Information    Donte Zafar MD Internal Medicine   [facility]  [address]  [address] [place] [id number] [place] [address] Emergency Department Emergency Medicine  If symptoms worsen [address] [place] [address]  [phone]  ED,  [address], [place], [address]          Patient's Medications   Discharge Prescriptions    No medications on file No discharge procedures on file      ED Provider  Electronically Signed by           Raphael Hays DO  06/11/18 1500

## 2018-06-11 NOTE — TELEPHONE ENCOUNTER
Called and spoke with Tristan Marcum, and made her aware that patient's 6/11/18 hgb of 10 5  Dr Estelle Brito wants the patient evaluated at an ER  Patient will be going to [facility]- Patient access called and made aware

## 2018-06-11 NOTE — TELEPHONE ENCOUNTER
Patient's wife will be taking the patient for a CBC and blood tube on hold today  Patient is barely able to get out of bed and is very SOB  Patient's wife does not want to take the patient to the ER at this time  Explained to the patient's wife that per Dr Ildefonso Worthy note, the patient's bone lesions are stable and this is unlikely the cause of the patient's multiple blood transfusions

## 2018-06-11 NOTE — DISCHARGE INSTRUCTIONS
Fatigue   WHAT YOU NEED TO KNOW:   Fatigue is mental and physical exhaustion that does not get better with rest  Fatigue may make daily activities difficult or cause extreme sleepiness  It is normal to feel tired sometimes, but long-term fatigue may be a sign of serious illness  DISCHARGE INSTRUCTIONS:   Return to the emergency department if:   · You have chest pain  · You have difficulty breathing  Contact your healthcare provider if:   · You have a cough that gets worse, or does not go away  · You see blood in your urine or bowel movement  · You have numbness or tingling around your mouth or in an arm or leg  · You faint, feel dizzy, or have vision changes  · You have swelling in your lymph nodes  · You are a woman and have vaginal bleeding that is not normal for you, or is not expected  · You lose weight without trying, or you have trouble eating  · You feel weak or have muscle pain  · You have pain or swelling in your joints  · You have questions or concerns about your condition or care  Follow up with your healthcare provider as directed: You may need more tests  Your healthcare provider may also refer you to a specialist  Write down your questions so you remember to ask them during your visits  Manage fatigue:   · Keep a fatigue diary  Include anything that makes you feel more tired or less tired  Bring the diary with you to follow-up visits with your provider  · Exercise as directed  Exercise can help you feel more alert  Exercise can also help you manage stress or relieve depression  Try to get at least 30 minutes of exercise most days of the week  · Keep a regular sleep schedule  Go to bed and wake up at the same times every day  Limit naps to 1 hour each day  A nap can improve fatigue, but a long nap may make it harder to go to sleep at night  · Plan and limit your activities    Limit the number of activities such as shopping and cleaning you do each day  If possible, try to spread out your trips throughout the week  Plan ahead so you are not rushing to get something done  Only do activities that you have the energy to complete  Take breaks between activities  Ask for help if you need it  Another person may be able to drive you or help with daily activities  · Eat a variety of healthy foods  Healthy foods include fruits, vegetables, whole-grain breads, low-fat dairy products, beans, lean meats, and fish  Good nutrition can help manage fatigue  · Limit caffeine and alcohol  These can make it difficult to fall or stay asleep  Women should limit alcohol to 1 drink a day  Men should limit alcohol to 2 drinks a day  A drink of alcohol is 12 ounces of beer, 5 ounces of wine, or 1½ ounces of liquor  Ask our healthcare provider how much caffeine is safe for you  · Do not smoke  Nicotine and other chemicals in cigarettes and cigars can cause lung damage and increase fatigue  Ask your healthcare provider for information if you currently smoke and need help to quit  E-cigarettes or smokeless tobacco still contain nicotine  Talk to your healthcare provider before you use these products  © 2017 [facility] Information is for End User's use only and may not be sold, redistributed or otherwise used for commercial purposes  All illustrations and images included in CareNotes® are the copyrighted property of A D A M [employer] Inc  or [employer]  The above information is an  only  It is not intended as medical advice for individual conditions or treatments  Talk to your doctor, nurse or pharmacist before following any medical regimen to see if it is safe and effective for you  Shortness of Breath   WHAT YOU NEED TO KNOW:   Shortness of breath is a feeling that you cannot get enough air when you breathe in  You may have this feeling only during activity, or all the time  Your symptoms can range from mild to severe   Shortness of breath may be a sign of a serious health condition that needs immediate care  DISCHARGE INSTRUCTIONS:   Return to the emergency department if:   · Your signs and symptoms are the same or worse within 24 hours of treatment  · The skin over your ribs or on your neck sinks in when you breathe  · You feel confused or dizzy  Contact your healthcare provider if:   · You have new or worsening symptoms  · You have questions or concerns about your condition or care  Medicines:   · Medicines  may be used to treat the cause of your symptoms  You may need medicine to treat a bacterial infection or reduce anxiety  Other medicines may be used to open your airway, reduce swelling, or remove extra fluid  If you have a heart condition, you may need medicine to help your heart beat more strongly or regularly  · Take your medicine as directed  Contact your healthcare provider if you think your medicine is not helping or if you have side effects  Tell him or her if you are allergic to any medicine  Keep a list of the medicines, vitamins, and herbs you take  Include the amounts, and when and why you take them  Bring the list or the pill bottles to follow-up visits  Carry your medicine list with you in case of an emergency  Manage shortness of breath:   · Create an action plan  You and your healthcare provider can work together to create a plan for how to handle shortness of breath  The plan can include daily activities, treatment changes, and what to do if you have severe breathing problems  · Lean forward on your elbows when you sit  This helps your lungs expand and may make it easier to breathe  · Use pursed-lip breathing any time you feel short of breath  Breathe in through your nose and then slowly breathe out through your mouth with your lips slightly puckered  It should take you twice as long to breathe out as it did to breathe in  · Do not smoke    Nicotine and other chemicals in cigarettes and cigars can cause lung damage and make shortness of breath worse  Ask your healthcare provider for information if you currently smoke and need help to quit  E-cigarettes or smokeless tobacco still contain nicotine  Talk to your healthcare provider before you use these products  · Reach or maintain a healthy weight  Your healthcare provider can help you create a safe weight loss plan if you are overweight  · Exercise as directed  Exercise can help your lungs work more easily  Exercise can also help you lose weight if needed  Try to get at least 30 minutes of exercise most days of the week  Follow up with your healthcare provider or specialist as directed:  Write down your questions so you remember to ask them during your visits  © 2017 [facility] Information is for End User's use only and may not be sold, redistributed or otherwise used for commercial purposes  All illustrations and images included in CareNotes® are the copyrighted property of [employer]  or [employer] Porter  The above information is an  only  It is not intended as medical advice for individual conditions or treatments  Talk to your doctor, nurse or pharmacist before following any medical regimen to see if it is safe and effective for you

## 2018-06-13 ENCOUNTER — TELEPHONE (OUTPATIENT)
Dept: RADIOLOGY | Facility: HOSPITAL | Age: 78
End: 2018-06-13

## 2018-06-13 DIAGNOSIS — C34.90 MALIGNANT NEOPLASM OF LUNG, UNSPECIFIED LATERALITY, UNSPECIFIED PART OF LUNG (HCC): ICD-10-CM

## 2018-06-13 DIAGNOSIS — R63.0 LACK OF APPETITE: Primary | ICD-10-CM

## 2018-06-13 LAB
ATRIAL RATE: 90 BPM
P AXIS: 45 DEGREES
PR INTERVAL: 154 MS
QRS AXIS: -24 DEGREES
QRSD INTERVAL: 90 MS
QT INTERVAL: 348 MS
QTC INTERVAL: 425 MS
T WAVE AXIS: 2 DEGREES
VENTRICULAR RATE: 90 BPM

## 2018-06-13 PROCEDURE — 93010 ELECTROCARDIOGRAM REPORT: CPT | Performed by: INTERNAL MEDICINE

## 2018-06-13 RX ORDER — PREDNISONE 10 MG/1
10 TABLET ORAL 2 TIMES DAILY WITH MEALS
Qty: 60 TABLET | Refills: 1 | Status: SHIPPED | OUTPATIENT
Start: 2018-06-13 | End: 2018-06-21 | Stop reason: HOSPADM

## 2018-06-13 RX ORDER — SODIUM CHLORIDE 9 MG/ML
75 INJECTION, SOLUTION INTRAVENOUS CONTINUOUS
Status: CANCELLED | OUTPATIENT
Start: 2018-06-13

## 2018-06-13 NOTE — TELEPHONE ENCOUNTER
Pt's wife Nola Stein called to report Megace is not effective in regards to improving pt's appetite  Nola Stein said if it wasn't working Dr Daryrl Pool would order something else   Please update Nola Stein   [phone]

## 2018-06-13 NOTE — TELEPHONE ENCOUNTER
Dr Micha Burks is suggesting that the patient start Prednisone 10 mg BID for appetite stimulation  Spoke with the patient's wife and made her aware that a script for the prednisone was sent to the [facility] pharmacy in [place]

## 2018-06-14 ENCOUNTER — TELEPHONE (OUTPATIENT)
Dept: HEMATOLOGY ONCOLOGY | Facility: CLINIC | Age: 78
End: 2018-06-14

## 2018-06-14 NOTE — TELEPHONE ENCOUNTER
To get a port on Monday  She is a little concerned about whether he can do this due his breathing  She would like to talk to Brennon Smith  She said they have already been to the ER for this, and he does not want to go back  Please call

## 2018-06-14 NOTE — TELEPHONE ENCOUNTER
Patient does not want to go to the ER at this time for his increasing SOB  Patient is agreeable to being admitted as a direct admit at [facility] on Monday, 6/18/18  Outpatient port placement was cancelled

## 2018-06-16 DIAGNOSIS — N40.1 BENIGN PROSTATIC HYPERPLASIA WITH LOWER URINARY TRACT SYMPTOMS, SYMPTOM DETAILS UNSPECIFIED: Chronic | ICD-10-CM

## 2018-06-16 LAB
BACTERIA BLD CULT: NORMAL
BACTERIA BLD CULT: NORMAL

## 2018-06-17 RX ORDER — DOXAZOSIN MESYLATE 4 MG/1
TABLET ORAL
Qty: 30 TABLET | Refills: 0 | Status: SHIPPED | OUTPATIENT
Start: 2018-06-17 | End: 2018-07-19 | Stop reason: SDUPTHER

## 2018-06-18 ENCOUNTER — TELEPHONE (OUTPATIENT)
Dept: HEMATOLOGY ONCOLOGY | Facility: CLINIC | Age: 78
End: 2018-06-18

## 2018-06-18 ENCOUNTER — APPOINTMENT (EMERGENCY)
Dept: RADIOLOGY | Facility: HOSPITAL | Age: 78
DRG: 189 | End: 2018-06-18
Payer: COMMERCIAL

## 2018-06-18 ENCOUNTER — HOSPITAL ENCOUNTER (INPATIENT)
Facility: HOSPITAL | Age: 78
LOS: 3 days | Discharge: HOME | DRG: 189 | End: 2018-06-21
Attending: EMERGENCY MEDICINE | Admitting: INTERNAL MEDICINE
Payer: COMMERCIAL

## 2018-06-18 DIAGNOSIS — I10 ESSENTIAL HYPERTENSION: ICD-10-CM

## 2018-06-18 DIAGNOSIS — E03.9 HYPOTHYROID: ICD-10-CM

## 2018-06-18 DIAGNOSIS — C34.92 PRIMARY ADENOCARCINOMA OF LEFT LUNG (HCC): ICD-10-CM

## 2018-06-18 DIAGNOSIS — J96.01 ACUTE RESPIRATORY FAILURE WITH HYPOXIA (HCC): ICD-10-CM

## 2018-06-18 DIAGNOSIS — C34.90 LUNG CANCER (HCC): ICD-10-CM

## 2018-06-18 DIAGNOSIS — J84.10 PULMONARY FIBROSIS (HCC): ICD-10-CM

## 2018-06-18 DIAGNOSIS — J84.10 PULMONARY FIBROSIS, UNSPECIFIED (HCC): ICD-10-CM

## 2018-06-18 DIAGNOSIS — C34.90 PRIMARY ADENOCARCINOMA OF LUNG, UNSPECIFIED LATERALITY (HCC): ICD-10-CM

## 2018-06-18 DIAGNOSIS — R06.00 DOE (DYSPNEA ON EXERTION): Primary | ICD-10-CM

## 2018-06-18 LAB
ANION GAP SERPL CALCULATED.3IONS-SCNC: 8 MMOL/L (ref 4–13)
BASOPHILS # BLD AUTO: 0.01 THOUSANDS/ΜL (ref 0–0.1)
BASOPHILS NFR BLD AUTO: 0 % (ref 0–1)
BUN SERPL-MCNC: 35 MG/DL (ref 5–25)
CALCIUM SERPL-MCNC: 9.9 MG/DL (ref 8.3–10.1)
CHLORIDE SERPL-SCNC: 100 MMOL/L (ref 100–108)
CO2 SERPL-SCNC: 27 MMOL/L (ref 21–32)
CREAT SERPL-MCNC: 1.25 MG/DL (ref 0.6–1.3)
EOSINOPHIL # BLD AUTO: 0.04 THOUSAND/ΜL (ref 0–0.61)
EOSINOPHIL NFR BLD AUTO: 1 % (ref 0–6)
ERYTHROCYTE [DISTWIDTH] IN BLOOD BY AUTOMATED COUNT: 17 % (ref 11.6–15.1)
GFR SERPL CREATININE-BSD FRML MDRD: 55 ML/MIN/1.73SQ M
GLUCOSE SERPL-MCNC: 107 MG/DL (ref 65–140)
HCT VFR BLD AUTO: 30.7 % (ref 36.5–49.3)
HGB BLD-MCNC: 9.9 G/DL (ref 12–17)
LYMPHOCYTES # BLD AUTO: 0.36 THOUSANDS/ΜL (ref 0.6–4.47)
LYMPHOCYTES NFR BLD AUTO: 6 % (ref 14–44)
MCH RBC QN AUTO: 29.8 PG (ref 26.8–34.3)
MCHC RBC AUTO-ENTMCNC: 32.2 G/DL (ref 31.4–37.4)
MCV RBC AUTO: 93 FL (ref 82–98)
MONOCYTES # BLD AUTO: 0.46 THOUSAND/ΜL (ref 0.17–1.22)
MONOCYTES NFR BLD AUTO: 8 % (ref 4–12)
NEUTROPHILS # BLD AUTO: 5.24 THOUSANDS/ΜL (ref 1.85–7.62)
NEUTS SEG NFR BLD AUTO: 86 % (ref 43–75)
PLATELET # BLD AUTO: 144 THOUSANDS/UL (ref 149–390)
PMV BLD AUTO: 9.8 FL (ref 8.9–12.7)
POTASSIUM SERPL-SCNC: 4.6 MMOL/L (ref 3.5–5.3)
RBC # BLD AUTO: 3.32 MILLION/UL (ref 3.88–5.62)
SODIUM SERPL-SCNC: 135 MMOL/L (ref 136–145)
TROPONIN I SERPL-MCNC: <0.02 NG/ML
WBC # BLD AUTO: 6.11 THOUSAND/UL (ref 4.31–10.16)

## 2018-06-18 PROCEDURE — 80048 BASIC METABOLIC PNL TOTAL CA: CPT | Performed by: EMERGENCY MEDICINE

## 2018-06-18 PROCEDURE — 93005 ELECTROCARDIOGRAM TRACING: CPT

## 2018-06-18 PROCEDURE — 36415 COLL VENOUS BLD VENIPUNCTURE: CPT | Performed by: EMERGENCY MEDICINE

## 2018-06-18 PROCEDURE — 71046 X-RAY EXAM CHEST 2 VIEWS: CPT

## 2018-06-18 PROCEDURE — 84484 ASSAY OF TROPONIN QUANT: CPT | Performed by: EMERGENCY MEDICINE

## 2018-06-18 PROCEDURE — 99285 EMERGENCY DEPT VISIT HI MDM: CPT

## 2018-06-18 PROCEDURE — 85025 COMPLETE CBC W/AUTO DIFF WBC: CPT | Performed by: EMERGENCY MEDICINE

## 2018-06-18 PROCEDURE — 99223 1ST HOSP IP/OBS HIGH 75: CPT | Performed by: INTERNAL MEDICINE

## 2018-06-18 RX ORDER — DIPHENOXYLATE HYDROCHLORIDE AND ATROPINE SULFATE 2.5; .025 MG/1; MG/1
1 TABLET ORAL DAILY
COMMUNITY
End: 2018-09-27

## 2018-06-18 RX ORDER — DOXAZOSIN MESYLATE 4 MG/1
4 TABLET ORAL
Status: DISCONTINUED | OUTPATIENT
Start: 2018-06-18 | End: 2018-06-21 | Stop reason: HOSPADM

## 2018-06-18 RX ORDER — HEPARIN SODIUM 5000 [USP'U]/ML
5000 INJECTION, SOLUTION INTRAVENOUS; SUBCUTANEOUS EVERY 8 HOURS SCHEDULED
Status: DISCONTINUED | OUTPATIENT
Start: 2018-06-18 | End: 2018-06-18

## 2018-06-18 RX ORDER — FOLIC ACID 1 MG/1
1 TABLET ORAL DAILY
Status: DISCONTINUED | OUTPATIENT
Start: 2018-06-19 | End: 2018-06-21 | Stop reason: HOSPADM

## 2018-06-18 RX ORDER — FINASTERIDE 5 MG/1
5 TABLET, FILM COATED ORAL DAILY
Status: DISCONTINUED | OUTPATIENT
Start: 2018-06-19 | End: 2018-06-21 | Stop reason: HOSPADM

## 2018-06-18 RX ORDER — LEVOTHYROXINE SODIUM 88 UG/1
88 TABLET ORAL
Status: DISCONTINUED | OUTPATIENT
Start: 2018-06-19 | End: 2018-06-21 | Stop reason: HOSPADM

## 2018-06-18 RX ORDER — ONDANSETRON 2 MG/ML
4 INJECTION INTRAMUSCULAR; INTRAVENOUS EVERY 4 HOURS PRN
Status: DISCONTINUED | OUTPATIENT
Start: 2018-06-18 | End: 2018-06-21 | Stop reason: HOSPADM

## 2018-06-18 RX ORDER — DEXAMETHASONE 4 MG/1
4 TABLET ORAL 2 TIMES DAILY PRN
Status: DISCONTINUED | OUTPATIENT
Start: 2018-06-18 | End: 2018-06-18

## 2018-06-18 RX ORDER — SODIUM CHLORIDE 9 MG/ML
125 INJECTION, SOLUTION INTRAVENOUS CONTINUOUS
Status: DISCONTINUED | OUTPATIENT
Start: 2018-06-18 | End: 2018-06-18

## 2018-06-18 RX ORDER — ACETAMINOPHEN 325 MG/1
650 TABLET ORAL EVERY 6 HOURS PRN
Status: DISCONTINUED | OUTPATIENT
Start: 2018-06-18 | End: 2018-06-21 | Stop reason: HOSPADM

## 2018-06-18 RX ORDER — CHLORAL HYDRATE 500 MG
1000 CAPSULE ORAL DAILY
Status: DISCONTINUED | OUTPATIENT
Start: 2018-06-19 | End: 2018-06-21 | Stop reason: HOSPADM

## 2018-06-18 RX ORDER — OMEGA-3-ACID ETHYL ESTERS 1 G/1
1 CAPSULE, LIQUID FILLED ORAL DAILY
COMMUNITY
End: 2018-09-27

## 2018-06-18 RX ADMIN — SODIUM CHLORIDE 1000 ML: 0.9 INJECTION, SOLUTION INTRAVENOUS at 10:37

## 2018-06-18 NOTE — TELEPHONE ENCOUNTER
Lorrie Lake from the [address] [place] [address] department called because he was scheduled to have a port placement today, but the patient was admitted into the hospital  Des Lake is wondering if a port placement is something that can be done while he is admitted  I can always call Des Lake back with whatever information you have   If you would like to call her yourself she can be reached at [phone] option 4

## 2018-06-18 NOTE — ED PROVIDER NOTES
History  Chief Complaint   Patient presents with    Shortness of Breath     pt was seen last week for same complaint of SOB  Pt declines being admitted each time  Hx of lung CA  Becomes easily SOB with activity     61-year-old male presents to the emergency department for evaluation of dyspnea on exertion  Patient states that he has been experiencing shortness of breath for the past 6 months  It has gradually been worsening  Today he is unable to even get dressed without feeling short of breath  Patient denies associated fevers or chills  No cough  No hemoptysis  Patient has a history of lung cancer and is currently receiving chemotherapy and radiation  Patient was evaluated in the department last week for similar symptoms, clear-cut etiology of symptoms was not found and patient left against medical advice  History provided by:  Patient, spouse and medical records   used: No    Shortness of Breath   Severity:  Severe  Onset quality:  Gradual  Timing:  Constant  Progression:  Worsening  Chronicity:  Chronic  Context: activity    Relieved by:  Rest  Worsened by: Activity and movement  Ineffective treatments:  None tried  Associated symptoms: no abdominal pain, no chest pain, no claudication, no cough, no fever, no headaches, no hemoptysis, no rash, no sputum production, no syncope, no vomiting and no wheezing    Risk factors: no recent surgery        Prior to Admission Medications   Prescriptions Last Dose Informant Patient Reported? Taking?   dexamethasone (DECADRON) 4 mg tablet 6/18/2018 at Unknown time Spouse/Significant Other No Yes   Sig: Take 1 tablet (4 mg total) by mouth 2 (two) times a day as needed (PLEASE SEE SIG NOTES) TAKE 1 TABLET WITH BREAKFAST, 1 TABLET WITH DINNER, DAY BEFORE, DAY OF, AND DAY AFTER CHEMO     doxazosin (CARDURA) 4 mg tablet 6/18/2018 at Unknown time  No Yes   Sig: TAKE 1 TABLET BY MOUTH DAILY AT BEDTIME   finasteride (PROSCAR) 5 mg tablet 6/18/2018 at Unknown time Spouse/Significant Other No Yes   Sig: Take 1 tablet (5 mg total) by mouth daily   folic acid (FOLVITE) 1 mg tablet 6/18/2018 at Unknown time Spouse/Significant Other No Yes   Sig: Take 1 tablet (1 mg total) by mouth daily   levothyroxine 88 mcg tablet 6/18/2018 at Unknown time Spouse/Significant Other Yes Yes   Sig: Take 88 mcg by mouth daily   multivitamin (THERAGRAN) TABS 6/18/2018 at Unknown time  Yes Yes   Sig: Take 1 tablet by mouth daily   omega-3-acid ethyl esters (LOVAZA) 1 g capsule 6/18/2018 at Unknown time  Yes Yes   Sig: Take 1 g by mouth daily   predniSONE 10 mg tablet 6/18/2018 at Unknown time  No Yes   Sig: Take 1 tablet (10 mg total) by mouth 2 (two) times a day with meals      Facility-Administered Medications: None       Past Medical History:   Diagnosis Date    Anemia     BPH (benign prostatic hyperplasia)     Cancer of lung (HCC)     Disease of thyroid gland     GAVE (gastric antral vascular ectasia)     Hypertension     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[address] )     Spinal stenosis     Tobacco abuse        Past Surgical History:   Procedure Laterality Date    ESOPHAGOGASTRODUODENOSCOPY N/A 3/20/2018    Procedure: ESOPHAGOGASTRODUODENOSCOPY (EGD); Surgeon: Danika Bishop MD;  Location: [facility]; Service: Gastroenterology       Family History   Problem Relation Age of Onset    Esophageal cancer Mother     Diabetes Father     No Known Problems Sister     No Known Problems Brother      I have reviewed and agree with the history as documented  Social History   Substance Use Topics    Smoking status: Former Smoker     Packs/day: 0 50     Years: 15 00     Types: Cigarettes     Quit date: 1/11/2018    Smokeless tobacco: Never Used      Comment: Quit December 2017    Alcohol use No        Review of Systems   Constitutional: Positive for fatigue  Negative for appetite change, fever and unexpected weight change  HENT: Negative for congestion      Respiratory: Positive for shortness of breath  Negative for cough, hemoptysis, sputum production and wheezing  Cardiovascular: Negative for chest pain, palpitations, claudication, leg swelling and syncope  Gastrointestinal: Negative for abdominal pain and vomiting  Genitourinary: Negative for dysuria  Musculoskeletal: Negative for back pain  Skin: Negative for rash  Neurological: Negative for headaches  All other systems reviewed and are negative  Physical Exam  Physical Exam   Constitutional: He is oriented to person, place, and time  Vital signs are normal  He appears well-developed and well-nourished  HENT:   Head: Normocephalic and atraumatic  Mouth/Throat: Mucous membranes are dry  Eyes: Conjunctivae and EOM are normal  Pupils are equal, round, and reactive to light  Neck: Normal range of motion  Neck supple  Cardiovascular: Normal rate, regular rhythm, normal heart sounds and intact distal pulses  Exam reveals no friction rub  No murmur heard  Pulmonary/Chest: Effort normal and breath sounds normal  No accessory muscle usage  No respiratory distress  He has no decreased breath sounds  He has no wheezes  He has no rhonchi  He exhibits no tenderness  Faint crackles at bilateral bases   Abdominal: Soft  Normal appearance and bowel sounds are normal  He exhibits no distension  There is no tenderness  There is no rebound and no guarding  Musculoskeletal: Normal range of motion  He exhibits no edema, tenderness or deformity  Lymphadenopathy:     He has no cervical adenopathy  Neurological: He is alert and oriented to person, place, and time  He has normal strength and normal reflexes  Coordination normal    Skin: Skin is warm, dry and intact  No rash noted  Psychiatric: He has a normal mood and affect  His behavior is normal  Judgment and thought content normal    Nursing note and vitals reviewed        Vital Signs  ED Triage Vitals   Temperature Pulse Respirations Blood Pressure SpO2 06/18/18 0949 06/18/18 0949 06/18/18 0949 06/18/18 0949 06/18/18 0949   97 8 °F (36 6 °C) 72 (!) 24 132/71 92 %      Temp Source Heart Rate Source Patient Position - Orthostatic VS BP Location FiO2 (%)   06/18/18 0949 06/18/18 0949 06/18/18 1154 06/18/18 1154 --   Oral Monitor Sitting Right arm       Pain Score       06/18/18 0949       No Pain           Vitals:    06/18/18 0949 06/18/18 1154 06/18/18 1330   BP: 132/71 143/68 129/67   Pulse: 72 66 72   Patient Position - Orthostatic VS:  Sitting        Visual Acuity      ED Medications  Medications   sodium chloride 0 9 % bolus 1,000 mL (0 mL Intravenous Stopped 6/18/18 1139)       Diagnostic Studies  Results Reviewed     Procedure Component Value Units Date/Time    Troponin I [[id number]]  (Normal) Collected:  06/18/18 1037    Lab Status:  Final result Specimen:  Blood from Arm, Right Updated:  06/18/18 1108     Troponin I <0 02 ng/mL     Basic metabolic panel [[id number]]  (Abnormal) Collected:  06/18/18 1037    Lab Status:  Final result Specimen:  Blood from Arm, Right Updated:  06/18/18 1103     Sodium 135 (L) mmol/L      Potassium 4 6 mmol/L      Chloride 100 mmol/L      CO2 27 mmol/L      Anion Gap 8 mmol/L      BUN 35 (H) mg/dL      Creatinine 1 25 mg/dL      Glucose 107 mg/dL      Calcium 9 9 mg/dL      eGFR 55 ml/min/1 73sq m     Narrative:         National Kidney Disease Education Program recommendations are as follows:  GFR calculation is accurate only with a steady state creatinine  Chronic Kidney disease less than 60 ml/min/1 73 sq  meters  Kidney failure less than 15 ml/min/1 73 sq  meters      CBC and differential [[id number]]  (Abnormal) Collected:  06/18/18 1037    Lab Status:  Final result Specimen:  Blood from Arm, Right Updated:  06/18/18 1048     WBC 6 11 Thousand/uL      RBC 3 32 (L) Million/uL      Hemoglobin 9 9 (L) g/dL      Hematocrit 30 7 (L) %      MCV 93 fL      MCH 29 8 pg      MCHC 32 2 g/dL      RDW 17 0 (H) %      MPV 9 8 fL Platelets 638 (L) Thousands/uL      Neutrophils Relative 86 (H) %      Lymphocytes Relative 6 (L) %      Monocytes Relative 8 %      Eosinophils Relative 1 %      Basophils Relative 0 %      Neutrophils Absolute 5 24 Thousands/µL      Lymphocytes Absolute 0 36 (L) Thousands/µL      Monocytes Absolute 0 46 Thousand/µL      Eosinophils Absolute 0 04 Thousand/µL      Basophils Absolute 0 01 Thousands/µL                  XR chest 2 views   Final Result by Justin Paredes DO (06/18 1234)   Mild pulmonary vascular congestion, superimposed on underlying pulmonary fibrosis        Workstation performed: [id number]                    Procedures  ECG 12 Lead Documentation  Date/Time: 6/18/2018 11:05 AM  Performed by: Raina Wilson  Authorized by: JARETT FORD     Indications / Diagnosis:  SOB  ECG reviewed by me, the ED Provider: yes    Patient location:  ED  Previous ECG:     Previous ECG:  Compared to current    Comparison ECG info:  6/11/18  Interpretation:     Interpretation: non-specific    Rate:     ECG rate:  70    ECG rate assessment: normal    Rhythm:     Rhythm: sinus rhythm    Ectopy:     Ectopy: none    QRS:     QRS axis:  Normal  Conduction:     Conduction: abnormal      Abnormal conduction: incomplete RBBB             Phone Contacts  ED Phone Contact    ED Course                               MDM  Number of Diagnoses or Management Options  BLACK (dyspnea on exertion): new and requires workup  Lung cancer [facility]): new and requires workup  Pulmonary fibrosis, unspecified ([facility] [address] ): new and requires workup     Amount and/or Complexity of Data Reviewed  Clinical lab tests: ordered and reviewed  Tests in the radiology section of CPT®: ordered and reviewed  Tests in the medicine section of CPT®: ordered and reviewed  Decide to obtain previous medical records or to obtain history from someone other than the patient: yes  Obtain history from someone other than the patient: yes  Discuss the patient with other providers: yes  Independent visualization of images, tracings, or specimens: yes    Patient Progress  Patient progress: stable    CritCare Time    Disposition  Final diagnoses:   BLACK (dyspnea on exertion)   Pulmonary fibrosis, unspecified ([facility] )   Lung cancer ([facility] )     Time reflects when diagnosis was documented in both MDM as applicable and the Disposition within this note     Time User Action Codes Description Comment    6/18/2018 10:45 AM Whitley Urbina [R06 09] BLACK (dyspnea on exertion)     6/18/2018 10:45 AM Whitley Urbina [J84 10] Pulmonary fibrosis, unspecified ([facility] )     6/18/2018 10:45 AM Whitley Urbina [C34 90] Lung cancer [facility])       ED Disposition     ED Disposition Condition Comment    Admit  Case was discussed with Dr Kristen Santana and the patient's admission status was agreed to be Admission Status: inpatient status to the service of Dr Kristen Santana   Follow-up Information    None         Patient's Medications   Discharge Prescriptions    No medications on file     No discharge procedures on file      ED Provider  Electronically Signed by           Justina Jaramillo DO  06/18/18 0723

## 2018-06-18 NOTE — NURSING NOTE
Pt asked to use bathroom when he arrived from ED  Pt ambulated to bathroom on room air with pulse ox on, pulse ox dropped to 87%  When pt was done urinating he was walked back to the bed and his pulse ox was found to be 81% on room air  O2 2 liters via NC applied and pulse ox went up to 95-97%  Dr Vicky Castrejon was made aware

## 2018-06-18 NOTE — TELEPHONE ENCOUNTER
Patient was scheduled for a direct admission today at [facility], but has not heard anything yet  Patient's wife called to report that the patient "can't breathe when walking" and "breathing is a lot worse"  Patient's wife was directed to take the patient to the ER immediately

## 2018-06-19 ENCOUNTER — APPOINTMENT (INPATIENT)
Dept: CT IMAGING | Facility: HOSPITAL | Age: 78
DRG: 189 | End: 2018-06-19
Payer: COMMERCIAL

## 2018-06-19 LAB
ANION GAP SERPL CALCULATED.3IONS-SCNC: 8 MMOL/L (ref 4–13)
ATRIAL RATE: 70 BPM
BUN SERPL-MCNC: 33 MG/DL (ref 5–25)
CALCIUM SERPL-MCNC: 9 MG/DL (ref 8.3–10.1)
CHLORIDE SERPL-SCNC: 101 MMOL/L (ref 100–108)
CO2 SERPL-SCNC: 26 MMOL/L (ref 21–32)
CREAT SERPL-MCNC: 1.23 MG/DL (ref 0.6–1.3)
ERYTHROCYTE [DISTWIDTH] IN BLOOD BY AUTOMATED COUNT: 17.5 % (ref 11.6–15.1)
GFR SERPL CREATININE-BSD FRML MDRD: 56 ML/MIN/1.73SQ M
GLUCOSE SERPL-MCNC: 86 MG/DL (ref 65–140)
HCT VFR BLD AUTO: 28.5 % (ref 36.5–49.3)
HGB BLD-MCNC: 9 G/DL (ref 12–17)
MCH RBC QN AUTO: 29.7 PG (ref 26.8–34.3)
MCHC RBC AUTO-ENTMCNC: 31.6 G/DL (ref 31.4–37.4)
MCV RBC AUTO: 94 FL (ref 82–98)
P AXIS: 57 DEGREES
PLATELET # BLD AUTO: 136 THOUSANDS/UL (ref 149–390)
PMV BLD AUTO: 10 FL (ref 8.9–12.7)
POTASSIUM SERPL-SCNC: 4.3 MMOL/L (ref 3.5–5.3)
PR INTERVAL: 154 MS
PROCALCITONIN SERPL-MCNC: 0.11 NG/ML
QRS AXIS: -18 DEGREES
QRSD INTERVAL: 98 MS
QT INTERVAL: 382 MS
QTC INTERVAL: 412 MS
RBC # BLD AUTO: 3.03 MILLION/UL (ref 3.88–5.62)
SODIUM SERPL-SCNC: 135 MMOL/L (ref 136–145)
T WAVE AXIS: 26 DEGREES
VENTRICULAR RATE: 70 BPM
WBC # BLD AUTO: 5.62 THOUSAND/UL (ref 4.31–10.16)

## 2018-06-19 PROCEDURE — 80048 BASIC METABOLIC PNL TOTAL CA: CPT | Performed by: INTERNAL MEDICINE

## 2018-06-19 PROCEDURE — 71275 CT ANGIOGRAPHY CHEST: CPT

## 2018-06-19 PROCEDURE — 93010 ELECTROCARDIOGRAM REPORT: CPT | Performed by: INTERNAL MEDICINE

## 2018-06-19 PROCEDURE — 99223 1ST HOSP IP/OBS HIGH 75: CPT | Performed by: INTERNAL MEDICINE

## 2018-06-19 PROCEDURE — 84145 PROCALCITONIN (PCT): CPT | Performed by: NURSE PRACTITIONER

## 2018-06-19 PROCEDURE — 99221 1ST HOSP IP/OBS SF/LOW 40: CPT | Performed by: SPECIALIST

## 2018-06-19 PROCEDURE — 99232 SBSQ HOSP IP/OBS MODERATE 35: CPT | Performed by: INTERNAL MEDICINE

## 2018-06-19 PROCEDURE — 85027 COMPLETE CBC AUTOMATED: CPT | Performed by: INTERNAL MEDICINE

## 2018-06-19 RX ORDER — PREDNISONE 10 MG/1
10 TABLET ORAL 2 TIMES DAILY WITH MEALS
Status: DISCONTINUED | OUTPATIENT
Start: 2018-06-19 | End: 2018-06-19

## 2018-06-19 RX ADMIN — FINASTERIDE 5 MG: 5 TABLET, FILM COATED ORAL at 08:01

## 2018-06-19 RX ADMIN — PREDNISONE 77 MG: 20 TABLET ORAL at 15:59

## 2018-06-19 RX ADMIN — ENOXAPARIN SODIUM 40 MG: 100 INJECTION SUBCUTANEOUS at 08:01

## 2018-06-19 RX ADMIN — Medication 1000 MG: at 08:01

## 2018-06-19 RX ADMIN — IOHEXOL 85 ML: 350 INJECTION, SOLUTION INTRAVENOUS at 13:33

## 2018-06-19 RX ADMIN — FOLIC ACID 1 MG: 1 TABLET ORAL at 08:01

## 2018-06-19 RX ADMIN — LEVOTHYROXINE SODIUM 88 MCG: 88 TABLET ORAL at 05:06

## 2018-06-19 NOTE — H&P
History & Physical - [facility] Service - Internal Medicine      PATIENT INFORMATION      Cecilia Allison 69 y o  male MRN: [id number]  Unit/Bed#: -01 Encounter: [id number]  Admitting Physician: Ariadna Reyez MD  PCP: Mikey Aj MD  Date of Admission:  06/18/18      ASSESSMENTS & PLAN       1  Acute respiratory failure with hypoxia  · Currently maintained on 2 L of oxygen via nasal cannula with home baseline of room air  · Multifactorial secondary to her lung adenocarcinoma with progressive fibrosis possibly idiopathic pulmonary versus radiation induced    2  Adenocarcinoma of lung - Metastasis to thoracic spine and lymph nodes  · Follows with outpatient Oncology - status post nine cycles of radiation (last session in January 2018) - currently undergoing chemotherapy (last session two weeks ago) - last two cycles have been without Carboplatin due to side effects but continuation of Alimta along with Keytruda for immunotherapy  · Will appreciate inpatient Oncology input - PRN pain/dyspnea control otherwise     3  Pulmonary fibrosis  · Possibly radiation induced Versus idiopathic - clinically seems associated w/ prior cycles of radiation for lung cancer as symptoms started per patient around that time and he was also seen by gastroenterology for radiation-induced esophagitis earlier this spring  · Continue supplemental oxygen - serial CT scans earlier this year reveal progressive worsening of disease - will appreciate pulmonology input    4  Essential hypertension  · Low-sodium diet encouraged - continue Cardura     5  Hypothyroidism  · Continue Synthroid    6    Benign prostatic hyperplasia  · Continue Proscar      DVT Prophylaxis:  Heparin SC      CHIEF COMPLAINT      Shortness of breath      HISTORY OF PRESENT ILLNESS      Cecilia Allison is a 69 y o  male who presents with progressively worsening shortness of breath over the last few months was acutely worsened this morning per patient and wife at bedside  He has a history of lung adenocarcinoma diagnosed approximately 6-7 months ago with thoracic spinal and lymph node metastasis  He has undergone nine sessions of radiation with his last session being back in January along with multiple cycles of chemotherapy  He also developed radiation induced esophagitis approximately three months ago and underwent an endoscopy per gastroenterology  Per patient and wife, he was noted to be increasingly short of breath this morning and could not even do simple activities of daily living including walking short distances or putting his clothes on  He was found to be hypoxic with levels in the 80s which improved with supplemental oxygenation  Upon my encounter, he was oxygenating on 2 L via nasal cannula and per nursing on the floor, even walking to the bathroom from his bed cause dyspnea on exertion  XR imaging of the chest in the ER did revealed underlying pulmonary fibrosis with mild pulmonary vascular congestion  Prior CT of chest imaging in March and May of this year respectively did reveal progression of fibrotic interstitial lung disease  The patient is visibly anxious due to his shortness of breath but feels at ease when resting in bed  Denies other complaints at this time  REVIEW OF SYSTEMS      Review of Systems - A thorough 12 point review systems was conducted  Pertinent positives and negatives are mentioned in the history of present illness  PAST MEDICAL & SURGICAL HISTORY      Past Medical History:   Diagnosis Date    Anemia     BPH (benign prostatic hyperplasia)     Cancer of lung ([address] )     Disease of thyroid gland     GAVE (gastric antral vascular ectasia)     Hypertension     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[address] )     Spinal stenosis     Tobacco abuse        Past Surgical History:   Procedure Laterality Date    ESOPHAGOGASTRODUODENOSCOPY N/A 3/20/2018    Procedure: ESOPHAGOGASTRODUODENOSCOPY (EGD);   Surgeon: Juanito Burks Kellie Howell MD;  Location: [facility]; Service: Gastroenterology         MEDICATIONS & ALLERGIES       Prior to Admission medications    Medication Sig Start Date End Date Taking? Authorizing Provider   dexamethasone (DECADRON) 4 mg tablet Take 1 tablet (4 mg total) by mouth 2 (two) times a day as needed (PLEASE SEE SIG NOTES) TAKE 1 TABLET WITH BREAKFAST, 1 TABLET WITH DINNER, DAY BEFORE, DAY OF, AND DAY AFTER CHEMO  4/16/18  Yes Hanna Gusman MD   doxazosin (CARDURA) 4 mg tablet TAKE 1 TABLET BY MOUTH DAILY AT BEDTIME 6/17/18  Yes Hanna Gusman MD   finasteride (PROSCAR) 5 mg tablet Take 1 tablet (5 mg total) by mouth daily 4/30/18  Yes Hanna Gusman MD   folic acid (FOLVITE) 1 mg tablet Take 1 tablet (1 mg total) by mouth daily 4/24/18  Yes Carmen Keith PA-C   levothyroxine 88 mcg tablet Take 88 mcg by mouth daily   Yes Historical Provider, MD   multivitamin (THERAGRAN) TABS Take 1 tablet by mouth daily   Yes Historical Provider, MD   omega-3-acid ethyl esters (LOVAZA) 1 g capsule Take 1 g by mouth daily   Yes Historical Provider, MD   predniSONE 10 mg tablet Take 1 tablet (10 mg total) by mouth 2 (two) times a day with meals 6/13/18  Yes Hanna Gusman MD         Allergies: No Known Allergies      SOCIAL HISTORY         Marital Status: /Civil Union   Occupation:  Retired  Patient Pre-hospital Living Situation:  Lives at home with wife  Patient Pre-hospital Level of Mobility:  Freely ambulates    Substance Use History:   History   Alcohol Use No     History   Smoking Status    Former Smoker    Packs/day: 0 50    Years: 15 00    Types: Cigarettes    Quit date: 1/11/2018   Smokeless Tobacco    Never Used     Comment: Quit December 2017     History   Drug Use No         FAMILY HISTORY      Significant for softer cancer in mother  Significant diabetes mellitus in father        PHYSICAL EXAM      Vitals:   Blood Pressure: 139/73 (06/18/18 1844)  Pulse: 75 (06/18/18 1844)  Temperature: 99 3 °F (37 4 °C) (06/18/18 1844)  Temp Source: Oral (06/18/18 1844)  Respirations: 18 (06/18/18 1844)  Height: 5' 7" (170 2 cm) (06/18/18 1844)  Weight - Scale: 77 2 kg (170 lb 3 1 oz) (06/18/18 1844)  SpO2: 95 % (06/18/18 1844)      GENERAL:  Well-developed/nourished - waxing/waning distress respiratory distress  HEAD:  Normocephalic - atraumatic  EYES: PERRL - EOMI   MOUTH:  Mucosa moist  NECK:  Supple - full range of motion  CARDIAC:  Regular rate/rhythm - S1/S2 positive  PULMONARY:  Clear breath sounds bilaterally   ABDOMEN:  Soft - nontender/nondistended - active bowel sounds  MUSCULOSKELETAL:  Motor strength/range of motion intact  NEUROLOGIC:  Alert/oriented x 3 - cranial nerves grossly intact  SKIN:  Chronic wrinkles/blemishes   PSYCHIATRIC:  Mood/affect stable      ADDITIONAL DATA     Lab Results:       Results from last 7 days  Lab Units 06/18/18  1037   WBC Thousand/uL 6 11   HEMOGLOBIN g/dL 9 9*   HEMATOCRIT % 30 7*   PLATELETS Thousands/uL 144*   NEUTROS PCT % 86*   LYMPHS PCT % 6*   MONOS PCT % 8   EOS PCT % 1       Results from last 7 days  Lab Units 06/18/18  1037   SODIUM mmol/L 135*   POTASSIUM mmol/L 4 6   CHLORIDE mmol/L 100   CO2 mmol/L 27   BUN mg/dL 35*   CREATININE mg/dL 1 25   CALCIUM mg/dL 9 9   GLUCOSE RANDOM mg/dL 107           Imaging:     Xr Chest 2 Views    Result Date: 6/18/2018  Narrative: CHEST INDICATION:   SOB  COMPARISON:  Chest radiographs June 11, 2018 EXAM PERFORMED/VIEWS:  XR CHEST PA & LATERAL  The frontal view was performed utilizing dual energy radiographic technique  Images: 4 FINDINGS: Lung volumes diminished  Elevation of right hemidiaphragm  Persistent changes, consistent with pulmonary fibrosis  Vague groundglass infiltrates in the lower lobes of the lungs bilaterally, suggesting a mild degree of pulmonary vascular congestion  The heart is top normal in size  Atherosclerotic changes in the aorta  Degenerative changes thoracic spine  Stable sclerotic metastatic lesions  Impression: Mild pulmonary vascular congestion, superimposed on underlying pulmonary fibrosis  Workstation performed: [id number]     Xr Chest 2 Views    Result Date: 6/11/2018  Narrative: CHEST INDICATION:   sob  COMPARISON:  CT chest 5/29/2018 and priors EXAM PERFORMED/VIEWS:  XR CHEST PA & LATERAL FINDINGS: Cardiomediastinal silhouette appears unremarkable  Stable appearance of pulmonary fibrosis  Known lung nodules better seen on prior CT  Otherwise no new consolidation  Osseous structures appear within normal limits for patient age  Impression: Stable appearance of pulmonary fibrosis  Known lung nodules better seen on prior CT  Otherwise no new consolidation  Workstation performed: [id number]     Ct Chest Abdomen Pelvis W Contrast    Result Date: 6/1/2018  Narrative: CT CHEST, ABDOMEN AND PELVIS WITH IV CONTRAST INDICATION:   C34 32: Malignant neoplasm of lower lobe, left bronchus or lung C79 51: Secondary malignant neoplasm of bone  Evaluate metastasis  COMPARISON: 3/19/2018, 3/9/2018 TECHNIQUE: CT examination of the chest, abdomen and pelvis was performed  Axial, sagittal, and coronal 2D reformatted images were created from the source data and submitted for interpretation  Radiation dose length product (DLP) for this visit:  606 mGy-cm   This examination, like all CT scans performed in the [facility], was performed utilizing techniques to minimize radiation dose exposure, including the use of iterative reconstruction and automated exposure control  IV Contrast:  100 mL of iohexol (OMNIPAQUE) Enteric Contrast: Enteric contrast was administered  FINDINGS: CHEST LUNGS:  Again seen is moderate diffuse pulmonary fibrosis  This has progressed from earlier CTs  Primary malignancy in the left lower lobe has decreased compared to 3/9/2018 measuring 0 8 x 1 2 cm, previously 1 2 x 1 3 cm  A 6 mm right middle lobe nodule is stable  Small left upper lobe nodules are stable   PLEURA: Unremarkable  HEART/GREAT VESSELS:  Unremarkable for patient's age  MEDIASTINUM AND SARI:  There is diffuse esophageal thickening  Previously seen lymphadenopathy in the left hilum and mediastinum has resolved  CHEST WALL AND LOWER NECK:   Unremarkable  ABDOMEN LIVER/BILIARY TREE:  There is a stable hypodense lesion in the liver, likely a small cyst  GALLBLADDER:  No calcified gallstones  No pericholecystic inflammatory change  SPLEEN:  Unremarkable  PANCREAS:  Unremarkable  ADRENAL GLANDS:  Unremarkable  KIDNEYS/URETERS:  There is a large cystic structure originating from the right collecting system  On coronal images, this appears to communicate with the infundibula and calyces and may represent a severely dilated extrarenal pelvis secondary to UPJ obstruction  Parapelvic cyst is also possible though there are no delayed images to confirm this  This is stable from prior studies dating back to 1/11/2018 Multiple additional cysts are identified in both kidneys  A cyst in the left upper pole is septated measuring 3 8 x 3 9 cm though stable  STOMACH AND BOWEL:  There is colonic diverticulosis without evidence of acute diverticulitis  APPENDIX:  No findings to suggest appendicitis  ABDOMINOPELVIC CAVITY:  No ascites or free intraperitoneal air  No lymphadenopathy  VESSELS:  Unremarkable for patient's age  PELVIS REPRODUCTIVE ORGANS:  The prostate is enlarged  URINARY BLADDER:  Unremarkable  ABDOMINAL WALL/INGUINAL REGIONS:  There is a right inguinal hernia containing a loop of nonobstructed sigmoid colon  There is a fat-containing left inguinal hernia  OSSEOUS STRUCTURES:  Again seen is a large lytic lesion involving the T10 vertebral body with additional lytic/sclerotic lesions seen at T3, T7-T9, T11, and L1  Impression: 1  Stable lytic and sclerotic spinal metastasis  No new or enlarging metastatic lesions  2   Decreased primary lung lesion and resolution of mediastinal/hilar lymphadenopathy   3   Large cystic structure arising from the right collecting system, parapelvic cyst versus severely dilated extrarenal pelvis due to UPJ obstruction  This would be better delineated by delayed imaging on follow-up  4   Multiple additional renal cysts are seen including a complex septated cyst on the left  5   Moderate pulmonary fibrosis which has progressed from earlier CTs  Stable nodules  6   Right inguinal hernia containing nonobstructed sigmoid colon  Workstation performed: [id number]       Code Status:  Full code      Anticipated Length of Stay:  Patient will be admitted on an Inpatient basis with an anticipated length of stay of  greater than 2 midnights  Justification for Hospital Stay:  Acute respiratory failure requiring oxygen supplementation with a history of lung adenocarcinoma and underlying/progressive pulmonary fibrosis  Total Time for Visit, including Counseling / Coordination of Care: 72 minutes  Greater than 50% of this total time spent on direct patient counseling and coordination of care     ** Please Note: This note is constructed using a voice recognition dictation system   **

## 2018-06-19 NOTE — CONSULTS
Consultation - Pulmonary Medicine   Chip Aguirre 69 y o  male MRN: [id number]  Unit/Bed#: -01 Encounter: [id number]      Assessment/Plan:    Acute hypoxic respiratory failure, multifactorial due to below   Titrate oxygen to maintain POX > or = 88%  OOB as tolerated and evaluate ambulatory POX  Abnormal CXR/CT chest with diffuse pulmonary fibrosis and LLL lung mass    CTA of the chest was ordered by primary team and reviewed  There is no PE  It does show extensive pulmonary fibrosis and right lower lobe consolidation concerning for pneumonia  There is also crazy paving attenuation in the left lower lobe  In review of past CT scans, Juana Mccoy had mild pulmonary fibrosis back in January 2018, prior to his lung cancer treatment; however, interstitial changes have clearly progressed  Etiology could be radiation fibrosis versus immunotherapy-induced pneumonitis +/- underlying fibrosis prior to lung cancer treatment  D/W Oncology and prednisone started for pneumonitis  Juana Mccoy shows no signs of pneumonia  Check procalcitonin  Check echocardiogram     Mild Restrictive Lung Disease, suspect due to mild pulmonary fibrosis   Outpatient follow-up and repeat imaging  Metastatic Adenocarcinoma of the Lung   Management per Oncology  Outpatient follow-up and repeat imaging pending hospital course  D/W Oncology and patient's family at bedside  History of Present Illness   Physician Requesting Consult: Danielle Thompson MD  Reason for Consult / Principal Problem:  Pulmonary fibrosis, lung cancer  Hx and PE limited by:  none  Chief Complaint:  "I was short of breath  "  HPI: Chip Aguirre is a 69 y o   male who presented to [place] [address] with complaints of shortness of breath  He reports that he started with worsening shortness of breath a few weeks ago  He reports he has had an occasional dry cough, but is not producing any sputum    He denies chest pain, but occasionally has chest tightness  He has not had any fevers, chills, or night sweats  He reports he was diagnosed with lung cancer in January of this year  He has metastatic lesions in his spine and underwent radiation therapy in March  He has been undergoing chemotherapy and his last chemotherapy was approximately two weeks ago  He is due for chemotherapy in another two weeks  He reports due to the radiation, he had significant radiation esophagitis diagnosed by EGD, and lost about 20 pounds since that time  His symptoms associated with this are improving  At baseline, he does not follow with a pulmonologist   He does not wear oxygen, nor does he use inhaled therapies  Aside from shortness of breath, he denies any other complaints  He does report that his shortness of breath is only with exertion  Inpatient consult to Pulmonology  Consult performed by: Marisa Masterson ordered by: Harriet Franco        Review of Systems   All other systems reviewed and are negative  A full 12-point review of systems was completed and is negative except for those outlined in the HPI  Historical Information   Past Medical History:   Diagnosis Date    Anemia     BPH (benign prostatic hyperplasia)     [facility] [address] [address]     Disease of thyroid gland     GAVE (gastric antral vascular ectasia)     Hypertension     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     Spinal stenosis     Tobacco abuse      Past Surgical History:   Procedure Laterality Date    ESOPHAGOGASTRODUODENOSCOPY N/A 3/20/2018    Procedure: ESOPHAGOGASTRODUODENOSCOPY (EGD); Surgeon: Greta Colon MD;  Location: [facility];   Service: Gastroenterology     Social History   History   Alcohol Use No     History   Drug Use No     History   Smoking Status    Former Smoker    Packs/day: 0 50    Years: 15 00    Types: Cigarettes    Quit date: 1/11/2018   Smokeless Tobacco    Never Used     Comment: Quit December 2017     Occupational History: Retired, but previously worked as an  and had significant exposures to asbestos and other inhalants  Family History:   Family History   Problem Relation Age of Onset    Esophageal cancer Mother     Diabetes Father     No Known Problems Sister     No Known Problems Brother      Meds/Allergies   all current active meds have been reviewed, pertinent pulmonary meds have been reviewed, current meds:   Current Facility-Administered Medications   Medication Dose Route Frequency    acetaminophen (TYLENOL) tablet 650 mg  650 mg Oral Q6H PRN    doxazosin (CARDURA) tablet 4 mg  4 mg Oral HS    enoxaparin (LOVENOX) subcutaneous injection 40 mg  40 mg Subcutaneous Q24H [address]    finasteride (PROSCAR) tablet 5 mg  5 mg Oral Daily    fish oil capsule 1,000 mg  1,000 mg Oral Daily    folic acid (FOLVITE) tablet 1 mg  1 mg Oral Daily    levothyroxine tablet 88 mcg  88 mcg Oral Early Morning    ondansetron (ZOFRAN) injection 4 mg  4 mg Intravenous Q4H PRN    predniSONE tablet 77 mg  1 mg/kg/day Oral Daily    and PTA meds:   Prior to Admission Medications   Prescriptions Last Dose Informant Patient Reported? Taking?   dexamethasone (DECADRON) 4 mg tablet 6/18/2018 at Unknown time Spouse/Significant Other No Yes   Sig: Take 1 tablet (4 mg total) by mouth 2 (two) times a day as needed (PLEASE SEE SIG NOTES) TAKE 1 TABLET WITH BREAKFAST, 1 TABLET WITH DINNER, DAY BEFORE, DAY OF, AND DAY AFTER CHEMO     doxazosin (CARDURA) 4 mg tablet 6/18/2018 at Unknown time  No Yes   Sig: TAKE 1 TABLET BY MOUTH DAILY AT BEDTIME   finasteride (PROSCAR) 5 mg tablet 6/18/2018 at Unknown time Spouse/Significant Other No Yes   Sig: Take 1 tablet (5 mg total) by mouth daily   folic acid (FOLVITE) 1 mg tablet 6/18/2018 at Unknown time Spouse/Significant Other No Yes   Sig: Take 1 tablet (1 mg total) by mouth daily   levothyroxine 88 mcg tablet 6/18/2018 at Unknown time Spouse/Significant Other Yes Yes   Sig: Take 88 mcg by mouth daily   multivitamin (THERAGRAN) TABS 6/18/2018 at Unknown time  Yes Yes   Sig: Take 1 tablet by mouth daily   omega-3-acid ethyl esters (LOVAZA) 1 g capsule 6/18/2018 at Unknown time  Yes Yes   Sig: Take 1 g by mouth daily   predniSONE 10 mg tablet 6/18/2018 at Unknown time  No Yes   Sig: Take 1 tablet (10 mg total) by mouth 2 (two) times a day with meals      Facility-Administered Medications: None     No Known Allergies    Objective   Vitals: Blood pressure 115/64, pulse 84, temperature 98 1 °F (36 7 °C), temperature source Oral, resp  rate 18, height 5' 7" (1 702 m), weight 77 2 kg (170 lb 3 1 oz), SpO2 96 %  2L NC,Body mass index is 26 66 kg/m²  No intake or output data in the 24 hours ending 06/19/18 1233  Invasive Devices     Peripheral Intravenous Line            Peripheral IV 06/18/18 Right Antecubital 1 day              Physical Exam   Constitutional: He is oriented to person, place, and time  He appears well-developed and well-nourished  He is cooperative  Non-toxic appearance  No distress  HENT:   Head: Normocephalic and atraumatic  Mouth/Throat: No oropharyngeal exudate  Eyes: EOM are normal  No scleral icterus  Neck: Neck supple  No JVD present  No tracheal deviation present  Cardiovascular: Normal rate, regular rhythm, S1 normal and S2 normal   Exam reveals no gallop and no friction rub  No murmur heard  Pulmonary/Chest: Effort normal  No accessory muscle usage or stridor  No tachypnea  No respiratory distress  He has no decreased breath sounds  He has no wheezes  He has no rhonchi  He has rales in the right lower field and the left lower field  He exhibits no tenderness  Abdominal: Soft  Bowel sounds are normal  He exhibits no distension  There is no tenderness  There is no rebound and no guarding  Musculoskeletal: He exhibits no edema or tenderness  Neurological: He is alert and oriented to person, place, and time  He has normal strength  GCS eye subscore is 4  GCS verbal subscore is 5  GCS motor subscore is 6  Skin: Skin is warm and dry  No abrasion, no ecchymosis, no lesion and no rash noted  He is not diaphoretic  No cyanosis or erythema  Nails show no clubbing  Psychiatric: He has a normal mood and affect  His speech is normal and behavior is normal    Vitals reviewed  Lab Results:     CBC:   Lab Results   Component Value Date    WBC 5 62 06/19/2018    HGB 9 0 (L) 06/19/2018    HCT 28 5 (L) 06/19/2018    MCV 94 06/19/2018     (L) 06/19/2018    MCH 29 7 06/19/2018    MCHC 31 6 06/19/2018    RDW 17 5 (H) 06/19/2018    MPV 10 0 06/19/2018   , CMP:   Lab Results   Component Value Date     (L) 06/19/2018    K 4 3 06/19/2018     06/19/2018    CO2 26 06/19/2018    ANIONGAP 8 06/19/2018    BUN 33 (H) 06/19/2018    CREATININE 1 23 06/19/2018    GLUCOSE 86 06/19/2018    CALCIUM 9 0 06/19/2018    EGFR 56 06/19/2018     Imaging Studies: I have personally reviewed pertinent reports   , I have personally reviewed pertinent films in PACS and Chest x-ray shows pulmonary fibrosis with questionable increased pulmonary vascular congestion    EKG, Pathology, and Other Studies: Echocardiogram pending    Pulmonary Results (PFTs, PSG): I have personally reviewed pertinent reports  and PFTs from January 2018:  FEV1/ FVC ratio 79% with FEV1 of 2 53 liters or 96% predicted and FVC of 3 19 liters or 87% predicted  Total lung capacity was 79% predicted and residual volume was 94% predicted  DLCO corrected for patient's hemoglobin was 64%  VTE Prophylaxis: Sequential compression device (Venodyne)  and Enoxaparin (Lovenox)    Code Status: Level 1 - Full Code    None    Portions of the record may have been created with voice recognition software  Occasional wrong word or "sound a like" substitutions may have occurred due to the inherent limitations of voice recognition software  Read the chart carefully and recognize, using context, where substitutions have occurred

## 2018-06-19 NOTE — ASSESSMENT & PLAN NOTE
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

## 2018-06-19 NOTE — ASSESSMENT & PLAN NOTE
· On chemotherapy and immunotherapy, status post radiation to thoracic spine Mets  · Management per Oncology

## 2018-06-19 NOTE — CASE MANAGEMENT
Initial Clinical Review    Admission: Date/Time/Statement: 6/18/18 @ 1046     Orders Placed This Encounter   Procedures    Inpatient Admission (expected length of stay for this patient is greater than two midnights)     Standing Status:   Standing     Number of Occurrences:   1     Order Specific Question:   Admitting Physician     Answer:   Presley Philip     Order Specific Question:   Level of Care     Answer:   Med Surg [16]     Order Specific Question:   Estimated length of stay     Answer:   More than 2 Midnights     Order Specific Question:   Certification     Answer:   I certify that inpatient services are medically necessary for this patient for a duration of greater than two midnights  See H&P and MD Progress Notes for additional information about the patient's course of treatment  ED: Date/Time/Mode of Arrival:   ED Arrival Information     Expected Arrival Acuity Means of Arrival Escorted By Service Admission Type    6/18/2018 08:37 6/18/2018 09:40 Emergent Walk-In Family Member General Medicine Emergency    Arrival Complaint    INCREASING SOB          Chief Complaint:   Chief Complaint   Patient presents with    Shortness of Breath     pt was seen last week for same complaint of SOB  Pt declines being admitted each time  Hx of lung CA  Becomes easily SOB with activity       History of Illness: 69 y o  male who presents with progressively worsening shortness of breath over the last few months was acutely worsened this morning per patient and wife at bedside  He has a history of lung adenocarcinoma diagnosed approximately 6-7 months ago with thoracic spinal and lymph node metastasis  He has undergone nine sessions of radiation with his last session being back in January along with multiple cycles of chemotherapy  He also developed radiation induced esophagitis approximately three months ago and underwent an endoscopy per gastroenterology    Per patient and wife, he was noted to be increasingly short of breath this morning and could not even do simple activities of daily living including walking short distances or putting his clothes on  He was found to be hypoxic with levels in the 80s which improved with supplemental oxygenation  Remains short of breath with activity    ED Vital Signs:   ED Triage Vitals   Temperature Pulse Respirations Blood Pressure SpO2   06/18/18 0949 06/18/18 0949 06/18/18 0949 06/18/18 0949 06/18/18 0949   97 8 °F (36 6 °C) 72 (!) 24 132/71 92 %      Temp Source Heart Rate Source Patient Position - Orthostatic VS BP Location FiO2 (%)   06/18/18 0949 06/18/18 0949 06/18/18 1154 06/18/18 1154 --   Oral Monitor Sitting Right arm       Pain Score       06/18/18 0949       No Pain        Wt Readings from Last 1 Encounters:   06/18/18 77 2 kg (170 lb 3 1 oz)       Vital Signs (abnormal): respiratory rate 24 - 22   92% sat on room air  Exam - lungs crackles at bases  Abnormal Labs/Diagnostic Test Results:   Na 135  Bun 35  Creatinine 1 25    hgb 9 9, hct 30 7  Platelets 700  CxR- Mild pulmonary vascular congestion, superimposed on underlying pulmonary fibrosis    6/19/2018-  Na 135  Bun 33  Wbc 5 62, hgb 9, hct 28 5  Platelets 775    ED Treatment: oxygen 2 liters  Medication Administration from 06/18/2018 0837 to 06/18/2018 1844       Date/Time Order Dose Route Action Comments     06/18/2018 1139 sodium chloride 0 9 % bolus 1,000 mL 0 mL Intravenous Stopped      06/18/2018 1037 sodium chloride 0 9 % bolus 1,000 mL 1,000 mL Intravenous New Bag           Past Medical/Surgical History:    Active Ambulatory Problems     Diagnosis Date Noted    Hypothyroidism     Hypertension     BPH (benign prostatic hyperplasia) 12/22/2017    GAVE (gastric antral vascular ectasia) 12/22/2017    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 )     Anemia     Constipation 01/13/2018    Failure to thrive (0-17) 03/19/2018    Dysphagia 03/19/2018    Leukopenia 03/19/2018    Total bilirubin, elevated 03/19/2018    Esophagitis 03/19/2018    Adenocarcinoma of left lung ([facility] ) 04/03/2018     Resolved Ambulatory Problems     Diagnosis Date Noted    Spinal stenosis of thoracolumbar region 12/22/2017    SOB (shortness of breath) 12/22/2017    Acute tracheobronchitis 12/22/2017    Cancer of lung ([place] [address] )     Spinal stenosis     Tobacco abuse     Cancer associated pain 01/13/2018    Iron deficiency anemia 01/25/2018    Cancer of lower lobe of left lung ([facility] ) 01/25/2018    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 ([place] [address] ) 03/08/2018     Past Medical History:   Diagnosis Date    Anemia     BPH (benign prostatic hyperplasia)     Cancer of lung (HCC)     Disease of thyroid gland     GAVE (gastric antral vascular ectasia)     Hypertension     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HCC)     Spinal stenosis     Tobacco abuse        Admitting Diagnosis: Lung cancer ([place] [address] ) [C34 90]  SOB (shortness of breath) [R06 02]  BLACK (dyspnea on exertion) [R06 09]  Pulmonary fibrosis, unspecified (HCC) [J84 10]    Age/Sex: 69 y o  male    Assessment/Plan: Acute respiratory failure with hypoxia  · Currently maintained on 2 L of oxygen via nasal cannula with home baseline of room air  · Multifactorial secondary to her lung adenocarcinoma with progressive fibrosis possibly idiopathic pulmonary versus radiation induced     2    Adenocarcinoma of lung - Metastasis to thoracic spine and lymph nodes  · Follows with outpatient Oncology - status post nine cycles of radiation (last session in January 2018) - currently undergoing chemotherapy (last session two weeks ago) - last two cycles have been without Carboplatin due to side effects but continuation of Alimta along with Keytruda for immunotherapy  · Will appreciate inpatient Oncology input - PRN pain/dyspnea control otherwise      3   Pulmonary fibrosis  · Possibly radiation induced Versus idiopathic - clinically seems associated w/ prior cycles of radiation for lung cancer as symptoms started per patient around that time and he was also seen by gastroenterology for radiation-induced esophagitis earlier this spring  · Continue supplemental oxygen - serial CT scans earlier this year reveal progressive worsening of disease - will appreciate pulmonology input     4   Essential hypertension  · Low-sodium diet encouraged - continue Cardura     5  Hypothyroidism  · Continue Synthroid     6  Benign prostatic hyperplasia  Continue Proscar    Admission Orders:  6/18/2018  1047 INPATIENT   Scheduled Meds:   Current Facility-Administered Medications:  doxazosin 4 mg Oral HS   enoxaparin 40 mg Subcutaneous Q24H KAIDEN   finasteride 5 mg Oral Daily   fish oil 1,000 mg Oral Daily   folic acid 1 mg Oral Daily   levothyroxine 88 mcg Oral Early Morning     Continuous Infusions:    PRN Meds: not used  OTHER ORDERS:  scds  cta chest   Consult oncology; pulmonology  Echo  Oxygen 2 liters      Thank you,  [facility] in the [facility] by Cody Porter for 2017  Network Utilization Review Department  Phone: [phone]; Fax [phone]  ATTENTION: The Network Utilization Review Department is now centralized for our 7 Facilities  Make a note that we have a new phone and fax numbers for our Department  Please call with any questions or concerns to [phone] and carefully follow the prompts so that you are directed to the right person  All voicemails are confidential  Fax any determinations, approvals, denials, and requests for initial or continue stay review clinical to [phone]  Due to HIGH CALL volume, it would be easier if you could please send faxed requests to expedite your requests and in part, help us provide discharge notifications faster

## 2018-06-19 NOTE — TELEPHONE ENCOUNTER
Called and left a message for Aure Mercado in IR in regards to the patient's port placement  Heme/Oncology was consulted for the patient's hospital admission   AGUSTIN Rios with our group was made aware that Dr Claudy Turner would like the patient to have a port placed prior to discharge from the hospital

## 2018-06-19 NOTE — ASSESSMENT & PLAN NOTE
· Secondary to pulmonary fibrosis/pneumonitis  Patient was on room air at home  · Continue supplemental O2 to maintain sats >= 88%  · Continue steroids as ordered and observe for response  · Follow-up on procalcitonin level, start on antibiotics if elevated in view of CTA chest findings    He is currently afebrile and without leukocytosis

## 2018-06-19 NOTE — PROGRESS NOTES
Progress Note Anahi Conklin 1940, 69 y o  male MRN: [id number]    Unit/Bed#: -01 Encounter: [id number]    Primary Care Provider: Trell Mulligan MD   Date and time admitted to hospital: 6/18/2018  9:45 AM    * Acute respiratory failure with hypoxia    Assessment & Plan    · Secondary to pulmonary fibrosis/pneumonitis  Patient was on room air at home  · Continue supplemental O2 to maintain sats >= 88%  · Continue steroids as ordered and observe for response  · Follow-up on procalcitonin level, start on antibiotics if elevated in view of CTA chest findings  He is currently afebrile and without leukocytosis      Pulmonary fibrosis    Assessment & Plan    · Unclear etiology however in view of underlying history, suspected pneumonitis in the setting of metastatic lung cancer on chemo and immunotherapy vs progressive pulmonary fibrosis  · He has been started on steroid therapy fall 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  · Appreciate Pulmonary/Oncology input      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    Assessment & Plan    · On chemotherapy and immunotherapy, status post radiation to thoracic spine Mets  · Management per Oncology      Essential hypertension   Assessment & Plan    · Continue Cardura        VTE Pharmacologic Prophylaxis:   Pharmacologic: Enoxaparin (Lovenox)  Mechanical VTE Prophylaxis in Place: Yes    Patient Centered Rounds: I have performed bedside rounds with nursing staff today      Discussions with Specialists or Other Care Team Provider:  Nursing/Oncology/pulmonology    Education and Discussions with Family / Patient:  Patient and family updated at the bedside, all questions answered    Current Length of Stay: 1 day(s)    Current Patient Status: Inpatient   Certification Statement: The patient will continue to require additional inpatient hospital stay due to Above diagnosis and care plan    Discharge Plan:  Not medically stable for discharge    Code Status: Level 1 - Full Code      Subjective:   Patient seen and evaluated  He reports shortness of breath was on exertion  He has a dry cough  Objective:     Vitals:   Temp (24hrs), Av 8 °F (37 1 °C), Min:98 1 °F (36 7 °C), Max:99 5 °F (37 5 °C)    HR:  [75-84] 81  Resp:  [18-22] 22  BP: (101-117)/(63-66) 101/63  SpO2:  [96 %] 96 %  Body mass index is 26 66 kg/m²  Input and Output Summary (last 24 hours):     No intake or output data in the 24 hours ending 18    Physical Exam:  General Appearance:    Alert, cooperative, no distress, appropriately responsive    Head:    Normocephalic, without obvious abnormality, atraumatic, mucous membranes moist    Eyes:    Conjunctiva/corneas clear, EOM's intact   Neck:   Supple   Lungs:     Bilateral basal rales R>L, no wheezes    Heart:    Regular rate and rhythm, S1 and S2    Abdomen:     Soft, non-tender, bowel sounds active all four quadrants,     no masses, no organomegaly   Extremities:   Extremities normal, atraumatic, no cyanosis or edema   Neurologic:  nonfocal         Additional Data:     Labs:      Results from last 7 days  Lab Units 18  0556 18  1037   WBC Thousand/uL 5 62 6 11   HEMOGLOBIN g/dL 9 0* 9 9*   HEMATOCRIT % 28 5* 30 7*   PLATELETS Thousands/uL 136* 144*   NEUTROS PCT %  --  86*   LYMPHS PCT %  --  6*   MONOS PCT %  --  8   EOS PCT %  --  1       Results from last 7 days  Lab Units 18  0556   SODIUM mmol/L 135*   POTASSIUM mmol/L 4 3   CHLORIDE mmol/L 101   CO2 mmol/L 26   BUN mg/dL 33*   CREATININE mg/dL 1 23   CALCIUM mg/dL 9 0   GLUCOSE RANDOM mg/dL 86           * I Have Reviewed All Lab Data Listed Above  * Additional Pertinent Lab Tests Reviewed: [address] Admission Reviewed    Cultures:   Blood Culture:   Lab Results   Component Value Date    BLOODCX No Growth After 5 Days  2018    BLOODCX No Growth After 5 Days  2018    BLOODCX No Growth After 5 Days   2017    BLOODCX No Growth After 5 Days  12/22/2017     Urine Culture: No results found for: URINECX  Sputum Culture: No components found for: SPUTUMCX  Wound Culture: No results found for: WOUNDCULT    Last 24 Hours Medication List:     Current Facility-Administered Medications:  acetaminophen 650 mg Oral Q6H PRN Xuan Andres, MD   doxazosin 4 mg Oral HS Xuan Andres, MD   enoxaparin 40 mg Subcutaneous Q24H [address] Xuan Andres, MD   finasteride 5 mg Oral Daily Xuan Knife, MD   fish oil 1,000 mg Oral Daily Xuan Andres, MD   folic acid 1 mg Oral Daily Xuan Andres, MD   levothyroxine 88 mcg Oral Early Morning Xuan Andres, MD   ondansetron 4 mg Intravenous Q4H PRN Xuan Andres, MD   predniSONE 1 mg/kg/day Oral Daily Youlanda Boast, PA-C        Today, Patient Was Seen By: Indu Mason MD    ** Please Note: Dragon 360 Dictation voice to text software may have been used in the creation of this document   **

## 2018-06-19 NOTE — CONSULTS
Consultation -  Hematology/Oncology   Corrina Cormier 69 y o  male MRN: [id number]  Unit/Bed#: -01 Encounter: [id number]    Physician Requesting Consult: Celso Akhtar MD  Reason for Consult: Metastatic Adenocarcinoma of the Left Lung    HPI: Corrina Cormier is a 69y o  year old male with a history of metastatic adenocarcinoma of the left lung with who presented 6/16 for progressive shortness of breathing, weakness and cough  He reports a 3 month history of progressive shortness of breathing  The day of admission, he felt profoundly weak with non productive cough with difficulty ambulating from room to room in his home prompting ED evaluation  Denied fever, chills, dizziness, headache, hemoptysis, chest pain, nausea, vomiting and diarrhea  No urinary complaints  He was initiated on 20 mg prednisone one week prior by Dr Danielle Richter for SOB  He did not feel any improvement  Subsequent work up with chest XRay showed: Mild pulmonary vascular congestion, superimposed on underlying pulmonary fibrosis  CBC showed: Hemoglobin 9 9, WBC 6 0/ANC 5 2, platelets 761,217  Oncologic History:  Primary Oncology Provider: Dr Danielle Richter  -1/2018: Diagnosed with adenocarcinoma of the left lung with osseous and chandra involvement  -He was initiated on carboplatin/pemetrexed + pembrolizumab  He also received palliative radiotherapy to the thoracic spine for symptomatic osseous metastasis and is on denosumab [id number]  Assessment/Plan:   #1 Adenocarcinoma of the left lung with chandra and osseous metastasis, on chemotherapy + immunotherapy, s/pC6 (6/6)  -he has been responding to current regimen with pemetrexed and pembrolizumab  -he will continue to follow with Dr Danielle Richter outpatient after discharge and stablization of acute hospital issues for continuing treatments   -his previously scheduled chemotherapy appointment for tomorrow will be rescheduled    Requesting a port to be placed this admission prior to discharge   -  Rosa updated this admission  #2 Dyspnea, progressive, etiology unclear  -poss pneumonitis 2/2 #1  -CT Chest negative for PE  Interstitial lung disease seen on imaging  I discussed findings with Dr Andre Adjutant Radiology, there is a  lower suspicion for radiation induced pulmonary fibrosis based on the pattern seen on imaging  The patient had radiotherapy to the thoracic spine and not the lung parenchyma   -We will trial a dose of prednisone 1mg/kg/day and monitor for improvement  If there is improvement (may take several days to determine response), the likelihood of immunotherapy induced pneumonitis is high  Further immunotherapy will be discussed with Dr Jyoti Christensen following resoluton of pneumonitis if that is the case  #3 Anemia of chronic disease + chemo induced myelosuppression, 2/2 #1  -admission hemoglobin 9 9, he is currently s/p chemo induced myelosuppression julianna  -I do not anticipate downward trend in hemoglobin this admission  However, if needed, transfuse to keep hemoglobin>7g/dL or sooner if symptomatic  He should receive leukoreduced products  Discussed with the primary team ([facility]-Dr Leopold Schmitt) on this date  We will continue to follow this admission  Please contact us if you have any questions regarding this consult  Thank you for this consult      Past Medical History:   Diagnosis Date    Anemia     BPH (benign prostatic hyperplasia)     Cancer of lung ([address] )     Disease of thyroid gland     GAVE (gastric antral vascular ectasia)     Hypertension     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HCC)     Spinal stenosis     Tobacco abuse      Family History   Problem Relation Age of Onset    Esophageal cancer Mother     Diabetes Father     No Known Problems Sister     No Known Problems Brother      Social History     Social History    Marital status: /Civil Union     Spouse name: Vernestine Stage Number of children: 2    Years of education: N/A     Occupational History    retired  Social History Main Topics    Smoking status: Former Smoker     Packs/day: 0 50     Years: 15 00     Types: Cigarettes     Quit date: 1/11/2018    Smokeless tobacco: Never Used      Comment: Quit December 2017    Alcohol use No    Drug use: No    Sexual activity: Not Asked     Other Topics Concern    None     Social History Narrative    None     No Known Allergies    Subjective:  He continues with SOB and nagging non productive cough  SOB is worse with ambulation, he has difficulty walking to the commode without feeling "winded"  No fever, chills, CP, palpitations, nausea, vomiting or diarrhea  Review of Systems   Constitutional: Positive for fatigue  Negative for chills and fever  HENT:   Negative for mouth sores, nosebleeds and trouble swallowing  Eyes: Negative for icterus  Respiratory: Positive for cough and shortness of breath  Negative for chest tightness  Genitourinary: Negative for dysuria and hematuria  Musculoskeletal: Negative for arthralgias, back pain and gait problem  Skin: Negative for rash  Neurological: Negative for gait problem, headaches and speech difficulty  Hematological: Negative for adenopathy  Does not bruise/bleed easily  Psychiatric/Behavioral: Negative for confusion  All other systems reviewed and are negative        Objective:  /64 (BP Location: Left arm)   Pulse 84   Temp 98 1 °F (36 7 °C) (Oral)   Resp 18   Ht 5' 7" (1 702 m)   Wt 77 2 kg (170 lb 3 1 oz)   SpO2 96%   BMI 26 66 kg/m²       Current Facility-Administered Medications:     acetaminophen (TYLENOL) tablet 650 mg, 650 mg, Oral, Q6H PRN, Erickson El MD    doxazosin (CARDURA) tablet 4 mg, 4 mg, Oral, HS, Erickson El MD    enoxaparin (LOVENOX) subcutaneous injection 40 mg, 40 mg, Subcutaneous, Q24H [facility], Erickson El MD, 40 mg at 06/19/18 0801    finasteride (PROSCAR) tablet 5 mg, 5 mg, Oral, Daily, Erickson El MD, 5 mg at 06/19/18 0801    fish oil capsule 1,000 mg, 1,000 mg, Oral, Daily, Sher Barrientos MD, 1,000 mg at 86/15/93 3183    folic acid (FOLVITE) tablet 1 mg, 1 mg, Oral, Daily, Sher Barrientos MD, 1 mg at 06/19/18 0801    levothyroxine tablet 88 mcg, 88 mcg, Oral, Early Morning, Sher Barrientos MD, 88 mcg at 06/19/18 0506    ondansetron (ZOFRAN) injection 4 mg, 4 mg, Intravenous, Q4H PRN, Sher Barrientos MD    Recent Labs      06/18/18   1037  06/19/18   0556   WBC  6 11  5 62   HGB  9 9*  9 0*   PLT  144*  136*   MCV  93  94   RDW  17 0*  17 5*   CREATININE  1 25  1 23     Laboratory studies were reviewed    Imaging:   -6/19 Chest PE CT:   1   No evidence of pulmonary embolus  2   Extensive pulmonary fibrosis  3   Right lower lobe consolidation, most likely pneumonia, developing since 5/29/2018   4   "Crazy paving" pattern of attenuation in portions of the left lower lobe, a differential diagnosis of which has been discussed above  5   Multiple osteolytic and osteoblastic test ascites in the thoracic spine     -6/18 Chest XRay:  Mild pulmonary vascular congestion, superimposed on underlying pulmonary fibrosis  Pathology: None    Physical Exam   Constitutional: He is oriented to person, place, and time  No distress  HENT:   Mouth/Throat: No oropharyngeal exudate  Eyes: Conjunctivae and EOM are normal  No scleral icterus  Neck: Normal range of motion  Cardiovascular: Normal rate and regular rhythm  Pulmonary/Chest: Effort normal and breath sounds normal  No respiratory distress  He has no wheezes  He exhibits no tenderness  Abdominal: Soft  There is no tenderness  Musculoskeletal: He exhibits no edema or tenderness  Lymphadenopathy:     He has no cervical adenopathy  Neurological: He is alert and oriented to person, place, and time  Skin: Skin is warm and dry  No rash noted  Vitals reviewed  Code Status: Level 1 - Full Code    ** Please Note: Dictation voice to text software may have been used in the creation of this document   **

## 2018-06-20 ENCOUNTER — TELEPHONE (OUTPATIENT)
Dept: HEMATOLOGY ONCOLOGY | Facility: CLINIC | Age: 78
End: 2018-06-20

## 2018-06-20 ENCOUNTER — APPOINTMENT (INPATIENT)
Dept: NON INVASIVE DIAGNOSTICS | Facility: HOSPITAL | Age: 78
DRG: 189 | End: 2018-06-20
Payer: COMMERCIAL

## 2018-06-20 LAB
ANION GAP SERPL CALCULATED.3IONS-SCNC: 10 MMOL/L (ref 4–13)
BASOPHILS # BLD AUTO: 0 THOUSANDS/ΜL (ref 0–0.1)
BASOPHILS NFR BLD AUTO: 0 % (ref 0–1)
BUN SERPL-MCNC: 38 MG/DL (ref 5–25)
CALCIUM SERPL-MCNC: 9 MG/DL (ref 8.3–10.1)
CHLORIDE SERPL-SCNC: 100 MMOL/L (ref 100–108)
CO2 SERPL-SCNC: 23 MMOL/L (ref 21–32)
CREAT SERPL-MCNC: 1.3 MG/DL (ref 0.6–1.3)
EOSINOPHIL # BLD AUTO: 0.01 THOUSAND/ΜL (ref 0–0.61)
EOSINOPHIL NFR BLD AUTO: 0 % (ref 0–6)
ERYTHROCYTE [DISTWIDTH] IN BLOOD BY AUTOMATED COUNT: 17.2 % (ref 11.6–15.1)
GFR SERPL CREATININE-BSD FRML MDRD: 53 ML/MIN/1.73SQ M
GLUCOSE SERPL-MCNC: 148 MG/DL (ref 65–140)
HCT VFR BLD AUTO: 29.5 % (ref 36.5–49.3)
HGB BLD-MCNC: 9.5 G/DL (ref 12–17)
LYMPHOCYTES # BLD AUTO: 0.21 THOUSANDS/ΜL (ref 0.6–4.47)
LYMPHOCYTES NFR BLD AUTO: 4 % (ref 14–44)
MCH RBC QN AUTO: 29.9 PG (ref 26.8–34.3)
MCHC RBC AUTO-ENTMCNC: 32.2 G/DL (ref 31.4–37.4)
MCV RBC AUTO: 93 FL (ref 82–98)
MONOCYTES # BLD AUTO: 0.37 THOUSAND/ΜL (ref 0.17–1.22)
MONOCYTES NFR BLD AUTO: 7 % (ref 4–12)
NEUTROPHILS # BLD AUTO: 4.95 THOUSANDS/ΜL (ref 1.85–7.62)
NEUTS SEG NFR BLD AUTO: 89 % (ref 43–75)
PLATELET # BLD AUTO: 167 THOUSANDS/UL (ref 149–390)
PMV BLD AUTO: 10.3 FL (ref 8.9–12.7)
POTASSIUM SERPL-SCNC: 4.8 MMOL/L (ref 3.5–5.3)
RBC # BLD AUTO: 3.18 MILLION/UL (ref 3.88–5.62)
SODIUM SERPL-SCNC: 133 MMOL/L (ref 136–145)
WBC # BLD AUTO: 5.54 THOUSAND/UL (ref 4.31–10.16)

## 2018-06-20 PROCEDURE — 99232 SBSQ HOSP IP/OBS MODERATE 35: CPT | Performed by: PHYSICIAN ASSISTANT

## 2018-06-20 PROCEDURE — 93306 TTE W/DOPPLER COMPLETE: CPT

## 2018-06-20 PROCEDURE — 99233 SBSQ HOSP IP/OBS HIGH 50: CPT | Performed by: INTERNAL MEDICINE

## 2018-06-20 PROCEDURE — 93306 TTE W/DOPPLER COMPLETE: CPT | Performed by: INTERNAL MEDICINE

## 2018-06-20 PROCEDURE — 80048 BASIC METABOLIC PNL TOTAL CA: CPT | Performed by: INTERNAL MEDICINE

## 2018-06-20 PROCEDURE — 85025 COMPLETE CBC W/AUTO DIFF WBC: CPT | Performed by: INTERNAL MEDICINE

## 2018-06-20 RX ADMIN — ENOXAPARIN SODIUM 40 MG: 100 INJECTION SUBCUTANEOUS at 09:25

## 2018-06-20 RX ADMIN — Medication 1000 MG: at 09:24

## 2018-06-20 RX ADMIN — PREDNISONE 77 MG: 20 TABLET ORAL at 16:14

## 2018-06-20 RX ADMIN — FINASTERIDE 5 MG: 5 TABLET, FILM COATED ORAL at 09:25

## 2018-06-20 RX ADMIN — LEVOTHYROXINE SODIUM 88 MCG: 88 TABLET ORAL at 06:30

## 2018-06-20 RX ADMIN — FOLIC ACID 1 MG: 1 TABLET ORAL at 09:24

## 2018-06-20 NOTE — PROGRESS NOTES
Progress Note - Pulmonary   Cody Shield 69 y o  male MRN: [id number]  Unit/Bed#: -01 Encounter: [id number]    Assessment/Plan:    Acute hypoxic respiratory failure, multifactorial due to below              Titrate oxygen to maintain POX > or = 88%  OOB as tolerated and evaluate ambulatory POX prior to D/C to evaluate supplemental oxygen needs  D/W [facility]      Abnormal CXR/CT chest with diffuse subpleural pulmonary fibrosis (present since 2015), acute pneumonitis/alveolitis, suspect due to chemotherapy/immunotherapy, and LLL lung mass - known adenocarcinoma   Continue prednisone 1 milligram/kilogram and taper per Oncology  Further cancer treatment per Oncology  Outpatient follow-up in the Pulmonary office for repeat imaging in 6 to 8 weeks with high resolution CT of the chest to evaluate acute and chronic changes      Mild Restrictive Lung Disease, suspect due to mild pulmonary fibrosis as above              Outpatient follow-up and repeat imaging as above      Metastatic Adenocarcinoma of the Lung              Management per Oncology      Outpatient follow-up and HRCT chest as per discharge instructions  Discussed with Internal Medicine  Patient will be undergoing port placement tomorrow  Will sign off  Please call with further questions or concerns  Subjective:     Naveen Frey feels good this morning  He denies any shortness of breath at rest   He has not been ambulating in the halls  He denies chest pain or tightness  He denies cough or sputum production  Objective:     Vitals: Blood pressure 123/66, pulse 81, temperature 97 9 °F (36 6 °C), temperature source Oral, resp  rate 20, height 5' 7" (1 702 m), weight 77 2 kg (170 lb 3 1 oz), SpO2 97 %  1 liter nasal cannula,Body mass index is 26 66 kg/m²        Intake/Output Summary (Last 24 hours) at 06/20/18 1302  Last data filed at 06/20/18 0802   Gross per 24 hour   Intake              180 ml   Output                0 ml   Net 180 ml       Invasive Devices     Peripheral Intravenous Line            Peripheral IV 06/18/18 Right Antecubital 2 days                Physical Exam:     Physical Exam   Constitutional: He is oriented to person, place, and time  He appears well-developed and well-nourished  He is cooperative  Non-toxic appearance  No distress  HENT:   Head: Normocephalic and atraumatic  Mouth/Throat: No oropharyngeal exudate  Eyes: EOM are normal  No scleral icterus  Neck: Neck supple  No JVD present  No tracheal deviation present  Cardiovascular: Normal rate, regular rhythm, S1 normal and S2 normal   Exam reveals no gallop and no friction rub  No murmur heard  Pulmonary/Chest: Effort normal  No accessory muscle usage or stridor  No tachypnea  No respiratory distress  He has no decreased breath sounds  He has no wheezes  He has no rhonchi  He has rales in the right lower field and the left lower field  He exhibits no tenderness  Abdominal: Soft  Bowel sounds are normal  He exhibits no distension  There is no tenderness  There is no rebound and no guarding  Musculoskeletal: He exhibits no edema or tenderness  Neurological: He is alert and oriented to person, place, and time  He has normal strength  GCS eye subscore is 4  GCS verbal subscore is 5  GCS motor subscore is 6  Skin: Skin is warm and dry  No abrasion, no ecchymosis, no lesion and no rash noted  He is not diaphoretic  No cyanosis or erythema  Nails show no clubbing  Psychiatric: He has a normal mood and affect  His speech is normal and behavior is normal    Vitals reviewed        Labs:   CBC:   Lab Results   Component Value Date    WBC 5 54 06/20/2018    HGB 9 5 (L) 06/20/2018    HCT 29 5 (L) 06/20/2018    MCV 93 06/20/2018     06/20/2018    MCH 29 9 06/20/2018    MCHC 32 2 06/20/2018    RDW 17 2 (H) 06/20/2018    MPV 10 3 06/20/2018   , CMP:   Lab Results   Component Value Date     (L) 06/20/2018    K 4 8 06/20/2018     06/20/2018    CO2 23 06/20/2018    ANIONGAP 10 06/20/2018    BUN 38 (H) 06/20/2018    CREATININE 1 30 06/20/2018    GLUCOSE 148 (H) 06/20/2018    CALCIUM 9 0 06/20/2018    EGFR 53 06/20/2018       Imaging and other studies: None today

## 2018-06-20 NOTE — PROGRESS NOTES
When Juana Mccoy was walking he had shortness of breath with NC and on room air  He dropped suddenly after walking a short distance on room air and then being on 2 L of Nasal Cannula  Both ways he dropped between 82-85% We could not walk that far away from his room because he would get out of breath almost immediately

## 2018-06-20 NOTE — PROGRESS NOTES
Progress Note Estelle Bradley 1940, 69 y o  male MRN: [id number]    Unit/Bed#: -01 Encounter: [id number]    Primary Care Provider: Sasha Enamorado MD   Date and time admitted to hospital: 6/18/2018  9:45 AM    * Acute respiratory failure with hypoxia    Assessment & Plan    · Secondary to pulmonary fibrosis/pneumonitis  Patient was on room air at home  · Continue supplemental O2 to maintain sats >= 88%  Assess for needs for home O2 on discharge  · Continue steroids as ordered and observe for response  · CTA suggestive of possible pneumonia but PCT negative and patient is afebrile, without leukocytosis  Monitor off antibiotics  · F/U echo  · To continue on prednisone until f/u appointment with oncology for further instructions        Pulmonary fibrosis    Assessment & Plan    · Unclear etiology however in view of underlying history, suspected pneumonitis in the setting of metastatic lung cancer on chemo and immunotherapy vs progressive pulmonary fibrosis  · He has been started on steroid therapy fall 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  · Appreciate Pulmonary/Oncology input  · Needs repeat high resolution chest CT in 4-6 weeks and pulm f/u        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    Assessment & Plan    · On chemotherapy and immunotherapy, status post radiation to thoracic spine Mets  · Management per Oncology- plan is for port placement tomorrow and discharge home after          VTE Pharmacologic Prophylaxis:   Pharmacologic: Enoxaparin (Lovenox)  Mechanical VTE Prophylaxis in Place: Yes    Patient Centered Rounds:      Discussions with Specialists or Other Care Team Provider:      Education and Discussions with Family / Patient: Spouse at bedside    Time Spent for Care: 20 minutes  More than 50% of total time spent on counseling and coordination of care as described above      Current Length of Stay: 2 day(s)    Current Patient Status: Inpatient   Certification Statement: The patient will continue to require additional inpatient hospital stay due to monitoring symptoms, awaiting echo    Discharge Plan: Anticipate discharge home tomorrow after port placement    Code Status: Level 1 - Full Code      Subjective:   Feels well  No SOB at rest although does get SOB with activity  He feels better now that he is on oxygen  Objective:     Vitals:   Temp (24hrs), Av 3 °F (36 8 °C), Min:97 6 °F (36 4 °C), Max:99 5 °F (37 5 °C)    HR:  [78-81] 81  Resp:  [20-22] 20  BP: (101-123)/(61-66) 123/66  SpO2:  [94 %-97 %] 97 %  Body mass index is 26 66 kg/m²  Input and Output Summary (last 24 hours): Intake/Output Summary (Last 24 hours) at 18 1220  Last data filed at 18 0802   Gross per 24 hour   Intake              180 ml   Output                0 ml   Net              180 ml       Physical Exam:     Physical Exam   Constitutional: He is oriented to person, place, and time  No distress  HENT:   Head: Normocephalic and atraumatic  Eyes: No scleral icterus  Neck: Normal range of motion  Neck supple  Cardiovascular: Normal rate, regular rhythm and normal heart sounds  Pulmonary/Chest: Effort normal and breath sounds normal  No respiratory distress  He has no wheezes  He has no rales  Musculoskeletal: He exhibits no edema  Neurological: He is alert and oriented to person, place, and time  Skin: Skin is warm and dry  He is not diaphoretic  Psychiatric: He has a normal mood and affect   His behavior is normal  Thought content normal        Additional Data:     Labs:      Results from last 7 days  Lab Units 18  0612   WBC Thousand/uL 5 54   HEMOGLOBIN g/dL 9 5*   HEMATOCRIT % 29 5*   PLATELETS Thousands/uL 167   NEUTROS PCT % 89*   LYMPHS PCT % 4*   MONOS PCT % 7   EOS PCT % 0       Results from last 7 days  Lab Units 18  0612   SODIUM mmol/L 133*   POTASSIUM mmol/L 4 8   CHLORIDE mmol/L 100   CO2 mmol/L 23   BUN mg/dL 38*   CREATININE mg/dL 1 30   CALCIUM mg/dL 9 0   GLUCOSE RANDOM mg/dL 148*           * I Have Reviewed All Lab Data Listed Above  * Additional Pertinent Lab Tests Reviewed: All Labs Within Last 24 Hours Reviewed    Imaging:    Imaging Reports Reviewed Today Include: CTA chest  Imaging Personally Reviewed by Myself Includes:  None    Recent Cultures (last 7 days):           Last 24 Hours Medication List:     Current Facility-Administered Medications:  acetaminophen 650 mg Oral Q6H PRN Elis Araiza MD   doxazosin 4 mg Oral HS Elis Araiza MD   enoxaparin 40 mg Subcutaneous Q24H [address] Elis Araiza MD   finasteride 5 mg Oral Daily Elis Araiza MD   fish oil 1,000 mg Oral Daily Elis Araiza MD   folic acid 1 mg Oral Daily Elis Araiza MD   levothyroxine 88 mcg Oral Early Morning Elis Araiza MD   ondansetron 4 mg Intravenous Q4H PRN Elis Araiza MD   predniSONE 1 mg/kg/day Oral Daily Dmitri Acuna PA-C        Today, Patient Was Seen By: Shaniqua Marie PA-C    ** Please Note: Dragon 360 Dictation voice to text software may have been used in the creation of this document   **

## 2018-06-20 NOTE — PROGRESS NOTES
Progress Note -  Hematology/Oncology   Sally Livingston 69 y o  male MRN: [id number]  Unit/Bed#: -01 Encounter: [id number]    CC: Metastatic Adenocarcinoma of the Left Lung  HPI: Sally Livingston is a 69y o  year old male with a history of metastatic adenocarcinoma of the left lung with who presented 6/16 for progressive shortness of breathing, weakness and cough  He reports a 3 month history of progressive shortness of breathing  The day of admission, he felt profoundly weak with non productive cough with difficulty ambulating from room to room in his home prompting ED evaluation  Denied fever, chills, dizziness, headache, hemoptysis, chest pain, nausea, vomiting and diarrhea  No urinary complaints  He was initiated on 20 mg prednisone one week prior by Dr Jose Andino for SOB  He did not feel any improvement  Subsequent work up with chest XRay showed: Mild pulmonary vascular congestion, superimposed on underlying pulmonary fibrosis  CBC showed: Hemoglobin 9 9, WBC 6 0/ANC 5 2, platelets [phone]      Oncologic History:  Primary Oncology Provider: Dr Jose Andino  -1/2018: Diagnosed with adenocarcinoma of the left lung with osseous and chandra involvement  -He was initiated on carboplatin/pemetrexed + pembrolizumab  He also received palliative radiotherapy to the thoracic spine for symptomatic osseous metastasis and is on denosumab [id number]  Assessment/Plan:   #1 Adenocarcinoma of the left lung with chandra and osseous metastasis, on chemotherapy + immunotherapy, s/pC6 (6/6)  -he has been responding to current regimen with pemetrexed and pembrolizumab, however with suspicion for immunotherapy induced pneumonitis, pembrolizumab may not be continued  -he will continue to follow with Dr Jose Andino outpatient after discharge and stablization of acute hospital issues for treatment planning  His appointment is on 7/3 at [place] [address] a port to be placed this admission prior to discharge  Orders are in EPIC   -Dr Estlele Brito updated this admission      #2 Dyspnea, improving, suspect 2/2 pneumonitis (immunotherapy induced)  -CT Chest negative for PE  Interstitial lung disease seen on imaging  I discussed findings with Dr Marcelino Wil[facility], there is a  lower suspicion for radiation induced pulmonary fibrosis based on the pattern seen on imaging  The patient had radiotherapy to the thoracic spine and not the lung parenchyma   -He was initiated on prednisone 1mg/kg/day on 6/19  If there is improvement (may take several days to determine response), the likelihood of immunotherapy induced pneumonitis is high and treatment changes regarding further immunotherapy will be discussed with Dr Estelle Brito following resoluton of pneumonitis if that is the case   -He should continue this for discharge until he sees Dr Estelle Brito on 7/3, taper regimen witll be initiated at that time  OK to use 80 mg prednisone po daily for discharge  Please continue bactrim ppx as well  #3 Anemia of chronic disease + chemo induced myelosuppression, 2/2 #1, stable  -admission hemoglobin 9 9, he is currently s/p chemo induced myelosuppression julianna  -I do not anticipate downward trend in hemoglobin this admission  However, if needed, transfuse to keep hemoglobin>7g/dL or sooner if symptomatic  He should receive leukoreduced products      Discussed with the primary team ([facility]-Dr Nancy Laguerre) on this date  Subjective: Today, he reports less SOB and fatigue  He denies fever, but reported sweats this previous evening  No pain  Appetite slightly improved  No urinary or stool changes  Review of Systems   Constitutional: Negative for fever  HENT:   Negative for trouble swallowing  Eyes: Negative for icterus  Respiratory: Positive for shortness of breath  Negative for chest tightness and wheezing  Cardiovascular: Negative for chest pain, leg swelling and palpitations  Gastrointestinal: Negative for abdominal distention and nausea  Genitourinary: Negative for dysuria  Skin: Negative for rash  Neurological: Negative for speech difficulty  Hematological: Does not bruise/bleed easily  Psychiatric/Behavioral: Negative for confusion  All other systems reviewed and are negative  Objective:  /66 (BP Location: Left arm)   Pulse 81   Temp 97 9 °F (36 6 °C) (Oral)   Resp 20   Ht 5' 7" (1 702 m)   Wt 77 2 kg (170 lb 3 1 oz)   SpO2 97%   BMI 26 66 kg/m²     Physical Exam   Constitutional: He is oriented to person, place, and time  No distress  HENT:   Head: Normocephalic and atraumatic  Eyes: Conjunctivae and EOM are normal  No scleral icterus  Neck: Normal range of motion  Cardiovascular: Normal rate and regular rhythm  Pulmonary/Chest: Effort normal  No respiratory distress  He has no wheezes  Abdominal: Soft  Bowel sounds are normal  He exhibits no distension  There is no tenderness  Musculoskeletal: He exhibits edema  He exhibits no tenderness  Lymphadenopathy:     He has no cervical adenopathy  Neurological: He is alert and oriented to person, place, and time  Skin: Skin is warm and dry  No rash noted  No erythema  Vitals reviewed        Recent Labs      06/18/18   1037  06/19/18   0556  06/20/18   0612   WBC  6 11  5 62  5 54   HGB  9 9*  9 0*  9 5*   PLT  144*  136*  167   MCV  93  94  93   RDW  17 0*  17 5*  17 2*   CREATININE  1 25  1 23  1 30     Laboratory studies were reviewed    Imaging Studies:        Pathology: None    Code Status: Level 1 - Full Code

## 2018-06-20 NOTE — TELEPHONE ENCOUNTER
Called and spoke with Dior Tobar and asked about the plan with the port placement  Blanca has placed an order in EPIC and the patient is to have a port placed before being discharged from the hospital  Spoke with Francisca Aguirre, the patient's wife, and made her aware of the plan  Pat verbally understood

## 2018-06-20 NOTE — ASSESSMENT & PLAN NOTE
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

## 2018-06-20 NOTE — ASSESSMENT & PLAN NOTE
· On chemotherapy and immunotherapy, status post radiation to thoracic spine Mets  · Management per Oncology- plan is for port placement tomorrow and discharge home after

## 2018-06-20 NOTE — TELEPHONE ENCOUNTER
Pat calls about Giuliana Angeless  He is still in the hospital  She wondered about the plan for getting a port  She asked the floor nurse and she said he has a consult for radiology, but she did not know about a port  Bernardo Montelongo thinks he may be discharged tomorrow, but his oxygen level still drops when walks  I told her there was an order for a consult about a port  She hopes that it would be put in while he is still in the hospital, rather than being discharged and then having to come back in

## 2018-06-20 NOTE — ASSESSMENT & PLAN NOTE
· Secondary to pulmonary fibrosis/pneumonitis  Patient was on room air at home  · Continue supplemental O2 to maintain sats >= 88%  Assess for needs for home O2 on discharge  · Continue steroids as ordered and observe for response  · CTA suggestive of possible pneumonia but PCT negative and patient is afebrile, without leukocytosis   Monitor off antibiotics  · F/U echo  · To continue on prednisone until f/u appointment with oncology for further instructions

## 2018-06-21 ENCOUNTER — APPOINTMENT (INPATIENT)
Dept: RADIOLOGY | Facility: HOSPITAL | Age: 78
DRG: 189 | End: 2018-06-21
Payer: COMMERCIAL

## 2018-06-21 VITALS
BODY MASS INDEX: 26.71 KG/M2 | SYSTOLIC BLOOD PRESSURE: 124 MMHG | TEMPERATURE: 97.5 F | WEIGHT: 170.19 LBS | HEIGHT: 67 IN | HEART RATE: 78 BPM | OXYGEN SATURATION: 95 % | DIASTOLIC BLOOD PRESSURE: 60 MMHG | RESPIRATION RATE: 18 BRPM

## 2018-06-21 LAB
ANION GAP SERPL CALCULATED.3IONS-SCNC: 10 MMOL/L (ref 4–13)
BASOPHILS # BLD MANUAL: 0 THOUSAND/UL (ref 0–0.1)
BASOPHILS NFR MAR MANUAL: 0 % (ref 0–1)
BUN SERPL-MCNC: 38 MG/DL (ref 5–25)
CALCIUM SERPL-MCNC: 9 MG/DL (ref 8.3–10.1)
CHLORIDE SERPL-SCNC: 100 MMOL/L (ref 100–108)
CO2 SERPL-SCNC: 23 MMOL/L (ref 21–32)
CREAT SERPL-MCNC: 1.26 MG/DL (ref 0.6–1.3)
EOSINOPHIL # BLD MANUAL: 0 THOUSAND/UL (ref 0–0.4)
EOSINOPHIL NFR BLD MANUAL: 0 % (ref 0–6)
ERYTHROCYTE [DISTWIDTH] IN BLOOD BY AUTOMATED COUNT: 17.4 % (ref 11.6–15.1)
GFR SERPL CREATININE-BSD FRML MDRD: 55 ML/MIN/1.73SQ M
GLUCOSE SERPL-MCNC: 155 MG/DL (ref 65–140)
HCT VFR BLD AUTO: 29.3 % (ref 36.5–49.3)
HGB BLD-MCNC: 9.4 G/DL (ref 12–17)
INR PPP: 1.08 (ref 0.86–1.17)
LYMPHOCYTES # BLD AUTO: 0.18 THOUSAND/UL (ref 0.6–4.47)
LYMPHOCYTES # BLD AUTO: 3 % (ref 14–44)
MCH RBC QN AUTO: 30 PG (ref 26.8–34.3)
MCHC RBC AUTO-ENTMCNC: 32.1 G/DL (ref 31.4–37.4)
MCV RBC AUTO: 94 FL (ref 82–98)
METAMYELOCYTES NFR BLD MANUAL: 1 % (ref 0–1)
MONOCYTES # BLD AUTO: 0.3 THOUSAND/UL (ref 0–1.22)
MONOCYTES NFR BLD: 5 % (ref 4–12)
MYELOCYTES NFR BLD MANUAL: 1 % (ref 0–1)
NEUTROPHILS # BLD MANUAL: 5.48 THOUSAND/UL (ref 1.85–7.62)
NEUTS BAND NFR BLD MANUAL: 1 % (ref 0–8)
NEUTS SEG NFR BLD AUTO: 89 % (ref 43–75)
PLATELET # BLD AUTO: 201 THOUSANDS/UL (ref 149–390)
PLATELET BLD QL SMEAR: ADEQUATE
PMV BLD AUTO: 10.2 FL (ref 8.9–12.7)
POTASSIUM SERPL-SCNC: 5 MMOL/L (ref 3.5–5.3)
PROTHROMBIN TIME: 13.7 SECONDS (ref 11.8–14.2)
RBC # BLD AUTO: 3.13 MILLION/UL (ref 3.88–5.62)
SODIUM SERPL-SCNC: 133 MMOL/L (ref 136–145)
TOTAL CELLS COUNTED SPEC: 100
WBC # BLD AUTO: 6.09 THOUSAND/UL (ref 4.31–10.16)

## 2018-06-21 PROCEDURE — 85610 PROTHROMBIN TIME: CPT | Performed by: RADIOLOGY

## 2018-06-21 PROCEDURE — 99239 HOSP IP/OBS DSCHRG MGMT >30: CPT | Performed by: INTERNAL MEDICINE

## 2018-06-21 PROCEDURE — 77001 FLUOROGUIDE FOR VEIN DEVICE: CPT

## 2018-06-21 PROCEDURE — C1788 PORT, INDWELLING, IMP: HCPCS

## 2018-06-21 PROCEDURE — 85027 COMPLETE CBC AUTOMATED: CPT | Performed by: PHYSICIAN ASSISTANT

## 2018-06-21 PROCEDURE — 76937 US GUIDE VASCULAR ACCESS: CPT | Performed by: RADIOLOGY

## 2018-06-21 PROCEDURE — 99152 MOD SED SAME PHYS/QHP 5/>YRS: CPT | Performed by: RADIOLOGY

## 2018-06-21 PROCEDURE — 99232 SBSQ HOSP IP/OBS MODERATE 35: CPT | Performed by: PHYSICIAN ASSISTANT

## 2018-06-21 PROCEDURE — 36561 INSERT TUNNELED CV CATH: CPT

## 2018-06-21 PROCEDURE — 80048 BASIC METABOLIC PNL TOTAL CA: CPT | Performed by: PHYSICIAN ASSISTANT

## 2018-06-21 PROCEDURE — 02H633Z INSERTION OF INFUSION DEVICE INTO RIGHT ATRIUM, PERCUTANEOUS APPROACH: ICD-10-PCS | Performed by: RADIOLOGY

## 2018-06-21 PROCEDURE — 36561 INSERT TUNNELED CV CATH: CPT | Performed by: RADIOLOGY

## 2018-06-21 PROCEDURE — 0JH60WZ INSERTION OF TOTALLY IMPLANTABLE VASCULAR ACCESS DEVICE INTO CHEST SUBCUTANEOUS TISSUE AND FASCIA, OPEN APPROACH: ICD-10-PCS | Performed by: RADIOLOGY

## 2018-06-21 PROCEDURE — 77001 FLUOROGUIDE FOR VEIN DEVICE: CPT | Performed by: RADIOLOGY

## 2018-06-21 PROCEDURE — 99153 MOD SED SAME PHYS/QHP EA: CPT

## 2018-06-21 PROCEDURE — 99152 MOD SED SAME PHYS/QHP 5/>YRS: CPT

## 2018-06-21 PROCEDURE — 76937 US GUIDE VASCULAR ACCESS: CPT

## 2018-06-21 PROCEDURE — 85007 BL SMEAR W/DIFF WBC COUNT: CPT | Performed by: PHYSICIAN ASSISTANT

## 2018-06-21 RX ORDER — CEFAZOLIN SODIUM 1 G/3ML
INJECTION, POWDER, FOR SOLUTION INTRAMUSCULAR; INTRAVENOUS CODE/TRAUMA/SEDATION MEDICATION
Status: DISCONTINUED | OUTPATIENT
Start: 2018-06-21 | End: 2018-06-21 | Stop reason: HOSPADM

## 2018-06-21 RX ORDER — MIDAZOLAM HYDROCHLORIDE 1 MG/ML
INJECTION INTRAMUSCULAR; INTRAVENOUS CODE/TRAUMA/SEDATION MEDICATION
Status: DISCONTINUED | OUTPATIENT
Start: 2018-06-21 | End: 2018-06-21 | Stop reason: HOSPADM

## 2018-06-21 RX ORDER — FENTANYL CITRATE 50 UG/ML
INJECTION, SOLUTION INTRAMUSCULAR; INTRAVENOUS CODE/TRAUMA/SEDATION MEDICATION
Status: DISCONTINUED | OUTPATIENT
Start: 2018-06-21 | End: 2018-06-21 | Stop reason: HOSPADM

## 2018-06-21 RX ORDER — PREDNISONE 20 MG/1
80 TABLET ORAL DAILY
Qty: 120 TABLET | Refills: 0 | Status: SHIPPED | OUTPATIENT
Start: 2018-06-22 | End: 2018-09-27

## 2018-06-21 RX ADMIN — Medication 1000 MG: at 08:24

## 2018-06-21 RX ADMIN — FENTANYL CITRATE 50 MCG: 50 INJECTION, SOLUTION INTRAMUSCULAR; INTRAVENOUS at 14:31

## 2018-06-21 RX ADMIN — FENTANYL CITRATE 50 MCG: 50 INJECTION, SOLUTION INTRAMUSCULAR; INTRAVENOUS at 14:49

## 2018-06-21 RX ADMIN — MIDAZOLAM HYDROCHLORIDE 1 MG: 1 INJECTION, SOLUTION INTRAMUSCULAR; INTRAVENOUS at 14:31

## 2018-06-21 RX ADMIN — MIDAZOLAM HYDROCHLORIDE 1 MG: 1 INJECTION, SOLUTION INTRAMUSCULAR; INTRAVENOUS at 14:49

## 2018-06-21 RX ADMIN — PREDNISONE 77 MG: 20 TABLET ORAL at 17:04

## 2018-06-21 RX ADMIN — LEVOTHYROXINE SODIUM 88 MCG: 88 TABLET ORAL at 06:42

## 2018-06-21 RX ADMIN — CEFAZOLIN 1000 MG: 1 INJECTION, POWDER, FOR SOLUTION INTRAVENOUS at 14:59

## 2018-06-21 RX ADMIN — FENTANYL CITRATE 50 MCG: 50 INJECTION, SOLUTION INTRAMUSCULAR; INTRAVENOUS at 15:01

## 2018-06-21 RX ADMIN — FOLIC ACID 1 MG: 1 TABLET ORAL at 08:24

## 2018-06-21 RX ADMIN — FINASTERIDE 5 MG: 5 TABLET, FILM COATED ORAL at 08:24

## 2018-06-21 NOTE — PROGRESS NOTES
Progress Note -  Hematology/Oncology   Evangelista Cruz 69 y o  male MRN: [id number]  Unit/Bed#: -01 Encounter: [id number]    CC: Metastatic Adenocarcinoma of the Left Lung  HPI: Francis Cruz is a 69 y  o  year old male with a history of metastatic adenocarcinoma of the left lung with who presented 6/16 for progressive shortness of breathing, weakness and cough  He reports a 3 month history of progressive shortness of breathing  The day of admission, he felt profoundly weak with non productive cough with difficulty ambulating from room to room in his home prompting ED evaluation  Denied fever, chills, dizziness, headache, hemoptysis, chest pain, nausea, vomiting and diarrhea  No urinary complaints  He was initiated on 20 mg prednisone one week prior by Dr Estelle Brito for SOB  He did not feel any improvement  Subsequent work up with chest XRay showed: Mild pulmonary vascular congestion, superimposed on underlying pulmonary fibrosis  CBC showed: Hemoglobin 9 9, WBC 6 0/ANC 5 2, platelets [phone]      Oncologic History:  Primary Oncology Provider: Dr Estelle Brito   -1/2018: Diagnosed with adenocarcinoma of the left lung with osseous and chandra involvement  -He was initiated on carboplatin/pemetrexed + pembrolizumab  He also received palliative radiotherapy to the thoracic spine for symptomatic osseous metastasis and is on denosumab [id number]       Assessment/Plan:   #1 Adenocarcinoma of the left lung with chandra and osseous metastasis, on chemotherapy + immunotherapy, s/pC6 (6/6)  -he has been responding to current regimen with pemetrexed and pembrolizumab, however with suspicion for immunotherapy induced pneumonitis  -he will follow with Dr Estelle Brito outpatient after discharge for subsequent  treatment planning and discussion of immunotherapy use in the future  His appointment is on 7/3 at [facility]    -Aport to be placed this admission prior to discharge  Orders are in EPIC   -Dr Estelle Brito updated this admission      #2 Dyspnea, suspect multifactorial 2/2 pneumonitis (immunotherapy induced) +/- underlying ILD (pulmonary fibrosis)  -CT Chest negative for PE  Interstitial lung disease seen on imaging  I discussed findings with Dr Lexi Banks Radiology, there is a  lower suspicion for radiation induced pulmonary fibrosis based on the pattern seen on imaging  The patient had radiotherapy to the thoracic spine and not the lung parenchyma   -He was initiated on prednisone 1mg/kg/day on 6/19  If there is improvement (may take several days to determine response), the likelihood of immunotherapy induced pneumonitis is high and treatment changes regarding further immunotherapy will be discussed with Dr Zack Nunes following resoluton of pneumonitis if that is the case   -He should continue this for discharge until he sees Dr Zack Nunes on 7/3, taper regimen witll be initiated at that time  OK to use 80 mg prednisone po daily for discharge  Please continue bactrim ppx as well  -IF his symptoms do not improve over there next several days, he may need and increase steroid to 2mg/kg/day  He understands to contact Dr Alysa Becker office after discharge if no improvement  #3 Anemia of chronic disease + chemo induced myelosuppression, 2/2 #1, stable  -admission hemoglobin 9 9, he is currently s/p chemo induced myelosuppression julianna  -I do not anticipate downward trend in hemoglobin this admission  However, if needed, transfuse to keep hemoglobin>7g/dL or sooner if symptomatic  He should receive leukoreduced products      Discussed with the primary team ([facility]-Dr Caleb Jaquez) on this date    Subjective: Today, he denies improvement of SOB (vs yesterday he reported some improvement)  He does not feel supplemental oxygen is helping  No fever, cough, CP  No GI or  symptoms  Review of Systems   Constitutional: Positive for fatigue  Negative for chills and fever  HENT:   Negative for mouth sores and trouble swallowing      Eyes: Negative for icterus  Respiratory: Positive for shortness of breath  Negative for chest tightness, cough, hemoptysis and wheezing  Cardiovascular: Positive for chest pain  Negative for leg swelling  Gastrointestinal: Positive for constipation  Negative for abdominal distention, abdominal pain, diarrhea, nausea and vomiting  Genitourinary: Negative for dysuria and hematuria  Musculoskeletal: Negative for arthralgias  Skin: Negative for rash  Hematological: Does not bruise/bleed easily  Psychiatric/Behavioral: Negative for confusion  All other systems reviewed and are negative  Objective:  /58 (BP Location: Left arm)   Pulse 67   Temp 97 6 °F (36 4 °C) (Oral)   Resp 18   Ht 5' 7" (1 702 m)   Wt 77 2 kg (170 lb 3 1 oz)   SpO2 98%   BMI 26 66 kg/m²     Physical Exam   Constitutional: He is oriented to person, place, and time  No distress  HENT:   Head: Normocephalic and atraumatic  Eyes: Conjunctivae and EOM are normal  No scleral icterus  Neck: Normal range of motion  Cardiovascular: Normal rate and regular rhythm  Pulmonary/Chest: Effort normal and breath sounds normal  No respiratory distress  He has no wheezes  He exhibits no tenderness  Abdominal: Soft  He exhibits no distension  There is no tenderness  Musculoskeletal: He exhibits no edema or tenderness  Lymphadenopathy:     He has no cervical adenopathy  Neurological: He is alert and oriented to person, place, and time  Skin: Skin is warm and dry  No rash noted  No pallor  Vitals reviewed        Recent Labs      06/19/18   0556  06/20/18   0612  06/21/18   0436   WBC  5 62  5 54  6 09   HGB  9 0*  9 5*  9 4*   PLT  136*  167  201   MCV  94  93  94   RDW  17 5*  17 2*  17 4*   CREATININE  1 23  1 30  1 26       Laboratory studies were reviewed    Imaging Studies:        Pathology: None    Code Status: Level 1 - Full Code

## 2018-06-21 NOTE — SOCIAL WORK
S/w pt and wife at bedside who is need of home oxygen  Pt and wife agreeable to [facility] DME  Referral placed  Amanda from [facility] here to deliver portable tank to patient's bedside  I offered [facility] to patient and wife but they do not want to maintain homebound status

## 2018-06-21 NOTE — NURSING NOTE
Pulse ox on 2 liters via NC at rest 96%  Pulse ox on RA with ambulation 80-81%  Pulse ox on 2 liters via NC with ambulation 86%  Pulse ox on 4 liters via NC with ambulation 89-90%

## 2018-06-21 NOTE — BRIEF OP NOTE (RAD/CATH)
Right port placement  Procedure Note    PATIENT NAME: Corrina Cormier  : 1940  MRN: [id number]     Pre-op Diagnosis:   1  BLACK (dyspnea on exertion)    2  Pulmonary fibrosis, unspecified ([facility] )    3  Lung cancer ([facility] )    4  Primary adenocarcinoma of lung, unspecified laterality (HCC)      Post-op Diagnosis:   1  BLACK (dyspnea on exertion)    2  Pulmonary fibrosis, unspecified ([facility] )    3  Lung cancer ([place] [address] )    4  Primary adenocarcinoma of lung, unspecified laterality [facility])        Surgeon:   Manuel Lambert MD  Assistants:     No qualified resident was available, Resident is only observing    Estimated Blood Loss:  1 mL  Findings:   Patent and compressible right internal jugular vein  Successful placement of right chest port via right internal jugular vein access, 8 [place] midsize dignity port  Port flushed and aspirated without difficulty  Heparin lock administered  Catheter tip is within the right atrium, near or at the caval atrial junction  Port is ready for use  Final image demonstrates no pneumothorax      Specimens:  None requested    Complications:  Nothing immediately apparent    Anesthesia: Conscious sedation and Local    Manuel Lambert MD     Date: 2018  Time: 3:39 PM

## 2018-06-21 NOTE — DISCHARGE SUMMARY
Discharge Summary - [facility] Internal Medicine    Patient Information: Jeana Bach 69 y o  male MRN: [id number]  Unit/Bed#: -01 Encounter: [id number]    Discharging Physician / Practitioner: Elijah Carter MD  PCP: Stephanie Winkler MD  Admission Date: 6/18/2018  Discharge Date: 06/21/18    Reason for Admission:  Respiratory failure    Discharge Diagnoses:     Principal Problem:    Acute respiratory failure with hypoxia   Active Problems:    Pulmonary fibrosis     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     Essential hypertension    Hypothyroidism    BPH (benign prostatic hyperplasia)      Consultations During Hospital Stay:  · Pulmonology  · Oncology    Procedures Performed:   · Port-A-Cath placement on 6/21/18    Significant findings:  · CTA chest -   No evidence of pulmonary embolus  Extensive pulmonary fibrosis  Right lower lobe consolidation, most likely pneumonia, developing since 5/29/2018  "Crazy paving" pattern of attenuation in portions of the left lower lobe, a differential diagnosis of which has been discussed above  Multiple osteolytic and osteoblastic test ascites in the thoracic spine  Hospital Course:   Jeana Bach is a 69 y o  male patient who originally presented to the hospital on 6/18/2018 due to respiratory failure  Patient has an underlying history of adenocarcinoma of the lungs with thoracic spine Mets  He presented to the hospital due to worsening shortness of breath  A CT of the chest showed evidence of pulmonary fibrosis and other findings as indicated above  He was seen in consultation by the pulmonologist and upon review of multiple CT scans dating back to 2015, it was determined that he had underlying mildly progressive subpleural pulmonary fibrosis  In addition to this, it was also determined that 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 as he is currently on chemo/immunotherapy    He was started on prednisone 1 mg/kg daily which he will continue until he follows up with his oncologist in the office  He continued to be hypoxic especially with ambulation requiring up to 4 L supplemental O2 on the day of discharge with ambulation  He has a follow-up appointments scheduled with the pulmonologist on 7/3/18 at 9:00 a m  as well as with his oncologist on the same day at 11:00 a m  He was discharged home with supplemental O2 in stable condition  Condition at Discharge: stable     Discharge Day Visit / Exam:     Subjective:  No new complaints or acute overnight events    Vitals: Blood Pressure: 124/60 (06/21/18 1545)  Pulse: 78 (06/21/18 1545)  Temperature: 97 5 °F (36 4 °C) (06/21/18 1545)  Temp Source: Oral (06/21/18 1545)  Respirations: 18 (06/21/18 1545)  Height: 5' 7" (170 2 cm) (06/18/18 1844)  Weight - Scale: 77 2 kg (170 lb 3 1 oz) (06/18/18 1844)  SpO2: 95 % (06/21/18 1627)    General Appearance:    Alert, cooperative, no distress, appropriately responsive    Head:    Normocephalic, without obvious abnormality, atraumatic, mucous membranes moist    Eyes:    Conjunctiva/corneas clear, EOM's intact   Neck:   Supple   Lungs:     R>L basal rales, no wheezes    Heart:    Regular rate and rhythm, S1 and S2    Abdomen:     Soft, non-tender, bowel sounds active all four quadrants,     no masses, no organomegaly   Extremities:   Extremities normal, atraumatic, no cyanosis or edema   Neurologic:  nonfocal      Discharge instructions/Information to patient and family:   See after visit summary for information provided to patient and family  Provisions for Follow-Up Care:  See after visit summary for information related to follow-up care and any pertinent home health orders  Disposition: Home     Discussed with patient and spouse at the bedside  All questions answered and concerns addressed  Discharge Statement:  I spent >30 minutes discharging the patient  This time was spent on the day of discharge   I had direct contact with the patient on the day of discharge  Greater than 50% of the total time was spent examining patient, answering all patient questions, arranging and discussing plan of care with patient as well as directly providing post-discharge instructions  Additional time then spent on discharge activities  Discharge Medications:  See after visit summary for reconciled discharge medications provided to patient and family  ** Please Note: Dragon 360 Dictation voice to text software may have been used in the creation of this document   **

## 2018-06-21 NOTE — PLAN OF CARE
Problem: DISCHARGE PLANNING - CARE MANAGEMENT  Goal: Discharge to post-acute care or home with appropriate resources  INTERVENTIONS:  - Conduct assessment to determine patient/family and health care team treatment goals, and need for post-acute services based on payer coverage, community resources, and patient preferences, and barriers to discharge  - Address psychosocial, clinical, and financial barriers to discharge as identified in assessment in conjunction with the patient/family and health care team  - Arrange appropriate level of post-acute services according to patients   needs and preference and payer coverage in collaboration with the physician and health care team  - Communicate with and update the patient/family, physician, and health care team regarding progress on the discharge plan  - Arrange appropriate transportation to post-acute venues  Outcome: Completed Date Met: 06/21/18  S/w pt and wife at bedside who is need of home oxygen  Pt and wife agreeable to [facility] DME  Referral placed  Amanda from [facility] here to deliver portable tank to patient's bedside  I offered VNA to patient and wife but they do not want to maintain homebound status

## 2018-06-21 NOTE — PROGRESS NOTES
Vascular and Interventional Radiology Pre Procedure Note    Diagnosis:  Left lung cancer with metastases    Procedure:  Image guided port placement    HPI:  67-year-old male with left adenocarcinoma history with bony metastases  Patient is to start chemotherapy and port is requested to facilitate treatment  The patient states he is not going to be receiving radiation therapy  Patient denies chest pain, shortness of breath, fever, chills, nausea and vomiting  Patient has no current complaints today  Exam:  General:  Awake, alert, oriented x3, no acute distress  Neck:  Soft, supple, nontender  Chest:  Nontender, no deformity  Abdomen:  Soft, nontender, nondistended, no guarding, no rebound  Cardiac:  S1-S2, regular rate and rhythm  Lungs:  Bilateral air entry, nonlabored breathing, speaking in full sentences      Labs:  Hematology:  WBC 6 09, hemoglobin 9 4, hematocrit 29 3, platelets 270  Chemistry:  Sodium 133, potassium 5, chloride 100, carbon dioxide 23, BUN 38, creatinine 1 26, glucose 155  Coagulation:  INR 1 08, prothrombin time 13 7    Imaging:  CT scan of the chest June 19, 2018 reviewed  The right internal jugular vein appears patent and there is no evidence of superior vena cava syndrome    Plan:  Proceed with placement of a right internal jugular chest port    I discussed the procedure, its details, risks, benefits and alternatives with the patient  All questions were answered  Patient elects to proceed with the procedure  H&P was reviewed

## 2018-07-02 ENCOUNTER — TELEPHONE (OUTPATIENT)
Dept: HEMATOLOGY ONCOLOGY | Facility: CLINIC | Age: 78
End: 2018-07-02

## 2018-07-02 NOTE — TELEPHONE ENCOUNTER
Called patient's spouse and made her aware that the patient's oxygen is to managed by [facility]  Patient has an appointment tomorrow with Pulmonary at [place]

## 2018-07-02 NOTE — TELEPHONE ENCOUNTER
Pt's wife Sami Golden called  Pt on home oxygen with [facility]  [facility] faxed a script for travel O2 but has not received it back  Tristan Marcum would appreciate if you can FU   Tristan Marcum

## 2018-07-03 ENCOUNTER — OFFICE VISIT (OUTPATIENT)
Dept: PULMONOLOGY | Facility: CLINIC | Age: 78
End: 2018-07-03
Payer: COMMERCIAL

## 2018-07-03 ENCOUNTER — OFFICE VISIT (OUTPATIENT)
Dept: HEMATOLOGY ONCOLOGY | Facility: CLINIC | Age: 78
End: 2018-07-03
Payer: COMMERCIAL

## 2018-07-03 VITALS
TEMPERATURE: 98.5 F | SYSTOLIC BLOOD PRESSURE: 112 MMHG | WEIGHT: 167 LBS | BODY MASS INDEX: 26.21 KG/M2 | OXYGEN SATURATION: 90 % | HEART RATE: 98 BPM | HEIGHT: 67 IN | RESPIRATION RATE: 20 BRPM | DIASTOLIC BLOOD PRESSURE: 78 MMHG

## 2018-07-03 VITALS
WEIGHT: 166 LBS | BODY MASS INDEX: 26.06 KG/M2 | SYSTOLIC BLOOD PRESSURE: 120 MMHG | RESPIRATION RATE: 16 BRPM | TEMPERATURE: 97.3 F | HEIGHT: 67 IN | DIASTOLIC BLOOD PRESSURE: 70 MMHG

## 2018-07-03 DIAGNOSIS — C34.92 PRIMARY ADENOCARCINOMA OF LEFT LUNG (HCC): Primary | ICD-10-CM

## 2018-07-03 DIAGNOSIS — C34.92 ADENOCARCINOMA OF LEFT LUNG (HCC): ICD-10-CM

## 2018-07-03 DIAGNOSIS — R09.02 HYPOXEMIA: ICD-10-CM

## 2018-07-03 DIAGNOSIS — J98.4 LUNG DISEASE, RESTRICTIVE: ICD-10-CM

## 2018-07-03 DIAGNOSIS — C79.51 METASTATIC ADENOCARCINOMA TO BONE (HCC): Chronic | ICD-10-CM

## 2018-07-03 DIAGNOSIS — J84.10 PULMONARY FIBROSIS (HCC): Primary | ICD-10-CM

## 2018-07-03 DIAGNOSIS — J18.9 PNEUMONITIS: ICD-10-CM

## 2018-07-03 PROCEDURE — 94618 PULMONARY STRESS TESTING: CPT | Performed by: PHYSICIAN ASSISTANT

## 2018-07-03 PROCEDURE — 99214 OFFICE O/P EST MOD 30 MIN: CPT | Performed by: PHYSICIAN ASSISTANT

## 2018-07-03 NOTE — PROGRESS NOTES
Hematology/Oncology Outpatient Follow-up  Nae Sanchez 69 y o  male 1940 [id number]    Date:  7/3/2018      Assessment and Plan:  1  Primary adenocarcinoma of left lung ([facility] )   patient with stage IV lung cancer  He presented to the hospital due to worsening shortness of breath  He was found to have pulmonary fibrosis and also thought to have pneumonitis secondary to [facility]  He was started on prednisone 1 milligram/kilogram on 06/19/2018  He has had improvement in his breathing  However still remains short of breath  He continues to use oxygen  He saw pulmonology this morning and he will be getting a portable oxygen tank  At this time, we will decrease patient's prednisone to 60 mg daily as he previously has been taking 80 mg for the past 2 weeks  We will speak to him over the phone in 1 week and possibly decrease to 40 mg  He will follow up in 2 weeks  Plan for maintenance Alimta  500 milligrams/meter squared every 3 weeks at that time  he will also continue on Xgeva therapy  discontinue dexamethasone therapy prior to Alimta due to being on prednisone  2  Pneumonitis   see above    3  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)    See above    HPI:    Oncology History    Follow-up visit for 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 diagnosed in January 2018  Status post 4 cycles of carboplatin plus Alimta plus Keytruda and 5th cycle without carboplatin  Cycle 6 will be tomorrow  He has received palliative radiation to thoracic spine  He is responding  Lung mass is smaller  Lymph nodes are not enlarged in the mediastinum anymore  Bone lesion is stable  Also he gets Xgeva once every 3 months  PD L1 level was 0  She gets hematological toxicities and has required  blood transfusions              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 [facility])        Chemotherapy       February 2018 - carboplatin plus Alimta plus Keytruda x 4 cycles and after that Alimta plus Malgorzata Sameera is continuing  Radiation       March or April 2018 - Radiation to thoracic spine  Cancer of lower lobe of left lung ([address] ) (Resolved)    1/15/2018 Initial Diagnosis     Cancer of lower lobe of left lung ([address] )    A  Bone, Left iliac crest, biopsy:  - Metastatic adenocarcinoma  1/31/2018 -  Chemotherapy     Carboplatin AUC 5, Alimta 500 mg/m2, Keytruda 200 mg IV every 3 weeks  Cycle #1 1/31/2018  Xgeva 120 mg subcu every 6 weeks          69-year-old male with past medical history significant for hypothyroidism, hypertension, BPH, spinal stenosis of thoracic and lumbar region  [facility] presented to the [facility] on 12/22  [facility] was having worsening shortness of breath in mid back pain  He was having difficulty walking   He saw his PCP also on 12/22 ordered a chest x-ray   This showed a suspected consolidation on the left upper lobe  Smoking history is significant for roughly 10 cigarettes per day for the past 50 years      Also, he has a history of anemia  [facility] had episode of 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 that was performed on 12/04/2017 with Dr Dee Zepeda      He had PET/CT on 01/11/2018   This showed left lower lobe lesion, 2 cm, SUV 20 9   Also, left perihilar, mediastinal, left subcarinal lymph node (1 6 x 1 2 cm) lymph nodes  No disease in the abdomen or pelvis   Multiple osseous metastases, lesion on transverse process of T3, T7, L1; lytic lesion on the left ilium; destructive lesion on the left bryson sacrum --with soft tissue component; lesion of right femoral neck       CT of the head on 1/14/18 was normal       Dr Ta Bruner noted patient to have hemoglobin dropped from 14-10  He was referred to GI   He was placed on oral iron TID, then he was decreased to BID when he was in the hospital       He started having worsening pain of the back around Laureen       He quit smoking (Dec 2017)     3/03/19/2022 patient was admitted to the hospital due to severe radiation esophagitis  Mary Ann Salamanca had completed   9/10 radiation treatments  to bone metastases   He follows with Dr Jessica Bocanegra    while inpatient, he had an EGD with gastroenterology which showed severe inflammation   There was no of white patches  In the esophagus on exam  Polly Meza was started on nystatin swish and swallow   Also, Carafate   Also, Protonix 40 mg b i d     he was still having poor p o  Intake   He was given intravenous fluids at the infusion center on  3/28 and 3/30      Patient continued to feel fatigued and weak at the end of April  Therefore, carboplatin was discontinued  Patient was transition to Garelodia Pleva plus Alimta  He had 1 dose of this on 06/06/2018  He then called with worsening shortness of breath  He was admitted to the hospital and diagnosed with pulmonary fibrosis, possible pneumonitis secondary to get treated  He was initiated on 1 milligram/kilogram of prednisone on 06/19/2018  Today he states he feels extremely better  His appetite is improved increased  He  Has gained 7 lb  He has better energy  He feels that his shortness of breath also is improved  He still pulmonology today and continues on oxygen  He did not bring oxygen to the office today  ROS: Review of Systems   Constitutional: Positive for activity change (Activity level has increased), appetite change (Improved, he has gained weight since last visit) and fatigue (Improved, best he has felt since starting treatment)  Negative for chills, fever and unexpected weight change  HENT: Negative for mouth sores and nosebleeds  Respiratory: Positive for shortness of breath (With exertion)  Negative for cough, chest tightness and wheezing  Cardiovascular: Negative for chest pain, palpitations and leg swelling     Gastrointestinal: Negative for abdominal pain, blood in stool, constipation, diarrhea, nausea and vomiting  Genitourinary: Negative for difficulty urinating, dysuria and hematuria  Musculoskeletal: Negative for arthralgias, joint swelling and myalgias  Skin: Negative  Neurological: Negative for dizziness, weakness, light-headedness, numbness and headaches  Hematological: Negative  Psychiatric/Behavioral: Negative  Past Medical History:   Diagnosis Date    Anemia     BPH (benign prostatic hyperplasia)     Cancer of lung ([address] )     Disease of thyroid gland     GAVE (gastric antral vascular ectasia)     Hypertension     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HCC)     Spinal stenosis     Tobacco abuse        Past Surgical History:   Procedure Laterality Date    ESOPHAGOGASTRODUODENOSCOPY N/A 3/20/2018    Procedure: ESOPHAGOGASTRODUODENOSCOPY (EGD); Surgeon: Greta Colon MD;  Location: [facility];   Service: Gastroenterology       Social History     Social History    Marital status: /Civil Union     Spouse name: Jordan Gramajo Number of children: 2    Years of education: N/A     Occupational History    retired       Social History Main Topics    Smoking status: Former Smoker     Packs/day: 0 50     Years: 15 00     Types: Cigarettes     Quit date: 1/11/2018    Smokeless tobacco: Never Used      Comment: Quit December 2017    Alcohol use No    Drug use: No    Sexual activity: Not on file     Other Topics Concern    Not on file     Social History Narrative    No narrative on file       Family History   Problem Relation Age of Onset    Esophageal cancer Mother     Diabetes Father     No Known Problems Sister     No Known Problems Brother        No Known Allergies      Current Outpatient Prescriptions:     doxazosin (CARDURA) 4 mg tablet, TAKE 1 TABLET BY MOUTH DAILY AT BEDTIME, Disp: 30 tablet, Rfl: 0    finasteride (PROSCAR) 5 mg tablet, Take 1 tablet (5 mg total) by mouth daily, Disp: 30 tablet, Rfl: 0    folic acid (FOLVITE) 1 mg tablet, Take 1 tablet (1 mg total) by mouth daily, Disp: 90 tablet, Rfl: 0    levothyroxine 88 mcg tablet, Take 88 mcg by mouth daily, Disp: , Rfl:     multivitamin (THERAGRAN) TABS, Take 1 tablet by mouth daily, Disp: , Rfl:     omega-3-acid ethyl esters (LOVAZA) 1 g capsule, Take 1 g by mouth daily, Disp: , Rfl:     predniSONE 20 mg tablet, Take 4 tablets (80 mg total) by mouth daily, Disp: 120 tablet, Rfl: 0      Physical Exam:  /78 (BP Location: Left arm, Patient Position: Sitting, Cuff Size: Adult)   Pulse 98   Temp 98 5 °F (36 9 °C)   Resp 20   Ht 5' 7" (1 702 m)   Wt 75 8 kg (167 lb)   SpO2 90%   BMI 26 16 kg/m²     Physical Exam   Constitutional: He is oriented to person, place, and time  He appears well-developed and well-nourished  No distress  Appears to have gained weight   HENT:   Head: Normocephalic and atraumatic  Eyes: Conjunctivae are normal  No scleral icterus  Neck: Normal range of motion  Neck supple  Cardiovascular: Normal rate, regular rhythm and normal heart sounds  No murmur heard  Pulmonary/Chest: Effort normal and breath sounds normal  No respiratory distress  Abdominal: Soft  There is no tenderness  Musculoskeletal: Normal range of motion  He exhibits no edema or tenderness  Lymphadenopathy:     He has no cervical adenopathy  Neurological: He is alert and oriented to person, place, and time  No cranial nerve deficit  Skin: Skin is warm and dry  Psychiatric: He has a normal mood and affect  Mood is improved compared to prior visits   Vitals reviewed          Labs:  Lab Results   Component Value Date    WBC 6 09 06/21/2018    HGB 9 4 (L) 06/21/2018    HCT 29 3 (L) 06/21/2018    MCV 94 06/21/2018     06/21/2018     Lab Results   Component Value Date     (L) 06/21/2018    K 5 0 06/21/2018     06/21/2018    CO2 23 06/21/2018    ANIONGAP 10 06/21/2018    BUN 38 (H) 06/21/2018    CREATININE 1 26 06/21/2018    GLUCOSE 155 (H) 06/21/2018    GLUF 106 (H) 05/14/2018 CALCIUM 9 0 06/21/2018    AST 38 06/11/2018    ALT 56 06/11/2018    ALKPHOS 79 06/11/2018    PROT 7 4 06/11/2018    BILITOT 0 60 06/11/2018    EGFR 55 06/21/2018     @RESULTFAST(TSH)@    Patient voiced understanding and agreement in the above discussion  Aware to contact our office with questions/symptoms in the interim

## 2018-07-03 NOTE — PROGRESS NOTES
Assessment:    1  Pulmonary fibrosis   CT chest high resolution   2  Adenocarcinoma of left lung (HCC)  CT chest high resolution   3  Lung disease, restrictive     4  Hypoxemia  POCT 6 minute walk       6 minutes walk 07/03/2018  Resting on room air 92 percent  Exercise on room air 85 percent after 21 meters  3 liter oxygen with pulse ox 90 percent at 2 laps  Patient dropped to 87 percent after 42 meters on 3 liters of oxygen  Patient was placed on 5 liters O2 and pulse ox increased to 93 percent after 5 minutes/63 meters  Plan:     Ceci Armenta seen in the office today for hospital follow-up visit  Dyspnea at rest slowly improving while on steroids  However has noticed increased dyspnea with exertion  Home O2 increased to 5 liters/minute  Patient following with Oncology and has an appointment today  Repeat high resolution CT scan  Of chest ordered for mid August 2018  Patient will follow up with Dr Coy in 2 months or sooner if needed  Patient  Instructed to call with any questions or concerns  Subjective:     Patient ID: Jere Matamoros is a 69 y o  male  Chief Complaint:  HPI    67-year-old male new for hospital follow-up visit  He has history of metastatic adenocarcinoma of the lung following with Oncology, mild restrictive lung disease, abnormal chest x-ray of chest with diffuse subpleural pulmonary fibrosis which was present since 2015 acute pneumonitis/ alveolitis suspected secondary to chemotherapy /immunotherapy  Patient complaining of persistent dyspnea on exertion and 6 minutes walk was completed here in the office  Patient states however his dyspnea at rest is slowly improving while on steroids  See results above patient was on 2 liters oxygen at home and needed to be increased to 5 liters/minute after 6 minutes walk evaluation  Patient denies any fevers, chills, headaches, chest pain, shortness of breath at rest, hemoptysis, cough    See full ROS    Past Medical History:   Diagnosis Date    Anemia     BPH (benign prostatic hyperplasia)     Cancer of lung ([address] )     Disease of thyroid gland     GAVE (gastric antral vascular ectasia)     Hypertension     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HCC)     Spinal stenosis     Tobacco abuse        Review of Systems   Constitutional: Positive for fatigue  Negative for activity change, appetite change, chills, diaphoresis and fever  HENT: Negative  Negative for congestion, postnasal drip, rhinorrhea, sinus pain, sinus pressure, sneezing and sore throat  Respiratory: Positive for shortness of breath (  Positive dyspnea with exertion)  Negative for apnea, cough, choking, chest tightness, wheezing and stridor  Cardiovascular: Negative  Gastrointestinal: Negative  Genitourinary: Negative  Musculoskeletal: Negative for myalgias, neck pain and neck stiffness  Skin: Negative for rash  Neurological: Negative for dizziness and headaches  Psychiatric/Behavioral: Negative  Objective:  /70   Temp (!) 97 3 °F (36 3 °C)   Resp 16   Ht 5' 7" (1 702 m)   Wt 75 3 kg (166 lb)   BMI 26 00 kg/m²  86 percent on room air initially at rest   2 liters nasal cannula  Physical Exam   Constitutional: He appears well-developed and well-nourished  No distress  HENT:   Head: Normocephalic and atraumatic  Nose: Nose normal    Mouth/Throat: Oropharynx is clear and moist    Eyes: Conjunctivae are normal  Pupils are equal, round, and reactive to light  No scleral icterus  Neck: Neck supple  Cardiovascular: Normal rate, regular rhythm and intact distal pulses  Exam reveals no gallop and no friction rub  No murmur heard  Pulmonary/Chest: Effort normal  No respiratory distress  He has no wheezes  He has no rales  He exhibits no tenderness  Few bibasilar crackles  Abdominal: Soft  Bowel sounds are normal  There is no tenderness  Musculoskeletal: He exhibits no edema or tenderness  Lymphadenopathy:     He has no cervical adenopathy  Neurological: He is alert  Skin: No rash noted  He is not diaphoretic  Psychiatric: He has a normal mood and affect  Nursing note and vitals reviewed

## 2018-07-03 NOTE — PATIENT INSTRUCTIONS
Please have CT scan of chest done before your next visit    CT scan should be  Done around August 20, 2018 timeframe

## 2018-07-11 ENCOUNTER — TELEPHONE (OUTPATIENT)
Dept: HEMATOLOGY ONCOLOGY | Facility: CLINIC | Age: 78
End: 2018-07-11

## 2018-07-11 NOTE — TELEPHONE ENCOUNTER
Pat calls saying that she was expecting a call yesterday about the prednisone dose  Wondering if they are to change it

## 2018-07-11 NOTE — TELEPHONE ENCOUNTER
Spoke with Holly Mckinney, patient's spouse, in regards to patient's current status with his oxygen  Patient's oxygen level is 87% on RA when performing activities outdoors  When patient is sitting in his house, he is using 2 5 L of oxygen and when he is walking around the house he is using 4 L of oxygen  Patient states "I feel the best I have in months"  Dr Raul Reynolds is ok with the patient's prednisone dose being tapered to 40 mg  Pat verbally understood

## 2018-07-16 ENCOUNTER — APPOINTMENT (OUTPATIENT)
Dept: LAB | Facility: CLINIC | Age: 78
End: 2018-07-16
Payer: COMMERCIAL

## 2018-07-16 RX ORDER — SODIUM CHLORIDE 9 MG/ML
20 INJECTION, SOLUTION INTRAVENOUS CONTINUOUS
Status: DISCONTINUED | OUTPATIENT
Start: 2018-07-17 | End: 2018-07-20 | Stop reason: HOSPADM

## 2018-07-16 RX ORDER — CYANOCOBALAMIN 1000 UG/ML
1000 INJECTION INTRAMUSCULAR; SUBCUTANEOUS ONCE
Status: COMPLETED | OUTPATIENT
Start: 2018-07-17 | End: 2018-07-17

## 2018-07-17 ENCOUNTER — OFFICE VISIT (OUTPATIENT)
Dept: HEMATOLOGY ONCOLOGY | Facility: CLINIC | Age: 78
End: 2018-07-17
Payer: COMMERCIAL

## 2018-07-17 ENCOUNTER — HOSPITAL ENCOUNTER (OUTPATIENT)
Dept: INFUSION CENTER | Facility: CLINIC | Age: 78
Discharge: HOME | End: 2018-07-17
Payer: COMMERCIAL

## 2018-07-17 VITALS
TEMPERATURE: 97.7 F | SYSTOLIC BLOOD PRESSURE: 118 MMHG | BODY MASS INDEX: 26.92 KG/M2 | WEIGHT: 167.5 LBS | RESPIRATION RATE: 20 BRPM | OXYGEN SATURATION: 97 % | HEART RATE: 92 BPM | DIASTOLIC BLOOD PRESSURE: 72 MMHG | HEIGHT: 66 IN

## 2018-07-17 VITALS
HEART RATE: 92 BPM | WEIGHT: 168.5 LBS | HEIGHT: 67 IN | OXYGEN SATURATION: 92 % | SYSTOLIC BLOOD PRESSURE: 126 MMHG | RESPIRATION RATE: 17 BRPM | DIASTOLIC BLOOD PRESSURE: 76 MMHG | TEMPERATURE: 97 F | BODY MASS INDEX: 26.45 KG/M2

## 2018-07-17 DIAGNOSIS — C34.92 PRIMARY ADENOCARCINOMA OF LEFT LUNG (HCC): Primary | ICD-10-CM

## 2018-07-17 DIAGNOSIS — E03.8 OTHER SPECIFIED HYPOTHYROIDISM: ICD-10-CM

## 2018-07-17 DIAGNOSIS — C79.51 METASTATIC ADENOCARCINOMA TO BONE (HCC): Chronic | ICD-10-CM

## 2018-07-17 PROCEDURE — 96372 THER/PROPH/DIAG INJ SC/IM: CPT

## 2018-07-17 PROCEDURE — 96367 TX/PROPH/DG ADDL SEQ IV INF: CPT

## 2018-07-17 PROCEDURE — 96409 CHEMO IV PUSH SNGL DRUG: CPT

## 2018-07-17 PROCEDURE — 96401 CHEMO ANTI-NEOPL SQ/IM: CPT

## 2018-07-17 PROCEDURE — 99214 OFFICE O/P EST MOD 30 MIN: CPT | Performed by: INTERNAL MEDICINE

## 2018-07-17 RX ADMIN — CYANOCOBALAMIN 1000 MCG: 1000 INJECTION, SOLUTION INTRAMUSCULAR at 11:34

## 2018-07-17 RX ADMIN — Medication 300 UNITS: at 12:00

## 2018-07-17 RX ADMIN — DENOSUMAB 120 MG: 120 INJECTION SUBCUTANEOUS at 11:33

## 2018-07-17 RX ADMIN — SODIUM CHLORIDE 8 MG: 9 INJECTION, SOLUTION INTRAVENOUS at 10:25

## 2018-07-17 RX ADMIN — SODIUM CHLORIDE 20 ML/HR: 9 INJECTION, SOLUTION INTRAVENOUS at 10:10

## 2018-07-17 RX ADMIN — SODIUM CHLORIDE 900 MG: 9 INJECTION, SOLUTION INTRAVENOUS at 11:14

## 2018-07-17 NOTE — PROGRESS NOTES
Patient is here for Alimta infusion  Patient currently offers no complaints  Labs within parameters to treat  Port accessed without incident

## 2018-07-17 NOTE — LETTER
July 17, 2018     Shefali Sprague MD  [address] [facility]  [address]  [address] [place] [address] [facility]    Patient: Jere Matamoros   YOB: 1940   Date of Visit: 7/17/2018       Dear Dr Eddie Brown: Thank you for referring Jere Matamoros to me for evaluation  Below are my notes for this consultation  If you have questions, please do not hesitate to call me  I look forward to following your patient along with you  Sincerely,        Hanna Gusman MD        CC: No Recipients  Hanna Gusman MD  7/17/2018  5:40 PM  Sign at close encounter    HPI:   Jere Matamoros is a 69 y o  male   He is here with his wife  He is on maintenance Alimta for adenocarcinoma of the lung  He also gets B12 shot  He is taking folic acid 1 mg daily  Prednisone dose is slowly being decreased and he will go from 40 mg a day  to 30 mg a day   He has minimal cough  Improving exertional dyspnea on prednisone  No other chest symptoms  Tiredness  Arthritic symptoms  He is eating better on prednisone  History of pulmonary fibrosis  Recent history of Keytruda induced pneumonitis being treated with tapering dose of prednisone  Follow-up visit for 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 diagnosed in January 2018  Status post 4 cycles of carboplatin plus Alimta plus Keytruda and 5th cycle without carboplatin   Patient responded to systemic chemo/immunotherapy  He was on  Alimta plus Keytruda maintenance systemic therapy       Recent  admission because of pneumonitis secondary to [facility] as above  He  had responded  Lymph nodes not enlarged in the mediastinum anymore  Bone lesions stable  Patient had received radiation to thoracic spine for painful bony lesion  He has been getting  Xgeva once every 3 months  PD L1 level was 0  She   had hematological toxicities and received   blood transfusions  He has been using oxygen with exertion    The diagnosis was made by the biopsy  from the left iliac crest  that identified metastatic adenocarcinoma                   Oncology History     Follow-up visit for 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 diagnosed in January 2018  Status post 4 cycles of carboplatin plus Alimta plus Keytruda and 5th cycle without carboplatin  Cycle 6 will be tomorrow  He has received palliative radiation to thoracic spine  He is responding  Lung mass is smaller  Lymph nodes are not enlarged in the mediastinum anymore  Bone lesion is stable  Also he gets Xgeva once every 3 months  PD L1 level was 0  She gets hematological toxicities and has required  blood transfusions               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 [facility])       Initial Diagnosis       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 [facility])           Chemotherapy         February 2018 - carboplatin plus Alimta plus Keytruda x 4 cycles and after that Alimta plus [facility] is continuing               Radiation         March or April 2018 - Radiation to thoracic spine  Current Outpatient Prescriptions:     doxazosin (CARDURA) 4 mg tablet, TAKE 1 TABLET BY MOUTH DAILY AT BEDTIME, Disp: 30 tablet, Rfl: 0    finasteride (PROSCAR) 5 mg tablet, Take 1 tablet (5 mg total) by mouth daily, Disp: 30 tablet, Rfl: 0    folic acid (FOLVITE) 1 mg tablet, Take 1 tablet (1 mg total) by mouth daily, Disp: 90 tablet, Rfl: 0    levothyroxine 88 mcg tablet, Take 88 mcg by mouth daily, Disp: , Rfl:     multivitamin (THERAGRAN) TABS, Take 1 tablet by mouth daily, Disp: , Rfl:     omega-3-acid ethyl esters (LOVAZA) 1 g capsule, Take 1 g by mouth daily, Disp: , Rfl:     predniSONE 20 mg tablet, Take 4 tablets (80 mg total) by mouth daily, Disp: 120 tablet, Rfl: 0  No current facility-administered medications for this visit       Facility-Administered Medications Ordered in Other Visits:     alteplase (CATHFLO) injection 2 mg, 2 mg, Intracatheter, Once PRN, Ritu Bel MD Rosa    cyanocobalamin injection 1,000 mcg, 1,000 mcg, Intramuscular, Once, Lashawn Grant MD    denosumab (XGEVA) subcutaneous injection 120 mg, 120 mg, Subcutaneous, Once, Lashawn Grant MD    heparin lock flush 100 units/mL injection 300 Units, 300 Units, Intracatheter, Q1H PRN, Lashawn Grant MD    ondansetron (ZOFRAN) 8 mg in sodium chloride 0 9 % 50 mL IVPB, 8 mg, Intravenous, Once, Lahsawn Grant MD    PEMEtrexed (ALIMTA) 900 mg in sodium chloride 0 9 % 100 mL chemo infusion, 900 mg, Intravenous, Once, Lashawn Grant MD    sodium chloride 0 9 % infusion, 20 mL/hr, Intravenous, Continuous, Lashawn Grant MD    No Known Allergies    Oncology History    Follow-up visit for 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 diagnosed in January 2018  Status post 4 cycles of carboplatin plus Alimta plus Keytruda and 5th cycle without carboplatin  Cycle 6 will be tomorrow  He has received palliative radiation to thoracic spine  He is responding  Lung mass is smaller  Lymph nodes are not enlarged in the mediastinum anymore  Bone lesion is stable  Also he gets Xgeva once every 3 months  PD L1 level was 0  She gets hematological toxicities and has required  blood transfusions  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 [facility])     Initial Diagnosis     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 [facility])        Chemotherapy       February 2018 - carboplatin plus Alimta plus Keytruda x 4 cycles and after that Alimta plus [facility]a [facility] is continuing  Radiation       March or April 2018 - Radiation to thoracic spine  Cancer of lower lobe of left lung ([address] ) (Resolved)    1/15/2018 Initial Diagnosis     Cancer of lower lobe of left lung ([address] )    A  Bone, Left iliac crest, biopsy:  - Metastatic adenocarcinoma  1/31/2018 -  Chemotherapy     Carboplatin AUC 5, Alimta 500 mg/m2, Keytruda 200 mg IV every 3 weeks  Cycle #1 1/31/2018     Xgeva 120 mg subcu every 6 weeks            ROS:  07/17/18 Reviewed 13 systems:  Presently no headaches, seizures, dizziness, diplopia, dysphagia, hoarseness, chest pain, palpitations,  hemoptysis, abdominal pain, nausea, vomiting, change in bowel habits, melena, hematuria, fever, chills, bleeding,  skin rash, weight loss,  weakness, numbness,  claudication and gait problem  No frequent infections  Not unusually sensitive to heat or cold  No swelling of the ankles  No swollen glands  Patient is anxious  Other symptoms are in HPI        /76 (BP Location: Left arm, Cuff Size: Adult)   Pulse 92   Temp (!) 97 °F (36 1 °C) (Tympanic)   Resp 17   Ht 5' 7" (1 702 m)   Wt 76 4 kg (168 lb 8 oz)   SpO2 92%   BMI 26 39 kg/m²      Physical Exam:  Alert, oriented, not in distress, no icterus, no oral thrush, no palpable neck mass, clear lung fields, regular heart rate, abdomen  soft and non tender, no palpable abdominal mass, no ascites, no edema of ankles, no calf tenderness, no focal neurological deficit, no skin rash, no palpable lymphadenopathy, good arterial pulses, no clubbing  Patient is anxious  Performance status 2        IMAGING:  No orders to display       LABS:  Results for orders placed or performed in visit on 07/06/18   CBC and differential   Result Value Ref Range    WBC 5 27 4 31 - 10 16 Thousand/uL    RBC 3 85 (L) 3 88 - 5 62 Million/uL    Hemoglobin 11 9 (L) 12 0 - 17 0 g/dL    Hematocrit 36 5 36 5 - 49 3 %    MCV 95 82 - 98 fL    MCH 30 9 26 8 - 34 3 pg    MCHC 32 6 31 4 - 37 4 g/dL    RDW 19 4 (H) 11 6 - 15 1 %    MPV 9 1 8 9 - 12 7 fL    Platelets 772 (L) 199 - 390 Thousands/uL   Comprehensive metabolic panel   Result Value Ref Range    Sodium 139 136 - 145 mmol/L    Potassium 4 1 3 5 - 5 3 mmol/L    Chloride 102 100 - 108 mmol/L    CO2 30 21 - 32 mmol/L    Anion Gap 7 4 - 13 mmol/L    BUN 39 (H) 5 - 25 mg/dL    Creatinine 1 24 0 60 - 1 30 mg/dL    Glucose 90 65 - 140 mg/dL    Calcium 8 9 8 3 - 10 1 mg/dL AST 25 5 - 45 U/L    ALT 50 12 - 78 U/L    Alkaline Phosphatase 87 46 - 116 U/L    Total Protein 6 7 6 4 - 8 2 g/dL    Albumin 3 1 (L) 3 5 - 5 0 g/dL    Total Bilirubin 0 60 0 20 - 1 00 mg/dL    eGFR 56 ml/min/1 73sq m   TSH, 3rd generation with T4 reflex   Result Value Ref Range    TSH 3RD GENERATON 4 479 (H) 0 358 - 3 740 uIU/mL   T4, free   Result Value Ref Range    Free T4 0 87 0 76 - 1 46 ng/dL   Manual Differential(PHLEBS Do Not Order)   Result Value Ref Range    Segmented % 92 (H) 43 - 75 %    Lymphocytes % 3 (L) 14 - 44 %    Monocytes % 3 (L) 4 - 12 %    Eosinophils % 0 0 - 6 %    Basophils % 0 0 - 1 %    Myelocytes % 1 0 - 1 %    Atypical Lymphocytes % 1 (H) <=0 %    Absolute Neutrophils 4 85 1 85 - 7 62 Thousand/uL    Lymphocytes Absolute 0 16 (L) 0 60 - 4 47 Thousand/uL    Monocytes Absolute 0 16 0 00 - 1 22 Thousand/uL    Eosinophils Absolute 0 00 0 00 - 0 40 Thousand/uL    Basophils Absolute 0 00 0 00 - 0 10 Thousand/uL    Total Counted 100     Platelet Estimate Decreased (A) Adequate    Large Platelet Present      Labs, Imaging, & Other studies:   All pertinent labs and imaging studies were personally reviewed    Lab Results   Component Value Date     07/16/2018    K 4 1 07/16/2018     07/16/2018    CO2 30 07/16/2018    ANIONGAP 7 07/16/2018    BUN 39 (H) 07/16/2018    CREATININE 1 24 07/16/2018    GLUCOSE 90 07/16/2018    GLUF 106 (H) 05/14/2018    CALCIUM 8 9 07/16/2018    AST 25 07/16/2018    ALT 50 07/16/2018    ALKPHOS 87 07/16/2018    PROT 6 7 07/16/2018    BILITOT 0 60 07/16/2018    EGFR 56 07/16/2018     Lab Results   Component Value Date    WBC 5 27 07/16/2018    HGB 11 9 (L) 07/16/2018    HCT 36 5 07/16/2018    MCV 95 07/16/2018     (L) 07/16/2018   No results found for: SEDRATE    Reviewed and discussed with patient  Assessment and plan:  Ankur Merritt is a 69 y o  male   He is here with his wife  He is on maintenance Alimta for adenocarcinoma of the lung    He also gets B12 shot  He is taking folic acid 1 mg daily  Prednisone dose is slowly being decreased and he will go from 40 mg a day  to 30 mg a day   He has minimal cough  Improving exertional dyspnea on prednisone  No other chest symptoms  Tiredness  Arthritic symptoms  He is eating better on prednisone  History of pulmonary fibrosis  Recent history of Keytruda induced pneumonitis being treated with tapering dose of prednisone  Follow-up visit for 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 as above  He  had responded  Lymph nodes not enlarged in the mediastinum anymore  Bone lesions stable  Patient had received radiation to thoracic spine for painful bony lesion  He has been getting  Xgeva once every 3 months  PD L1 level was 0  She   had hematological toxicities and received   blood transfusions  He has been using oxygen with exertion  The diagnosis was made by the biopsy  from the left iliac crest  that identified metastatic adenocarcinoma     Physical examination and test results are as recorded and discussed  Next CT scan will be towards the end of August 2018  Prednisone dose is being slowly tapered  He is being continued on Alimta and Xgeva  All discussed in detail  Questions answered  1  Primary adenocarcinoma of left lung ([address] )      2  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 ([address] )      3  Other specified hypothyroidism    - TSH, 3rd generation with Free T4 reflex; Future      Patient voiced understanding and agreement in the discussion         Counseling / Coordination of Care   Greater than 50% of total time was spent with the patient and / or family counseling and / or coordination of care

## 2018-07-17 NOTE — PROGRESS NOTES
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 diagnosed in January 2018  Status post 4 cycles of carboplatin plus Alimta plus Keytruda and 5th cycle without carboplatin  Cycle 6 will be tomorrow  He has received palliative radiation to thoracic spine  He is responding  Lung mass is smaller  Lymph nodes are not enlarged in the mediastinum anymore  Bone lesion is stable    Also he gets Xgeva once every 3 months  PD L1 level was 0  She gets hematological toxicities and has required  blood transfusions               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 [facility])       Initial Diagnosis       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 [facility])           Chemotherapy         February 2018 - carboplatin plus Alimta plus Keytruda x 4 cycles and after that Alimta plus [facility] is continuing               Radiation         March or April 2018 - Radiation to thoracic spine  Current Outpatient Prescriptions:     doxazosin (CARDURA) 4 mg tablet, TAKE 1 TABLET BY MOUTH DAILY AT BEDTIME, Disp: 30 tablet, Rfl: 0    finasteride (PROSCAR) 5 mg tablet, Take 1 tablet (5 mg total) by mouth daily, Disp: 30 tablet, Rfl: 0    folic acid (FOLVITE) 1 mg tablet, Take 1 tablet (1 mg total) by mouth daily, Disp: 90 tablet, Rfl: 0    levothyroxine 88 mcg tablet, Take 88 mcg by mouth daily, Disp: , Rfl:     multivitamin (THERAGRAN) TABS, Take 1 tablet by mouth daily, Disp: , Rfl:     omega-3-acid ethyl esters (LOVAZA) 1 g capsule, Take 1 g by mouth daily, Disp: , Rfl:     predniSONE 20 mg tablet, Take 4 tablets (80 mg total) by mouth daily, Disp: 120 tablet, Rfl: 0  No current facility-administered medications for this visit       Facility-Administered Medications Ordered in Other Visits:     alteplase (CATHFLO) injection 2 mg, 2 mg, Intracatheter, Once PRN, Monico Sotelo MD    cyanocobalamin injection 1,000 mcg, 1,000 mcg, Intramuscular, Once, Monico Sotelo MD    denosumab (XGEVA) subcutaneous injection 120 mg, 120 mg, Subcutaneous, Once, Monico Sotelo MD    heparin lock flush 100 units/mL injection 300 Units, 300 Units, Intracatheter, Q1H PRN, Monico Sotelo MD    ondansetron (ZOFRAN) 8 mg in sodium chloride 0 9 % 50 mL IVPB, 8 mg, Intravenous, Once, Monico Sotelo MD    PEMEtrexed (ALIMTA) 900 mg in sodium chloride 0 9 % 100 mL chemo infusion, 900 mg, Intravenous, Once, Monico Sotelo MD    sodium chloride 0 9 % infusion, 20 mL/hr, Intravenous, Continuous, Rosa Cunningham MD    No Known Allergies    Oncology History    Follow-up visit for 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 diagnosed in January 2018  Status post 4 cycles of carboplatin plus Alimta plus Keytruda and 5th cycle without carboplatin  Cycle 6 will be tomorrow  He has received palliative radiation to thoracic spine  He is responding  Lung mass is smaller  Lymph nodes are not enlarged in the mediastinum anymore  Bone lesion is stable  Also he gets Xgeva once every 3 months  PD L1 level was 0  She gets hematological toxicities and has required  blood transfusions  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 [facility])     Initial Diagnosis     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 [facility])        Chemotherapy       February 2018 - carboplatin plus Alimta plus Keytruda x 4 cycles and after that Alimta plus Tamera [place] is continuing  Radiation       March or April 2018 - Radiation to thoracic spine  Cancer of lower lobe of left lung ([address] ) (Resolved)    1/15/2018 Initial Diagnosis     Cancer of lower lobe of left lung ([facility] )    A  Bone, Left iliac crest, biopsy:  - Metastatic adenocarcinoma  1/31/2018 -  Chemotherapy     Carboplatin AUC 5, Alimta 500 mg/m2, Keytruda 200 mg IV every 3 weeks  Cycle #1 1/31/2018  Xgeva 120 mg subcu every 6 weeks            ROS:  07/17/18 Reviewed 13 systems:  Presently no headaches, seizures, dizziness, diplopia, dysphagia, hoarseness, chest pain, palpitations,  hemoptysis, abdominal pain, nausea, vomiting, change in bowel habits, melena, hematuria, fever, chills, bleeding,  skin rash, weight loss,  weakness, numbness,  claudication and gait problem  No frequent infections  Not unusually sensitive to heat or cold  No swelling of the ankles  No swollen glands  Patient is anxious   Other symptoms are in HPI        /76 (BP Location: Left arm, Cuff Size: Adult) Pulse 92   Temp (!) 97 °F (36 1 °C) (Tympanic)   Resp 17   Ht 5' 7" (1 702 m)   Wt 76 4 kg (168 lb 8 oz)   SpO2 92%   BMI 26 39 kg/m²     Physical Exam:  Alert, oriented, not in distress, no icterus, no oral thrush, no palpable neck mass, clear lung fields, regular heart rate, abdomen  soft and non tender, no palpable abdominal mass, no ascites, no edema of ankles, no calf tenderness, no focal neurological deficit, no skin rash, no palpable lymphadenopathy, good arterial pulses, no clubbing  Patient is anxious  Performance status 2        IMAGING:  No orders to display       LABS:  Results for orders placed or performed in visit on 07/06/18   CBC and differential   Result Value Ref Range    WBC 5 27 4 31 - 10 16 Thousand/uL    RBC 3 85 (L) 3 88 - 5 62 Million/uL    Hemoglobin 11 9 (L) 12 0 - 17 0 g/dL    Hematocrit 36 5 36 5 - 49 3 %    MCV 95 82 - 98 fL    MCH 30 9 26 8 - 34 3 pg    MCHC 32 6 31 4 - 37 4 g/dL    RDW 19 4 (H) 11 6 - 15 1 %    MPV 9 1 8 9 - 12 7 fL    Platelets 850 (L) 131 - 390 Thousands/uL   Comprehensive metabolic panel   Result Value Ref Range    Sodium 139 136 - 145 mmol/L    Potassium 4 1 3 5 - 5 3 mmol/L    Chloride 102 100 - 108 mmol/L    CO2 30 21 - 32 mmol/L    Anion Gap 7 4 - 13 mmol/L    BUN 39 (H) 5 - 25 mg/dL    Creatinine 1 24 0 60 - 1 30 mg/dL    Glucose 90 65 - 140 mg/dL    Calcium 8 9 8 3 - 10 1 mg/dL    AST 25 5 - 45 U/L    ALT 50 12 - 78 U/L    Alkaline Phosphatase 87 46 - 116 U/L    Total Protein 6 7 6 4 - 8 2 g/dL    Albumin 3 1 (L) 3 5 - 5 0 g/dL    Total Bilirubin 0 60 0 20 - 1 00 mg/dL    eGFR 56 ml/min/1 73sq m   TSH, 3rd generation with T4 reflex   Result Value Ref Range    TSH 3RD GENERATON 4 479 (H) 0 358 - 3 740 uIU/mL   T4, free   Result Value Ref Range    Free T4 0 87 0 76 - 1 46 ng/dL   Manual Differential(PHLEBS Do Not Order)   Result Value Ref Range    Segmented % 92 (H) 43 - 75 %    Lymphocytes % 3 (L) 14 - 44 %    Monocytes % 3 (L) 4 - 12 %    Eosinophils % 0 0 - 6 %    Basophils % 0 0 - 1 %    Myelocytes % 1 0 - 1 %    Atypical Lymphocytes % 1 (H) <=0 %    Absolute Neutrophils 4 85 1 85 - 7 62 Thousand/uL    Lymphocytes Absolute 0 16 (L) 0 60 - 4 47 Thousand/uL    Monocytes Absolute 0 16 0 00 - 1 22 Thousand/uL    Eosinophils Absolute 0 00 0 00 - 0 40 Thousand/uL    Basophils Absolute 0 00 0 00 - 0 10 Thousand/uL    Total Counted 100     Platelet Estimate Decreased (A) Adequate    Large Platelet Present      Labs, Imaging, & Other studies:   All pertinent labs and imaging studies were personally reviewed    Lab Results   Component Value Date     07/16/2018    K 4 1 07/16/2018     07/16/2018    CO2 30 07/16/2018    ANIONGAP 7 07/16/2018    BUN 39 (H) 07/16/2018    CREATININE 1 24 07/16/2018    GLUCOSE 90 07/16/2018    GLUF 106 (H) 05/14/2018    CALCIUM 8 9 07/16/2018    AST 25 07/16/2018    ALT 50 07/16/2018    ALKPHOS 87 07/16/2018    PROT 6 7 07/16/2018    BILITOT 0 60 07/16/2018    EGFR 56 07/16/2018     Lab Results   Component Value Date    WBC 5 27 07/16/2018    HGB 11 9 (L) 07/16/2018    HCT 36 5 07/16/2018    MCV 95 07/16/2018     (L) 07/16/2018   No results found for: SEDRATE    Reviewed and discussed with patient  Assessment and plan:  Marta Monson is a 69 y o  male   He is here with his wife  He is on maintenance Alimta for adenocarcinoma of the lung  He also gets B12 shot  He is taking folic acid 1 mg daily  Prednisone dose is slowly being decreased and he will go from 40 mg a day  to 30 mg a day   He has minimal cough  Improving exertional dyspnea on prednisone  No other chest symptoms  Tiredness  Arthritic symptoms  He is eating better on prednisone  History of pulmonary fibrosis  Recent history of Keytruda induced pneumonitis being treated with tapering dose of prednisone  Follow-up visit for 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 diagnosed in January 2018    Status post 4 cycles of carboplatin plus Alimta plus Keytruda and 5th cycle without carboplatin   Patient responded to systemic chemo/immunotherapy  He was on  Alimta plus Keytruda maintenance systemic therapy       Recent  admission because of pneumonitis secondary to [facility] as above  He had responded  Lymph nodes not enlarged in the mediastinum anymore  Bone lesions stable  Patient had received radiation to thoracic spine for painful bony lesion  He has been getting  Xgeva once every 3 months  PD L1 level was 0  She  had hematological toxicities and received   blood transfusions  He has been using oxygen with exertion  The diagnosis was made by the biopsy  from the left iliac crest  that identified metastatic adenocarcinoma     Physical examination and test results are as recorded and discussed  Next CT scan will be towards the end of August 2018  Prednisone dose is being slowly tapered  He is being continued on Alimta and Xgeva  All discussed in detail  Questions answered  1  Primary adenocarcinoma of left lung ([address] )      2  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 ([address] )      3  Other specified hypothyroidism    - TSH, 3rd generation with Free T4 reflex; Future      Patient voiced understanding and agreement in the discussion  Counseling / Coordination of Care   Greater than 50% of total time was spent with the patient and / or family counseling and / or coordination of care

## 2018-07-17 NOTE — PROGRESS NOTES
Patient tolerated Alimta infusion without incident  Port de-accessed as per protocol  Patient aware of next appointment   AVS printed and given to patient

## 2018-07-19 DIAGNOSIS — N40.1 BENIGN PROSTATIC HYPERPLASIA WITH LOWER URINARY TRACT SYMPTOMS, SYMPTOM DETAILS UNSPECIFIED: Chronic | ICD-10-CM

## 2018-07-19 RX ORDER — DOXAZOSIN MESYLATE 4 MG/1
TABLET ORAL
Qty: 30 TABLET | Refills: 0 | Status: SHIPPED | OUTPATIENT
Start: 2018-07-19 | End: 2018-07-23

## 2018-07-23 ENCOUNTER — TELEPHONE (OUTPATIENT)
Dept: PULMONOLOGY | Facility: CLINIC | Age: 78
End: 2018-07-23

## 2018-07-23 ENCOUNTER — OFFICE VISIT (OUTPATIENT)
Dept: UROLOGY | Facility: CLINIC | Age: 78
End: 2018-07-23
Payer: COMMERCIAL

## 2018-07-23 ENCOUNTER — DOCUMENTATION (OUTPATIENT)
Dept: PULMONOLOGY | Facility: CLINIC | Age: 78
End: 2018-07-23

## 2018-07-23 VITALS
DIASTOLIC BLOOD PRESSURE: 78 MMHG | HEIGHT: 67 IN | BODY MASS INDEX: 26.56 KG/M2 | HEART RATE: 90 BPM | WEIGHT: 169.2 LBS | SYSTOLIC BLOOD PRESSURE: 122 MMHG

## 2018-07-23 DIAGNOSIS — R39.12 BENIGN PROSTATIC HYPERPLASIA WITH WEAK URINARY STREAM: Primary | ICD-10-CM

## 2018-07-23 DIAGNOSIS — N40.1 BENIGN PROSTATIC HYPERPLASIA WITH WEAK URINARY STREAM: Primary | ICD-10-CM

## 2018-07-23 DIAGNOSIS — J96.11 CHRONIC RESPIRATORY FAILURE WITH HYPOXIA (HCC): Primary | ICD-10-CM

## 2018-07-23 PROCEDURE — 99204 OFFICE O/P NEW MOD 45 MIN: CPT | Performed by: UROLOGY

## 2018-07-23 RX ORDER — TAMSULOSIN HYDROCHLORIDE 0.4 MG/1
0.4 CAPSULE ORAL
Qty: 30 CAPSULE | Refills: 6 | Status: SHIPPED | OUTPATIENT
Start: 2018-07-23 | End: 2018-09-18 | Stop reason: SDUPTHER

## 2018-07-23 RX ORDER — OXYBUTYNIN CHLORIDE 10 MG/1
10 TABLET, EXTENDED RELEASE ORAL
Qty: 30 TABLET | Refills: 6 | Status: SHIPPED | OUTPATIENT
Start: 2018-07-23 | End: 2018-09-18 | Stop reason: SDUPTHER

## 2018-07-23 NOTE — PATIENT INSTRUCTIONS
Finasteride - continue    Doxazosin - stop    Start Tamsulosin at bedtime    Start oxybutinin at bedtime

## 2018-07-23 NOTE — TELEPHONE ENCOUNTER
Not sure how to accomplish that from [facility]  Is someone there that can write the order so you can fax it

## 2018-07-23 NOTE — PROGRESS NOTES
Referring Physician: Arron Garrido MD  A copy of this note was sent to the referring physician  Diagnoses and all orders for this visit:    Benign prostatic hyperplasia with weak urinary stream  -     tamsulosin (FLOMAX) 0 4 mg; Take 1 capsule (0 4 mg total) by mouth daily with dinner  -     oxybutynin (DITROPAN-XL) 10 MG 24 hr tablet; Take 1 tablet (10 mg total) by mouth daily at bedtime            Assessment and plan: This is a very pleasant 61-year-old male with stage IV lung cancer, a longstanding history of BPH and bothersome nocturia  Patient's goal is to improve his nocturia with medical management  He is already on combination therapy and has been for some time    Plan:  1  Continue finasteride  2  Stop doxazosin and start tamsulosin  3  Start anticholinergic for his storage symptoms (unfortunately mirabegron was not covered by his insurance plan)    Instructions, potential side-effects and dosing reviewed  He will follow up in 6 months      Chris Marti MD      Chief Complaint     Urinary frequency      History of Present Illness     Mateo Jeffers is a 69 y o  referred for evaluation of urinary frequency  Patient has a longstanding history of BPH and has been managed with combination therapy with finasteride and doxazosin for approximately 7 or 8 years  He has severe nocturia waking up once every night  He has no history of hematuria or UTIs  Medical comorbidities include stage IV lung cancer with osseous metastases    Detailed Urologic History     - please refer to HPI    Review of Systems     Review of Systems   Constitutional: Negative for activity change and fatigue  HENT: Negative for congestion  Eyes: Negative for visual disturbance  Respiratory: Negative for shortness of breath and wheezing  Cardiovascular: Negative for chest pain and leg swelling  Gastrointestinal: Negative for abdominal pain  Endocrine: Positive for polyuria     Genitourinary: Positive for dysuria, frequency and urgency  Negative for flank pain and hematuria  Musculoskeletal: Negative for back pain  Allergic/Immunologic: Negative for immunocompromised state  Neurological: Negative for dizziness and numbness  Psychiatric/Behavioral: Negative for dysphoric mood  All other systems reviewed and are negative  AUA SYMPTOM SCORE      Most Recent Value   AUA SYMPTOM SCORE   How often have you had a sensation of not emptying your bladder completely after you finished urinating? 0   How often have you had to urinate again less than two hours after you finished urinating? 4   How often have you found you stopped and started again several times when you urinate? 2   How often have you found it difficult to postpone urination? 2   How often have you had a weak urinary stream?  2   How often have you had to push or strain to begin urination? 3   How many times did you most typically get up to urinate from the time you went to bed at night until the time you got up in the morning? 4   Quality of Life: If you were to spend the rest of your life with your urinary condition just the way it is now, how would you feel about that?  3   AUA SYMPTOM SCORE  17            Allergies     No Known Allergies    Physical Exam     Physical Exam   Constitutional: He is oriented to person, place, and time  He appears well-developed and well-nourished  No distress  HENT:   Head: Normocephalic and atraumatic  Eyes: EOM are normal    Neck: Normal range of motion  Cardiovascular:   Negative lower extremity edema   Pulmonary/Chest:   On oxygen, minimal dyspnea with exertion   Abdominal: Soft  Genitourinary:   Genitourinary Comments: Negative suprapubic tenderness   Musculoskeletal: Normal range of motion  Neurological: He is alert and oriented to person, place, and time  Skin: Skin is warm  Psychiatric: He has a normal mood and affect   His behavior is normal            Vital Signs  Vitals: 07/23/18 0939   BP: 122/78   Pulse: 90   Weight: 76 7 kg (169 lb 3 2 oz)   Height: 5' 7" (1 702 m)         Current Medications       Current Outpatient Prescriptions:     doxazosin (CARDURA) 4 mg tablet, TAKE 1 TABLET BY MOUTH DAILY AT BEDTIME, Disp: 30 tablet, Rfl: 0    finasteride (PROSCAR) 5 mg tablet, Take 1 tablet (5 mg total) by mouth daily, Disp: 30 tablet, Rfl: 0    folic acid (FOLVITE) 1 mg tablet, Take 1 tablet (1 mg total) by mouth daily, Disp: 90 tablet, Rfl: 0    levothyroxine 88 mcg tablet, Take 88 mcg by mouth daily, Disp: , Rfl:     multivitamin (THERAGRAN) TABS, Take 1 tablet by mouth daily, Disp: , Rfl:     omega-3-acid ethyl esters (LOVAZA) 1 g capsule, Take 1 g by mouth daily, Disp: , Rfl:     predniSONE 20 mg tablet, Take 4 tablets (80 mg total) by mouth daily, Disp: 120 tablet, Rfl: 0      Active Problems     Patient Active Problem List   Diagnosis    Other specified hypothyroidism    Essential hypertension    Benign prostatic hyperplasia with weak urinary stream    GAVE (gastric antral vascular ectasia)    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 (HCC)    Anemia    Constipation    Failure to thrive (0-17)    Dysphagia    Leukopenia    Total bilirubin, elevated    Esophagitis    Adenocarcinoma of left lung (HCC)    Acute respiratory failure with hypoxia     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     Pulmonary fibrosis     Lung disease, restrictive         Past Medical History     Past Medical History:   Diagnosis Date    Anemia     BPH (benign prostatic hyperplasia)     Cancer of lung (HCC)     Disease of thyroid gland     GAVE (gastric antral vascular ectasia)     Hypertension     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[address] )     Spinal stenosis     Tobacco abuse          Surgical History     Past Surgical History:   Procedure Laterality Date    ESOPHAGOGASTRODUODENOSCOPY N/A 3/20/2018    Procedure: ESOPHAGOGASTRODUODENOSCOPY (EGD);   Surgeon: Venessa Gerard Martha Luong MD;  [facility]; Service: Gastroenterology         Family History     Family History   Problem Relation Age of Onset    Esophageal cancer Mother     Diabetes Father     No Known Problems Sister     No Known Problems Brother          Social History     Social History     History   Smoking Status    Former Smoker    Packs/day: 0 50    Years: 15 00    Types: Cigarettes    Quit date: 1/11/2018   Smokeless Tobacco    Never Used     Comment: Quit December 2017         Pertinent Lab Values     Lab Results   Component Value Date    CREATININE 1 24 07/16/2018       Lab Results   Component Value Date    PSA 3 8 05/14/2018       @RESULTRCNT(1H])@      Pertinent Imaging     Postvoid residual by bladder ultrasound 94 mL    Portions of the record may have been created with voice recognition software   Occasional wrong word or "sound a like" substitutions may have occurred due to the inherent limitations of voice recognition software   Read the chart carefully and recognize, using context, where substitutions have occurred

## 2018-07-23 NOTE — LETTER
July 23, 2018     Larry Anthony MD  [facility] [address]  [address] [facility]    Patient: Brooke Cabral   YOB: 1940   Date of Visit: 7/23/2018       Dear Dr Carbone Greek: Thank you for referring Brooke Cabral to me for evaluation  Below are my notes for this consultation  If you have questions, please do not hesitate to call me  I look forward to following your patient along with you  Sincerely,        Marian Isaac MD        CC: No Recipients  Marian Isaac MD  7/23/2018 10:02 AM  Sign at close encounter  Referring Physician: Larry Anthony MD  A copy of this note was sent to the referring physician  Diagnoses and all orders for this visit:    Benign prostatic hyperplasia with weak urinary stream  -     tamsulosin (FLOMAX) 0 4 mg; Take 1 capsule (0 4 mg total) by mouth daily with dinner  -     oxybutynin (DITROPAN-XL) 10 MG 24 hr tablet; Take 1 tablet (10 mg total) by mouth daily at bedtime            Assessment and plan: This is a very pleasant 16-year-old male with stage IV lung cancer, a longstanding history of BPH and bothersome nocturia  Patient's goal is to improve his nocturia with medical management  He is already on combination therapy and has been for some time    Plan:  1  Continue finasteride  2  Stop doxazosin and start tamsulosin  3  Start anticholinergic for his storage symptoms (unfortunately mirabegron was not covered by his insurance plan)    Instructions, potential side-effects and dosing reviewed  He will follow up in 6 months      Marian Isaac MD      Chief Complaint     Urinary frequency      History of Present Illness     Brooke Cabral is a 69 y o  referred for evaluation of urinary frequency  Patient has a longstanding history of BPH and has been managed with combination therapy with finasteride and doxazosin for approximately 7 or 8 years  He has severe nocturia waking up once every night  He has no history of hematuria or UTIs  Medical comorbidities include stage IV lung cancer with osseous metastases    Detailed Urologic History     - please refer to HPI    Review of Systems     Review of Systems   Constitutional: Negative for activity change and fatigue  HENT: Negative for congestion  Eyes: Negative for visual disturbance  Respiratory: Negative for shortness of breath and wheezing  Cardiovascular: Negative for chest pain and leg swelling  Gastrointestinal: Negative for abdominal pain  Endocrine: Positive for polyuria  Genitourinary: Positive for dysuria, frequency and urgency  Negative for flank pain and hematuria  Musculoskeletal: Negative for back pain  Allergic/Immunologic: Negative for immunocompromised state  Neurological: Negative for dizziness and numbness  Psychiatric/Behavioral: Negative for dysphoric mood  All other systems reviewed and are negative  AUA SYMPTOM SCORE      Most Recent Value   AUA SYMPTOM SCORE   How often have you had a sensation of not emptying your bladder completely after you finished urinating? 0   How often have you had to urinate again less than two hours after you finished urinating? 4   How often have you found you stopped and started again several times when you urinate? 2   How often have you found it difficult to postpone urination? 2   How often have you had a weak urinary stream?  2   How often have you had to push or strain to begin urination? 3   How many times did you most typically get up to urinate from the time you went to bed at night until the time you got up in the morning? 4   Quality of Life: If you were to spend the rest of your life with your urinary condition just the way it is now, how would you feel about that?  3   AUA SYMPTOM SCORE  17            Allergies     No Known Allergies    Physical Exam     Physical Exam   Constitutional: He is oriented to person, place, and time   He appears well-developed and well-nourished  No distress  HENT:   Head: Normocephalic and atraumatic  Eyes: EOM are normal    Neck: Normal range of motion  Cardiovascular:   Negative lower extremity edema   Pulmonary/Chest:   On oxygen, minimal dyspnea with exertion   Abdominal: Soft  Genitourinary:   Genitourinary Comments: Negative suprapubic tenderness   Musculoskeletal: Normal range of motion  Neurological: He is alert and oriented to person, place, and time  Skin: Skin is warm  Psychiatric: He has a normal mood and affect   His behavior is normal            Vital Signs  Vitals:    07/23/18 0939   BP: 122/78   Pulse: 90   Weight: 76 7 kg (169 lb 3 2 oz)   Height: 5' 7" (1 702 m)         Current Medications       Current Outpatient Prescriptions:     doxazosin (CARDURA) 4 mg tablet, TAKE 1 TABLET BY MOUTH DAILY AT BEDTIME, Disp: 30 tablet, Rfl: 0    finasteride (PROSCAR) 5 mg tablet, Take 1 tablet (5 mg total) by mouth daily, Disp: 30 tablet, Rfl: 0    folic acid (FOLVITE) 1 mg tablet, Take 1 tablet (1 mg total) by mouth daily, Disp: 90 tablet, Rfl: 0    levothyroxine 88 mcg tablet, Take 88 mcg by mouth daily, Disp: , Rfl:     multivitamin (THERAGRAN) TABS, Take 1 tablet by mouth daily, Disp: , Rfl:     omega-3-acid ethyl esters (LOVAZA) 1 g capsule, Take 1 g by mouth daily, Disp: , Rfl:     predniSONE 20 mg tablet, Take 4 tablets (80 mg total) by mouth daily, Disp: 120 tablet, Rfl: 0      Active Problems     Patient Active Problem List   Diagnosis    Other specified hypothyroidism    Essential hypertension    Benign prostatic hyperplasia with weak urinary stream    GAVE (gastric antral vascular ectasia)    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 (HCC)    Anemia    Constipation    Failure to thrive (0-17)    Dysphagia    Leukopenia    Total bilirubin, elevated    Esophagitis    Adenocarcinoma of left lung (HCC)    Acute respiratory failure with hypoxia     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     Pulmonary fibrosis     Lung disease, restrictive         Past Medical History     Past Medical History:   Diagnosis Date    Anemia     BPH (benign prostatic hyperplasia)     Cancer [facility] [address] [address]     Disease of thyroid gland     GAVE (gastric antral vascular ectasia)     Hypertension     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[facility] )     Spinal stenosis     Tobacco abuse          Surgical History     Past Surgical History:   Procedure Laterality Date    ESOPHAGOGASTRODUODENOSCOPY N/A 3/20/2018    Procedure: ESOPHAGOGASTRODUODENOSCOPY (EGD); Surgeon: Jami Bingham MD;  [facility]; Service: Gastroenterology         Family History     Family History   Problem Relation Age of Onset    Esophageal cancer Mother     Diabetes Father     No Known Problems Sister     No Known Problems Brother          Social History     Social History     History   Smoking Status    Former Smoker    Packs/day: 0 50    Years: 15 00    Types: Cigarettes    Quit date: 1/11/2018   Smokeless Tobacco    Never Used     Comment: Quit December 2017         Pertinent Lab Values     Lab Results   Component Value Date    CREATININE 1 24 07/16/2018       Lab Results   Component Value Date    PSA 3 8 05/14/2018       @RESULTRCNT(1H])@      Pertinent Imaging     Postvoid residual by bladder ultrasound 94 mL    Portions of the record may have been created with voice recognition software   Occasional wrong word or "sound a like" substitutions may have occurred due to the inherent limitations of voice recognition software   Read the chart carefully and recognize, using context, where substitutions have occurred

## 2018-07-23 NOTE — TELEPHONE ENCOUNTER
Wife calling saying Jose Carlos Jones was given a M6B canister for oxygen and it is making him dizzy because he has to "sniff" the oxygen like an inhaler  It is not continuous  She is saying he need the [place] oxygen and [facility] is asking for a script

## 2018-07-25 DIAGNOSIS — C34.32 CANCER OF LOWER LOBE OF LEFT LUNG (HCC): ICD-10-CM

## 2018-07-25 RX ORDER — FOLIC ACID 1 MG/1
1 TABLET ORAL DAILY
Qty: 90 TABLET | Refills: 0 | Status: SHIPPED | OUTPATIENT
Start: 2018-07-25 | End: 2018-10-29 | Stop reason: SDUPTHER

## 2018-07-26 ENCOUNTER — TELEPHONE (OUTPATIENT)
Dept: HEMATOLOGY ONCOLOGY | Facility: CLINIC | Age: 78
End: 2018-07-26

## 2018-07-26 DIAGNOSIS — F41.9 ANXIETY: Primary | ICD-10-CM

## 2018-07-26 RX ORDER — ALPRAZOLAM 0.25 MG/1
0.25 TABLET ORAL EVERY 6 HOURS PRN
Qty: 60 TABLET | Refills: 0 | OUTPATIENT
Start: 2018-07-26 | End: 2018-09-27

## 2018-07-26 NOTE — TELEPHONE ENCOUNTER
Patient's wife, Rachell Florentino, called to report that the patient has been verbally aggressive lately  The patient has not physically harmed her  Shirleyrob Holt has been on a long course of steroids to treat a lung infection  Rachell Florentino states that the patient took 20 mg prednisone today  Per Dr Vincent Kayser, patient is to continue taking 20 mg PO daily and then taper the dose to 10 mg PO daily on 8/2/18  Dr Vincent Kayser will be ordering the patient Xanax to help the patient's anxiety/mood  Pat agreed to call back if the patient's behavior worsens

## 2018-08-03 DIAGNOSIS — J98.4 LUNG DISEASE, RESTRICTIVE: Primary | ICD-10-CM

## 2018-08-03 NOTE — TELEPHONE ENCOUNTER
Patient's wife Dustin Mins called stating she would like a new RX for POC sent to [address] [place] [address] she no longer want to continue with [facility]  She can be reached at [phone]

## 2018-08-06 ENCOUNTER — TELEPHONE (OUTPATIENT)
Dept: HEMATOLOGY ONCOLOGY | Facility: CLINIC | Age: 78
End: 2018-08-06

## 2018-08-06 ENCOUNTER — APPOINTMENT (OUTPATIENT)
Dept: LAB | Facility: MEDICAL CENTER | Age: 78
End: 2018-08-06
Payer: COMMERCIAL

## 2018-08-06 DIAGNOSIS — E03.2 HYPOTHYROIDISM DUE TO MEDICATION: Primary | ICD-10-CM

## 2018-08-06 DIAGNOSIS — R06.00 DYSPNEA, UNSPECIFIED TYPE: ICD-10-CM

## 2018-08-06 DIAGNOSIS — E03.8 OTHER SPECIFIED HYPOTHYROIDISM: ICD-10-CM

## 2018-08-06 DIAGNOSIS — C34.92 MALIGNANT NEOPLASM OF LEFT LUNG, UNSPECIFIED PART OF LUNG (HCC): ICD-10-CM

## 2018-08-06 RX ORDER — CYANOCOBALAMIN 1000 UG/ML
1000 INJECTION INTRAMUSCULAR; SUBCUTANEOUS ONCE
Status: COMPLETED | OUTPATIENT
Start: 2018-08-07 | End: 2018-08-07

## 2018-08-06 RX ORDER — LEVOTHYROXINE SODIUM 112 UG/1
112 TABLET ORAL DAILY
Qty: 30 TABLET | Refills: 1 | Status: SHIPPED | OUTPATIENT
Start: 2018-08-06 | End: 2018-09-19 | Stop reason: DRUGHIGH

## 2018-08-06 RX ORDER — SODIUM CHLORIDE 9 MG/ML
20 INJECTION, SOLUTION INTRAVENOUS CONTINUOUS
Status: DISCONTINUED | OUTPATIENT
Start: 2018-08-07 | End: 2018-08-10 | Stop reason: HOSPADM

## 2018-08-06 NOTE — TELEPHONE ENCOUNTER
Patient TSH is elevated, 28  This is still a side effect from [place]  It appears he is on 88 mcg of levothyroxine at this time  Please call and confirm and ask who is prescribing  If he is taking, his dose needs to be increased  Please find out info and then will determine dose increase  This is not an urgent problem

## 2018-08-06 NOTE — TELEPHONE ENCOUNTER
Called and spoke with Vita Dutton, the patient's wife, in regard's to the patient's TSH of 28 8  Vita Dutton states that Urology has been managing his levothyroxine  Instructed Pat to call Urology to have the patient's levothyroxine increased  Pat verbally understood

## 2018-08-07 ENCOUNTER — HOSPITAL ENCOUNTER (OUTPATIENT)
Dept: INFUSION CENTER | Facility: CLINIC | Age: 78
Discharge: HOME | End: 2018-08-07
Payer: COMMERCIAL

## 2018-08-07 VITALS
DIASTOLIC BLOOD PRESSURE: 84 MMHG | HEART RATE: 77 BPM | SYSTOLIC BLOOD PRESSURE: 150 MMHG | TEMPERATURE: 97.1 F | HEIGHT: 67 IN | WEIGHT: 167.5 LBS | BODY MASS INDEX: 26.29 KG/M2 | RESPIRATION RATE: 18 BRPM

## 2018-08-07 PROCEDURE — 96367 TX/PROPH/DG ADDL SEQ IV INF: CPT

## 2018-08-07 PROCEDURE — 96372 THER/PROPH/DIAG INJ SC/IM: CPT

## 2018-08-07 PROCEDURE — 96409 CHEMO IV PUSH SNGL DRUG: CPT

## 2018-08-07 RX ADMIN — CYANOCOBALAMIN 1000 MCG: 1000 INJECTION, SOLUTION INTRAMUSCULAR at 10:31

## 2018-08-07 RX ADMIN — SODIUM CHLORIDE 20 ML/HR: 0.9 INJECTION, SOLUTION INTRAVENOUS at 10:06

## 2018-08-07 RX ADMIN — SODIUM CHLORIDE 900 MG: 9 INJECTION, SOLUTION INTRAVENOUS at 11:01

## 2018-08-07 RX ADMIN — ONDANSETRON 8 MG: 2 INJECTION INTRAMUSCULAR; INTRAVENOUS at 10:28

## 2018-08-07 RX ADMIN — Medication 300 UNITS: at 11:15

## 2018-08-07 NOTE — PLAN OF CARE
Problem: Potential for Falls  Goal: Patient will remain free of falls  INTERVENTIONS:  - Assess patient frequently for physical needs  -  Identify cognitive and physical deficits and behaviors that affect risk of falls    -  [place] fall precautions as indicated by assessment   - Educate patient/family on patient safety including physical limitations  - Instruct patient to call for assistance with activity based on assessment  - Modify environment to reduce risk of injury  - Consider OT/PT consult to assist with strengthening/mobility   Outcome: Progressing

## 2018-08-07 NOTE — PROGRESS NOTES
LATE NOTE:  To clinic for maintenance alimta for NSCLC  He has recent history of IRAE pneumonitis for which he continues on a steroid taper  He feels that his lung difficulties are "about the same" as they have been  He denies any ill effect from the steroids  Therapy given as per orders without incident  Pt confirms his daily folic acid and Z54 injection given  AVS provided

## 2018-08-09 ENCOUNTER — TELEPHONE (OUTPATIENT)
Dept: HEMATOLOGY ONCOLOGY | Facility: CLINIC | Age: 78
End: 2018-08-09

## 2018-08-09 NOTE — TELEPHONE ENCOUNTER
Called and spoke with the patient's wife in regards to the patient's breathing and prednisone  Patient's wife reports that the patient's breathing has been improving with the prednisone  Per Dr Jose Andino, patient can decrease his prednisone dose to 5 mg daily  Patient's wife verbally understood

## 2018-08-09 NOTE — TELEPHONE ENCOUNTER
Patient's wife called it has now been 1 week that he has been taking Prednisone 10 mg  Does he need to change dose

## 2018-08-14 ENCOUNTER — DOCUMENTATION (OUTPATIENT)
Dept: PULMONOLOGY | Facility: CLINIC | Age: 78
End: 2018-08-14

## 2018-08-14 ENCOUNTER — OFFICE VISIT (OUTPATIENT)
Dept: PULMONOLOGY | Facility: CLINIC | Age: 78
End: 2018-08-14
Payer: COMMERCIAL

## 2018-08-14 VITALS
WEIGHT: 171 LBS | HEART RATE: 91 BPM | SYSTOLIC BLOOD PRESSURE: 128 MMHG | DIASTOLIC BLOOD PRESSURE: 84 MMHG | TEMPERATURE: 96 F | OXYGEN SATURATION: 96 % | BODY MASS INDEX: 26.84 KG/M2 | HEIGHT: 67 IN

## 2018-08-14 DIAGNOSIS — J84.10 PULMONARY FIBROSIS (HCC): ICD-10-CM

## 2018-08-14 DIAGNOSIS — C34.92 PRIMARY ADENOCARCINOMA OF LEFT LUNG (HCC): ICD-10-CM

## 2018-08-14 DIAGNOSIS — C34.92 PRIMARY LUNG ADENOCARCINOMA, LEFT (HCC): ICD-10-CM

## 2018-08-14 DIAGNOSIS — J96.11 CHRONIC RESPIRATORY FAILURE WITH HYPOXIA (HCC): Primary | ICD-10-CM

## 2018-08-14 PROCEDURE — 99214 OFFICE O/P EST MOD 30 MIN: CPT | Performed by: PHYSICIAN ASSISTANT

## 2018-08-14 PROCEDURE — 94618 PULMONARY STRESS TESTING: CPT | Performed by: PHYSICIAN ASSISTANT

## 2018-08-14 NOTE — ASSESSMENT & PLAN NOTE
· Breathing appears to be slowly improving  Oxygenation is also improving from 1 month ago  · He is scheduled for a follow-up CT of his chest next week on August 20th  We will call with results when they are available to us  · He will continue on 5 mg of prednisone daily until he is seen by Oncology next week

## 2018-08-14 NOTE — PROGRESS NOTES
Pulmonary Follow Up Note   Marcia Sims 69 y o  male MRN: [id number]  8/14/2018      Assessment:    Chronic respiratory failure with hypoxia ([facility] [address] )  · Prior to today patient was using 2-3 L of oxygen at rest and 5-6 L with ambulation  · Repeat 6 minutes walk was done in the office today  Room air resting saturations were 95% with a heart rate of 88  He was able to ambulate for 84 meters and he desaturated to 87% on room air  He was placed on 2 L of supplemental oxygen and ambulated for 2 laps and saturations dropped to 88%  He was increased at 3 L of supplemental oxygen and was able to ambulate for 294 meters in saturations maintained between 92 and 93% with a heart rate of 127 at its highest   This was reviewed with the patient and his family today  · He will continue to use supplemental oxygen with all activities at 3 L  He is able to come off oxygen at rest   A new prescription was ordered for him and sent to [id number] which is [employer] [place]   · Breathing appears to be slowly improving  Oxygenation is also improving from 1 month ago  · He is scheduled for a follow-up CT of his chest next week on August 20th  We will call with results when they are available to us  · He will continue on 5 mg of prednisone daily until he is seen by Oncology next week  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   · Follow up with Oncology as scheduled next week  · Currently is on chemotherapy once every 3 weeks        Plan:    Diagnoses and all orders for this visit:    Chronic respiratory failure with hypoxia (HCC)  -     POCT 6 minute walk  -     Home Oxygen with [employer]    Pulmonary fibrosis (HCC)  -     POCT 6 minute walk  -     Home Oxygen with [employer]    Primary lung adenocarcinoma, left (HCC)  -     POCT 6 minute walk  -     Home Oxygen with [employer]    Primary adenocarcinoma of left lung ([facility] [address] )      ~Family updated at length today as they were present for appointment  ~He will follow-up in 6-8 weeks or sooner if problems arise  At that time we will re-evaluate oxygen needs  All her questions were answered  He was instructed to call the office with any questions he may have or if he develops any changes in his breathing  History of Present Illness   HPI:  Omid Chu is a 69 y o  male who presents to the office this morning with his family for follow-up of chronic hypoxic respiratory failure and pulmonary fibrosis  He was last seen in the office on July 3rd as a hospital follow-up  He was slowly improving at that time and 6 minutes walk showed that he needed to be on 5 L of oxygen with all activities  He does have metastatic adenocarcinoma of his left lung and is currently undergoing chemotherapy by Dr Laura Belle  This is every 3 weeks  He has been titrated down on his prednisone to 5 mg daily and was told to remain on this dose until he is seen by Oncology as an outpatient, which is scheduled for next week  He does complain of poor appetite  He has been using his oxygen as prescribed, 2-3 L at rest and 5-6 L with ambulation  He does check his oxygen levels at home and they have been good  He does note some occasional tachycardia but denies chest pain or palpitations  He was recently diagnosed with hypothyroidism and TSH is elevated at 28  He feels his breathing is better than when he was in the hospital, is not yet back to its baseline  He is not on any medications from a pulmonary standpoint except prednisone  He denies cough, sputum production, hemoptysis or bronchospasm  He denies resting shortness of breath, chest pain, pleurisy or lower extremity edema  He is scheduled for repeat CT scan next week  They are requesting a repeat 6 minutes walk today as they need a new oxygen prescription as they are switching supply companies  Review of Systems   Constitutional: Positive for appetite change   Negative for activity change, chills, diaphoresis, fatigue, fever and unexpected weight change  HENT: Negative  Eyes: Negative  Respiratory: Positive for shortness of breath  Negative for apnea, cough, choking, chest tightness, wheezing and stridor  Cardiovascular: Negative  Gastrointestinal: Negative  Endocrine: Negative  Genitourinary: Negative  Musculoskeletal: Negative  Skin: Negative  Allergic/Immunologic: Positive for immunocompromised state  Negative for environmental allergies and food allergies  Neurological: Negative  Psychiatric/Behavioral: Negative  Historical Information   Past Medical History:   Diagnosis Date    Anemia     BPH (benign prostatic hyperplasia)     [facility]     Disease of thyroid gland     GAVE (gastric antral vascular ectasia)     Hypertension     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[facility] )     Spinal stenosis     Tobacco abuse      Past Surgical History:   Procedure Laterality Date    ESOPHAGOGASTRODUODENOSCOPY N/A 3/20/2018    Procedure: ESOPHAGOGASTRODUODENOSCOPY (EGD); Surgeon: Ash Santos MD;  Location: [facility];   Service: Gastroenterology     Family History   Problem Relation Age of Onset    Esophageal cancer Mother     Diabetes Father     No Known Problems Sister     No Known Problems Brother        History   Smoking Status    Former Smoker    Packs/day: 0 50    Years: 15 00    Types: Cigarettes    Start date: 1968    Quit date: 1/11/2018   Smokeless Tobacco    Never Used     Comment: Quit December 2017         Meds/Allergies     Current Outpatient Prescriptions:     ALPRAZolam (XANAX) 0 25 mg tablet, Take 1 tablet (0 25 mg total) by mouth every 6 (six) hours as needed for anxiety, Disp: 60 tablet, Rfl: 0    finasteride (PROSCAR) 5 mg tablet, Take 1 tablet (5 mg total) by mouth daily, Disp: 30 tablet, Rfl: 0    folic acid (FOLVITE) 1 mg tablet, TAKE 1 TABLET (1 MG TOTAL) BY MOUTH DAILY, Disp: 90 tablet, Rfl: 0    levothyroxine 112 mcg tablet, Take 1 tablet (112 mcg total) by mouth daily, Disp: 30 tablet, Rfl: 1    multivitamin (THERAGRAN) TABS, Take 1 tablet by mouth daily, Disp: , Rfl:     omega-3-acid ethyl esters (LOVAZA) 1 g capsule, Take 1 g by mouth daily, Disp: , Rfl:     predniSONE 20 mg tablet, Take 4 tablets (80 mg total) by mouth daily (Patient taking differently: Take 5 mg by mouth daily  ), Disp: 120 tablet, Rfl: 0    tamsulosin (FLOMAX) 0 4 mg, Take 1 capsule (0 4 mg total) by mouth daily with dinner, Disp: 30 capsule, Rfl: 6    oxybutynin (DITROPAN-XL) 10 MG 24 hr tablet, Take 1 tablet (10 mg total) by mouth daily at bedtime (Patient not taking: Reported on 8/14/2018 ), Disp: 30 tablet, Rfl: 6  No Known Allergies    Vitals: Blood pressure 128/84, pulse 91, temperature (!) 96 °F (35 6 °C), temperature source Tympanic, height 5' 7" (1 702 m), weight 77 6 kg (171 lb), SpO2 96 %  Body mass index is 26 78 kg/m²  Oxygen Therapy  SpO2: 96 %  Oxygen Therapy: Supplemental oxygen  O2 Delivery Method: Nasal cannula  O2 Flow Rate (L/min): 6 L/min     6 minutes walk was done in the office today on room air  Room air resting saturations 95% with a heart rate of 88  He ambulated for 84 meters and dropped to 87% on room air  He was placed on 2 L supplemental oxygen ambulated for 2 laps and dropped his saturation to 88% with a heart rate of 116  He was then placed on 3 L and ambulated for a total of 294 meters with lowest saturation at 92%  Ice heart rate was 127  Physical Exam  Physical Exam   Constitutional: He is oriented to person, place, and time  He appears well-developed and well-nourished  No distress  HENT:   Head: Normocephalic and atraumatic  Wearing O2 via nasal cannula   Eyes: Conjunctivae and EOM are normal  Pupils are equal, round, and reactive to light  No scleral icterus  Neck: Normal range of motion  Neck supple  No JVD present  No tracheal deviation present  Cardiovascular: Regular rhythm and normal heart sounds  Exam reveals no gallop and no friction rub  No murmur heard  Mildly tachycardic   Pulmonary/Chest: Effort normal  No stridor  No respiratory distress  He has no wheezes  He has rales  He exhibits no tenderness  Bibasilar rales right greater than left   Abdominal: Soft  Bowel sounds are normal    Musculoskeletal: Normal range of motion  He exhibits no edema  Neurological: He is alert and oriented to person, place, and time  Skin: Skin is warm and dry  No rash noted  He is not diaphoretic  No pallor  Psychiatric: He has a normal mood and affect  His behavior is normal  Judgment and thought content normal    Vitals reviewed  Labs: I have personally reviewed pertinent lab results  , ABG: No results found for: PHART, PPP8UNS, PO2ART, CGA6ZCF, W4IMMPGV, BEART, SOURCE, BNP: No results found for: BNP, CBC: No results found for: WBC, HGB, HCT, MCV, PLT, ADJUSTEDWBC, MCH, MCHC, RDW, MPV, NRBC, CMP: No results found for: NA, K, CL, CO2, ANIONGAP, BUN, CREATININE, GLUCOSE, CALCIUM, AST, ALT, ALKPHOS, PROT, ALBUMIN, BILITOT, EGFR, PT/INR: No results found for: PT, INR, Troponin: No results found for: TROPONINI     Lab Results   Component Value Date    WBC 4 53 08/06/2018    HGB 10 4 (L) 08/06/2018    HCT 33 7 (L) 08/06/2018    MCV 99 (H) 08/06/2018     08/06/2018     Lab Results   Component Value Date    GLUCOSE 90 07/16/2018    CALCIUM 9 3 08/06/2018     08/06/2018    K 4 2 08/06/2018    CO2 31 08/06/2018     08/06/2018    BUN 22 08/06/2018    CREATININE 1 22 08/06/2018     No results found for: IGE  Lab Results   Component Value Date    ALT 43 08/06/2018    AST 27 08/06/2018    ALKPHOS 79 08/06/2018    BILITOT 0 53 08/06/2018     TSH 28 8    Imaging and other studies: I have personally reviewed pertinent films in PACS     Repeat CT of the chest pending and is scheduled for August 20, 2018    CTA Chest 6/19/18  No PE  Extensive pulmonary fibrosis noted with right lower lobe consolidation  There is also increased attenuation a left lower lobe

## 2018-08-14 NOTE — ASSESSMENT & PLAN NOTE
· Follow up with Oncology as scheduled next week  · Currently is on chemotherapy once every 3 weeks

## 2018-08-14 NOTE — PATIENT INSTRUCTIONS
1  Continue supplemental oxygen at 3 L with all activities  It is okay to remain off oxygen at rest  2  Continue prednisone 5 mg daily and to seen by Oncology next week  3   Obtain CT of the chest as scheduled

## 2018-08-14 NOTE — ASSESSMENT & PLAN NOTE
· Prior to today patient was using 2-3 L of oxygen at rest and 5-6 L with ambulation  · Repeat 6 minutes walk was done in the office today  Room air resting saturations were 95% with a heart rate of 88  He was able to ambulate for 84 meters and he desaturated to 87% on room air  He was placed on 2 L of supplemental oxygen and ambulated for 2 laps and saturations dropped to 88%  He was increased at 3 L of supplemental oxygen and was able to ambulate for 294 meters in saturations maintained between 92 and 93% with a heart rate of 127 at its highest   This was reviewed with the patient and his family today  · He will continue to use supplemental oxygen with all activities at 3 L  He is able to come off oxygen at rest   A new prescription was ordered for him and [facility]

## 2018-08-20 ENCOUNTER — HOSPITAL ENCOUNTER (OUTPATIENT)
Dept: CT IMAGING | Facility: HOSPITAL | Age: 78
Discharge: HOME | End: 2018-08-20
Payer: COMMERCIAL

## 2018-08-20 DIAGNOSIS — C34.92 ADENOCARCINOMA OF LEFT LUNG (HCC): ICD-10-CM

## 2018-08-20 DIAGNOSIS — J84.10 PULMONARY FIBROSIS (HCC): ICD-10-CM

## 2018-08-20 DIAGNOSIS — R09.02 HYPOXIA: Primary | ICD-10-CM

## 2018-08-20 PROCEDURE — 71250 CT THORAX DX C-: CPT

## 2018-08-22 ENCOUNTER — DOCUMENTATION (OUTPATIENT)
Dept: PULMONOLOGY | Facility: CLINIC | Age: 78
End: 2018-08-22

## 2018-08-27 ENCOUNTER — TELEPHONE (OUTPATIENT)
Dept: HEMATOLOGY ONCOLOGY | Facility: CLINIC | Age: 78
End: 2018-08-27

## 2018-08-27 ENCOUNTER — APPOINTMENT (OUTPATIENT)
Dept: LAB | Facility: MEDICAL CENTER | Age: 78
End: 2018-08-27
Payer: COMMERCIAL

## 2018-08-27 ENCOUNTER — TRANSCRIBE ORDERS (OUTPATIENT)
Dept: ADMINISTRATIVE | Facility: HOSPITAL | Age: 78
End: 2018-08-27

## 2018-08-27 DIAGNOSIS — C34.92 ADENOCARCINOMA OF LEFT LUNG (HCC): Primary | ICD-10-CM

## 2018-08-27 LAB
ALBUMIN SERPL BCP-MCNC: 3.3 G/DL (ref 3.5–5)
ALP SERPL-CCNC: 63 U/L (ref 46–116)
ALT SERPL W P-5'-P-CCNC: 26 U/L (ref 12–78)
ANION GAP SERPL CALCULATED.3IONS-SCNC: 6 MMOL/L (ref 4–13)
AST SERPL W P-5'-P-CCNC: 23 U/L (ref 5–45)
BASOPHILS # BLD AUTO: 0.02 THOUSANDS/ΜL (ref 0–0.1)
BASOPHILS NFR BLD AUTO: 0 % (ref 0–1)
BILIRUB SERPL-MCNC: 0.54 MG/DL (ref 0.2–1)
BUN SERPL-MCNC: 19 MG/DL (ref 5–25)
CALCIUM SERPL-MCNC: 9.4 MG/DL (ref 8.3–10.1)
CHLORIDE SERPL-SCNC: 104 MMOL/L (ref 100–108)
CO2 SERPL-SCNC: 28 MMOL/L (ref 21–32)
CREAT SERPL-MCNC: 1.11 MG/DL (ref 0.6–1.3)
EOSINOPHIL # BLD AUTO: 0.09 THOUSAND/ΜL (ref 0–0.61)
EOSINOPHIL NFR BLD AUTO: 2 % (ref 0–6)
ERYTHROCYTE [DISTWIDTH] IN BLOOD BY AUTOMATED COUNT: 21.8 % (ref 11.6–15.1)
GFR SERPL CREATININE-BSD FRML MDRD: 64 ML/MIN/1.73SQ M
GLUCOSE P FAST SERPL-MCNC: 88 MG/DL (ref 65–99)
HCT VFR BLD AUTO: 33.6 % (ref 36.5–49.3)
HGB BLD-MCNC: 10.6 G/DL (ref 12–17)
IMM GRANULOCYTES # BLD AUTO: 0.1 THOUSAND/UL (ref 0–0.2)
IMM GRANULOCYTES NFR BLD AUTO: 2 % (ref 0–2)
LYMPHOCYTES # BLD AUTO: 0.37 THOUSANDS/ΜL (ref 0.6–4.47)
LYMPHOCYTES NFR BLD AUTO: 6 % (ref 14–44)
MCH RBC QN AUTO: 31.8 PG (ref 26.8–34.3)
MCHC RBC AUTO-ENTMCNC: 31.5 G/DL (ref 31.4–37.4)
MCV RBC AUTO: 101 FL (ref 82–98)
MONOCYTES # BLD AUTO: 0.92 THOUSAND/ΜL (ref 0.17–1.22)
MONOCYTES NFR BLD AUTO: 15 % (ref 4–12)
NEUTROPHILS # BLD AUTO: 4.6 THOUSANDS/ΜL (ref 1.85–7.62)
NEUTS SEG NFR BLD AUTO: 75 % (ref 43–75)
NRBC BLD AUTO-RTO: 0 /100 WBCS
PLATELET # BLD AUTO: 241 THOUSANDS/UL (ref 149–390)
PMV BLD AUTO: 10.1 FL (ref 8.9–12.7)
POTASSIUM SERPL-SCNC: 4.3 MMOL/L (ref 3.5–5.3)
PROT SERPL-MCNC: 6.8 G/DL (ref 6.4–8.2)
RBC # BLD AUTO: 3.33 MILLION/UL (ref 3.88–5.62)
SODIUM SERPL-SCNC: 138 MMOL/L (ref 136–145)
TSH SERPL DL<=0.05 MIU/L-ACNC: 7.65 UIU/ML
WBC # BLD AUTO: 6.1 THOUSAND/UL (ref 4.31–10.16)

## 2018-08-27 PROCEDURE — 84443 ASSAY THYROID STIM HORMONE: CPT

## 2018-08-27 PROCEDURE — 85025 COMPLETE CBC W/AUTO DIFF WBC: CPT

## 2018-08-27 PROCEDURE — 80053 COMPREHEN METABOLIC PANEL: CPT

## 2018-08-27 PROCEDURE — 36415 COLL VENOUS BLD VENIPUNCTURE: CPT

## 2018-08-27 RX ORDER — SODIUM CHLORIDE 9 MG/ML
20 INJECTION, SOLUTION INTRAVENOUS CONTINUOUS
Status: DISCONTINUED | OUTPATIENT
Start: 2018-08-28 | End: 2018-08-31 | Stop reason: HOSPADM

## 2018-08-27 RX ORDER — SODIUM CHLORIDE 9 MG/ML
20 INJECTION, SOLUTION INTRAVENOUS CONTINUOUS
Status: DISCONTINUED | OUTPATIENT
Start: 2018-08-27 | End: 2018-08-27

## 2018-08-27 RX ORDER — CYANOCOBALAMIN 1000 UG/ML
1000 INJECTION INTRAMUSCULAR; SUBCUTANEOUS ONCE
Status: COMPLETED | OUTPATIENT
Start: 2018-08-28 | End: 2018-08-28

## 2018-08-27 NOTE — TELEPHONE ENCOUNTER
Pt arrived for lab work  No orders in Epic  Reviewed orders  Needs CBC/D, CMP, and TSH on d1 of each cycle  Entered recurring order

## 2018-08-28 ENCOUNTER — HOSPITAL ENCOUNTER (OUTPATIENT)
Dept: INFUSION CENTER | Facility: CLINIC | Age: 78
Discharge: HOME | End: 2018-08-28
Payer: COMMERCIAL

## 2018-08-28 ENCOUNTER — TELEPHONE (OUTPATIENT)
Dept: PULMONOLOGY | Facility: CLINIC | Age: 78
End: 2018-08-28

## 2018-08-28 ENCOUNTER — OFFICE VISIT (OUTPATIENT)
Dept: HEMATOLOGY ONCOLOGY | Facility: CLINIC | Age: 78
End: 2018-08-28
Payer: COMMERCIAL

## 2018-08-28 VITALS
OXYGEN SATURATION: 97 % | HEIGHT: 67 IN | WEIGHT: 172 LBS | SYSTOLIC BLOOD PRESSURE: 146 MMHG | HEART RATE: 89 BPM | RESPIRATION RATE: 20 BRPM | DIASTOLIC BLOOD PRESSURE: 100 MMHG | TEMPERATURE: 97.8 F | BODY MASS INDEX: 27 KG/M2

## 2018-08-28 VITALS
RESPIRATION RATE: 18 BRPM | DIASTOLIC BLOOD PRESSURE: 78 MMHG | HEIGHT: 67 IN | SYSTOLIC BLOOD PRESSURE: 122 MMHG | BODY MASS INDEX: 27.13 KG/M2 | TEMPERATURE: 98 F | WEIGHT: 172.84 LBS | HEART RATE: 83 BPM | OXYGEN SATURATION: 98 %

## 2018-08-28 DIAGNOSIS — K11.7 XEROSTOMIA: ICD-10-CM

## 2018-08-28 DIAGNOSIS — J84.9 INTERSTITIAL LUNG DISEASE (HCC): ICD-10-CM

## 2018-08-28 DIAGNOSIS — C34.92 PRIMARY ADENOCARCINOMA OF LEFT LUNG (HCC): Primary | ICD-10-CM

## 2018-08-28 DIAGNOSIS — J84.10 PULMONARY FIBROSIS (HCC): Primary | ICD-10-CM

## 2018-08-28 DIAGNOSIS — C79.51 METASTATIC ADENOCARCINOMA TO BONE (HCC): Chronic | ICD-10-CM

## 2018-08-28 DIAGNOSIS — E03.8 OTHER SPECIFIED HYPOTHYROIDISM: ICD-10-CM

## 2018-08-28 PROCEDURE — 96409 CHEMO IV PUSH SNGL DRUG: CPT

## 2018-08-28 PROCEDURE — 96372 THER/PROPH/DIAG INJ SC/IM: CPT

## 2018-08-28 PROCEDURE — 99215 OFFICE O/P EST HI 40 MIN: CPT | Performed by: INTERNAL MEDICINE

## 2018-08-28 PROCEDURE — 96375 TX/PRO/DX INJ NEW DRUG ADDON: CPT

## 2018-08-28 RX ADMIN — SODIUM CHLORIDE 900 MG: 9 INJECTION, SOLUTION INTRAVENOUS at 10:53

## 2018-08-28 RX ADMIN — SODIUM CHLORIDE 8 MG: 9 INJECTION, SOLUTION INTRAVENOUS at 10:36

## 2018-08-28 RX ADMIN — DEXAMETHASONE SODIUM PHOSPHATE 10 MG: 10 INJECTION, SOLUTION INTRAMUSCULAR; INTRAVENOUS at 10:21

## 2018-08-28 RX ADMIN — SODIUM CHLORIDE 20 ML/HR: 0.9 INJECTION, SOLUTION INTRAVENOUS at 10:02

## 2018-08-28 RX ADMIN — CYANOCOBALAMIN 1000 MCG: 1000 INJECTION, SOLUTION INTRAMUSCULAR at 11:00

## 2018-08-28 RX ADMIN — Medication 300 UNITS: at 11:17

## 2018-08-28 NOTE — LETTER
August 28, 2018     Michela Mane MD  [address] [facility]  [address]  [address] [facility]    Patient: Marcia Sims   YOB: 1940   Date of Visit: 8/28/2018       Dear Dr Diamond Belle: Thank you for referring Marcia Sims to me for evaluation  Below are my notes for this consultation  If you have questions, please do not hesitate to call me  I look forward to following your patient along with you  Sincerely,        Johnny Begum MD        CC: No Recipients  Johnny Begum MD  8/28/2018  1:35 PM  Sign at close encounter    HPI: Patient is here with his wife  He is being treated for stage IV adenocarcinoma of the lung and presently he is on maintenance Alimta with H09, folic acid and steroid  He also has history of interstitial lung disease  He has nasal oxygen but he does not use that all the time  Prednisone dose is down to 5 mg a day  He gets extra dose of prednisone and also intravenous Decadron  with Alimta  Recently was found to have hypo thyroidism and TSH was 28  He is taking Synthroid 112 mcg daily  TSH has come down to 7 6  He complains of dryness of the mouth  He has shortness of breath with minimal exertion  He has generalized weakness and tiredness  He has arthritic symptoms  Follow-up visit for 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 diagnosed in January 2018   Status post 4 cycles of carboplatin plus Alimta plus Keytruda and 5th cycle without carboplatin   Patient responded to systemic chemo/immunotherapy  He was on  Alimta plus Keytruda maintenance systemic therapy       Recent  admission because of pneumonitis secondary to [facility] as above    Lymph nodes not enlarged in the mediastinum anymore   Bone lesions stable     Patient had received radiation to thoracic spine for painful bony lesion    He has been getting  Xgeva once every 3 months   PD L1 level was 0   She  had hematological toxicities and received   blood transfusions  He has been using oxygen with exertion  The diagnosis was made by the biopsy  from the left iliac crest  that identified metastatic adenocarcinoma               Current Outpatient Prescriptions:     ALPRAZolam (XANAX) 0 25 mg tablet, Take 1 tablet (0 25 mg total) by mouth every 6 (six) hours as needed for anxiety, Disp: 60 tablet, Rfl: 0    finasteride (PROSCAR) 5 mg tablet, Take 1 tablet (5 mg total) by mouth daily, Disp: 30 tablet, Rfl: 0    folic acid (FOLVITE) 1 mg tablet, TAKE 1 TABLET (1 MG TOTAL) BY MOUTH DAILY, Disp: 90 tablet, Rfl: 0    levothyroxine 112 mcg tablet, Take 1 tablet (112 mcg total) by mouth daily, Disp: 30 tablet, Rfl: 1    multivitamin (THERAGRAN) TABS, Take 1 tablet by mouth daily, Disp: , Rfl:     omega-3-acid ethyl esters (LOVAZA) 1 g capsule, Take 1 g by mouth daily, Disp: , Rfl:     oxybutynin (DITROPAN-XL) 10 MG 24 hr tablet, Take 1 tablet (10 mg total) by mouth daily at bedtime, Disp: 30 tablet, Rfl: 6    predniSONE 20 mg tablet, Take 4 tablets (80 mg total) by mouth daily (Patient taking differently: Take 5 mg by mouth daily  ), Disp: 120 tablet, Rfl: 0    tamsulosin (FLOMAX) 0 4 mg, Take 1 capsule (0 4 mg total) by mouth daily with dinner, Disp: 30 capsule, Rfl: 6  No current facility-administered medications for this visit       Facility-Administered Medications Ordered in Other Visits:     alteplase (CATHFLO) injection 2 mg, 2 mg, Intracatheter, PRN, Alicia Mariano MD    cyanocobalamin injection 1,000 mcg, 1,000 mcg, Intramuscular, Once, Alicia Mariano MD    heparin lock flush 100 units/mL injection 300 Units, 300 Units, Intracatheter, PRN, Alicia Mariano MD    ondansetron (ZOFRAN) 8 mg in sodium chloride 0 9 % 50 mL IVPB, 8 mg, Intravenous, Once, Alicia Mariano MD    PEMEtrexed (ALIMTA) 900 mg in sodium chloride 0 9 % 100 mL chemo infusion, 900 mg, Intravenous, Once, Alicia Mariano MD    sodium chloride 0 9 % infusion, 20 mL/hr, Intravenous, Continuous, Derk Dakin, MD    No Known Allergies    Oncology History    Follow-up visit for 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 diagnosed in January 2018  Status post 4 cycles of carboplatin plus Alimta plus Keytruda and 5th cycle without carboplatin  Cycle 6 will be tomorrow  He has received palliative radiation to thoracic spine  He is responding  Lung mass is smaller  Lymph nodes are not enlarged in the mediastinum anymore  Bone lesion is stable  Also he gets Xgeva once every 3 months  PD L1 level was 0  She gets hematological toxicities and has required  blood transfusions  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 [facility])     Initial Diagnosis     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 [facility])        Chemotherapy       February 2018 - carboplatin plus Alimta plus Keytruda x 4 cycles and after that Alimta plus Barby Barrera is continuing  Radiation       March or April 2018 - Radiation to thoracic spine  Cancer of lower lobe of left lung ([address] ) (Resolved)    1/15/2018 Initial Diagnosis     Cancer of lower lobe of left lung ([address] )    A  Bone, Left iliac crest, biopsy:  - Metastatic adenocarcinoma  1/31/2018 -  Chemotherapy     Carboplatin AUC 5, Alimta 500 mg/m2, Keytruda 200 mg IV every 3 weeks  Cycle #1 1/31/2018  Xgeva 120 mg subcu every 6 weeks           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     6/18/2018 Initial Diagnosis     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         Chemotherapy       February 2018:  Carboplatin plus Alimta plus Keytruda for 4-5 cycles and after that Alimta plus Keytruda  until June 2018  Barby Barrera stopped because of pneumonitis, treated with prednisone and dose is being tapered  Patient will be getting maintenance Alimta alone once every 3 weeks starting in July 2018              ROS:  08/28/18 Reviewed 13 systems:  Presently no headaches, seizures, dizziness, diplopia, dysphagia, hoarseness, chest pain, palpitations,  cough, hemoptysis, abdominal pain, nausea, vomiting, change in bowel habits, melena, hematuria, fever, chills, bleeding, bone pains, skin rash, weight loss,  weakness, numbness,  claudication and gait problem  No frequent infections  Not unusually sensitive to heat or cold  No swelling of the ankles  No swollen glands  Patient is anxious  Other symptoms are in HPI        /100 (BP Location: Left arm, Cuff Size: Standard)   Pulse 89   Temp 97 8 °F (36 6 °C) (Tympanic)   Resp 20   Ht 5' 7" (1 702 m)   Wt 78 kg (172 lb)   SpO2 97%   BMI 26 94 kg/m²      Physical Exam:  Alert, oriented, not in distress, no icterus, no oral thrush, no palpable neck mass,  bilateral fine crepitations in the lung fields, regular heart rate, abdomen  soft and non tender, no palpable abdominal mass, no ascites, no edema of ankles, no calf tenderness, no focal neurological deficit, no skin rash, no palpable lymphadenopathy, good arterial pulses, no clubbing  Patient is anxious  Performance status 2  IMAGING:  IMPRESSION:  1  Dominant left lower lobe nodule now measures 9 x 9 mm  There are additional stable subcentimeter nodules present  2   Decrease prominence of but persistent right lower lobe airspace opacity which may represent atelectasis versus pneumonia  Recommend continued follow-up to ensure resolution of this finding  Trace right pleural effusion  3  Persistent bilateral subpleural reticular opacities and bibasilar bronchiectasis compatible with interstitial lung disease  This is similar in appearance to most recent prior exam   4   Enlargement of the ascending aorta measuring 4 3 cm at the level the right pulmonary artery    5   Stable sclerotic and lytic vertebral metastases      Workstation performed: [id number]      Imaging     CT chest high resolution (Order #[id number]) on 8/20/2018 - Imaging Information Result History     CT chest high resolution (Order #[id number]) on 8/22/2018 - Order Result History Report         LABS:  Results for orders placed or performed in visit on 08/27/18   CBC and differential   Result Value Ref Range    WBC 6 10 4 31 - 10 16 Thousand/uL    RBC 3 33 (L) 3 88 - 5 62 Million/uL    Hemoglobin 10 6 (L) 12 0 - 17 0 g/dL    Hematocrit 33 6 (L) 36 5 - 49 3 %     (H) 82 - 98 fL    MCH 31 8 26 8 - 34 3 pg    MCHC 31 5 31 4 - 37 4 g/dL    RDW 21 8 (H) 11 6 - 15 1 %    MPV 10 1 8 9 - 12 7 fL    Platelets 727 163 - 030 Thousands/uL    nRBC 0 /100 WBCs    Neutrophils Relative 75 43 - 75 %    Immat GRANS % 2 0 - 2 %    Lymphocytes Relative 6 (L) 14 - 44 %    Monocytes Relative 15 (H) 4 - 12 %    Eosinophils Relative 2 0 - 6 %    Basophils Relative 0 0 - 1 %    Neutrophils Absolute 4 60 1 85 - 7 62 Thousands/µL    Immature Grans Absolute 0 10 0 00 - 0 20 Thousand/uL    Lymphocytes Absolute 0 37 (L) 0 60 - 4 47 Thousands/µL    Monocytes Absolute 0 92 0 17 - 1 22 Thousand/µL    Eosinophils Absolute 0 09 0 00 - 0 61 Thousand/µL    Basophils Absolute 0 02 0 00 - 0 10 Thousands/µL   Comprehensive metabolic panel   Result Value Ref Range    Sodium 138 136 - 145 mmol/L    Potassium 4 3 3 5 - 5 3 mmol/L    Chloride 104 100 - 108 mmol/L    CO2 28 21 - 32 mmol/L    ANION GAP 6 4 - 13 mmol/L    BUN 19 5 - 25 mg/dL    Creatinine 1 11 0 60 - 1 30 mg/dL    Glucose, Fasting 88 65 - 99 mg/dL    Calcium 9 4 8 3 - 10 1 mg/dL    AST 23 5 - 45 U/L    ALT 26 12 - 78 U/L    Alkaline Phosphatase 63 46 - 116 U/L    Total Protein 6 8 6 4 - 8 2 g/dL    Albumin 3 3 (L) 3 5 - 5 0 g/dL    Total Bilirubin 0 54 0 20 - 1 00 mg/dL    eGFR 64 ml/min/1 73sq m   TSH baseline   Result Value Ref Range    TSH Baseline 7 650 uIU/mL     Labs, Imaging, & Other studies:   All pertinent labs and imaging studies were personally reviewed    Lab Results   Component Value Date     08/27/2018    K 4 3 08/27/2018     08/27/2018 CO2 28 08/27/2018    BUN 19 08/27/2018    CREATININE 1 11 08/27/2018    GLUF 88 08/27/2018    CALCIUM 9 4 08/27/2018    AST 23 08/27/2018    ALT 26 08/27/2018    ALKPHOS 63 08/27/2018    EGFR 64 08/27/2018     Lab Results   Component Value Date    WBC 6 10 08/27/2018    HGB 10 6 (L) 08/27/2018    HCT 33 6 (L) 08/27/2018     (H) 08/27/2018     08/27/2018   No results found for: [address] [place] [address] and discussed with patient  Assessment and plan:   Patient is here with his wife  He is being treated for stage IV adenocarcinoma of the lung and presently he is on maintenance Alimta with N02, folic acid and steroid  He also has history of interstitial lung disease  He has nasal oxygen but he does not use that all the time  Prednisone dose is down to 5 mg a day  He gets extra dose of prednisone and also intravenous Decadron  with Alimta  Recently was found to have hypo thyroidism and TSH was 28  He is taking Synthroid 112 mcg daily  TSH has come down to 7 6  He complains of dryness of the mouth  He has shortness of breath with minimal exertion  He has generalized weakness and tiredness  He has arthritic symptoms  Follow-up visit for 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 diagnosed in January 2018   Status post 4 cycles of carboplatin plus Alimta plus Keytruda and 5th cycle without carboplatin   Patient responded to systemic chemo/immunotherapy  He was on  Alimta plus Keytruda maintenance systemic therapy       Recent  admission because of pneumonitis secondary to [facility] as above    Lymph nodes not enlarged in the mediastinum anymore   Bone lesions stable  Patient had received radiation to thoracic spine for painful bony lesion    He has been getting  Xgeva once every 3 months   PD L1 level was 0   She  had hematological toxicities and received   blood transfusions  He has been using oxygen with exertion    The diagnosis was made by the biopsy  from the left iliac crest  that identified metastatic adenocarcinoma  He will be getting chemotherapy today  He will receive 10 mg Decadron intravenously  Tonight and tomorrow morning he will have 20 mg prednisone dose and then he will go back to 5 mg prednisone daily  For subsequent cycles he will have prednisone 10 mg twice a day, the day before, the day of and day after chemotherapy and in between he will stay on prednisone 5 mg daily  He will take prednisone with food  Patient's wife wrote down those instructions     Physical examination and test results are as recorded and discussed  especially CT scan report  Lidia Gillespie He is being continued on Alimta and Xgeva  All discussed in detail  Questions answered  Patient is being referred to palliative care for dryness of the mouth  1  Primary adenocarcinoma of left lung ([facility] )    - Ambulatory referral to Palliative Care; Future    2  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

## 2018-08-28 NOTE — PATIENT INSTRUCTIONS
prednisone 5 daily  Today in the evening 20 mg prednisone  Tomorrow morning 20 mg prednisone  With each cycle of chemotherapy 10 mg prednisone twice a day, the day before the day of and day after and then back to 5 mg prednisone daily  Prednisone to be taken with food  Continuing with Alimta  Folic acid and R38 shots as before    To continue with thyroid medication

## 2018-08-28 NOTE — TELEPHONE ENCOUNTER
Reviewed CT results with pt's wife by phone  Fibrosis stable from previous scan, but more pronounced from January 2018 scan  Discussed case with Dr Harkins Friend, who would like to see pt as soon as possible  Wife aware and our office will be calling to set up appointment  Spirometry and DLCO will be done prior to appointment as well  Francis's breathing is about the same  He continues to use oxygen as prescribed  All her questions were answered  They were instructed to call the office with any further questions or if he develops any changes in his breathing

## 2018-08-28 NOTE — PROGRESS NOTES
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

## 2018-08-28 NOTE — PROGRESS NOTES
HPI: Patient is here with his wife  He is being treated for stage IV adenocarcinoma of the lung and presently he is on maintenance Alimta with C86, folic acid and steroid  He also has history of interstitial lung disease  He has nasal oxygen but he does not use that all the time  Prednisone dose is down to 5 mg a day  He gets extra dose of prednisone and also intravenous Decadron  with Alimta  Recently was found to have hypo thyroidism and TSH was 28  He is taking Synthroid 112 mcg daily  TSH has come down to 7 6  He complains of dryness of the mouth  He has shortness of breath with minimal exertion  He has generalized weakness and tiredness  He has arthritic symptoms  Follow-up visit for 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 diagnosed in January 2018   Status post 4 cycles of carboplatin plus Alimta plus Keytruda and 5th cycle without carboplatin   Patient responded to systemic chemo/immunotherapy  He was on  Alimta plus Keytruda maintenance systemic therapy       Recent  admission because of pneumonitis secondary to [facility] as above    Lymph nodes not enlarged in the mediastinum anymore   Bone lesions stable  Patient had received radiation to thoracic spine for painful bony lesion    He has been getting  Xgeva once every 3 months   PD L1 level was 0   She  had hematological toxicities and received   blood transfusions  He has been using oxygen with exertion  The diagnosis was made by the biopsy  from the left iliac crest  that identified metastatic adenocarcinoma                  Current Outpatient Prescriptions:     ALPRAZolam (XANAX) 0 25 mg tablet, Take 1 tablet (0 25 mg total) by mouth every 6 (six) hours as needed for anxiety, Disp: 60 tablet, Rfl: 0    finasteride (PROSCAR) 5 mg tablet, Take 1 tablet (5 mg total) by mouth daily, Disp: 30 tablet, Rfl: 0    folic acid (FOLVITE) 1 mg tablet, TAKE 1 TABLET (1 MG TOTAL) BY MOUTH DAILY, Disp: 90 tablet, Rfl: 0    levothyroxine 112 mcg tablet, Take 1 tablet (112 mcg total) by mouth daily, Disp: 30 tablet, Rfl: 1    multivitamin (THERAGRAN) TABS, Take 1 tablet by mouth daily, Disp: , Rfl:     omega-3-acid ethyl esters (LOVAZA) 1 g capsule, Take 1 g by mouth daily, Disp: , Rfl:     oxybutynin (DITROPAN-XL) 10 MG 24 hr tablet, Take 1 tablet (10 mg total) by mouth daily at bedtime, Disp: 30 tablet, Rfl: 6    predniSONE 20 mg tablet, Take 4 tablets (80 mg total) by mouth daily (Patient taking differently: Take 5 mg by mouth daily  ), Disp: 120 tablet, Rfl: 0    tamsulosin (FLOMAX) 0 4 mg, Take 1 capsule (0 4 mg total) by mouth daily with dinner, Disp: 30 capsule, Rfl: 6  No current facility-administered medications for this visit  Facility-Administered Medications Ordered in Other Visits:     alteplase (CATHFLO) injection 2 mg, 2 mg, Intracatheter, PRN, Barbara Fox MD    cyanocobalamin injection 1,000 mcg, 1,000 mcg, Intramuscular, Once, Barbara Fox MD    heparin lock flush 100 units/mL injection 300 Units, 300 Units, Intracatheter, PRN, Barbara Fox MD    ondansetron (ZOFRAN) 8 mg in sodium chloride 0 9 % 50 mL IVPB, 8 mg, Intravenous, Once, Barbara Fox MD    PEMEtrexed (ALIMTA) 900 mg in sodium chloride 0 9 % 100 mL chemo infusion, 900 mg, Intravenous, Once, Barbara Fox MD    sodium chloride 0 9 % infusion, 20 mL/hr, Intravenous, Continuous, Barbara Fox MD    No Known Allergies    Oncology History    Follow-up visit for 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 diagnosed in January 2018  Status post 4 cycles of carboplatin plus Alimta plus Keytruda and 5th cycle without carboplatin  Cycle 6 will be tomorrow  He has received palliative radiation to thoracic spine  He is responding  Lung mass is smaller  Lymph nodes are not enlarged in the mediastinum anymore  Bone lesion is stable  Also he gets Xgeva once every 3 months    PD L1 level was 0  She gets hematological toxicities and has required  blood transfusions  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 [facility])     Initial Diagnosis     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 [facility])        Chemotherapy       February 2018 - carboplatin plus Alimta plus Keytruda x 4 cycles and after that Alimta plus [place] is continuing  Radiation       March or April 2018 - Radiation to thoracic spine  Cancer of lower lobe of left lung ([address] ) (Resolved)    1/15/2018 Initial Diagnosis     Cancer of lower lobe of left lung ([address] )    A  Bone, Left iliac crest, biopsy:  - Metastatic adenocarcinoma  1/31/2018 -  Chemotherapy     Carboplatin AUC 5, Alimta 500 mg/m2, Keytruda 200 mg IV every 3 weeks  Cycle #1 1/31/2018  Xgeva 120 mg subcu every 6 weeks           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     6/18/2018 Initial Diagnosis     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         Chemotherapy       February 2018:  Carboplatin plus Alimta plus Keytruda for 4-5 cycles and after that Alimta plus Keytruda  until June 2018  [place] stopped because of pneumonitis, treated with prednisone and dose is being tapered  Patient will be getting maintenance Alimta alone once every 3 weeks starting in July 2018  ROS:  08/28/18 Reviewed 13 systems:  Presently no headaches, seizures, dizziness, diplopia, dysphagia, hoarseness, chest pain, palpitations,  cough, hemoptysis, abdominal pain, nausea, vomiting, change in bowel habits, melena, hematuria, fever, chills, bleeding, bone pains, skin rash, weight loss,  weakness, numbness,  claudication and gait problem  No frequent infections  Not unusually sensitive to heat or cold  No swelling of the ankles  No swollen glands  Patient is anxious   Other symptoms are in HPI        /100 (BP Location: Left arm, Cuff Size: Standard)   Pulse 89   Temp 97 8 °F (36 6 °C) (Tympanic)   Resp 20 Ht 5' 7" (1 702 m)   Wt 78 kg (172 lb)   SpO2 97%   BMI 26 94 kg/m²     Physical Exam:  Alert, oriented, not in distress, no icterus, no oral thrush, no palpable neck mass,  bilateral fine crepitations in the lung fields, regular heart rate, abdomen  soft and non tender, no palpable abdominal mass, no ascites, no edema of ankles, no calf tenderness, no focal neurological deficit, no skin rash, no palpable lymphadenopathy, good arterial pulses, no clubbing  Patient is anxious  Performance status 2  IMAGING:  IMPRESSION:  1  Dominant left lower lobe nodule now measures 9 x 9 mm  There are additional stable subcentimeter nodules present  2   Decrease prominence of but persistent right lower lobe airspace opacity which may represent atelectasis versus pneumonia  Recommend continued follow-up to ensure resolution of this finding  Trace right pleural effusion  3  Persistent bilateral subpleural reticular opacities and bibasilar bronchiectasis compatible with interstitial lung disease  This is similar in appearance to most recent prior exam   4   Enlargement of the ascending aorta measuring 4 3 cm at the level the right pulmonary artery    5   Stable sclerotic and lytic vertebral metastases      Workstation performed: [id number]      Imaging     CT chest high resolution (Order #[id number]) on 8/20/2018 - Imaging Information   Result History     CT chest high resolution (Order #[id number]) on 8/22/2018 - Order Result History Report         LABS:  Results for orders placed or performed in visit on 08/27/18   CBC and differential   Result Value Ref Range    WBC 6 10 4 31 - 10 16 Thousand/uL    RBC 3 33 (L) 3 88 - 5 62 Million/uL    Hemoglobin 10 6 (L) 12 0 - 17 0 g/dL    Hematocrit 33 6 (L) 36 5 - 49 3 %     (H) 82 - 98 fL    MCH 31 8 26 8 - 34 3 pg    MCHC 31 5 31 4 - 37 4 g/dL    RDW 21 8 (H) 11 6 - 15 1 %    MPV 10 1 8 9 - 12 7 fL    Platelets 620 251 - 976 Thousands/uL    nRBC 0 /100 WBCs Neutrophils Relative 75 43 - 75 %    Immat GRANS % 2 0 - 2 %    Lymphocytes Relative 6 (L) 14 - 44 %    Monocytes Relative 15 (H) 4 - 12 %    Eosinophils Relative 2 0 - 6 %    Basophils Relative 0 0 - 1 %    Neutrophils Absolute 4 60 1 85 - 7 62 Thousands/µL    Immature Grans Absolute 0 10 0 00 - 0 20 Thousand/uL    Lymphocytes Absolute 0 37 (L) 0 60 - 4 47 Thousands/µL    Monocytes Absolute 0 92 0 17 - 1 22 Thousand/µL    Eosinophils Absolute 0 09 0 00 - 0 61 Thousand/µL    Basophils Absolute 0 02 0 00 - 0 10 Thousands/µL   Comprehensive metabolic panel   Result Value Ref Range    Sodium 138 136 - 145 mmol/L    Potassium 4 3 3 5 - 5 3 mmol/L    Chloride 104 100 - 108 mmol/L    CO2 28 21 - 32 mmol/L    ANION GAP 6 4 - 13 mmol/L    BUN 19 5 - 25 mg/dL    Creatinine 1 11 0 60 - 1 30 mg/dL    Glucose, Fasting 88 65 - 99 mg/dL    Calcium 9 4 8 3 - 10 1 mg/dL    AST 23 5 - 45 U/L    ALT 26 12 - 78 U/L    Alkaline Phosphatase 63 46 - 116 U/L    Total Protein 6 8 6 4 - 8 2 g/dL    Albumin 3 3 (L) 3 5 - 5 0 g/dL    Total Bilirubin 0 54 0 20 - 1 00 mg/dL    eGFR 64 ml/min/1 73sq m   TSH baseline   Result Value Ref Range    TSH Baseline 7 650 uIU/mL     Labs, Imaging, & Other studies:   All pertinent labs and imaging studies were personally reviewed    Lab Results   Component Value Date     08/27/2018    K 4 3 08/27/2018     08/27/2018    CO2 28 08/27/2018    BUN 19 08/27/2018    CREATININE 1 11 08/27/2018    GLUF 88 08/27/2018    CALCIUM 9 4 08/27/2018    AST 23 08/27/2018    ALT 26 08/27/2018    ALKPHOS 63 08/27/2018    EGFR 64 08/27/2018     Lab Results   Component Value Date    WBC 6 10 08/27/2018    HGB 10 6 (L) 08/27/2018    HCT 33 6 (L) 08/27/2018     (H) 08/27/2018     08/27/2018   No results found for: SEDRATE    Reviewed and discussed with patient  Assessment and plan:   Patient is here with his wife    He is being treated for stage IV adenocarcinoma of the lung and presently he is on maintenance Alimta with G00, folic acid and steroid  He also has history of interstitial lung disease  He has nasal oxygen but he does not use that all the time  Prednisone dose is down to 5 mg a day  He gets extra dose of prednisone and also intravenous Decadron  with Alimta  Recently was found to have hypo thyroidism and TSH was 28  He is taking Synthroid 112 mcg daily  TSH has come down to 7 6  He complains of dryness of the mouth  He has shortness of breath with minimal exertion  He has generalized weakness and tiredness  He has arthritic symptoms  Follow-up visit for 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 diagnosed in January 2018   Status post 4 cycles of carboplatin plus Alimta plus Keytruda and 5th cycle without carboplatin   Patient responded to systemic chemo/immunotherapy  He was on  Alimta plus Keytruda maintenance systemic therapy       Recent  admission because of pneumonitis secondary to [facility] as above    Lymph nodes not enlarged in the mediastinum anymore   Bone lesions stable  Patient had received radiation to thoracic spine for painful bony lesion    He has been getting  Xgeva once every 3 months   PD L1 level was 0   She  had hematological toxicities and received   blood transfusions  He has been using oxygen with exertion  The diagnosis was made by the biopsy  from the left iliac crest  that identified metastatic adenocarcinoma  He will be getting chemotherapy today  He will receive 10 mg Decadron intravenously  Tonight and tomorrow morning he will have 20 mg prednisone dose and then he will go back to 5 mg prednisone daily  For subsequent cycles he will have prednisone 10 mg twice a day, the day before, the day of and day after chemotherapy and in between he will stay on prednisone 5 mg daily  He will take prednisone with food  Patient's wife wrote down those instructions        Physical examination and test results are as recorded and discussed  especially CT scan report  Ashley Felix He is being continued on Alimta and Xgeva  All discussed in detail  Questions answered  Patient is being referred to palliative care for dryness of the mouth  1  Primary adenocarcinoma of left lung ([facility] )    - Ambulatory referral to Palliative Care; Future    2  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 ([facility] )      3  Other specified hypothyroidism      4  Interstitial lung disease ([facility] )      5  Xerostomia    - Ambulatory referral to Palliative Care; Future        Patient voiced understanding and agreement in the discussion  Counseling / Coordination of Care: 45 min   Greater than 50% of total time was spent with the patient and / or family counseling and / or coordination of care

## 2018-08-28 NOTE — PLAN OF CARE
Problem: Potential for Falls  Goal: Patient will remain free of falls  INTERVENTIONS:  - Assess patient frequently for physical needs  -  Identify cognitive and physical deficits and behaviors that affect risk of falls    -  [place] fall precautions as indicated by assessment   - Educate patient/family on patient safety including physical limitations  - Instruct patient to call for assistance with activity based on assessment  - Modify environment to reduce risk of injury  - Consider OT/PT consult to assist with strengthening/mobility   Outcome: Progressing

## 2018-08-29 ENCOUNTER — DOCUMENTATION (OUTPATIENT)
Dept: PULMONOLOGY | Facility: CLINIC | Age: 78
End: 2018-08-29

## 2018-08-29 NOTE — PROGRESS NOTES
Called and gave patients wife the appt information for Spirometry at [facility] on 9/4/18 @ 1:45pm with all instructions for [facility]

## 2018-09-04 ENCOUNTER — HOSPITAL ENCOUNTER (OUTPATIENT)
Dept: PULMONOLOGY | Facility: HOSPITAL | Age: 78
Discharge: HOME | End: 2018-09-04
Payer: COMMERCIAL

## 2018-09-04 DIAGNOSIS — J84.10 PULMONARY FIBROSIS (HCC): ICD-10-CM

## 2018-09-04 PROCEDURE — 94760 N-INVAS EAR/PLS OXIMETRY 1: CPT

## 2018-09-04 PROCEDURE — 94729 DIFFUSING CAPACITY: CPT | Performed by: INTERNAL MEDICINE

## 2018-09-04 PROCEDURE — 94729 DIFFUSING CAPACITY: CPT

## 2018-09-04 PROCEDURE — 94010 BREATHING CAPACITY TEST: CPT | Performed by: INTERNAL MEDICINE

## 2018-09-04 PROCEDURE — 94010 BREATHING CAPACITY TEST: CPT

## 2018-09-05 ENCOUNTER — OFFICE VISIT (OUTPATIENT)
Dept: PULMONOLOGY | Facility: CLINIC | Age: 78
End: 2018-09-05
Payer: COMMERCIAL

## 2018-09-05 VITALS
OXYGEN SATURATION: 97 % | WEIGHT: 172.4 LBS | HEART RATE: 104 BPM | BODY MASS INDEX: 27.71 KG/M2 | HEIGHT: 66 IN | TEMPERATURE: 98.7 F | DIASTOLIC BLOOD PRESSURE: 72 MMHG | RESPIRATION RATE: 18 BRPM | SYSTOLIC BLOOD PRESSURE: 118 MMHG

## 2018-09-05 DIAGNOSIS — J84.10 PULMONARY FIBROSIS (HCC): Primary | ICD-10-CM

## 2018-09-05 PROCEDURE — 99214 OFFICE O/P EST MOD 30 MIN: CPT | Performed by: INTERNAL MEDICINE

## 2018-09-05 RX ORDER — LOSARTAN POTASSIUM 50 MG/1
TABLET ORAL
COMMUNITY
Start: 2018-08-27 | End: 2018-09-27

## 2018-09-13 DIAGNOSIS — J84.10 PULMONARY FIBROSIS (HCC): Primary | ICD-10-CM

## 2018-09-17 ENCOUNTER — APPOINTMENT (OUTPATIENT)
Dept: LAB | Facility: MEDICAL CENTER | Age: 78
End: 2018-09-17
Payer: COMMERCIAL

## 2018-09-17 RX ORDER — SODIUM CHLORIDE 9 MG/ML
20 INJECTION, SOLUTION INTRAVENOUS CONTINUOUS
Status: DISCONTINUED | OUTPATIENT
Start: 2018-09-18 | End: 2018-09-21 | Stop reason: HOSPADM

## 2018-09-17 RX ORDER — CYANOCOBALAMIN 1000 UG/ML
1000 INJECTION INTRAMUSCULAR; SUBCUTANEOUS ONCE
Status: COMPLETED | OUTPATIENT
Start: 2018-09-18 | End: 2018-09-18

## 2018-09-18 ENCOUNTER — HOSPITAL ENCOUNTER (OUTPATIENT)
Dept: INFUSION CENTER | Facility: CLINIC | Age: 78
Discharge: HOME | End: 2018-09-18
Payer: COMMERCIAL

## 2018-09-18 VITALS
WEIGHT: 173 LBS | OXYGEN SATURATION: 96 % | TEMPERATURE: 98.1 F | HEIGHT: 67 IN | DIASTOLIC BLOOD PRESSURE: 80 MMHG | RESPIRATION RATE: 18 BRPM | HEART RATE: 58 BPM | BODY MASS INDEX: 27.15 KG/M2 | SYSTOLIC BLOOD PRESSURE: 140 MMHG

## 2018-09-18 DIAGNOSIS — N40.1 BENIGN PROSTATIC HYPERPLASIA WITH WEAK URINARY STREAM: ICD-10-CM

## 2018-09-18 DIAGNOSIS — R39.12 BENIGN PROSTATIC HYPERPLASIA WITH WEAK URINARY STREAM: ICD-10-CM

## 2018-09-18 PROCEDURE — 96409 CHEMO IV PUSH SNGL DRUG: CPT

## 2018-09-18 PROCEDURE — 96372 THER/PROPH/DIAG INJ SC/IM: CPT

## 2018-09-18 PROCEDURE — 96367 TX/PROPH/DG ADDL SEQ IV INF: CPT

## 2018-09-18 RX ADMIN — Medication 300 UNITS: at 11:32

## 2018-09-18 RX ADMIN — SODIUM CHLORIDE 900 MG: 9 INJECTION, SOLUTION INTRAVENOUS at 11:20

## 2018-09-18 RX ADMIN — CYANOCOBALAMIN 1000 MCG: 1000 INJECTION, SOLUTION INTRAMUSCULAR at 10:49

## 2018-09-18 RX ADMIN — DEXAMETHASONE SODIUM PHOSPHATE: 10 INJECTION, SOLUTION INTRAMUSCULAR; INTRAVENOUS at 10:44

## 2018-09-18 RX ADMIN — SODIUM CHLORIDE 20 ML/HR: 0.9 INJECTION, SOLUTION INTRAVENOUS at 10:35

## 2018-09-18 NOTE — PLAN OF CARE
Problem: Potential for Falls  Goal: Patient will remain free of falls  INTERVENTIONS:  - Assess patient frequently for physical needs  -  Identify cognitive and physical deficits and behaviors that affect risk of falls    -  [place] fall precautions as indicated by assessment   - Educate patient/family on patient safety including physical limitations  - Instruct patient to call for assistance with activity based on assessment  - Modify environment to reduce risk of injury  - Consider OT/PT consult to assist with strengthening/mobility   Outcome: Progressing

## 2018-09-18 NOTE — PROGRESS NOTES
Pt  Tolerated treatment & Vitamin B12 IM injection in [facility] w/out adverse reaction  Confirmed pts  Next appt   Pt  Declined AVS

## 2018-09-18 NOTE — TELEPHONE ENCOUNTER
Francois Gomez patient, managed by Dr Malik Abraham, received 90 day refill request for Tamsulosin and Oxybutynin       Patient scheduled for follow up on 1/23/19

## 2018-09-18 NOTE — PROGRESS NOTES
Nurse spoke with Rere Oleary RN & informed her of pts  TSH of 8 980  She stated she would inform Dr Pau Salas of the same & call nurse back if there are any changes  Nurse informed the pt  Of the same, Pt  Agreeable & verbalized understanding

## 2018-09-19 ENCOUNTER — TELEPHONE (OUTPATIENT)
Dept: HEMATOLOGY ONCOLOGY | Facility: CLINIC | Age: 78
End: 2018-09-19

## 2018-09-19 DIAGNOSIS — E03.2 HYPOTHYROIDISM DUE TO MEDICATION: Primary | ICD-10-CM

## 2018-09-19 RX ORDER — LEVOTHYROXINE SODIUM 0.12 MG/1
125 TABLET ORAL DAILY
Qty: 90 TABLET | Refills: 1 | Status: SHIPPED | OUTPATIENT
Start: 2018-09-19

## 2018-09-19 RX ORDER — TAMSULOSIN HYDROCHLORIDE 0.4 MG/1
0.4 CAPSULE ORAL
Qty: 90 CAPSULE | Refills: 1 | Status: SHIPPED | OUTPATIENT
Start: 2018-09-19 | End: 2018-12-04

## 2018-09-19 RX ORDER — OXYBUTYNIN CHLORIDE 10 MG/1
10 TABLET, EXTENDED RELEASE ORAL
Qty: 90 TABLET | Refills: 1 | Status: SHIPPED | OUTPATIENT
Start: 2018-09-19 | End: 2018-09-27

## 2018-09-19 NOTE — TELEPHONE ENCOUNTER
Called and spoke with the patient's wife in regards to the patient's 9/17/18 TSH level of 8 980  Patient was previously on 112 mcg Levothyroxine for a 8/27/18 of 7 650  Dr Toni Bran is increasing patient's Levothyroxine dose to 125 mcg daily  Pat requesting that the prescription be sent to [facility] as they supply a 90 day supply

## 2018-09-27 ENCOUNTER — OFFICE VISIT (OUTPATIENT)
Dept: PALLIATIVE MEDICINE | Facility: CLINIC | Age: 78
End: 2018-09-27
Payer: COMMERCIAL

## 2018-09-27 VITALS
HEIGHT: 67 IN | OXYGEN SATURATION: 95 % | DIASTOLIC BLOOD PRESSURE: 86 MMHG | TEMPERATURE: 98.6 F | SYSTOLIC BLOOD PRESSURE: 120 MMHG | BODY MASS INDEX: 26.76 KG/M2 | HEART RATE: 94 BPM | RESPIRATION RATE: 24 BRPM | WEIGHT: 170.5 LBS

## 2018-09-27 DIAGNOSIS — C34.92 PRIMARY ADENOCARCINOMA OF LEFT LUNG (HCC): Primary | ICD-10-CM

## 2018-09-27 DIAGNOSIS — K11.7 XEROSTOMIA: ICD-10-CM

## 2018-09-27 DIAGNOSIS — R43.2 DYSGEUSIA: ICD-10-CM

## 2018-09-27 PROCEDURE — 99214 OFFICE O/P EST MOD 30 MIN: CPT | Performed by: FAMILY MEDICINE

## 2018-09-27 RX ORDER — DEXAMETHASONE 4 MG/1
4 TABLET ORAL 2 TIMES DAILY WITH MEALS
COMMUNITY
End: 2018-12-04

## 2018-09-27 RX ORDER — PILOCARPINE HYDROCHLORIDE 5 MG/1
5 TABLET, FILM COATED ORAL 3 TIMES DAILY
Qty: 90 TABLET | Refills: 0 | Status: SHIPPED | OUTPATIENT
Start: 2018-09-27 | End: 2018-10-31

## 2018-09-27 NOTE — PROGRESS NOTES
Palliative and Supportive Care   Holger Pressley 69 y o  male [id number]    Assessment/Plan:  1  Primary adenocarcinoma of left lung ([address] )    2  Xerostomia    3  Dysgeusia      Requested Prescriptions     Signed Prescriptions Disp Refills    pilocarpine (SALAGEN) 5 mg tablet 90 tablet 0     Sig: Take 1 tablet (5 mg total) by mouth 3 (three) times a day     Medications Discontinued During This Encounter   Medication Reason    oxybutynin (DITROPAN-XL) 10 MG 24 hr tablet     multivitamin (THERAGRAN) TABS     omega-3-acid ethyl esters (LOVAZA) 1 g capsule      Despite the disease burden in his respiratory system, his main complaint is dry mouth and foods not tasting right after his most recent experience with radiation  To address this problem I have made recommendations to reduce his anticholingeric burden by stopping oxybutytinin  I have also recommended stopping his MVA and omega 3 fish oil pill as they can alter taste  I gave him a hand out on xerostomia from the Eating Hints booklet published by the [employer]  I have recommended that he frequently sip on water flavored with citrus such as lemon and use of tart/sour candies  If the above measures do not improve his symptoms enough, I have given him a prescription to try pilocarpine however I disclosed that cholinergic side effects such as diarrhea, flushing, sweating can outweigh the therapeutic benefits  I recommended that he consider acupuncture as there is evidence for this but he was not open to his suggested right now  Representatives have queried the patient's controlled substance dispensing history in the Prescription Drug Monitoring Program in compliance with regulations before I have prescribed any controlled substances  The prescription history is consistent with prescribed therapy and our practice policies        30 minutes were spent face to face with Holger Pressley and his spouse with greater than 50% of the time spent in counseling or coordination of care including discussions of treatment instructions and follow up requirements   All of the patient's questions were answered during this discussion  Return in about 1 month (around 10/27/2018)  Subjective:   Chief Complaint  New consultation for:  symptom management, goal of care assessment and decisional support  MIKE Aguirre is a 69 y o  male with stage IV adenocarcinoma of the lung  He is presently he is on maintenance Alimta under the direction of his medical oncologist Dr Darryl Pool  He has been on immunotherapy and received palliative radiation to his thoracic spine  He sees Dr Leetta Goldmann in pulmonology for underlying lung conditions including pulmonary fibrosis chronic hypoxia with oxygen dependence  Since his course of radiation he complains of dry mouth  It is so dry that his lips stick to his gums  He carries around water all day long and has to take a sip every 5 minutes  It has been several months since his radiation with no improvement in his symptom  He also states that foods do not taste normal - cucumbers taste like banannas for example  He is so frustrated by this that he wishes he never did any cancer treatment because he felt fine before he started  The following portions of the medical history were reviewed: past medical history, problem list, medication list, and social history      Current Outpatient Prescriptions:     Artificial Saliva (BIOTENE MOISTURIZING MOUTH MT), Apply to the mouth or throat, Disp: , Rfl:     dexamethasone (DECADRON) 4 mg tablet, Take 4 mg by mouth 2 (two) times a day with meals, Disp: , Rfl:     finasteride (PROSCAR) 5 mg tablet, Take 1 tablet (5 mg total) by mouth daily, Disp: 30 tablet, Rfl: 0    folic acid (FOLVITE) 1 mg tablet, TAKE 1 TABLET (1 MG TOTAL) BY MOUTH DAILY, Disp: 90 tablet, Rfl: 0    levothyroxine 125 mcg tablet, Take 1 tablet (125 mcg total) by mouth daily, Disp: 90 tablet, Rfl: 1    senna 8 8 mg/5 mL syrup, Take by mouth daily at bedtime, Disp: , Rfl:     tamsulosin (FLOMAX) 0 4 mg, Take 1 capsule (0 4 mg total) by mouth daily with dinner, Disp: 90 capsule, Rfl: 1    pilocarpine (SALAGEN) 5 mg tablet, Take 1 tablet (5 mg total) by mouth 3 (three) times a day, Disp: 90 tablet, Rfl: 0  Review of Systems   HENT: Positive for trouble swallowing  Negative for sore throat  Musculoskeletal: Positive for back pain and gait problem  All other systems negative    Objective:  Vital Signs  /86 (BP Location: Left arm, Patient Position: Sitting, Cuff Size: Standard)   Pulse 94   Temp 98 6 °F (37 °C) (Tympanic)   Resp (!) 24   Ht 5' 6 5" (1 689 m)   Wt 77 3 kg (170 lb 8 oz)   SpO2 95%   BMI 27 11 kg/m²    Physical Exam    Constitutional: Appears well-developed and well-nourished  In no acute distress  Head: Normocephalic and atraumatic  Eyes: EOM are normal  Right eye exhibits no discharge  Left eye exhibits no discharge  No scleral icterus  Oropharynx: clear  Dry mucus membranes  No signs of thrush  Pulmonary/Chest: Effort normal  No stridor  No respiratory distress  Abdominal: No distension  Musculoskeletal: No edema  Neurological: Alert, oriented and appropriately conversant  Skin: Skin is dry, not diaphoretic  Psychiatric: Displays a normal mood and affect  Behavior, judgement and thought content appear normal    Vitals reviewed

## 2018-10-08 ENCOUNTER — APPOINTMENT (OUTPATIENT)
Dept: LAB | Facility: MEDICAL CENTER | Age: 78
End: 2018-10-08
Payer: COMMERCIAL

## 2018-10-08 RX ORDER — CYANOCOBALAMIN 1000 UG/ML
1000 INJECTION INTRAMUSCULAR; SUBCUTANEOUS ONCE
Status: COMPLETED | OUTPATIENT
Start: 2018-10-09 | End: 2018-10-09

## 2018-10-08 RX ORDER — SODIUM CHLORIDE 9 MG/ML
20 INJECTION, SOLUTION INTRAVENOUS CONTINUOUS
Status: DISCONTINUED | OUTPATIENT
Start: 2018-10-09 | End: 2018-10-12 | Stop reason: HOSPADM

## 2018-10-09 ENCOUNTER — HOSPITAL ENCOUNTER (OUTPATIENT)
Dept: INFUSION CENTER | Facility: CLINIC | Age: 78
Discharge: HOME | End: 2018-10-09
Payer: COMMERCIAL

## 2018-10-09 ENCOUNTER — OFFICE VISIT (OUTPATIENT)
Dept: HEMATOLOGY ONCOLOGY | Facility: CLINIC | Age: 78
End: 2018-10-09
Payer: COMMERCIAL

## 2018-10-09 VITALS
OXYGEN SATURATION: 98 % | HEART RATE: 68 BPM | DIASTOLIC BLOOD PRESSURE: 72 MMHG | BODY MASS INDEX: 27 KG/M2 | HEIGHT: 67 IN | SYSTOLIC BLOOD PRESSURE: 130 MMHG | RESPIRATION RATE: 16 BRPM | TEMPERATURE: 98.2 F | WEIGHT: 172 LBS

## 2018-10-09 VITALS
OXYGEN SATURATION: 97 % | WEIGHT: 173 LBS | BODY MASS INDEX: 27.8 KG/M2 | DIASTOLIC BLOOD PRESSURE: 72 MMHG | SYSTOLIC BLOOD PRESSURE: 130 MMHG | TEMPERATURE: 97.8 F | HEART RATE: 83 BPM | HEIGHT: 66 IN | RESPIRATION RATE: 20 BRPM

## 2018-10-09 DIAGNOSIS — J84.9 INTERSTITIAL LUNG DISEASE (HCC): ICD-10-CM

## 2018-10-09 DIAGNOSIS — C34.92 PRIMARY ADENOCARCINOMA OF LEFT LUNG (HCC): ICD-10-CM

## 2018-10-09 DIAGNOSIS — C34.92 ADENOCARCINOMA OF LEFT LUNG (HCC): Primary | ICD-10-CM

## 2018-10-09 DIAGNOSIS — E03.8 OTHER SPECIFIED HYPOTHYROIDISM: ICD-10-CM

## 2018-10-09 DIAGNOSIS — K11.7 XEROSTOMIA: ICD-10-CM

## 2018-10-09 DIAGNOSIS — C79.51 METASTATIC ADENOCARCINOMA TO BONE (HCC): Chronic | ICD-10-CM

## 2018-10-09 PROCEDURE — 96367 TX/PROPH/DG ADDL SEQ IV INF: CPT

## 2018-10-09 PROCEDURE — 96401 CHEMO ANTI-NEOPL SQ/IM: CPT

## 2018-10-09 PROCEDURE — 96372 THER/PROPH/DIAG INJ SC/IM: CPT

## 2018-10-09 PROCEDURE — 96409 CHEMO IV PUSH SNGL DRUG: CPT

## 2018-10-09 PROCEDURE — 99214 OFFICE O/P EST MOD 30 MIN: CPT | Performed by: INTERNAL MEDICINE

## 2018-10-09 RX ADMIN — CYANOCOBALAMIN 1000 MCG: 1000 INJECTION, SOLUTION INTRAMUSCULAR at 13:14

## 2018-10-09 RX ADMIN — SODIUM CHLORIDE 20 ML/HR: 0.9 INJECTION, SOLUTION INTRAVENOUS at 13:00

## 2018-10-09 RX ADMIN — DENOSUMAB 120 MG: 120 INJECTION SUBCUTANEOUS at 13:17

## 2018-10-09 RX ADMIN — SODIUM CHLORIDE 900 MG: 9 INJECTION, SOLUTION INTRAVENOUS at 13:47

## 2018-10-09 RX ADMIN — DEXAMETHASONE SODIUM PHOSPHATE: 10 INJECTION, SOLUTION INTRAMUSCULAR; INTRAVENOUS at 13:14

## 2018-10-09 RX ADMIN — HEPARIN 300 UNITS: 100 SYRINGE at 14:00

## 2018-10-09 NOTE — PROGRESS NOTES
Pt  Tolerated treatment w/out adverse reaction  Pt  Tolerated Vitamin B12 IM injection in [facility] & Xgeva SC injection in [facility] w/out adverse reaction  Confirmed pts  Next appt   Pt  Declined AVS

## 2018-10-09 NOTE — PROGRESS NOTES
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  With Alimta he also gets   K40, folic acid and decadron     Because of steroids he is feeling better today     Today he is not  complaining of chest pains at the back of his left side of chest that comes and goes  Also less chronic low back pain  He is breathing better today  He  has history of interstitial lung disease  He has nasal oxygen but he does not use that all the time  Prednisone dose is down to 5 mg a day  He gets extra dose of prednisone and also intravenous Decadron  with Alimta  Recently he was found to have hypo thyroidism and TSH was 28  He is taking Synthroid 125 mcg daily and now his TSH is normal     He complains of dryness of the mouth  He has shortness of breath with minimal exertion  He has nonproductive upper respiratory cough with some hacking  He has generalized weakness and tiredness  He has arthritic symptoms  Follow-up visit for 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 diagnosed in January 2018   Status post 4 cycles of carboplatin plus Alimta plus Keytruda and 5th cycle without carboplatin    Patient responded to systemic chemo/immunotherapy   He was on  Alimta plus Keytruda maintenance systemic therapy    Rabia Sours because of pneumonitis secondary to [employer] as above    Lymph nodes not enlarged in the mediastinum anymore   Bone lesions stable    Patient had received radiation to thoracic spine for painful bony lesion    He has been getting  Xgeva once every 3 months   PD L1 level was 0   She  had hematological toxicities and received   blood transfusions  He has been using oxygen with exertion  The diagnosis was made by the biopsy  from the left iliac crest  that identified metastatic adenocarcinoma  He had chemotherapy today    He will receive 10 mg Decadron intravenously  Tonight and tomorrow morning he will have 20 mg prednisone dose and then he will go back to 5 mg prednisone daily  For subsequent cycles he will have prednisone 10 mg twice a day, the day before, the day of and day after chemotherapy and in between he will stay on prednisone 5 mg daily  He will take prednisone with food  Patient's wife wrote down those instructions                   Current Outpatient Prescriptions:     Artificial Saliva (BIOTENE MOISTURIZING MOUTH MT), Apply to the mouth or throat, Disp: , Rfl:     dexamethasone (DECADRON) 4 mg tablet, Take 4 mg by mouth 2 (two) times a day with meals, Disp: , Rfl:     folic acid (FOLVITE) 1 mg tablet, TAKE 1 TABLET (1 MG TOTAL) BY MOUTH DAILY, Disp: 90 tablet, Rfl: 0    levothyroxine 125 mcg tablet, Take 1 tablet (125 mcg total) by mouth daily, Disp: 90 tablet, Rfl: 1    pilocarpine (SALAGEN) 5 mg tablet, Take 1 tablet (5 mg total) by mouth 3 (three) times a day, Disp: 90 tablet, Rfl: 0    senna 8 8 mg/5 mL syrup, Take by mouth daily at bedtime, Disp: , Rfl:     finasteride (PROSCAR) 5 mg tablet, Take 1 tablet (5 mg total) by mouth daily, Disp: 30 tablet, Rfl: 0    tamsulosin (FLOMAX) 0 4 mg, Take 1 capsule (0 4 mg total) by mouth daily with dinner (Patient not taking: Reported on 10/9/2018 ), Disp: 90 capsule, Rfl: 1  No current facility-administered medications for this visit       Facility-Administered Medications Ordered in Other Visits:     alteplase (CATHFLO) injection 2 mg, 2 mg, Intracatheter, PRN, Kathryn Burrows PA-C    heparin lock flush 100 units/mL injection 300 Units, 300 Units, Intracatheter, PRN, Kathryn Burrows PA-C, 300 Units at 10/09/18 1400    sodium chloride 0 9 % infusion, 20 mL/hr, Intravenous, Continuous, Kathryn Burrows PA-C, Stopped at 10/09/18 1400    No Known Allergies    Oncology History    Follow-up visit for 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 diagnosed in January 2018  Status post 4 cycles of carboplatin plus Alimta plus Keytruda and 5th cycle without carboplatin  Cycle 6 will be tomorrow  He has received palliative radiation to thoracic spine  He is responding  Lung mass is smaller  Lymph nodes are not enlarged in the mediastinum anymore  Bone lesion is stable  Also he gets Xgeva once every 3 months  PD L1 level was 0  She gets hematological toxicities and has required  blood transfusions  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 [facility])     Initial Diagnosis     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 [facility])        Chemotherapy       February 2018 - carboplatin plus Alimta plus Keytruda x 4 cycles and after that Alimta plus Francoiseanda Deborah is continuing  Radiation       March or April 2018 - Radiation to thoracic spine  Cancer of lower lobe of left lung ([address] ) (Resolved)    1/15/2018 Initial Diagnosis     Cancer of lower lobe of left lung ([address] )    A  Bone, Left iliac crest, biopsy:  - Metastatic adenocarcinoma  1/31/2018 -  Chemotherapy     Carboplatin AUC 5, Alimta 500 mg/m2, Keytruda 200 mg IV every 3 weeks  Cycle #1 1/31/2018  Xgeva 120 mg subcu every 6 weeks           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     6/18/2018 Initial Diagnosis     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         Chemotherapy       February 2018:  Carboplatin plus Alimta plus Keytruda for 4-5 cycles and after that Alimta plus Keytruda  until June 2018  Francoisedebra Cabrera stopped because of pneumonitis, treated with prednisone and dose is being tapered  Patient will be getting maintenance Alimta alone once every 3 weeks starting in July 2018              ROS:  10/09/18 Reviewed 13 systems:  Presently no headaches, seizures, dizziness, diplopia, dysphagia, hoarseness,  palpitations,  hemoptysis, abdominal pain, nausea, vomiting, change in bowel habits, melena, hematuria, fever, chills, bleeding, bone pains, skin rash, weight loss, numbness,  claudication and gait problem  No frequent infections  Not unusually sensitive to heat or cold  No swelling of the ankles  No swollen glands  Patient is anxious  Other symptoms are in HPI        /72 (BP Location: Left arm, Patient Position: Sitting, Cuff Size: Adult)   Pulse 83   Temp 97 8 °F (36 6 °C)   Resp 20   Ht 5' 6" (1 676 m)   Wt 78 5 kg (173 lb)   SpO2 97%   BMI 27 92 kg/m²     Physical Exam:  Alert, oriented, not in distress, no icterus, no oral thrush, no palpable neck mass, clear lung fields, regular heart rate, abdomen  soft and non tender, no palpable abdominal mass, no ascites, no edema of ankles, no calf tenderness, no focal neurological deficit, no skin rash, no palpable lymphadenopathy, good arterial pulses, no clubbing  Patient is anxious  Performance status 2      IMAGING:      LABS:  Results for orders placed or performed in visit on 10/05/18   CBC and differential   Result Value Ref Range    WBC 5 73 4 31 - 10 16 Thousand/uL    RBC 2 94 (L) 3 88 - 5 62 Million/uL    Hemoglobin 9 1 (L) 12 0 - 17 0 g/dL    Hematocrit 29 2 (L) 36 5 - 49 3 %    MCV 99 (H) 82 - 98 fL    MCH 31 0 26 8 - 34 3 pg    MCHC 31 2 (L) 31 4 - 37 4 g/dL    RDW 16 7 (H) 11 6 - 15 1 %    MPV 9 6 8 9 - 12 7 fL    Platelets 954 710 - 390 Thousands/uL    nRBC 0 /100 WBCs    Neutrophils Relative 79 (H) 43 - 75 %    Immat GRANS % 1 0 - 2 %    Lymphocytes Relative 5 (L) 14 - 44 %    Monocytes Relative 12 4 - 12 %    Eosinophils Relative 3 0 - 6 %    Basophils Relative 0 0 - 1 %    Neutrophils Absolute 4 51 1 85 - 7 62 Thousands/µL    Immature Grans Absolute 0 04 0 00 - 0 20 Thousand/uL    Lymphocytes Absolute 0 31 (L) 0 60 - 4 47 Thousands/µL    Monocytes Absolute 0 69 0 17 - 1 22 Thousand/µL    Eosinophils Absolute 0 16 0 00 - 0 61 Thousand/µL    Basophils Absolute 0 02 0 00 - 0 10 Thousands/µL   Comprehensive metabolic panel   Result Value Ref Range    Sodium 136 136 - 145 mmol/L    Potassium 4 5 3 5 - 5 3 mmol/L    Chloride 103 100 - 108 mmol/L    CO2 26 21 - 32 mmol/L    ANION GAP 7 4 - 13 mmol/L    BUN 16 5 - 25 mg/dL    Creatinine 1 17 0 60 - 1 30 mg/dL    Glucose 153 (H) 65 - 140 mg/dL    Calcium 9 5 8 3 - 10 1 mg/dL    AST 25 5 - 45 U/L    ALT 21 12 - 78 U/L    Alkaline Phosphatase 90 46 - 116 U/L    Total Protein 7 4 6 4 - 8 2 g/dL    Albumin 3 1 (L) 3 5 - 5 0 g/dL    Total Bilirubin 0 70 0 20 - 1 00 mg/dL    eGFR 60 ml/min/1 73sq m   TSH baseline   Result Value Ref Range    TSH Baseline 1 910 uIU/mL     Labs, Imaging, & Other studies:   All pertinent labs and imaging studies were personally reviewed    Lab Results   Component Value Date     10/08/2018    K 4 5 10/08/2018     10/08/2018    CO2 26 10/08/2018    BUN 16 10/08/2018    CREATININE 1 17 10/08/2018    GLUF 175 (H) 09/17/2018    CALCIUM 9 5 10/08/2018    AST 25 10/08/2018    ALT 21 10/08/2018    ALKPHOS 90 10/08/2018    EGFR 60 10/08/2018     Lab Results   Component Value Date    WBC 5 73 10/08/2018    HGB 9 1 (L) 10/08/2018    HCT 29 2 (L) 10/08/2018    MCV 99 (H) 10/08/2018     10/08/2018       Reviewed and discussed with patient  Assessment and plan:  Patient is here with his wife  He is on maintenance Alimta once every 3 weeks for 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  With Alimta he also gets   T94, folic acid and decadron  Sarah Skipper He also gets Xgeva with calcium and vitamin-D  Because of steroids he is feeling better today     Today he is not  complaining of chest pains at the back of his left side of chest that comes and goes  Also less chronic low back pain  He is breathing better today  He  has history of interstitial lung disease  He has nasal oxygen but he does not use that all the time  Prednisone dose is down to 5 mg a day  He gets extra dose of prednisone and also intravenous Decadron  with Alimta    Recently he was found to have hypo thyroidism and TSH was 28  He is taking Synthroid 125 mcg daily and now his TSH is normal     He complains of dryness of the mouth  He has shortness of breath with minimal exertion  He has nonproductive upper respiratory cough with some hacking  He has generalized weakness and tiredness  He has arthritic symptoms  Follow-up visit for 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 diagnosed in January 2018   Status post 4 cycles of carboplatin plus Alimta plus Keytruda and 5th cycle without carboplatin    Patient responded to systemic chemo/immunotherapy   He was on  Alimta plus Keytruda maintenance systemic therapy    Dory Homme because of pneumonitis secondary to [facility] as above    Lymph nodes not enlarged in the mediastinum anymore   Bone lesions stable    Patient had received radiation to thoracic spine for painful bony lesion    He has been getting  Xgeva once every 3 months   PD L1 level was 0   She  had hematological toxicities and received   blood transfusions  He has been using oxygen with exertion  The diagnosis was made by the biopsy  from the left iliac crest  that identified metastatic adenocarcinoma  He had chemotherapy today  He will receive 10 mg Decadron intravenously  Tonight and tomorrow morning he will have 20 mg prednisone dose and then he will go back to 5 mg prednisone daily  For subsequent cycles he will have prednisone 10 mg twice a day, the day before, the day of and day after chemotherapy and in between he will stay on prednisone 5 mg daily  He will take prednisone with food  Patient's wife wrote down those instructions       Physical examination and test results are as recorded and discussed  He will have follow-up CT scan and also bone scan because of symptoms       He is being continued on Alimta and Xgeva   All discussed in detail   Questions answered  Patient follows with  palliative care for dryness of the mouth    Also discussed the importance of eating healthy foods and staying active with limitations because of dyspnea     To be evaluated  Patient is self-care  Goal is prolongation of survival   All discussed with patient and his wife in detail     Questions answered    1  Adenocarcinoma of left lung ([facility] )    - NM bone scan whole body; Future  - CT chest high resolution; Future    2  Interstitial lung disease ([facility] )    - CT chest high resolution; Future    3  Primary adenocarcinoma of left lung ([facility] )    - CT chest high resolution; Future    4  Xerostomia      5  Other specified hypothyroidism      6  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 ([facility] )    - NM bone scan whole body; Future        Patient voiced understanding and agreement in the discussion  Counseling / Coordination of Care   Greater than 50% of total time was spent with the patient and / or family counseling and / or coordination of care

## 2018-10-09 NOTE — LETTER
October 9, 2018     Yadira Flores MD  [address] [facility]  [address]  [address] [facility] [address] [facility]    Patient: Von Deleon   YOB: 1940   Date of Visit: 10/9/2018       Dear Dr Nelson Garcia: Thank you for referring Von Deleon to me for evaluation  Below are my notes for this consultation  If you have questions, please do not hesitate to call me  I look forward to following your patient along with you  Sincerely,        Sean Kuhn MD        CC: No Recipients  Sean Kuhn MD  10/9/2018 10:49 PM  Sign at close encounter    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  With Alimta he also gets   Q19, folic acid and decadron     Because of steroids he is feeling better today     Today he is not  complaining of chest pains at the back of his left side of chest that comes and goes  Also less chronic low back pain  He is breathing better today  He  has history of interstitial lung disease  He has nasal oxygen but he does not use that all the time  Prednisone dose is down to 5 mg a day  He gets extra dose of prednisone and also intravenous Decadron  with Alimta  Recently  he was found to have hypo thyroidism and TSH was 28  He is taking Synthroid 125 mcg daily and now his TSH is normal     He complains of dryness of the mouth  He has shortness of breath with minimal exertion  He has nonproductive upper respiratory cough with some hacking  He has generalized weakness and tiredness  He has arthritic symptoms  Follow-up visit for 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 diagnosed in January 2018   Status post 4 cycles of carboplatin plus Alimta plus Keytruda and 5th cycle without carboplatin    Patient responded to systemic chemo/immunotherapy   He was on  Alimta plus Keytruda maintenance systemic therapy  Jairon Jaime    Recent  admission because of pneumonitis secondary to [facility] as above    Lymph nodes not enlarged in the mediastinum anymore   Bone lesions stable    Patient had received radiation to thoracic spine for painful bony lesion    He has been getting  Xgeva once every 3 months   PD L1 level was 0   She  had hematological toxicities and received   blood transfusions  He has been using oxygen with exertion  The diagnosis was made by the biopsy  from the left iliac crest  that identified metastatic adenocarcinoma  He  had chemotherapy today  He will receive 10 mg Decadron intravenously  Tonight and tomorrow morning he will have 20 mg prednisone dose and then he will go back to 5 mg prednisone daily  For subsequent cycles he will have prednisone 10 mg twice a day, the day before, the day of and day after chemotherapy and in between he will stay on prednisone 5 mg daily  He will take prednisone with food    Patient's wife wrote down those instructions                   Current Outpatient Prescriptions:     Artificial Saliva (BIOTENE MOISTURIZING MOUTH MT), Apply to the mouth or throat, Disp: , Rfl:     dexamethasone (DECADRON) 4 mg tablet, Take 4 mg by mouth 2 (two) times a day with meals, Disp: , Rfl:     folic acid (FOLVITE) 1 mg tablet, TAKE 1 TABLET (1 MG TOTAL) BY MOUTH DAILY, Disp: 90 tablet, Rfl: 0    levothyroxine 125 mcg tablet, Take 1 tablet (125 mcg total) by mouth daily, Disp: 90 tablet, Rfl: 1    pilocarpine (SALAGEN) 5 mg tablet, Take 1 tablet (5 mg total) by mouth 3 (three) times a day, Disp: 90 tablet, Rfl: 0    senna 8 8 mg/5 mL syrup, Take by mouth daily at bedtime, Disp: , Rfl:     finasteride (PROSCAR) 5 mg tablet, Take 1 tablet (5 mg total) by mouth daily, Disp: 30 tablet, Rfl: 0    tamsulosin (FLOMAX) 0 4 mg, Take 1 capsule (0 4 mg total) by mouth daily with dinner (Patient not taking: Reported on 10/9/2018 ), Disp: 90 capsule, Rfl: 1  No current facility-administered medications for this visit  Facility-Administered Medications Ordered in Other Visits:     alteplase (CATHFLO) injection 2 mg, 2 mg, Intracatheter, PRN, Kathryn Ruckerlon, PA-C    heparin lock flush 100 units/mL injection 300 Units, 300 Units, Intracatheter, PRN, Jonathon Jesus, PA-C, 300 Units at 10/09/18 1400    sodium chloride 0 9 % infusion, 20 mL/hr, Intravenous, Continuous, Kathryn Ruckerlon, PA-C, Stopped at 10/09/18 1400    No Known Allergies    Oncology History    Follow-up visit for 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 diagnosed in January 2018  Status post 4 cycles of carboplatin plus Alimta plus Keytruda and 5th cycle without carboplatin  Cycle 6 will be tomorrow  He has received palliative radiation to thoracic spine  He is responding  Lung mass is smaller  Lymph nodes are not enlarged in the mediastinum anymore  Bone lesion is stable  Also he gets Xgeva once every 3 months  PD L1 level was 0  She gets hematological toxicities and has required  blood transfusions  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 [facility])     Initial Diagnosis     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 [facility])        Chemotherapy       February 2018 - carboplatin plus Alimta plus Keytruda x 4 cycles and after that Alimta plus Lakhani Scrape is continuing  Radiation       March or April 2018 - Radiation to thoracic spine  Cancer of lower lobe of left lung ([address] ) (Resolved)    1/15/2018 Initial Diagnosis     Cancer of lower lobe of left lung ([address] )    A  Bone, Left iliac crest, biopsy:  - Metastatic adenocarcinoma  1/31/2018 -  Chemotherapy     Carboplatin AUC 5, Alimta 500 mg/m2, Keytruda 200 mg IV every 3 weeks  Cycle #1 1/31/2018     Xgeva 120 mg subcu every 6 weeks           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     6/18/2018 Initial Diagnosis     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         Chemotherapy February 2018:  Carboplatin plus Alimta plus Keytruda for 4-5 cycles and after that Alimta plus Keytruda  until June 2018  Christen Baxter stopped because of pneumonitis, treated with prednisone and dose is being tapered  Patient will be getting maintenance Alimta alone once every 3 weeks starting in July 2018  ROS:  10/09/18 Reviewed 13 systems:  Presently no headaches, seizures, dizziness, diplopia, dysphagia, hoarseness,  palpitations,  hemoptysis, abdominal pain, nausea, vomiting, change in bowel habits, melena, hematuria, fever, chills, bleeding, bone pains, skin rash, weight loss, numbness,  claudication and gait problem  No frequent infections  Not unusually sensitive to heat or cold  No swelling of the ankles  No swollen glands  Patient is anxious  Other symptoms are in HPI        /72 (BP Location: Left arm, Patient Position: Sitting, Cuff Size: Adult)   Pulse 83   Temp 97 8 °F (36 6 °C)   Resp 20   Ht 5' 6" (1 676 m)   Wt 78 5 kg (173 lb)   SpO2 97%   BMI 27 92 kg/m²      Physical Exam:  Alert, oriented, not in distress, no icterus, no oral thrush, no palpable neck mass, clear lung fields, regular heart rate, abdomen  soft and non tender, no palpable abdominal mass, no ascites, no edema of ankles, no calf tenderness, no focal neurological deficit, no skin rash, no palpable lymphadenopathy, good arterial pulses, no clubbing  Patient is anxious  Performance status 2      IMAGING:      LABS:  Results for orders placed or performed in visit on 10/05/18   CBC and differential   Result Value Ref Range    WBC 5 73 4 31 - 10 16 Thousand/uL    RBC 2 94 (L) 3 88 - 5 62 Million/uL    Hemoglobin 9 1 (L) 12 0 - 17 0 g/dL    Hematocrit 29 2 (L) 36 5 - 49 3 %    MCV 99 (H) 82 - 98 fL    MCH 31 0 26 8 - 34 3 pg    MCHC 31 2 (L) 31 4 - 37 4 g/dL    RDW 16 7 (H) 11 6 - 15 1 %    MPV 9 6 8 9 - 12 7 fL    Platelets 797 412 - 757 Thousands/uL    nRBC 0 /100 WBCs    Neutrophils Relative 79 (H) 43 - 75 % Immat GRANS % 1 0 - 2 %    Lymphocytes Relative 5 (L) 14 - 44 %    Monocytes Relative 12 4 - 12 %    Eosinophils Relative 3 0 - 6 %    Basophils Relative 0 0 - 1 %    Neutrophils Absolute 4 51 1 85 - 7 62 Thousands/µL    Immature Grans Absolute 0 04 0 00 - 0 20 Thousand/uL    Lymphocytes Absolute 0 31 (L) 0 60 - 4 47 Thousands/µL    Monocytes Absolute 0 69 0 17 - 1 22 Thousand/µL    Eosinophils Absolute 0 16 0 00 - 0 61 Thousand/µL    Basophils Absolute 0 02 0 00 - 0 10 Thousands/µL   Comprehensive metabolic panel   Result Value Ref Range    Sodium 136 136 - 145 mmol/L    Potassium 4 5 3 5 - 5 3 mmol/L    Chloride 103 100 - 108 mmol/L    CO2 26 21 - 32 mmol/L    ANION GAP 7 4 - 13 mmol/L    BUN 16 5 - 25 mg/dL    Creatinine 1 17 0 60 - 1 30 mg/dL    Glucose 153 (H) 65 - 140 mg/dL    Calcium 9 5 8 3 - 10 1 mg/dL    AST 25 5 - 45 U/L    ALT 21 12 - 78 U/L    Alkaline Phosphatase 90 46 - 116 U/L    Total Protein 7 4 6 4 - 8 2 g/dL    Albumin 3 1 (L) 3 5 - 5 0 g/dL    Total Bilirubin 0 70 0 20 - 1 00 mg/dL    eGFR 60 ml/min/1 73sq m   TSH baseline   Result Value Ref Range    TSH Baseline 1 910 uIU/mL     Labs, Imaging, & Other studies:   All pertinent labs and imaging studies were personally reviewed    Lab Results   Component Value Date     10/08/2018    K 4 5 10/08/2018     10/08/2018    CO2 26 10/08/2018    BUN 16 10/08/2018    CREATININE 1 17 10/08/2018    GLUF 175 (H) 09/17/2018    CALCIUM 9 5 10/08/2018    AST 25 10/08/2018    ALT 21 10/08/2018    ALKPHOS 90 10/08/2018    EGFR 60 10/08/2018     Lab Results   Component Value Date    WBC 5 73 10/08/2018    HGB 9 1 (L) 10/08/2018    HCT 29 2 (L) 10/08/2018    MCV 99 (H) 10/08/2018     10/08/2018       Reviewed and discussed with patient  Assessment and plan:  Patient is here with his wife  He is on maintenance Alimta once every 3 weeks for 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   With Alimta he also gets   B12, folic acid and decadron  Tom Ingles He also gets Xgeva with calcium and vitamin-D  Because of steroids he is feeling better today     Today he is not  complaining of chest pains at the back of his left side of chest that comes and goes  Also less chronic low back pain  He is breathing better today  He  has history of interstitial lung disease  He has nasal oxygen but he does not use that all the time  Prednisone dose is down to 5 mg a day  He gets extra dose of prednisone and also intravenous Decadron  with Alimta  Recently  he was found to have hypo thyroidism and TSH was 28  He is taking Synthroid 125 mcg daily and now his TSH is normal     He complains of dryness of the mouth  He has shortness of breath with minimal exertion  He has nonproductive upper respiratory cough with some hacking  He has generalized weakness and tiredness  He has arthritic symptoms  Follow-up visit for 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 diagnosed in January 2018   Status post 4 cycles of carboplatin plus Alimta plus Keytruda and 5th cycle without carboplatin    Patient responded to systemic chemo/immunotherapy   He was on  Alimta plus Keytruda maintenance systemic therapy    Alexis Rouse because of pneumonitis secondary to [facility] as above    Lymph nodes not enlarged in the mediastinum anymore   Bone lesions stable    Patient had received radiation to thoracic spine for painful bony lesion    He has been getting  Xgeva once every 3 months   PD L1 level was 0   She  had hematological toxicities and received   blood transfusions  He has been using oxygen with exertion  The diagnosis was made by the biopsy  from the left iliac crest  that identified metastatic adenocarcinoma  He  had chemotherapy today  He will receive 10 mg Decadron intravenously  Tonight and tomorrow morning he will have 20 mg prednisone dose and then he will go back to 5 mg prednisone daily    For subsequent cycles he will have prednisone 10 mg twice a day, the day before, the day of and day after chemotherapy and in between he will stay on prednisone 5 mg daily  He will take prednisone with food  Patient's wife wrote down those instructions       Physical examination and test results are as recorded and discussed  He will have follow-up CT scan and also bone scan because of symptoms       He is being continued on Alimta and Xgeva   All discussed in detail   Questions answered  Patient  follows with  palliative care for dryness of the mouth  Also discussed the importance of eating healthy foods and staying active with limitations because of dyspnea     To be evaluated  Patient is self-care  Goal is prolongation of survival   All discussed with patient and his wife in detail     Questions answered    1  Adenocarcinoma of left lung ([facility] [address] )    - NM bone scan whole body; Future  - CT chest high resolution; Future    2  [facility] )    - CT chest high resolution; Future    3  Primary adenocarcinoma of left lung ([facility] [address] )    - CT chest high resolution; Future    4  Xerostomia      5  Other specified hypothyroidism      6  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 [facility] )    - NM bone scan whole body; Future        Patient voiced understanding and agreement in the discussion  Counseling / Coordination of Care   Greater than 50% of total time was spent with the patient and / or family counseling and / or coordination of care

## 2018-10-09 NOTE — PLAN OF CARE
Problem: Potential for Falls  Goal: Patient will remain free of falls  INTERVENTIONS:  - Assess patient frequently for physical needs  -  Identify cognitive and physical deficits and behaviors that affect risk of falls    -  [place] fall precautions as indicated by assessment   - Educate patient/family on patient safety including physical limitations  - Instruct patient to call for assistance with activity based on assessment  - Modify environment to reduce risk of injury  - Consider OT/PT consult to assist with strengthening/mobility   Outcome: Progressing

## 2018-10-14 ENCOUNTER — HOSPITAL ENCOUNTER (OUTPATIENT)
Dept: CT IMAGING | Facility: HOSPITAL | Age: 78
Discharge: HOME | End: 2018-10-14
Attending: INTERNAL MEDICINE
Payer: COMMERCIAL

## 2018-10-14 DIAGNOSIS — J84.9 INTERSTITIAL LUNG DISEASE (HCC): ICD-10-CM

## 2018-10-14 DIAGNOSIS — C34.92 ADENOCARCINOMA OF LEFT LUNG (HCC): ICD-10-CM

## 2018-10-14 DIAGNOSIS — C34.92 PRIMARY ADENOCARCINOMA OF LEFT LUNG (HCC): ICD-10-CM

## 2018-10-14 PROCEDURE — 71250 CT THORAX DX C-: CPT

## 2018-10-16 ENCOUNTER — HOSPITAL ENCOUNTER (OUTPATIENT)
Dept: NUCLEAR MEDICINE | Facility: HOSPITAL | Age: 78
Discharge: HOME | End: 2018-10-16
Attending: INTERNAL MEDICINE
Payer: COMMERCIAL

## 2018-10-16 DIAGNOSIS — C79.51 METASTATIC ADENOCARCINOMA TO BONE (HCC): Chronic | ICD-10-CM

## 2018-10-16 DIAGNOSIS — C34.92 ADENOCARCINOMA OF LEFT LUNG (HCC): ICD-10-CM

## 2018-10-16 PROCEDURE — A9503 TC99M MEDRONATE: HCPCS

## 2018-10-16 PROCEDURE — 78306 BONE IMAGING WHOLE BODY: CPT

## 2018-10-23 ENCOUNTER — TELEPHONE (OUTPATIENT)
Dept: HEMATOLOGY ONCOLOGY | Facility: CLINIC | Age: 78
End: 2018-10-23

## 2018-10-23 NOTE — TELEPHONE ENCOUNTER
Would you please call the patient back and schedule a sooner appointment with Dr Franklin Frias? Dr Franklin Frias will discuss treatment plans at that time

## 2018-10-23 NOTE — TELEPHONE ENCOUNTER
Call from Pt's wife Rea Ramírez for results of CT chest and bone scan  Reviewed results  Pt does have increased bone dises in spine and c/o increased back pain  States called yesterday and  was expecting a return call  No documentation of a call  Hoping to speak with [facility] RN or Dr Lacy Khan   Please call

## 2018-10-23 NOTE — TELEPHONE ENCOUNTER
Patient's wife has some more questions in regards to treatment: if chemo is going to be changed  Please call back and advise  Thank you

## 2018-10-23 NOTE — TELEPHONE ENCOUNTER
Called and spoke with the patient's wife in regards to the patient's recent NM bone scan and Chest CT results  Patient's 10/16/18 NM bone scan showed bony metastases at T3, T7, T10 and the left iliac bone  The T3, T7 & T10 have increased activity since the previous scan  Patient's 10/14/18 Chest CT scan showed diffuse interstitial lung disease and stable lung nodules  Patient's wife is concerned that the patient has had increasingly fatigue since starting Marinol  Patient was prescribed the Marinol by his PCP to help alleviate his cancer pain  Patient is also still experiencing a metallic taste in his mouth-Pat was questioning if the Alimta would be causing this  She is concerned that the Alimta is failing because the bony metastases appears to be worsening  Encouraged Pat to reach out to palliative care in regards to the patient's pain medications and appetite stimulants  Pat did not want to schedule the patient for a sooner appointment with Dr Marylene Bottom to discuss the scans in greater detail  Patient will keep his next office visit with Dr Marylene Bottom on 11/6/18

## 2018-10-29 ENCOUNTER — APPOINTMENT (OUTPATIENT)
Dept: LAB | Facility: MEDICAL CENTER | Age: 78
End: 2018-10-29
Payer: COMMERCIAL

## 2018-10-29 ENCOUNTER — TELEPHONE (OUTPATIENT)
Dept: PULMONOLOGY | Facility: CLINIC | Age: 78
End: 2018-10-29

## 2018-10-29 DIAGNOSIS — C34.32 CANCER OF LOWER LOBE OF LEFT LUNG (HCC): ICD-10-CM

## 2018-10-29 RX ORDER — SODIUM CHLORIDE 9 MG/ML
20 INJECTION, SOLUTION INTRAVENOUS CONTINUOUS
Status: DISCONTINUED | OUTPATIENT
Start: 2018-10-30 | End: 2018-11-02 | Stop reason: HOSPADM

## 2018-10-29 RX ORDER — CYANOCOBALAMIN 1000 UG/ML
1000 INJECTION INTRAMUSCULAR; SUBCUTANEOUS ONCE
Status: COMPLETED | OUTPATIENT
Start: 2018-10-30 | End: 2018-10-30

## 2018-10-29 RX ORDER — FOLIC ACID 1 MG/1
1 TABLET ORAL DAILY
Qty: 90 TABLET | Refills: 0 | Status: SHIPPED | OUTPATIENT
Start: 2018-10-29

## 2018-10-29 NOTE — TELEPHONE ENCOUNTER
Patient wife called requesting Dr Raymond Callaway to take a look at his recent CT and bone scan to ensure everything is normal from a pulmonary side   Please advise

## 2018-10-30 ENCOUNTER — TELEPHONE (OUTPATIENT)
Dept: PULMONOLOGY | Facility: CLINIC | Age: 78
End: 2018-10-30

## 2018-10-30 ENCOUNTER — TELEPHONE (OUTPATIENT)
Dept: GASTROENTEROLOGY | Facility: AMBULARY SURGERY CENTER | Age: 78
End: 2018-10-30

## 2018-10-30 ENCOUNTER — HOSPITAL ENCOUNTER (OUTPATIENT)
Dept: INFUSION CENTER | Facility: CLINIC | Age: 78
Discharge: HOME | End: 2018-10-30
Payer: COMMERCIAL

## 2018-10-30 VITALS
WEIGHT: 168.5 LBS | SYSTOLIC BLOOD PRESSURE: 122 MMHG | DIASTOLIC BLOOD PRESSURE: 80 MMHG | OXYGEN SATURATION: 97 % | RESPIRATION RATE: 22 BRPM | BODY MASS INDEX: 27.08 KG/M2 | HEIGHT: 66 IN | HEART RATE: 83 BPM | TEMPERATURE: 97.7 F

## 2018-10-30 PROCEDURE — 96372 THER/PROPH/DIAG INJ SC/IM: CPT

## 2018-10-30 PROCEDURE — 96367 TX/PROPH/DG ADDL SEQ IV INF: CPT

## 2018-10-30 PROCEDURE — 96409 CHEMO IV PUSH SNGL DRUG: CPT

## 2018-10-30 RX ADMIN — HEPARIN 300 UNITS: 100 SYRINGE at 11:20

## 2018-10-30 RX ADMIN — CYANOCOBALAMIN 1000 MCG: 1000 INJECTION, SOLUTION INTRAMUSCULAR at 11:24

## 2018-10-30 RX ADMIN — SODIUM CHLORIDE 20 ML/HR: 0.9 INJECTION, SOLUTION INTRAVENOUS at 10:25

## 2018-10-30 RX ADMIN — DEXAMETHASONE SODIUM PHOSPHATE: 10 INJECTION, SOLUTION INTRAMUSCULAR; INTRAVENOUS at 10:34

## 2018-10-30 RX ADMIN — SODIUM CHLORIDE 900 MG: 9 INJECTION, SOLUTION INTRAVENOUS at 11:06

## 2018-10-30 NOTE — TELEPHONE ENCOUNTER
CINDY  I spoke to patient's wife, who reports patient is receiving chemo for lung cancer and is loosing weight as he has persistent  decreased appetite and no taste for food  I saw in his chart that he is being followed for palliative care for this problem and they made a recommendation last month  She said he has f/u appointment with palliative tomorrow  She also questioned if an EGD can be done and "scrape off scar tissue" to improve symptoms  I advised if scar tissue is causing a narrowing making swallowing difficult, Dr Mary Link can assess to determine if EGD can be effective  I requested she call back tomorrow based on palliative care assessment  Patient's wife is aware that f/u with GI was to be scheduled, however she said he was hospitalized

## 2018-10-30 NOTE — TELEPHONE ENCOUNTER
Broad return patient's:  Spoke to wife the patient's current condition  She reports he is not doing well  She reports he is complaining of pain in his back  He has difficulty swallowing, dry mouth and a lost appetite for food  He also reports he is significantly short of breath with walking down the hallway  I reviewed his CT scan from October 16th and there are subpleural fibrotic changes which were progressed since 2015 better similar to what they were in August of 2018  There is no clear ground-glass or indication for steroids  We discussed the possibility of starting anti fibrotic agents but the patient is wife were not interested at this time  I recommended a follow-up with GI in palliative care about his swallowing dysfunction as this seemed to be his primary complaint

## 2018-10-30 NOTE — TELEPHONE ENCOUNTER
Wife calling again saying no one got back to them about Francis's results  She said "this is rude to wait a day and a half"

## 2018-10-30 NOTE — TELEPHONE ENCOUNTER
DR Tod Sepulveda PT    Pt spouse called with concerns  Pt is rapidly losing weight, experiencing trouble swallowing and non existing taste buds  Pt was offered the next available appt but rather advise before   Please assist thank you

## 2018-10-30 NOTE — PROGRESS NOTES
Pt to clinic for alimta infusion and B 12 injection, pt offers no complaints at this time, will continue to monitor, aware of next appointment, declines avs

## 2018-10-31 ENCOUNTER — OFFICE VISIT (OUTPATIENT)
Dept: PALLIATIVE MEDICINE | Facility: CLINIC | Age: 78
End: 2018-10-31
Payer: COMMERCIAL

## 2018-10-31 VITALS
TEMPERATURE: 97.2 F | WEIGHT: 171 LBS | HEART RATE: 70 BPM | DIASTOLIC BLOOD PRESSURE: 70 MMHG | OXYGEN SATURATION: 97 % | HEIGHT: 66 IN | BODY MASS INDEX: 27.48 KG/M2 | RESPIRATION RATE: 20 BRPM | SYSTOLIC BLOOD PRESSURE: 108 MMHG

## 2018-10-31 DIAGNOSIS — R63.0 DECREASED APPETITE: ICD-10-CM

## 2018-10-31 DIAGNOSIS — R43.2 DYSGEUSIA: ICD-10-CM

## 2018-10-31 DIAGNOSIS — K11.7 XEROSTOMIA: ICD-10-CM

## 2018-10-31 DIAGNOSIS — R53.83 OTHER FATIGUE: ICD-10-CM

## 2018-10-31 DIAGNOSIS — C34.92 ADENOCARCINOMA OF LEFT LUNG (HCC): Primary | ICD-10-CM

## 2018-10-31 PROBLEM — R62.7 FAILURE TO THRIVE IN ADULT: Status: ACTIVE | Noted: 2018-03-19

## 2018-10-31 PROBLEM — K20.80 RADIATION-INDUCED ESOPHAGITIS: Status: ACTIVE | Noted: 2018-03-19

## 2018-10-31 PROBLEM — T66.XXXA RADIATION-INDUCED ESOPHAGITIS: Status: ACTIVE | Noted: 2018-03-19

## 2018-10-31 PROCEDURE — 99214 OFFICE O/P EST MOD 30 MIN: CPT | Performed by: FAMILY MEDICINE

## 2018-10-31 RX ORDER — OXYBUTYNIN CHLORIDE 10 MG/1
10 TABLET, EXTENDED RELEASE ORAL
COMMUNITY
End: 2018-12-18 | Stop reason: SDUPTHER

## 2018-10-31 RX ORDER — DEXAMETHASONE 2 MG/1
2 TABLET ORAL
Qty: 30 TABLET | Refills: 1 | Status: SHIPPED | OUTPATIENT
Start: 2018-10-31 | End: 2018-10-31 | Stop reason: SDUPTHER

## 2018-10-31 RX ORDER — DEXAMETHASONE 2 MG/1
2 TABLET ORAL
Qty: 30 TABLET | Refills: 0 | Status: SHIPPED | OUTPATIENT
Start: 2018-10-31 | End: 2018-12-04

## 2018-10-31 RX ORDER — FLUCONAZOLE 100 MG/1
TABLET ORAL
Qty: 15 TABLET | Refills: 0 | Status: SHIPPED | OUTPATIENT
Start: 2018-10-31 | End: 2018-11-12 | Stop reason: SDUPTHER

## 2018-10-31 NOTE — PROGRESS NOTES
Palliative and Supportive Care   Dawood Holley 69 y o  male [id number]    Assessment/Plan:  1  Adenocarcinoma of left lung ([address] )    2  Xerostomia    3  Dysgeusia    4  Decreased appetite    5  Other fatigue      Requested Prescriptions     Signed Prescriptions Disp Refills    fluconazole (DIFLUCAN) 100 mg tablet 15 tablet 0     Si tabs PO x1 day then 1 tab daily for 13 days    dexamethasone (DECADRON) 2 mg tablet 30 tablet 0     Sig: Take 1 tablet (2 mg total) by mouth daily with breakfast     Medications Discontinued During This Encounter   Medication Reason    pilocarpine (SALAGEN) 5 mg tablet      His dry mouth and dysgeusia is severe and has been refractory to all behavioral and pharmacologic interventions  He does not want to return to GI or ENT for more testing  He is at risk for esophageal thrush  I will give him an empiric trial of fluconazole  If this measure does not improve this condition, our options for symptom management of xerostomia are limited  We have exhausted suggestions for oral lubrication and medications  I have again suggested that he may want to try acupuncture  We have better options to help him with appetite stimulation  So far marinol did not help but he has inquired about medical marijuana  We used today's visit to go through the informed consent and they were given the [facility] brochure with registration instructions  He has also noted that on the days when he takes the dexamethasone around chemotherapy he eats better  If this is the case, I have recommended that he consider taking dexamethasone 2mg daily  However, he will not explore this until we empirically treat for thrush  Representatives have queried the patient's controlled substance dispensing history in the Prescription Drug Monitoring Program in compliance with regulations before I have prescribed any controlled substances    The prescription history is consistent with prescribed therapy and our practice policies  30 minutes were spent face to face with Nataliya Cartagena and his wife with greater than 50% of the time spent in counseling or coordination of care including discussions of treatment instructions and follow up requirements   All of the patient's questions were answered during this discussion  Return if symptoms worsen or fail to improve  Subjective:   Chief Complaint  Follow up visit for:  symptom management  HPI     Nataliya Cartagena is a 69 y o  male with stage IV adenocarcinoma of the lung  He is presently he is on maintenance Alimta under the direction of his medical oncologist Dr Milena Buckley  He has been on immunotherapy and received palliative radiation to his thoracic spine  He sees Dr Janeth Mi in pulmonology for underlying lung conditions including pulmonary fibrosis chronic hypoxia with oxygen dependence  At his last visit to palliative care his main complaint was xerostomia  NOTHING has worked  They are so frustrated  He has tried pilocarpine  He has tried cevimeline  He has to drink water every 5 minutes  He has tried every biotine product  He has tried sprays  He has no appetite and nothing tastes good especially because his mouth is so dry  He was given marinol  It did not help  He is upset because he felt better before he did any cancer treatments  The following portions of the medical history were reviewed: past medical history, problem list, medication list, and social history      Current Outpatient Prescriptions:     dexamethasone (DECADRON) 4 mg tablet, Take 4 mg by mouth 2 (two) times a day with meals, Disp: , Rfl:     finasteride (PROSCAR) 5 mg tablet, Take 1 tablet (5 mg total) by mouth daily, Disp: 30 tablet, Rfl: 0    folic acid (FOLVITE) 1 mg tablet, TAKE 1 TABLET (1 MG TOTAL) BY MOUTH DAILY, Disp: 90 tablet, Rfl: 0    levothyroxine 125 mcg tablet, Take 1 tablet (125 mcg total) by mouth daily, Disp: 90 tablet, Rfl: 1    oxybutynin (DITROPAN XL) 10 MG 24 hr tablet, Take 10 mg by mouth daily at bedtime, Disp: , Rfl:     senna 8 8 mg/5 mL syrup, Take by mouth daily at bedtime, Disp: , Rfl:     tamsulosin (FLOMAX) 0 4 mg, Take 1 capsule (0 4 mg total) by mouth daily with dinner, Disp: 90 capsule, Rfl: 1    Artificial Saliva (BIOTENE MOISTURIZING MOUTH MT), Apply to the mouth or throat, Disp: , Rfl:     dexamethasone (DECADRON) 2 mg tablet, Take 1 tablet (2 mg total) by mouth daily with breakfast, Disp: 30 tablet, Rfl: 0    fluconazole (DIFLUCAN) 100 mg tablet, 2 tabs PO x1 day then 1 tab daily for 13 days, Disp: 15 tablet, Rfl: 0  No current facility-administered medications for this visit  Facility-Administered Medications Ordered in Other Visits:     alteplase (CATHFLO) injection 2 mg, 2 mg, Intracatheter, Once PRN, Cristina Andujar MD    sodium chloride 0 9 % infusion, 20 mL/hr, Intravenous, Continuous, Cristina Andujar MD, Stopped at 10/30/18 1120  Review of Systems   Constitutional: Positive for activity change, appetite change, fatigue and unexpected weight change  HENT: Positive for trouble swallowing  Neurological: Positive for weakness  All other systems reviewed and are negative  All other systems negative    Objective:  Vital Signs  /70 (BP Location: Left arm, Patient Position: Sitting, Cuff Size: Standard)   Pulse 70   Temp (!) 97 2 °F (36 2 °C) (Tympanic)   Resp 20   Ht 5' 6" (1 676 m)   Wt 77 6 kg (171 lb)   SpO2 97%   BMI 27 60 kg/m²    Physical Exam    Constitutional: Appears well-developed and well-nourished  In no acute distress  Head: Normocephalic and atraumatic  Eyes: EOM are normal  Right eye exhibits no discharge  Left eye exhibits no discharge  No scleral icterus  Mouth: mucus membranes are dry  There was 1 small white plaque seen in his posterior oropharynx but it seemed to be transient on re-examination  Pulmonary/Chest: Effort normal  No stridor  No respiratory distress  Abdominal: No distension  Musculoskeletal: No edema  Neurological: Alert, oriented and appropriately conversant  Skin: Skin is dry, not diaphoretic  Psychiatric: Displays a normal mood and affect  Behavior, judgement and thought content appear normal    Vitals reviewed

## 2018-11-06 ENCOUNTER — OFFICE VISIT (OUTPATIENT)
Dept: HEMATOLOGY ONCOLOGY | Facility: CLINIC | Age: 78
End: 2018-11-06
Payer: COMMERCIAL

## 2018-11-06 VITALS
HEART RATE: 113 BPM | SYSTOLIC BLOOD PRESSURE: 120 MMHG | OXYGEN SATURATION: 93 % | DIASTOLIC BLOOD PRESSURE: 80 MMHG | RESPIRATION RATE: 18 BRPM | TEMPERATURE: 98 F

## 2018-11-06 DIAGNOSIS — C79.51 METASTATIC ADENOCARCINOMA TO BONE (HCC): Chronic | ICD-10-CM

## 2018-11-06 DIAGNOSIS — C34.92 PRIMARY ADENOCARCINOMA OF LEFT LUNG (HCC): ICD-10-CM

## 2018-11-06 DIAGNOSIS — C34.92 ADENOCARCINOMA OF LEFT LUNG (HCC): Primary | ICD-10-CM

## 2018-11-06 PROCEDURE — 99215 OFFICE O/P EST HI 40 MIN: CPT | Performed by: PHYSICIAN ASSISTANT

## 2018-11-06 NOTE — PROGRESS NOTES
Hematology/Oncology Outpatient Follow-up  Von Deleon 69 y o  male 1940 [id number]    Date:  11/6/2018      Assessment and Plan:  1  Adenocarcinoma of left lung ([facility] [address] ) 2  Primary adenocarcinoma of left lung (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)  67-year-old male presents for follow-up regarding stage IV lung cancer  Patient has been on maintenance therapy with Alimta 500 mg per meter squared every 3 weeks since July 2018  Had repeat CT scans which show stability/improvement of pulmonary nodules  No adenopathy is noted  There is still note of pulmonary fibrosis  Patient had a bone scan for evaluation of bone metastases  Explained that this is the 1st time that he had bone scan to assess his bony lesions  Previously these were assessed on CT imaging  This does not show worsening of disease despite showed them stating that there is increase in activity  Increase in activity refers to nuclear Medicine material uptake  This previously had not been given as he did have a bone scan  Bone metastases remain stable  Patient continues to have multiple side effects  He continues to have fatigue  He is sleeping well at night time  His fatigue is limiting him from doing daily activities  He also continues to have shortness of breath  However, he does not appear to be consistent with his oxygen therapy  Discussed that he needs to use the oxygen at all times  He has pain that is in multiple areas of his back and hip  He states that the pain comes and goes  He has some relief sometimes with over-the-counter analgesia  He does not like taking narcotics as it makes him feel groggy  Suggested continue to try over-the-counter  Also he could try half of a pill of narcotics that he has  Patient also has severe dry mouth that is requiring frequent hydration  This is subsequently affecting his eating  He does not have appetite    He has been following closely with palliative care regarding both of these  At this point he is on a trial of fluconazole for 2 weeks  He is proximally intermediate through this  If it does not help he will trial Decadron 2 mg daily  He also had discussed medical marijuana with palliative care  I suggested that patient's wife get the paperwork started for this in case patient would like to proceed in the future  Reviewed at length that patient's quality of life is very poor at this time  Due to stability of scans and minimal disease at present, suggested holding therapy to see if he has improvement of symptoms  Likely his symptoms are combination of subsequent side effects from other therapies as well as underlying condition and long-term side effects he is experiencing  Patient and his wife were agreeable to hold treatment for the next cycle  We will then see him prior to chemotherapy in December 2018  We also then could discuss other treatments  HPI:       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     1/15/2018 Initial Diagnosis     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          1/31/2018 - 6/6/2018 Chemotherapy      Carboplatin plus Alimta plus Maryfrances Palm every 6 weeks           3/26/2018 -  Radiation     Radiation to the lower thoracic, upper lumbar spine area            6/18/2018 Adverse Reaction     Patient developed pneumonitis secondary to [facility] was discontinued and patient was given steroids  7/17/2018 -  Chemotherapy     Alimta 500 mg/m2 every 3 weeks     Xgeva 120 mg SQ every 12 weeks           56-year-old male with past medical history significant for hypothyroidism, hypertension, BPH, spinal stenosis of thoracic and lumbar region  Humberto Cochran presented to the [facility] on 12/22  Humberto Cochran was having worsening shortness of breath in mid back pain   He was having difficulty walking   He saw his PCP also on 12/22/17 ordered a chest x-ray   This showed a suspected consolidation on the left upper lobe  Smoking history is significant for roughly 10 cigarettes per day for the past 50 years  Dr Ruslan Joe noted patient to have hemoglobin dropped from 14-10  He was referred to GI  He was placed on oral iron TID, then he was decreased to BID when he was in the hospital       He started having worsening pain of the back around Laureen       Also, he has a history of anemia  Roopa Marroquin had episode of 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 that was performed on 12/04/2017 with Dr Gertrudis Barnes      He had PET/CT on 01/11/2018   This showed left lower lobe lesion, 2 cm, SUV 20 9   Also, left perihilar, mediastinal, left subcarinal lymph node (1 6 x 1 2 cm) lymph nodes  No disease in the abdomen or pelvis   Multiple osseous metastases, lesion on transverse process of T3, T7, L1; lytic lesion on the left ilium; destructive lesion on the left bryson sacrum --with soft tissue component; lesion of right femoral neck       CT of the head on 1/14/18 was normal       He quit smoking (Dec 2017)     March 2018 patient was admitted to the hospital due to severe radiation esophagitis  Roopa Marroquin had completed   9/10 radiation treatments  to bone metastases   He follows with Dr Sunny Wilson    while inpatient, he had an EGD with gastroenterology which showed severe inflammation   There was no of white patches  In the esophagus on exam   Patient was started on nystatin swish and swallow   Also, Carafate   Also, Protonix 40 mg b i d     he was still having poor p o  Intake   He was given intravenous fluids at the infusion center on  3/28 and 3/30      Patient continued to feel fatigued and weak at the end of April  Therefore, carboplatin was discontinued  Patient was transition to [facility]       He had 1 dose of this on 06/06/2018  He then called with worsening shortness of breath  He was admitted to the hospital and diagnosed with pulmonary fibrosis, possible pneumonitis secondary to get treated  He then was transitioned to Alimta maintenance  Interval history: continues to feel very tired and has SOB    ROS: Review of Systems   Constitutional: Positive for activity change (decreased due to low energy and difficulty breathing ), appetite change (no appetite), fatigue and unexpected weight change (losing weight from not eating )  HENT:        Constant dry mouth      Respiratory: Positive for cough and shortness of breath  Cardiovascular: Negative for chest pain, palpitations and leg swelling  Gastrointestinal: Positive for constipation (managed with medication regimen)  Negative for abdominal pain, diarrhea, nausea and vomiting  Genitourinary: Negative for difficulty urinating and dysuria  Musculoskeletal: Positive for arthralgias and back pain (migratory )  Negative for gait problem  Neurological: Positive for weakness  Negative for dizziness, light-headedness and headaches  Past Medical History:   Diagnosis Date    Anemia     BPH (benign prostatic hyperplasia)     Cancer of lung ([address] )     Disease of thyroid gland     GAVE (gastric antral vascular ectasia)     Hypertension     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HCC)     Spinal stenosis     Tobacco abuse        Past Surgical History:   Procedure Laterality Date    ESOPHAGOGASTRODUODENOSCOPY N/A 3/20/2018    Procedure: ESOPHAGOGASTRODUODENOSCOPY (EGD); Surgeon: Elly Salvador MD;  Location: [facility];   Service: Gastroenterology       Social History     Social History    Marital status: /Civil Union     Spouse name: Maite Gonzales Number of children: 2    Years of education: N/A     Occupational History    retired       Social History Main Topics    Smoking status: Former Smoker     Packs/day: 0 50     Years: 15 00     Types: Cigarettes     Start date: 1968     Quit date: 1/11/2018    Smokeless tobacco: Never Used      Comment: Quit December 2017    Alcohol use No    Drug use: No    Sexual activity: Not Asked Other Topics Concern    None     Social History Narrative    None       Family History   Problem Relation Age of Onset    Esophageal cancer Mother     Diabetes Father     No Known Problems Sister     No Known Problems Brother        No Known Allergies      Current Outpatient Prescriptions:     Artificial Saliva (BIOTENE MOISTURIZING MOUTH MT), Apply to the mouth or throat, Disp: , Rfl:     dexamethasone (DECADRON) 2 mg tablet, Take 1 tablet (2 mg total) by mouth daily with breakfast, Disp: 30 tablet, Rfl: 0    dexamethasone (DECADRON) 4 mg tablet, Take 4 mg by mouth 2 (two) times a day with meals, Disp: , Rfl:     finasteride (PROSCAR) 5 mg tablet, Take 1 tablet (5 mg total) by mouth daily, Disp: 30 tablet, Rfl: 0    fluconazole (DIFLUCAN) 100 mg tablet, 2 tabs PO x1 day then 1 tab daily for 13 days, Disp: 15 tablet, Rfl: 0    folic acid (FOLVITE) 1 mg tablet, TAKE 1 TABLET (1 MG TOTAL) BY MOUTH DAILY, Disp: 90 tablet, Rfl: 0    levothyroxine 125 mcg tablet, Take 1 tablet (125 mcg total) by mouth daily, Disp: 90 tablet, Rfl: 1    oxybutynin (DITROPAN XL) 10 MG 24 hr tablet, Take 10 mg by mouth daily at bedtime, Disp: , Rfl:     senna 8 8 mg/5 mL syrup, Take by mouth daily at bedtime, Disp: , Rfl:     tamsulosin (FLOMAX) 0 4 mg, Take 1 capsule (0 4 mg total) by mouth daily with dinner, Disp: 90 capsule, Rfl: 1      Physical Exam:  /80 (BP Location: Left arm, Cuff Size: Standard)   Pulse (!) 113   Temp 98 °F (36 7 °C) (Oral)   Resp 18   SpO2 93%     Physical Exam   Constitutional: He is oriented to person, place, and time  No distress  Chronically ill   HENT:   Head: Normocephalic and atraumatic  Slight thrush on his tongue; not on buccal surfaces or hard  palate    Eyes: No scleral icterus  Neck: Normal range of motion  Neck supple  Cardiovascular: Normal rate, regular rhythm and normal heart sounds  No murmur heard    Pulmonary/Chest: Effort normal and breath sounds normal  No respiratory distress  He has no wheezes  Port a cath present    Abdominal: Soft  He exhibits no distension  Musculoskeletal: Normal range of motion  He exhibits no edema  Lymphadenopathy:     He has no cervical adenopathy  Neurological: He is alert and oriented to person, place, and time  No cranial nerve deficit  Skin: Skin is warm and dry  There is pallor  Psychiatric: He has a normal mood and affect  Labs:  Lab Results   Component Value Date    WBC 6 96 10/29/2018    HGB 9 8 (L) 10/29/2018    HCT 32 1 (L) 10/29/2018    MCV 98 10/29/2018     (H) 10/29/2018     Lab Results   Component Value Date    K 4 3 10/29/2018     10/29/2018    CO2 27 10/29/2018    BUN 19 10/29/2018    CREATININE 1 18 10/29/2018    GLUF 175 (H) 09/17/2018    CALCIUM 9 8 10/29/2018    AST 21 10/29/2018    ALT 21 10/29/2018    ALKPHOS 103 10/29/2018    EGFR 59 10/29/2018       Patient voiced understanding and agreement in the above discussion  Aware to contact our office with questions/symptoms in the interim

## 2018-11-12 ENCOUNTER — TELEPHONE (OUTPATIENT)
Dept: PALLIATIVE MEDICINE | Facility: CLINIC | Age: 78
End: 2018-11-12

## 2018-11-12 DIAGNOSIS — K11.7 XEROSTOMIA: ICD-10-CM

## 2018-11-12 DIAGNOSIS — R43.2 DYSGEUSIA: ICD-10-CM

## 2018-11-12 RX ORDER — FLUCONAZOLE 100 MG/1
TABLET ORAL
Qty: 15 TABLET | Refills: 0 | Status: SHIPPED | OUTPATIENT
Start: 2018-11-12 | End: 2018-11-26

## 2018-11-12 NOTE — TELEPHONE ENCOUNTER
Fluconazole is the stronger of the options that we have to treat potential esophageal fungal infections  I will send over a prescription for another 2 weeks but after that if he is still having trouble he should consider going to the GI doctor so they can use a scope to see down there  That way we will know if we are treating the right thing and if it is clearing up

## 2018-11-14 ENCOUNTER — TELEPHONE (OUTPATIENT)
Dept: PALLIATIVE MEDICINE | Facility: CLINIC | Age: 78
End: 2018-11-14

## 2018-11-15 ENCOUNTER — TELEPHONE (OUTPATIENT)
Dept: PALLIATIVE MEDICINE | Facility: CLINIC | Age: 78
End: 2018-11-15

## 2018-11-15 NOTE — TELEPHONE ENCOUNTER
Pt's spouse, Brittnee Davila, called office stating she would like a follow up call to go over the pre-screening for [facility]

## 2018-11-15 NOTE — TELEPHONE ENCOUNTER
Medical Marijuana Pre-Visit Screening for Palliative & Supportive Care    Referral Source:Dr Avni Lewis  Diagnosis: Cancer  Is the diagnosis an approved serious medical condition as outlined by [place] Act 16: Yes  History/Symptoms: Appetite, Back Pain    Does the patient's diagnosis fall within the current scope of our Palliative & Supportive Care practice: Yes  Does the patient intend to use MMJ with palliative intent: Yes    Does the patient currently have a signed controlled substances contract with another provider: Yes  Dr Avni Lewis  Is the patient a resident of [place] with a valid state ID or 's license: Yes  Has the patient registered on the [facility] website: Yes  [email or web address]     Prior to any scheduled visit, the patient has been informed of the following:  · [facility] providers are knowledgeable about many ways to help people  A visit to discuss MMJ does not mean that the provider agrees that this is the best way to help you and may make other recommendations  · There is an expectation and requirement by the [facility] law for continuity of care if your certification is completed  · You will be expected to sign an informed consent  · A PDMP report will be reviewed before your visit  · This may effect your ability to purchase a handgun  · Medical marijuana products are not covered by insurance  The dispensaries do not accept credit cards  · The certification does not exempt you from any employer based drug screening programs and may effect your ability to participate in federally funded programs  · [facility] does not allow for medical marijuana possession or use at any of it's inpatient facilities  If it is brought it you will be asked to send it home with a designated representative (friend or family member)  If not one is available to take the product(s) home they will be stored in a secure location until you are discharged    · Please spend time becoming familiar with the information on the website before your visit: Robe hassan    Date of scheduled visit: 12/10/18 at 8:00 am

## 2018-11-15 NOTE — TELEPHONE ENCOUNTER
Called wife Norleen Moritz for pre-visit screening and to schedule MMJ appt  She verbalized she shouldn't need to come for appt  As she was told by Dr Kenna Michael to register and let her know that patient is registered  Please advise if she needs to make appt  For Mr David Nguyen for certification

## 2018-11-15 NOTE — TELEPHONE ENCOUNTER
He does need a visit  I just gave them the brochure to register  We will need to go through his consent

## 2018-11-16 ENCOUNTER — TELEPHONE (OUTPATIENT)
Dept: PULMONOLOGY | Facility: CLINIC | Age: 78
End: 2018-11-16

## 2018-11-20 ENCOUNTER — HOSPITAL ENCOUNTER (OUTPATIENT)
Dept: INFUSION CENTER | Facility: CLINIC | Age: 78
End: 2018-11-20

## 2018-12-03 ENCOUNTER — TELEPHONE (OUTPATIENT)
Dept: HEMATOLOGY ONCOLOGY | Facility: CLINIC | Age: 78
End: 2018-12-03

## 2018-12-03 NOTE — TELEPHONE ENCOUNTER
Miriam Cedillo called stating that he is coming into the office tomorrow but she wanted to go over his treatment plan and discuss his symptoms with Dr Viri Conde  Please review and contact the patient

## 2018-12-04 ENCOUNTER — APPOINTMENT (OUTPATIENT)
Dept: LAB | Facility: CLINIC | Age: 78
DRG: 180 | End: 2018-12-04
Payer: COMMERCIAL

## 2018-12-04 ENCOUNTER — TELEPHONE (OUTPATIENT)
Dept: GASTROENTEROLOGY | Facility: MEDICAL CENTER | Age: 78
End: 2018-12-04

## 2018-12-04 ENCOUNTER — OFFICE VISIT (OUTPATIENT)
Dept: HEMATOLOGY ONCOLOGY | Facility: CLINIC | Age: 78
End: 2018-12-04
Payer: COMMERCIAL

## 2018-12-04 ENCOUNTER — HOSPITAL ENCOUNTER (INPATIENT)
Facility: HOSPITAL | Age: 78
LOS: 3 days | Discharge: HOME | DRG: 180 | End: 2018-12-07
Attending: INTERNAL MEDICINE | Admitting: INTERNAL MEDICINE
Payer: COMMERCIAL

## 2018-12-04 ENCOUNTER — APPOINTMENT (INPATIENT)
Dept: CT IMAGING | Facility: HOSPITAL | Age: 78
DRG: 180 | End: 2018-12-04
Payer: COMMERCIAL

## 2018-12-04 VITALS
SYSTOLIC BLOOD PRESSURE: 118 MMHG | WEIGHT: 157 LBS | DIASTOLIC BLOOD PRESSURE: 82 MMHG | HEART RATE: 67 BPM | OXYGEN SATURATION: 92 % | BODY MASS INDEX: 25.23 KG/M2 | RESPIRATION RATE: 14 BRPM | HEIGHT: 66 IN

## 2018-12-04 DIAGNOSIS — J84.9 INTERSTITIAL LUNG DISEASE (HCC): ICD-10-CM

## 2018-12-04 DIAGNOSIS — G89.3 CANCER RELATED PAIN: ICD-10-CM

## 2018-12-04 DIAGNOSIS — R13.19 ESOPHAGEAL DYSPHAGIA: ICD-10-CM

## 2018-12-04 DIAGNOSIS — R09.89 ABNORMAL LUNG SOUNDS: ICD-10-CM

## 2018-12-04 DIAGNOSIS — R62.7 FTT (FAILURE TO THRIVE) IN ADULT: ICD-10-CM

## 2018-12-04 DIAGNOSIS — R43.2 DYSGEUSIA: ICD-10-CM

## 2018-12-04 DIAGNOSIS — R63.0 DECREASED APPETITE: ICD-10-CM

## 2018-12-04 DIAGNOSIS — R62.7 ADULT FAILURE TO THRIVE: ICD-10-CM

## 2018-12-04 DIAGNOSIS — R63.4 WEIGHT LOSS: ICD-10-CM

## 2018-12-04 DIAGNOSIS — C34.92 ADENOCARCINOMA OF LEFT LUNG (HCC): Primary | ICD-10-CM

## 2018-12-04 DIAGNOSIS — C79.51 METASTATIC ADENOCARCINOMA TO BONE (HCC): Primary | Chronic | ICD-10-CM

## 2018-12-04 PROBLEM — E86.0 DEHYDRATION: Status: ACTIVE | Noted: 2018-12-04

## 2018-12-04 LAB
BILIRUB UR QL STRIP: NEGATIVE
CLARITY UR: CLEAR
COLOR UR: YELLOW
GLUCOSE UR STRIP-MCNC: NEGATIVE MG/DL
HGB UR QL STRIP.AUTO: NEGATIVE
KETONES UR STRIP-MCNC: NEGATIVE MG/DL
LEUKOCYTE ESTERASE UR QL STRIP: NEGATIVE
NITRITE UR QL STRIP: NEGATIVE
PH UR STRIP.AUTO: 5.5 [PH] (ref 4.5–8)
PROT UR STRIP-MCNC: NEGATIVE MG/DL
SP GR UR STRIP.AUTO: <=1.005 (ref 1–1.03)
UROBILINOGEN UR QL STRIP.AUTO: 0.2 E.U./DL

## 2018-12-04 PROCEDURE — 99222 1ST HOSP IP/OBS MODERATE 55: CPT | Performed by: INTERNAL MEDICINE

## 2018-12-04 PROCEDURE — 99215 OFFICE O/P EST HI 40 MIN: CPT | Performed by: PHYSICIAN ASSISTANT

## 2018-12-04 PROCEDURE — 71260 CT THORAX DX C+: CPT

## 2018-12-04 PROCEDURE — 99223 1ST HOSP IP/OBS HIGH 75: CPT | Performed by: INTERNAL MEDICINE

## 2018-12-04 PROCEDURE — 81003 URINALYSIS AUTO W/O SCOPE: CPT | Performed by: INTERNAL MEDICINE

## 2018-12-04 PROCEDURE — 94664 DEMO&/EVAL PT USE INHALER: CPT

## 2018-12-04 PROCEDURE — 94760 N-INVAS EAR/PLS OXIMETRY 1: CPT

## 2018-12-04 PROCEDURE — 74177 CT ABD & PELVIS W/CONTRAST: CPT

## 2018-12-04 RX ORDER — LEVOTHYROXINE SODIUM 0.12 MG/1
125 TABLET ORAL
Status: DISCONTINUED | OUTPATIENT
Start: 2018-12-04 | End: 2018-12-07 | Stop reason: HOSPADM

## 2018-12-04 RX ORDER — ONDANSETRON 2 MG/ML
4 INJECTION INTRAMUSCULAR; INTRAVENOUS EVERY 6 HOURS PRN
Status: DISCONTINUED | OUTPATIENT
Start: 2018-12-04 | End: 2018-12-05

## 2018-12-04 RX ORDER — XYLITOL/YERBA SANTA
5 AEROSOL, SPRAY WITH PUMP (ML) MUCOUS MEMBRANE AS NEEDED
Status: DISCONTINUED | OUTPATIENT
Start: 2018-12-04 | End: 2018-12-07 | Stop reason: HOSPADM

## 2018-12-04 RX ORDER — SODIUM CHLORIDE 9 MG/ML
100 INJECTION, SOLUTION INTRAVENOUS CONTINUOUS
Status: DISCONTINUED | OUTPATIENT
Start: 2018-12-04 | End: 2018-12-05

## 2018-12-04 RX ORDER — FOLIC ACID 1 MG/1
1 TABLET ORAL DAILY
Status: DISCONTINUED | OUTPATIENT
Start: 2018-12-04 | End: 2018-12-07 | Stop reason: HOSPADM

## 2018-12-04 RX ORDER — ACETAMINOPHEN 325 MG/1
650 TABLET ORAL EVERY 8 HOURS SCHEDULED
Status: DISCONTINUED | OUTPATIENT
Start: 2018-12-04 | End: 2018-12-06

## 2018-12-04 RX ORDER — FINASTERIDE 5 MG/1
5 TABLET, FILM COATED ORAL DAILY
Status: DISCONTINUED | OUTPATIENT
Start: 2018-12-04 | End: 2018-12-07 | Stop reason: HOSPADM

## 2018-12-04 RX ORDER — OXYBUTYNIN CHLORIDE 5 MG/1
10 TABLET, EXTENDED RELEASE ORAL
Status: DISCONTINUED | OUTPATIENT
Start: 2018-12-04 | End: 2018-12-07 | Stop reason: HOSPADM

## 2018-12-04 RX ADMIN — OXYBUTYNIN CHLORIDE 10 MG: 5 TABLET, EXTENDED RELEASE ORAL at 22:04

## 2018-12-04 RX ADMIN — MORPHINE SULFATE 2 MG: 2 INJECTION, SOLUTION INTRAMUSCULAR; INTRAVENOUS at 19:58

## 2018-12-04 RX ADMIN — SODIUM CHLORIDE 100 ML/HR: 0.9 INJECTION, SOLUTION INTRAVENOUS at 15:18

## 2018-12-04 RX ADMIN — MORPHINE SULFATE 2 MG: 2 INJECTION, SOLUTION INTRAMUSCULAR; INTRAVENOUS at 12:37

## 2018-12-04 RX ADMIN — IOHEXOL 100 ML: 350 INJECTION, SOLUTION INTRAVENOUS at 14:36

## 2018-12-04 RX ADMIN — SENNOSIDES A AND B 8.8 MG: 415.36 LIQUID ORAL at 22:09

## 2018-12-04 RX ADMIN — ACETAMINOPHEN 650 MG: 325 TABLET, FILM COATED ORAL at 22:05

## 2018-12-04 RX ADMIN — ACETAMINOPHEN 650 MG: 325 TABLET, FILM COATED ORAL at 14:30

## 2018-12-04 NOTE — PROGRESS NOTES
Hematology/Oncology Outpatient Follow-up  Radha Meyers 69 y o  male 1940 [id number]    Date:  12/4/2018      Assessment and Plan:  1  Adenocarcinoma of left lung [facility])  60-year-old male presents for follow-up regarding stage IV lung cancer  July 2018 until the end of October 2018 patient was receiving Alimta maintenance therapy  His CT scan in October 2018 showed stability/improvement of pulmonary nodules, no new adenopathy, persistent pulmonary fibrosis  Nuclear bone scan in October 2018 also showed stable bone metastases  At his office visit in November 2018 he was continuing to have fatigue, shortness of breath, pain, severe dry mouth, decreased appetite  Therefore, it was decided to hold chemotherapy and re-evaluate patient today  CBC today shows adequate hemoglobin, 10 2     Patient continues to decline  Please see details below  Patient will be directly admitted to the hospital today  Recommend evaluation with CT of the chest as well as consultation with Gastroenterology  - Transfer to other facility    2  Weight loss  Patient weighed 171 lb on 10/31/18  Today his weight is 157 lb  Patient has some appetite but then when he begins eating he is no longer hungry  He is only eating fruits and vegetables  He is unable to tolerate Ensure  He began medical marijuana about 1 week ago without any significant benefit  Please have consultation with GI  I already spoke with Dr Miryam Alford and she does not have further medication to offer at this time  EGD may be recommended  - Transfer to other facility    3  Cancer related pain  Worsening back pain, middle back to neck area  Some relief with Advil  He does not tolerate oxycodone due to constipation and feeling 'loopy ' Medical marijuana is not helping with pain  - Transfer to other facility    4  Abnormal lung sounds  Crackles/congestion in the right middle lobe and upper aspect of the left lower lobe   This is new compared to physical exam last month  SOB has worsened  He is only able to walk approximately a yard on 5L of O2     - Transfer to other facility    5  Adult failure to thrive  - Transfer to other facility    HPI:       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     1/15/2018 Initial Diagnosis     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          1/31/2018 - 6/6/2018 Chemotherapy      Carboplatin plus Alimta plus Amina Barrack every 6 weeks           3/26/2018 -  Radiation     Radiation to the lower thoracic, upper lumbar spine area            6/18/2018 Adverse Reaction     Patient developed pneumonitis secondary to [facility] was discontinued and patient was given steroids  7/17/2018 -  Chemotherapy     Alimta 500 mg/m2 every 3 weeks     Xgeva 120 mg SQ every 12 weeks              52-year-old male with past medical history significant for hypothyroidism, hypertension, BPH, spinal stenosis of thoracic and lumbar region  [facility] presented to the [facility] on 12/22  [facility] was having worsening shortness of breath in mid back pain  He was having difficulty walking   He saw his PCP also on 12/22/17 ordered a chest x-ray   This showed a suspected consolidation on the left upper lobe  Smoking history is significant for roughly 10 cigarettes per day for the past 48 years      Dr Mirella Dangelo noted patient to have hemoglobin dropped from 14-10  He was referred to GI  He was placed on oral iron TID, then he was decreased to BID when he was in the hospital       He started having worsening pain of the back around Laureen       Also, he has a history of anemia  [facility] had episode of 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 that was performed on 12/04/2017 with Dr Jacqueline Quintana      He had PET/CT on 01/11/2018   This showed left lower lobe lesion, 2 cm, SUV 20 9   Also, left perihilar, mediastinal, left subcarinal lymph node (1 6 x 1 2 cm) lymph nodes   No disease in the abdomen or pelvis   Multiple osseous metastases, lesion on transverse process of T3, T7, L1; lytic lesion on the left ilium; destructive lesion on the left bryson sacrum --with soft tissue component; lesion of right femoral neck       CT of the head on 1/14/18 was normal       He quit smoking (Dec 2017)     March 2018 patient was admitted to the hospital due to severe radiation esophagitis  Pranay Huff had completed   9/10 radiation treatments  to bone metastases   He follows with Dr Anne Rojas    while inpatient, he had an EGD with gastroenterology which showed severe inflammation   There was no of white patches  In the esophagus on exam   Patient was started on nystatin swish and swallow   Also, Carafate   Also, Protonix 40 mg b i d     he was still having poor p o  Jenn Molina was given intravenous fluids at the infusion center on  3/28 and 3/30      Patient continued to feel fatigued and weak at the end of April   Therefore, carboplatin was discontinued  Kamron Clayton was transition to [facility] Friday had 1 dose of this on 06/06/2018  Pranay Huff then called with worsening shortness of breath   He was admitted to the hospital and diagnosed with pulmonary fibrosis, possible pneumonitis secondary to get treated       He then was transitioned to Alimta maintenance  Interval history:    ROS: Review of Systems   Constitutional: Positive for activity change (decreased ), appetite change (decreased ), chills, fatigue and unexpected weight change  Negative for fever  HENT: Negative for sore throat  Respiratory: Positive for cough (productive, white and green mucus ) and shortness of breath  Negative for wheezing  Cardiovascular: Negative for chest pain, palpitations and leg swelling  Gastrointestinal: Positive for nausea (occassional )  Negative for abdominal pain, blood in stool, constipation, diarrhea and vomiting  Genitourinary: Negative for difficulty urinating, dysuria and hematuria     Musculoskeletal: Positive for back pain  Negative for arthralgias, joint swelling and myalgias  Skin: Negative for pallor and rash  Neurological: Positive for weakness  Negative for dizziness, light-headedness, numbness and headaches  Hematological: Negative for adenopathy  Does not bruise/bleed easily  Past Medical History:   Diagnosis Date    Anemia     BPH (benign prostatic hyperplasia)     Cancer of lung ([address] )     Disease of thyroid gland     GAVE (gastric antral vascular ectasia)     Hypertension     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HCC)     Spinal stenosis     Tobacco abuse        Past Surgical History:   Procedure Laterality Date    ESOPHAGOGASTRODUODENOSCOPY N/A 3/20/2018    Procedure: ESOPHAGOGASTRODUODENOSCOPY (EGD); Surgeon: Jeannie Monae MD;  Location: [facility];   Service: Gastroenterology       Social History     Social History    Marital status: /Civil Union     Spouse name: Meño Michel Number of children: 2    Years of education: N/A     Occupational History    retired       Social History Main Topics    Smoking status: Former Smoker     Packs/day: 0 50     Years: 15 00     Types: Cigarettes     Start date: 1968     Quit date: 1/11/2018    Smokeless tobacco: Never Used      Comment: Quit December 2017    Alcohol use No    Drug use: No    Sexual activity: Not on file     Other Topics Concern    Not on file     Social History Narrative    No narrative on file       Family History   Problem Relation Age of Onset    Esophageal cancer Mother     Diabetes Father     No Known Problems Sister     No Known Problems Brother        No Known Allergies      Current Outpatient Prescriptions:     Artificial Saliva (BIOTENE MOISTURIZING MOUTH MT), Apply to the mouth or throat, Disp: , Rfl:     dexamethasone (DECADRON) 2 mg tablet, Take 1 tablet (2 mg total) by mouth daily with breakfast, Disp: 30 tablet, Rfl: 0    dexamethasone (DECADRON) 4 mg tablet, Take 4 mg by mouth 2 (two) times a day with meals, Disp: , Rfl:     finasteride (PROSCAR) 5 mg tablet, Take 1 tablet (5 mg total) by mouth daily, Disp: 30 tablet, Rfl: 0    folic acid (FOLVITE) 1 mg tablet, TAKE 1 TABLET (1 MG TOTAL) BY MOUTH DAILY, Disp: 90 tablet, Rfl: 0    levothyroxine 125 mcg tablet, Take 1 tablet (125 mcg total) by mouth daily, Disp: 90 tablet, Rfl: 1    oxybutynin (DITROPAN XL) 10 MG 24 hr tablet, Take 10 mg by mouth daily at bedtime, Disp: , Rfl:     senna 8 8 mg/5 mL syrup, Take by mouth daily at bedtime, Disp: , Rfl:     tamsulosin (FLOMAX) 0 4 mg, Take 1 capsule (0 4 mg total) by mouth daily with dinner, Disp: 90 capsule, Rfl: 1      Physical Exam:  Pulse 67   Resp 14   Ht 5' 6" (1 676 m)   Wt 71 2 kg (157 lb)   BMI 25 34 kg/m²     Physical Exam   Constitutional: He is oriented to person, place, and time  He appears well-developed and well-nourished  No distress  Chronically ill, appears worse than last month     HENT:   Head: Normocephalic and atraumatic  Eyes: Conjunctivae are normal  No scleral icterus  Neck: Normal range of motion  Neck supple  Cardiovascular: Regular rhythm and normal heart sounds  Tachycardia present  No murmur heard  Pulmonary/Chest: Effort normal  No respiratory distress  Crackles in RML, and upper aspect LLL  Nasal cannula O2   Abdominal: Soft  There is no tenderness  Musculoskeletal: Normal range of motion  He exhibits no edema or tenderness  Lymphadenopathy:     He has no cervical adenopathy  Neurological: He is alert and oriented to person, place, and time  No cranial nerve deficit  Skin: Skin is warm and dry  Psychiatric: He has a normal mood and affect  Vitals reviewed      Labs:  Lab Results   Component Value Date    WBC 8 27 12/04/2018    HGB 10 2 (L) 12/04/2018    HCT 33 9 (L) 12/04/2018    MCV 90 12/04/2018     12/04/2018     Lab Results   Component Value Date    K 4 3 10/29/2018     10/29/2018    CO2 27 10/29/2018    BUN 19 10/29/2018    CREATININE 1 18 10/29/2018    GLUF 175 (H) 09/17/2018    CALCIUM 9 8 10/29/2018    AST 21 10/29/2018    ALT 21 10/29/2018    ALKPHOS 103 10/29/2018    EGFR 59 10/29/2018     @RESULTFAST(TSH)@    Patient voiced understanding and agreement in the above discussion  Aware to contact our office with questions/symptoms in the interim

## 2018-12-04 NOTE — ASSESSMENT & PLAN NOTE
Reports occassional dysphagia   Dry mouth  Concerned food not going through - no regurgitation or vomiting  Predominantly loss of appetite per Hx  D/w hemonc - GI consultation recommended given GAVE's hx and dysphagia

## 2018-12-04 NOTE — ASSESSMENT & PLAN NOTE
2" progressive loss of appetite/ loss of weight/ worsened pain/ poor oral hydration  Progressive dyspnea with minimal exertion  IVF  Pain medications  Palliative care consultation  PT/OT

## 2018-12-04 NOTE — H&P
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 [facility])   Assessment & Plan    Under follow up by Dr Ethel Magdaleno  Last chemo 1 month ago and presently on hold  Sent from [address] [place] [address] Dr office with FTT  Consult Hemonc     * FTT (failure to thrive) in adult   Assessment & Plan    2" progressive loss of appetite/ loss of weight/ worsened pain/ poor oral hydration  Progressive dyspnea with minimal exertion  IVF  Pain medications  Palliative care consultation  PT/OT       VTE Prophylaxis: Enoxaparin (Lovenox)  / sequential compression device   Code Status: Level 1 - Full Code  POLST:     Anticipated Length of Stay:  Patient will be admitted on an Inpatient basis with an anticipated length of stay of  >2 midnights  Justification for Hospital Stay:     Total Time for Visit, including Counseling / Coordination of Care: 1 hour  Greater than 50% of this total time spent on direct patient counseling and coordination of care  Chief Complaint:   General weakness    of Present Illness:    Delicia Green is a 69 y o  male who presents with several day history of generalized weakness, dyspnea on exertion and worsening back and lower chest pain  He also reports poor oral intake due to lack of appetite and some difficulty swallowing mostly due to dry mucosal surfaces  No fever, chills, syncope, rash, diarrhea  All other ROS as listed    Today, was seen by hemonc and advised admission for IVF hemonc and GI evaluation  Review of Systems:    Review of Systems   Constitutional: Positive for activity change, appetite change and fatigue  HENT: Negative  Eyes: Negative  Respiratory: Positive for shortness of breath  Gastrointestinal: Negative  Endocrine: Negative  Genitourinary: Negative  Musculoskeletal: Positive for arthralgias, back pain and myalgias  Allergic/Immunologic: Negative  Neurological: Positive for weakness  Hematological: Negative  Psychiatric/Behavioral: Negative  All other systems reviewed and are negative  Past Medical and Surgical History:     Past Medical History:   Diagnosis Date    Anemia     BPH (benign prostatic hyperplasia)     Cancer of lung ([address] )     Disease of thyroid gland     GAVE (gastric antral vascular ectasia)     Hypertension     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HCC)     Spinal stenosis     Tobacco abuse        Past Surgical History:   Procedure Laterality Date    ESOPHAGOGASTRODUODENOSCOPY N/A 3/20/2018    Procedure: ESOPHAGOGASTRODUODENOSCOPY (EGD); Surgeon: Jass Redmond MD;  [facility]; Service: Gastroenterology       Meds/Allergies:    PTA meds:   Prior to Admission Medications   Prescriptions Last Dose Informant Patient Reported? Taking?    Artificial Saliva (BIOTENE MOISTURIZING MOUTH MT)  Self Yes No   Sig: Apply to the mouth or throat   finasteride (PROSCAR) 5 mg tablet  Self No No   Sig: Take 1 tablet (5 mg total) by mouth daily   folic acid (FOLVITE) 1 mg tablet   No No   Sig: TAKE 1 TABLET (1 MG TOTAL) BY MOUTH DAILY   levothyroxine 125 mcg tablet  Self No No   Sig: Take 1 tablet (125 mcg total) by mouth daily   oxybutynin (DITROPAN XL) 10 MG 24 hr tablet   Yes No   Sig: Take 10 mg by mouth daily at bedtime   senna 8 8 mg/5 mL syrup  Self Yes No   Sig: Take by mouth daily at bedtime      Facility-Administered Medications: None       Allergies: No Known Allergies  History:     Marital Status: /Civil Union   Occupation:   Patient Pre-hospital Living Situation:   Patient Pre-hospital Level of Mobility:   Patient Pre-hospital Diet Restrictions:   Substance Use History:   History   Alcohol Use No     History   Smoking Status    Former Smoker    Packs/day: 0 50    Years: 15 00    Types: Cigarettes    Start date: 1968    Quit date: 1/11/2018   Smokeless Tobacco    Never Used     Comment: Quit December 2017     History   Drug Use No       Family History:    Family History   Problem Relation Age of Onset    Esophageal cancer Mother     Diabetes Father     No Known Problems Sister     No Known Problems Brother        Physical Exam:     Vitals:   Blood Pressure: 115/63 (12/04/18 1500)  Pulse: 81 (12/04/18 1500)  Temperature: 98 3 °F (36 8 °C) (12/04/18 1500)  Temp Source: Axillary (12/04/18 1500)  Respirations: 17 (12/04/18 1500)  Height: 5' 7" (170 2 cm) (12/04/18 1103)  Weight - Scale: 66 7 kg (147 lb) (12/04/18 1103)  SpO2: 99 % (12/04/18 1500)    Physical Exam   HENT:   Head: Normocephalic  Eyes: Pupils are equal, round, and reactive to light  Cardiovascular: Normal heart sounds  Pulmonary/Chest: Effort normal    Abdominal: Soft  Musculoskeletal: Normal range of motion  Neurological: He is alert  Skin: There is pallor  Lab and Imaging Results: I have personally reviewed pertinent reports  Results from last 7 days  Lab Units 12/04/18  0845   WBC Thousand/uL 8 27   HEMOGLOBIN g/dL 10 2*   HEMATOCRIT % 33 9*   PLATELETS Thousands/uL 331   NEUTROS PCT % 83*   LYMPHS PCT % 5*   MONOS PCT % 8   EOS PCT % 3       Results from last 7 days  Lab Units 12/04/18  0845   POTASSIUM mmol/L 4 6   CHLORIDE mmol/L 100   CO2 mmol/L 30   BUN mg/dL 20   CREATININE mg/dL 1 29   CALCIUM mg/dL 10 1   ALK PHOS U/L 104   ALT U/L 33   AST U/L 22           No results found      EKG, Pathology, and Other Studies Reviewed on Admission:   · NSR    [employer] Records Reviewed: Yes     ** Please Note: Dragon 360 Dictation voice to text software may have been used in the creation of this document   **

## 2018-12-04 NOTE — ASSESSMENT & PLAN NOTE
Under follow up by Dr Borja Expose  Last chemo 1 month ago and presently on hold  Sent from [facility] office with [address] Dionicio Manzanares

## 2018-12-05 ENCOUNTER — ANESTHESIA EVENT (INPATIENT)
Dept: GASTROENTEROLOGY | Facility: HOSPITAL | Age: 78
DRG: 180 | End: 2018-12-05
Payer: COMMERCIAL

## 2018-12-05 ENCOUNTER — ANESTHESIA (INPATIENT)
Dept: GASTROENTEROLOGY | Facility: HOSPITAL | Age: 78
DRG: 180 | End: 2018-12-05
Payer: COMMERCIAL

## 2018-12-05 LAB
ALBUMIN SERPL BCP-MCNC: 2.5 G/DL (ref 3.5–5)
ALP SERPL-CCNC: 88 U/L (ref 46–116)
ALT SERPL W P-5'-P-CCNC: 24 U/L (ref 12–78)
ANION GAP SERPL CALCULATED.3IONS-SCNC: 9 MMOL/L (ref 4–13)
AST SERPL W P-5'-P-CCNC: 20 U/L (ref 5–45)
BILIRUB SERPL-MCNC: 0.3 MG/DL (ref 0.2–1)
BUN SERPL-MCNC: 16 MG/DL (ref 5–25)
CALCIUM SERPL-MCNC: 9.2 MG/DL (ref 8.3–10.1)
CHLORIDE SERPL-SCNC: 104 MMOL/L (ref 100–108)
CO2 SERPL-SCNC: 25 MMOL/L (ref 21–32)
CREAT SERPL-MCNC: 1.07 MG/DL (ref 0.6–1.3)
ERYTHROCYTE [DISTWIDTH] IN BLOOD BY AUTOMATED COUNT: 17.2 % (ref 11.6–15.1)
GFR SERPL CREATININE-BSD FRML MDRD: 67 ML/MIN/1.73SQ M
GLUCOSE SERPL-MCNC: 113 MG/DL (ref 65–140)
HCT VFR BLD AUTO: 27.6 % (ref 36.5–49.3)
HGB BLD-MCNC: 8.4 G/DL (ref 12–17)
MCH RBC QN AUTO: 27.3 PG (ref 26.8–34.3)
MCHC RBC AUTO-ENTMCNC: 30.4 G/DL (ref 31.4–37.4)
MCV RBC AUTO: 90 FL (ref 82–98)
PLATELET # BLD AUTO: 298 THOUSANDS/UL (ref 149–390)
PMV BLD AUTO: 9.5 FL (ref 8.9–12.7)
POTASSIUM SERPL-SCNC: 3.9 MMOL/L (ref 3.5–5.3)
PROT SERPL-MCNC: 6.8 G/DL (ref 6.4–8.2)
RBC # BLD AUTO: 3.08 MILLION/UL (ref 3.88–5.62)
SODIUM SERPL-SCNC: 138 MMOL/L (ref 136–145)
WBC # BLD AUTO: 7.14 THOUSAND/UL (ref 4.31–10.16)

## 2018-12-05 PROCEDURE — 99221 1ST HOSP IP/OBS SF/LOW 40: CPT | Performed by: FAMILY MEDICINE

## 2018-12-05 PROCEDURE — 85027 COMPLETE CBC AUTOMATED: CPT | Performed by: INTERNAL MEDICINE

## 2018-12-05 PROCEDURE — 88305 TISSUE EXAM BY PATHOLOGIST: CPT | Performed by: PATHOLOGY

## 2018-12-05 PROCEDURE — 88342 IMHCHEM/IMCYTCHM 1ST ANTB: CPT | Performed by: PATHOLOGY

## 2018-12-05 PROCEDURE — 99233 SBSQ HOSP IP/OBS HIGH 50: CPT | Performed by: INTERNAL MEDICINE

## 2018-12-05 PROCEDURE — 99222 1ST HOSP IP/OBS MODERATE 55: CPT | Performed by: PHYSICIAN ASSISTANT

## 2018-12-05 PROCEDURE — 0DB68ZX EXCISION OF STOMACH, VIA NATURAL OR ARTIFICIAL OPENING ENDOSCOPIC, DIAGNOSTIC: ICD-10-PCS | Performed by: INTERNAL MEDICINE

## 2018-12-05 PROCEDURE — 80053 COMPREHEN METABOLIC PANEL: CPT | Performed by: INTERNAL MEDICINE

## 2018-12-05 PROCEDURE — 43239 EGD BIOPSY SINGLE/MULTIPLE: CPT | Performed by: INTERNAL MEDICINE

## 2018-12-05 RX ORDER — PANTOPRAZOLE SODIUM 40 MG/1
40 TABLET, DELAYED RELEASE ORAL
Status: DISCONTINUED | OUTPATIENT
Start: 2018-12-06 | End: 2018-12-07 | Stop reason: HOSPADM

## 2018-12-05 RX ORDER — POLYETHYLENE GLYCOL 3350 17 G/17G
17 POWDER, FOR SOLUTION ORAL DAILY PRN
Status: DISCONTINUED | OUTPATIENT
Start: 2018-12-05 | End: 2018-12-07 | Stop reason: HOSPADM

## 2018-12-05 RX ORDER — SODIUM CHLORIDE 9 MG/ML
75 INJECTION, SOLUTION INTRAVENOUS CONTINUOUS
Status: DISCONTINUED | OUTPATIENT
Start: 2018-12-05 | End: 2018-12-06

## 2018-12-05 RX ORDER — PROPOFOL 10 MG/ML
INJECTION, EMULSION INTRAVENOUS AS NEEDED
Status: DISCONTINUED | OUTPATIENT
Start: 2018-12-05 | End: 2018-12-05 | Stop reason: SURG

## 2018-12-05 RX ORDER — PILOCARPINE HYDROCHLORIDE 5 MG/1
5 TABLET, FILM COATED ORAL 3 TIMES DAILY
Status: DISCONTINUED | OUTPATIENT
Start: 2018-12-05 | End: 2018-12-06

## 2018-12-05 RX ORDER — LIDOCAINE HYDROCHLORIDE 10 MG/ML
INJECTION, SOLUTION INFILTRATION; PERINEURAL AS NEEDED
Status: DISCONTINUED | OUTPATIENT
Start: 2018-12-05 | End: 2018-12-05 | Stop reason: SURG

## 2018-12-05 RX ORDER — SUCRALFATE ORAL 1 G/10ML
1000 SUSPENSION ORAL EVERY 6 HOURS SCHEDULED
Status: DISCONTINUED | OUTPATIENT
Start: 2018-12-05 | End: 2018-12-07 | Stop reason: HOSPADM

## 2018-12-05 RX ORDER — ONDANSETRON 2 MG/ML
4 INJECTION INTRAMUSCULAR; INTRAVENOUS
Status: DISCONTINUED | OUTPATIENT
Start: 2018-12-05 | End: 2018-12-06

## 2018-12-05 RX ORDER — AMOXICILLIN 250 MG
1 CAPSULE ORAL 2 TIMES DAILY
Status: DISCONTINUED | OUTPATIENT
Start: 2018-12-05 | End: 2018-12-06

## 2018-12-05 RX ORDER — DOCUSATE SODIUM 100 MG/1
100 CAPSULE, LIQUID FILLED ORAL 2 TIMES DAILY
Status: DISCONTINUED | OUTPATIENT
Start: 2018-12-05 | End: 2018-12-05

## 2018-12-05 RX ADMIN — SODIUM CHLORIDE 75 ML/HR: 0.9 INJECTION, SOLUTION INTRAVENOUS at 09:39

## 2018-12-05 RX ADMIN — ACETAMINOPHEN 650 MG: 325 TABLET, FILM COATED ORAL at 04:42

## 2018-12-05 RX ADMIN — LEVOTHYROXINE SODIUM 125 MCG: 125 TABLET ORAL at 04:42

## 2018-12-05 RX ADMIN — SENNOSIDES A AND B 8.8 MG: 415.36 LIQUID ORAL at 22:09

## 2018-12-05 RX ADMIN — ACETAMINOPHEN 650 MG: 325 TABLET, FILM COATED ORAL at 22:10

## 2018-12-05 RX ADMIN — SUCRALFATE 1000 MG: 1 SUSPENSION ORAL at 17:06

## 2018-12-05 RX ADMIN — MORPHINE SULFATE 2 MG: 2 INJECTION, SOLUTION INTRAMUSCULAR; INTRAVENOUS at 10:44

## 2018-12-05 RX ADMIN — ACETAMINOPHEN 650 MG: 325 TABLET, FILM COATED ORAL at 13:56

## 2018-12-05 RX ADMIN — POLYETHYLENE GLYCOL 3350 17 G: 17 POWDER, FOR SOLUTION ORAL at 22:17

## 2018-12-05 RX ADMIN — PROPOFOL 80 MG: 10 INJECTION, EMULSION INTRAVENOUS at 16:07

## 2018-12-05 RX ADMIN — LIDOCAINE HYDROCHLORIDE ANHYDROUS 100 MG: 10 INJECTION, SOLUTION INFILTRATION at 16:07

## 2018-12-05 RX ADMIN — PROPOFOL 30 MG: 10 INJECTION, EMULSION INTRAVENOUS at 16:09

## 2018-12-05 RX ADMIN — OXYBUTYNIN CHLORIDE 10 MG: 5 TABLET, EXTENDED RELEASE ORAL at 22:10

## 2018-12-05 RX ADMIN — SENNOSIDES AND DOCUSATE SODIUM 1 TABLET: 8.6; 5 TABLET ORAL at 17:06

## 2018-12-05 RX ADMIN — ONDANSETRON 4 MG: 2 INJECTION INTRAMUSCULAR; INTRAVENOUS at 17:06

## 2018-12-05 RX ADMIN — SODIUM CHLORIDE: 0.9 INJECTION, SOLUTION INTRAVENOUS at 15:40

## 2018-12-05 RX ADMIN — MORPHINE SULFATE 2 MG: 2 INJECTION, SOLUTION INTRAMUSCULAR; INTRAVENOUS at 18:53

## 2018-12-05 RX ADMIN — MORPHINE SULFATE 2 MG: 2 INJECTION, SOLUTION INTRAMUSCULAR; INTRAVENOUS at 22:48

## 2018-12-05 RX ADMIN — PILOCARPINE HYDROCHLORIDE 5 MG: 5 TABLET, FILM COATED ORAL at 16:59

## 2018-12-05 RX ADMIN — MORPHINE SULFATE 2 MG: 2 INJECTION, SOLUTION INTRAMUSCULAR; INTRAVENOUS at 14:45

## 2018-12-05 RX ADMIN — PILOCARPINE HYDROCHLORIDE 5 MG: 5 TABLET, FILM COATED ORAL at 22:10

## 2018-12-05 NOTE — PROGRESS NOTES
Progress Note Pingree Amas 1940, 69 y o  male MRN: [id number]    Unit/Bed#: -01 Encounter: [id number]    Primary Care Provider: Rubio Cooley MD   Date and time admitted to hospital: 12/4/2018 11:03 AM    Pulmonary fibrosis    Assessment & Plan    In context of past pembrolizumab related pneumonitis  Note mild associated nodularity and pleural thickening  Pulmonary input - ? 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 [facility])   Assessment & Plan    Under follow up by Dr Kamla Duarte  Last chemo 1 month ago and presently on hold  Sent from [address] [place] [address] Dr office with FTT  Port- a cath in situ     * FTT (failure to thrive) in adult   Assessment & Plan    2" progressive loss of appetite/ loss of weight/ worsened pain/ poor oral hydration  Progressive dyspnea with minimal exertion; increased o2 need  IVF - conntinue  Pain medications  Palliative care consultation noted  PT/OT         VTE Pharmacologic Prophylaxis: Enoxaparin (Lovenox)  Mechanical: Mechanical VTE prophylaxis in place  Patient Centered Rounds: I have performed bedside rounds with nursing staff today  Discussions with Specialists or Other Care Team Provider: Hemonc, CM, GI    Education and Discussions with Family / Patient:     Time Spent for Care: 35min for all above; discussed pain management options  More than 50% of total time spent on counseling and coordination of care as described above      Current Length of Stay: 1 day(s)    Current Patient Status: Inpatient   Certification Statement: The patient will continue to require additional inpatient hospital stay due to as above    Discharge Plan:    Code Status: Level 1 - Full Code      Subjective: no complaints    Objective:     Vitals:   Temp (24hrs), Av °F (36 7 °C), Min:97 5 °F (36 4 °C), Max:98 3 °F (36 8 °C)    Temp:  [97 5 °F (36 4 °C)-98 3 °F (36 8 °C)] 98 3 °F (36 8 °C)  HR:  [80-86] 83  Resp:  [17-18] 18  BP: (115-136)/(63-82) 134/76  SpO2:  [98 %-100 %] 99 %  Body mass index is 23 02 kg/m²  Input and Output Summary (last 24 hours): Intake/Output Summary (Last 24 hours) at 18 1447  Last data filed at 18   Gross per 24 hour   Intake                0 ml   Output              225 ml   Net             -225 ml       Physical Exam   HENT:   Head: Normocephalic  Eyes: Pupils are equal, round, and reactive to light  Cardiovascular: Normal heart sounds  Pulmonary/Chest: Effort normal    Abdominal: Soft  Neurological: He is alert  Skin: There is pallor  Additional Data:     Labs:      Results from last 7 days  Lab Units 18  0432 18  0845   WBC Thousand/uL 7 14 8 27   HEMOGLOBIN g/dL 8 4* 10 2*   HEMATOCRIT % 27 6* 33 9*   PLATELETS Thousands/uL 298 331   NEUTROS PCT %  --  83*   LYMPHS PCT %  --  5*   MONOS PCT %  --  8   EOS PCT %  --  3       Results from last 7 days  Lab Units 18  0432   POTASSIUM mmol/L 3 9   CHLORIDE mmol/L 104   CO2 mmol/L 25   BUN mg/dL 16   CREATININE mg/dL 1 07   CALCIUM mg/dL 9 2   ALK PHOS U/L 88   ALT U/L 24   AST U/L 20           * I Have Reviewed All Lab Data Listed Above  Imaging:  Imaging Personally Reviewed by Myself Includes:     Cultures:   Blood Culture:   Lab Results   Component Value Date    BLOODCX No Growth After 5 Days  2018    BLOODCX No Growth After 5 Days  2018    BLOODCX No Growth After 5 Days  2017    BLOODCX No Growth After 5 Days   2017     Urine Culture: No results found for: URINECX  Sputum Culture: No components found for: SPUTUMCX  Wound Culture: No results found for: WOUNDCULT    Last 24 Hours Medication List:     Current Facility-Administered Medications:  acetaminophen 650 mg Oral Q8H [facility] MD Anuel    enoxaparin 40 mg Subcutaneous Daily Anabel Cervantes MD    finasteride 5 mg Oral Daily Anabel Cervantes MD    folic acid 1 mg Oral Daily Anabel Cervantes MD    levothyroxine 125 mcg Oral Early Morning Anabel Cervantes MD    morphine injection 2 mg Intravenous Q3H PRN Ralph Hopkins MD    ondansetron 4 mg Intravenous Q6H PRN Anabel Cervantes MD    oxybutynin 10 mg Oral HS Anabel Cervantes MD    polyethylene glycol 17 g Oral Daily PRN Anabel Cervantes MD    saliva substitute 5 spray Mouth/Throat PRN Anabel Cervantes MD    senna 8 8 mg Oral HS Anabel Cervantes MD    senna-docusate sodium 1 tablet Oral BID Elissa Saleh MD    sodium chloride 75 mL/hr Intravenous Continuous Francis Love MD Last Rate: 75 mL/hr (12/05/18 0939)        Today, Patient Was Seen By: Anabel Cervantes MD    ** Please Note: Dragon 360 Dictation voice to text software may have been used in the creation of this document   **

## 2018-12-05 NOTE — ASSESSMENT & PLAN NOTE
2" progressive loss of appetite/ loss of weight/ worsened pain/ poor oral hydration  Progressive dyspnea with minimal exertion; increased o2 need  IVF - conntinue  Pain medications  Palliative care consultation noted  PT/OT

## 2018-12-05 NOTE — OP NOTE
ESOPHAGOGASTRODUODENOSCOPY    PROCEDURE: EGD    SEDATION: Monitored anesthesia care, check anesthesia records    ASA Class: 3    INDICATIONS:  Nausea/loss of appetite/weight loss  CONSENT:  Informed consent was obtained for the procedure, including sedation after explaining the risks and benefits of the procedure  Risks including but not limited to bleeding, perforation, infection, and missed lesion  PREPARATION:   Telemetry, pulse oximetry, blood pressure were monitored throughout the procedure  Patient was identified by myself both verbally and by visual inspection of ID band  DESCRIPTION:   Patient was placed in the left lateral decubitus position and was sedated with the above medication  The gastroscope was introduced in to the oropharynx and the esophagus was intubated under direct visualization  Scope was passed down the esophagus up to 2nd part of the duodenum  A careful inspection was made as the gastroscope was withdrawn, including a retroflexed view of the stomach; findings and interventions are described below  FINDINGS:    #1  Esophagus- normal appearing esophagus  Regular squamocolumnar junction noted at 40 cm  #2  Stomach- linear erythema noted in the antrum extending into the body, remainder of the gastric mucosa appeared atrophic, biopsies were obtained to assess for H pylori  There was bile noted in the stomach  #3  Duodenum- normal appearing duodenal mucosa within the bulb, duodenal sweep and D2  IMPRESSIONS:      1  Bile reflux  2  Moderate gastritis  RECOMMENDATIONS:     1  Follow-up biopsy results in 2 weeks  2  Avoid the use of NSAIDs  3  Start on standing dose of antiemetics, before meals  4  Start on Carafate 1 g q i d  5  Continue PPI for now  COMPLICATIONS:  None; patient tolerated the procedure well            DISPOSITION: PACU           CONDITION: Stable

## 2018-12-05 NOTE — ASSESSMENT & PLAN NOTE
Under follow up by Dr Tori Hargrove  Last chemo 1 month ago and presently on hold  Sent from [facility] office with FTT  Port- a cath in situ

## 2018-12-05 NOTE — CONSULTS
Consultation -  Hematology/Oncology   Gurpreet Hamilton 69 y o  male MRN: [id number]  Unit/Bed#: -01 Encounter: [id number]    Physician Requesting Consult: Kerry Marie MD  Reason for Consult: Metastatic Adenocarcinoma of the Lung    HPI: Gurpreet Hamilton is a 69y o  year old male with a history of metastatic lung cancer, on chemotherapy break since 10/2018 who was admitted from the outpatient medical oncology office on 12/4 for progressing back pain, anorexia with weight loss and overall weakness and failure to thrive  He reports progressing sharp back pain between his scapulae, largely to the left which is exacerbated by upright positioning especially with ambulation  Pain is slightly better with seated position with slight extention at the Tspine  Pain is not well controlled on current pain regimen but he states morphine this admission has helped  He is largely bed bound at home due to painful ambulation  He has had worsening dyspnea over the past week above his usual baseline  He has chronic dry cough with occasional phlegm but states he feels phlegm "gets stuck" in his throat and "wont come up"  No bowel or bladder issues  He reports ongoing dysgeusia, xerostomia and early satiety for past several months since he received prior palliative XRT to lumbar spine, this has worsened to the point of now a 15 pound weight loss over 5-6 weeks time  He has mild ongoing nausea, no emesis  No fever, chills, sweats, rash  Admission CBC and Chemistry panel were satisfactory  CT chest, abdomen and pelvis showed new nodular pleural thickening in the left pleura, progressing fibrosing interstitial lung disease and known bony metastatic disease in the T11, T10 vertebrae, T7 vertebrae and T3 vertebrae  No concerning findings in the abdomen and pelvis        Oncologic History:  Primary Oncology Provider: Dr Hathaway Sensing     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     1/15/2018 Initial Diagnosis     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          1/31/2018 - 6/6/2018 Chemotherapy      Carboplatin plus Alimta plus Jeoffrey Berth every 6 weeks           3/26/2018 -  Radiation     Radiation to the lower thoracic, upper lumbar spine area            6/18/2018 Adverse Reaction     Patient developed pneumonitis secondary to [address]  [address] was discontinued and patient was given steroids  7/17/2018 -  Chemotherapy     Alimta 500 mg/m2 every 3 weeks     Xgeva 120 mg SQ every 12 weeks           10/30/2018: Most recent dose of chemotherapy  Patient elected for a chemotherapy treatment break going forward  Assessment/Plan:   #1 Adenocarcinoma of the lung with osseous involvement, s/p Pemetrexed, currently on treatment break  Last dose 10/30  #2 Back pain, suspect 2/2 #1  -recommend rad onc consult to evaluate for possible XRT to symptomatic mid to upper T-spine lesions  An MRI spine may be helpful if he is able to tolerate  I will wait for rad onc input before ordering any further imaging studies  He has significant pulmonary fibrosis and this may not be an option, therefore continued palliative care recommendation for pain control  #3 xerostomia, dysgeusia, not improving  -unclear etiology, previously attributed to pall RT to lower t and l spine in 3/2018  -GI is planning upper endoscopy today to further evaluate  #4 Dyspnea, pulmonary fibrosis, not improving  -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 ? ? Possibly malignant disease progression  I do not suspect this nodularity causing his worsening symptoms at this time  Recommend follow up CT chest in 6 weeks time to re assess the status of this    We will ask pulmonology input for optimizing lung function  A trial of corticosteroids could be considered if infection is ruled out  Discussed with the primary team (SANDHYA-Dr June Burt) on this date  Please contact us if you have any questions regarding this consult  Thank you for this consult  Past Medical History:   Diagnosis Date    Anemia     BPH (benign prostatic hyperplasia)     Cancer of lung ([address] )     Disease of thyroid gland     GAVE (gastric antral vascular ectasia)     Hypertension     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HCC)     Spinal stenosis     Tobacco abuse      Family History   Problem Relation Age of Onset    Esophageal cancer Mother     Diabetes Father     No Known Problems Sister     No Known Problems Brother      Social History     Social History    Marital status: /Civil Union     Spouse name: Clemente Gold Number of children: 2    Years of education: N/A     Occupational History    retired       Social History Main Topics    Smoking status: Former Smoker     Packs/day: 0 50     Years: 15 00     Types: Cigarettes     Start date: 1968     Quit date: 1/11/2018    Smokeless tobacco: Never Used      Comment: Quit December 2017    Alcohol use No    Drug use: No    Sexual activity: Not on file     Other Topics Concern    Not on file     Social History Narrative    No narrative on file     No Known Allergies    Subjective:      Review of Systems   Constitutional: Positive for appetite change (Early satiety, dysgeusia), fatigue and unexpected weight change  Negative for chills and fever  HENT:   Negative for lump/mass, mouth sores, nosebleeds, sore throat and trouble swallowing  Dry mouth   Eyes: Negative for eye problems and icterus  Respiratory: Negative for cough and hemoptysis  Cardiovascular: Negative for chest pain, leg swelling and palpitations  Gastrointestinal: Positive for abdominal pain and nausea  Negative for abdominal distention, diarrhea and vomiting  Genitourinary: Negative for dysuria and hematuria  Musculoskeletal: Positive for back pain  Negative for myalgias  Skin: Negative for itching and rash  Neurological: Negative for dizziness, headaches, light-headedness and speech difficulty  Hematological: Negative for adenopathy  All other systems reviewed and are negative            Objective:  /82 (BP Location: Right arm)   Pulse 80   Temp 98 °F (36 7 °C) (Oral)   Resp 18   Ht 5' 7" (1 702 m)   Wt 66 7 kg (147 lb)   SpO2 100%   BMI 23 02 kg/m²       Current Facility-Administered Medications:     acetaminophen (TYLENOL) tablet 650 mg, 650 mg, Oral, Q8H [facility], Ralph Hopkins MD, 650 mg at 12/05/18 0442    enoxaparin (LOVENOX) subcutaneous injection 40 mg, 40 mg, Subcutaneous, Daily, Nik Alexander MD, Stopped at 12/05/18 0936    finasteride (PROSCAR) tablet 5 mg, 5 mg, Oral, Daily, Nik Alexander MD    folic acid (FOLVITE) tablet 1 mg, 1 mg, Oral, Daily, Nik Alexander MD    levothyroxine tablet 125 mcg, 125 mcg, Oral, Early Morning, Nik Alexander MD, 125 mcg at 12/05/18 0442    morphine injection 2 mg, 2 mg, Intravenous, Q4H PRN, Nik Alexander MD, 2 mg at 12/04/18 1958    ondansetron (ZOFRAN) injection 4 mg, 4 mg, Intravenous, Q6H PRN, Nik Alexander MD    oxybutynin (DITROPAN-XL) 24 hr tablet 10 mg, 10 mg, Oral, HS, Nik Alexander MD, 10 mg at 12/04/18 2204    saliva substitute (MOUTH KOTE) mucosal solution 5 spray, 5 spray, Mouth/Throat, PRN, Nik Alexander MD    senna oral syrup 8 8 mg, 8 8 mg, Oral, HS, Nik Alexander MD, 8 8 mg at 12/04/18 2209    sodium chloride 0 9 % infusion, 100 mL/hr, Intravenous, Continuous, Nik Alexander MD, Last Rate: 100 mL/hr at 12/04/18 1518, 100 mL/hr at 12/04/18 1518    sodium chloride 0 9 % infusion, 75 mL/hr, Intravenous, Continuous, Jewel Garza MD, Last Rate: 75 mL/hr at 12/05/18 0939, 75 mL/hr at 12/05/18 0939    Recent Labs 12/04/18   0845  12/05/18   0432   WBC  8 27  7 14   HGB  10 2*  8 4*   PLT  331  298   MCV  90  90   RDW  17 3*  17 2*   CREATININE  1 29  1 07   AST  22  20   ALT  33  24     Laboratory studies were reviewed    Imaging:       Pathology: None    Physical Exam   Constitutional: He is oriented to person, place, and time  No distress  HENT:   Head: Normocephalic and atraumatic  Mouth/Throat: Oropharynx is clear and moist    Eyes: Conjunctivae and EOM are normal  No scleral icterus  Neck: Normal range of motion  Cardiovascular: Normal rate and regular rhythm  Pulmonary/Chest: Effort normal  No respiratory distress  He has no wheezes  Abdominal: Soft  He exhibits no distension  There is no tenderness  Musculoskeletal: He exhibits tenderness (left paraspinous region in mid T-spine)  He exhibits no edema  Lymphadenopathy:     He has no cervical adenopathy  Neurological: He is alert and oriented to person, place, and time  Skin: Skin is warm and dry  No rash noted  No pallor  Vitals reviewed  Code Status: Level 1 - Full Code    ** Please Note: Dictation voice to text software may have been used in the creation of this document   **

## 2018-12-05 NOTE — ASSESSMENT & PLAN NOTE
Completed radiation 3/18  Reports occassional dysphagia   Plan for EGD per GI  Artificial saliva/ lozenges/ ?pilocarpine  Palliative care input

## 2018-12-05 NOTE — ANESTHESIA POSTPROCEDURE EVALUATION
Post-Op Assessment Note      CV Status:  Stable    Mental Status:  Alert and awake    Hydration Status:  Euvolemic    PONV Controlled:  Controlled    Airway Patency:  Patent    Post Op Vitals Reviewed: Yes          Staff: CRNA           BP      Temp     Pulse  88   Resp   18   SpO2   100

## 2018-12-05 NOTE — ANESTHESIA PREPROCEDURE EVALUATION
Review of Systems/Medical History  Patient summary reviewed    No history of anesthetic complications     Cardiovascular  EKG reviewed, Exercise tolerance (METS): <4,  Hypertension controlled,    Pulmonary  Smoker ex-smoker  , COPD severe- O2 dependent ,   Comment: Lung cancer     GI/Hepatic  Dysphagia,          Negative  ROS        Endo/Other  History of thyroid disease , hypothyroidism,      GYN       Hematology  Anemia ,     Musculoskeletal  Negative musculoskeletal ROS        Neurology  Negative neurology ROS      Psychology   Negative psychology ROS              Physical Exam    Airway    Mallampati score: I  TM Distance: >3 FB  Neck ROM: full     Dental   Comment: No loose,     Cardiovascular  Rhythm: regular, Rate: normal,     Pulmonary  Breath sounds clear to auscultation,     Other Findings        Anesthesia Plan  ASA Score- 4     Anesthesia Type- IV sedation with anesthesia with ASA Monitors  Additional Monitors:   Airway Plan:         Plan Factors-  Patient did not smoke on day of surgery  Induction- intravenous  Postoperative Plan-     Informed Consent- Anesthetic plan and risks discussed with patient, spouse and son  I personally reviewed this patient with the CRNA  Discussed and agreed on the Anesthesia Plan with the CRNA  Bishop Amador

## 2018-12-05 NOTE — ASSESSMENT & PLAN NOTE
In context of past pembrolizumab related pneumonitis  Note mild associated nodularity and pleural thickening  Pulmonary input - ?  Steroid course

## 2018-12-05 NOTE — ASSESSMENT & PLAN NOTE
Back pain thoracic thru upper lumbar spine  8/10 constant pain with waxing and waning  Responds to morphine IV, did not tolerate Oxycodone at home  Palliative care will review

## 2018-12-05 NOTE — OP NOTE
ESOPHAGOGASTRODUODENOSCOPY    PROCEDURE: EGD    SEDATION: Monitored anesthesia care, check anesthesia records    ASA Class: 2    INDICATIONS:  History of esophagitis  Anemia  CONSENT:  Informed consent was obtained for the procedure, including sedation after explaining the risks and benefits of the procedure  Risks including but not limited to bleeding, perforation, infection, and missed lesion  PREPARATION:   Telemetry, pulse oximetry, blood pressure were monitored throughout the procedure  Patient was identified by myself both verbally and by visual inspection of ID band  DESCRIPTION:   Patient was placed in the left lateral decubitus position and was sedated with the above medication  The gastroscope was introduced in to the oropharynx and the esophagus was intubated under direct visualization  Scope was passed down the esophagus up to 2nd part of the duodenum  A careful inspection was made as the gastroscope was withdrawn, including a retroflexed view of the stomach; findings and interventions are described below  FINDINGS:    #1  Esophagus- normal appearing esophagus  Regular squamocolumnar junction noted at 37 cm  There was a 5 cm hiatal hernia below  #2  Stomach- normal appearing gastric mucosa, bile seen in the gastric lumen  Retroflexed view was notable for hiatal hernia  Pylorus was patent  #3  Duodenum- normal appearing duodenal mucosa within the bulb, duodenal sweep and D2  IMPRESSIONS:      1  Moderate size hiatal hernia  RECOMMENDATIONS:     1  Continue Protonix 40 mg daily  2  Continue to monitor H&H   3  No evidence of upper GI bleed, suspect current episode of hematochezia is likely self-limited diverticular bleed  COMPLICATIONS:  None; patient tolerated the procedure well            DISPOSITION: PACU           CONDITION: Stable

## 2018-12-05 NOTE — CONSULTS
Consultation - Radiation Oncology   Lorean Denver 69 y o  male MRN: [id number]  Unit/Bed#: -01 Encounter: [id number]        History of Present Illness   Physician Requesting Consult: Jayjay Farias MD  Reason for Consult / Principal Problem: worsening cancer related pain/bone mets    HPI: Lorean Denver is a 69y o  year old male well known to the Radiation Oncology department, and previously seen in consultation with Dr Kai Ocampo 2/23/18  Briefly he is a gentleman with stage IV metastatic left lung adenocarcinoma diagnosed in 2017, started on chemotherapy under the care of Dr Maura Benitez, and received palliative radiation after being found to have pathologic compression fracture at T10/L1 with significant pain  He was started on EBRT fromt T5-L3, and completed 9 of 10 planned fractions, which was discontinue due to significant radiation esophagitis and candida esophagitis  Last treatment was 3/16/18  He also has history of pneumonitis secondary to pembrolizumab, at which time he was switched to alimta and xgeva in July 2018  He was recently hospitalized given continued weight loss and failure to thrive  INTERVAL EVENTS/REVIEW OF SYSTEMS:  Patient seen at bedside for consideration of palliative radiation therapy  She states that his pain begins medially to scapula and travels down to area around T10  He states that this lower pain is the worse of the two  He denies and numbness, extremity weakness or tingling, or any neurologic deficit  He endorses significant weight loss of 40 Lb, continued dysguesia, and xerostomia with thick mucous  He denies dysphagia or odynophagia  He endorses significant worsening fatigue and lack of appetite  He otherwise denies new onset headache, vision changes, nausea, vomiting or diarrhea  The remainder of the review of systems was performed and was otherwise negative            Consults    Review of Systems as per HPI    Historical Information Previous Oncology History: as per HPI  Past Medical History:   Diagnosis Date    Anemia     BPH (benign prostatic hyperplasia)     [facility] [address] [address]     Candida esophagitis ([facility] [address] ) 03/2018    Disease of thyroid gland     GAVE (gastric antral vascular ectasia)     History of immunotherapy     Pembrolizumab    Hx of radiation therapy 03/2018    lower thoracic spine and upper lumbar spine    Hypertension     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HCC)     Radiation esophagitis 03/2018    Spinal stenosis     Tobacco abuse      Past Surgical History:   Procedure Laterality Date    ESOPHAGOGASTRODUODENOSCOPY N/A 3/20/2018    Procedure: ESOPHAGOGASTRODUODENOSCOPY (EGD); Surgeon: Preeti Umanzor MD;  Location: [facility];   Service: Gastroenterology     Family History   Problem Relation Age of Onset    Esophageal cancer Mother     Diabetes Father     No Known Problems Sister     No Known Problems Brother      Social History   History   Alcohol Use No     History   Drug Use No     History   Smoking Status    Former Smoker    Packs/day: 0 50    Years: 15 00    Types: Cigarettes    Start date: 1968    Quit date: 1/11/2018   Smokeless Tobacco    Never Used     Comment: Quit December 2017       Meds/Allergies   current meds:   Current Facility-Administered Medications   Medication Dose Route Frequency    acetaminophen (TYLENOL) tablet 650 mg  650 mg Oral Q8H [address]    enoxaparin (LOVENOX) subcutaneous injection 40 mg  40 mg Subcutaneous Daily    finasteride (PROSCAR) tablet 5 mg  5 mg Oral Daily    folic acid (FOLVITE) tablet 1 mg  1 mg Oral Daily    levothyroxine tablet 125 mcg  125 mcg Oral Early Morning    morphine injection 2 mg  2 mg Intravenous Q4H PRN    ondansetron (ZOFRAN) injection 4 mg  4 mg Intravenous Q6H PRN    oxybutynin (DITROPAN-XL) 24 hr tablet 10 mg  10 mg Oral HS    polyethylene glycol (MIRALAX) packet 17 g  17 g Oral Daily PRN    saliva substitute (MOUTH KOTE) mucosal solution 5 spray  5 spray Mouth/Throat PRN    senna oral syrup 8 8 mg  8 8 mg Oral HS    senna-docusate sodium (SENOKOT S) 8 6-50 mg per tablet 1 tablet  1 tablet Oral BID    sodium chloride 0 9 % infusion  75 mL/hr Intravenous Continuous       No Known Allergies    Objective     Intake/Output Summary (Last 24 hours) at 12/05/18 1353  Last data filed at 12/04/18 2001   Gross per 24 hour   Intake                0 ml   Output              225 ml   Net             -225 ml     Invasive Devices     Central Venous Catheter Line            Port A Cath -- days          Peripheral Intravenous Line            Peripheral IV 12/04/18 Left Antecubital 1 day              Physical Exam   GENERAL:  Appears stated age, in no apparent distress  Alert and oriented  HEENT:  Normocephalic, atraumatic   extraocular muscles intact  Oral mucosa dry, with thick mucous in oral cavity  LYMPHATICS:  No cervical, supraclavicular, or infraclavicular lymphadenopathy noted bilaterally  PULMONARY:  Respirations unlabored  CARDIOVASCULAR:  Regular rate  ABDOMEN:  Soft, nondistended hepatosplenomegaly  NEUROLOGIC: Moving all extremities, No focal deficits noted  EXTREMITIES: no clubbing, cyanosis, or edema  PSYCHIATRIC: normal mood and affect  Appropriate thought content and judgement  BACK: non tender to palpation of spine  Lab Results: I have personally reviewed pertinent reports  Imaging Studies: I have personally reviewed pertinent films in PACS  EKG, Pathology, and Other Studies: I have personally reviewed pertinent reports  Assessment/Plan     Assessment and Plan:  69year old with metastatic lung cancer, on chemotherapy, status post previous EBRT to T5-L3 with 27 GY in 9 fractions completed 3/16/18, now with progressive failure to thrive and pain      I reviewed the imaging findings with the patient and explained that as I am unable to localize the upper back pain to findings on CT as he is endorsing pain 4-5 cm to the right of T3/4, and does not have tenderness to palpation over spine in that area, I believe his pain may be secondary to the pleural thickening seen at various levels on the CT  Regarding the pain that is lower down over T10, this area was encompassed in the previous treatment, and given interval of just 9 months ago, with significant side effects experience through treatment, I would be hesitant to retreat this area  I did also call the patient's room back while re-reviewing the imaging and spoke to his wife, Marci Peres, to discuss that I would not recommend RT at this time  Recommend optimal medical pain control per Palliative medicine  Thank you for allowing us to participate in the care of Mr Valencia Dial  Code Status: Level 1 - Full Code  Advance Directive and Living Will:      Power of :    POLST:      Counseling / Coordination of Care  Total floor / unit time spent today 50 minutes  Greater than 50% of total time was spent with the patient and / or family counseling and / or coordination of care

## 2018-12-05 NOTE — CONSULTS
Consultation - [address] Dalton 69 y o  male MRN: [id number]  Unit/Bed#: -01 Encounter: [id number]      Assessment/Plan     Assessment:  Lung cancer stage IV  Osseous mets - lesion on transverse process of T3, T7, L1; lytic lesion on the left ilium; destructive lesion on the left bryson sacrum --with soft tissue component; lesion of right femoral neck  Hx of radiation therapy 3/2018 to lower T and upper L spine  Hx of immunotherapy - pembrolizumab  Decreased appetite  Dysguesia  Xerostomia  Nausea  Weight loss  Fatigue  Back pain    Plan:  Symptom management:  Decreased appetite -has been resistant to multiple trials of outpatient appetite stimulants  And GI workup in progress    Dysguesia - refractory symptom post XRT treatment     Xerostomia - refractory symtpom post XRT treatment  It has not responded to any OTC measures or cholingeric prescriptions  He did not tolerate a trial of stopping his use of oxybutynin as an outpatient  Weight loss - This is the end result of the first 3 problems  GI work up in progress  Back pain - will be getting seen by rad/onc  If it comes down to medical management to address cancer related pain I will talk to him about a medication regime  He has not tolerated opioid medications in the past secondary to constipation  Goals of Care:  Level 1 code status    History of Present Illness   Physician Requesting Consult: Osiel Engel MD  Reason for Consult / Principal Problem: pain  HPI: Von Deleon is a 69y o  year old male with stage IV lung cancer initially diagnosed in 1/2018  He is under the care of medical oncology with Dr Perlita Pickard  He received carboplatin/alimta and pembrolizumab along with Xgeva from 1/2018 to 6/2018  In March 2018 he underwent palliative radiation to his lower thoracic and upper lumbar spine  He suffered with pneumonitis secondary to pembrolizumab in 6/2018 when this therapy was stopped    He also did not tolerate radiation very well suffering with radiation and candidal esophagitis for which he was hospitalized  Since 7/2018 He has been on maintenance and Alimta and Xgeva  Especially since radiation, he has been complaining of dry mouth, thick mucus, food not taking right, and nausea  His symptoms have been refractory to treatments with multiple over the counter products and prescriptions including use of medical marijuana  Given the refractory nature of his symptoms I also empirically treated him for candidal esophagitis with Diflucan over 1 month time since he refused to return to GI or the ENT specialist on an outpatient basis  Unfortunately his symptoms did not improve  With his continued weight loss he has now been admitted to the hospital with an overall picture of failure to thrive and worsening back pain  Inpatient consult to Palliative Care  Consult performed by: Enrique Garcia ordered by: Americo Hodge        Review of Systems   Constitutional: Positive for activity change, appetite change, fatigue and unexpected weight change  HENT: Positive for sore throat and trouble swallowing  Dry mouth, thick pasty mucus   Gastrointestinal: Positive for nausea  Musculoskeletal: Positive for back pain and gait problem  Psychiatric/Behavioral: Positive for sleep disturbance         Historical Information   Past Medical History:   Diagnosis Date    Anemia     BPH (benign prostatic hyperplasia)     Cancer [facility] lung [facility] )     Candida esophagitis ([facility] ) 03/2018    Disease of thyroid gland     GAVE (gastric antral vascular ectasia)     History of immunotherapy     Pembrolizumab    Hx of radiation therapy 03/2018    lower thoracic spine and upper lumbar spine    Hypertension     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HCC)     Radiation esophagitis 03/2018    Spinal stenosis     Tobacco abuse      Past Surgical History:   Procedure Laterality Date    ESOPHAGOGASTRODUODENOSCOPY N/A 3/20/2018    Procedure: ESOPHAGOGASTRODUODENOSCOPY (EGD); Surgeon: Melodie Augustin MD;  [facility]: [facility];   Service: Gastroenterology     Social History     Social History    Marital status: /Civil Union     Spouse name: James Islas Number of children: 2    Years of education: N/A     Occupational History    retired       Social History Main Topics    Smoking status: Former Smoker     Packs/day: 0 50     Years: 15 00     Types: Cigarettes     Start date: 1968     Quit date: 1/11/2018    Smokeless tobacco: Never Used      Comment: Quit December 2017    Alcohol use No    Drug use: No    Sexual activity: Not on file     Other Topics Concern    Not on file     Social History Narrative    No narrative on file     Family History   Problem Relation Age of Onset    Esophageal cancer Mother     Diabetes Father     No Known Problems Sister     No Known Problems Brother      Meds/Allergies   all current active meds have been reviewed, current meds:   Current Facility-Administered Medications   Medication Dose Route Frequency    acetaminophen (TYLENOL) tablet 650 mg  650 mg Oral Q8H [address]    enoxaparin (LOVENOX) subcutaneous injection 40 mg  40 mg Subcutaneous Daily    finasteride (PROSCAR) tablet 5 mg  5 mg Oral Daily    folic acid (FOLVITE) tablet 1 mg  1 mg Oral Daily    levothyroxine tablet 125 mcg  125 mcg Oral Early Morning    morphine injection 2 mg  2 mg Intravenous Q4H PRN    ondansetron (ZOFRAN) injection 4 mg  4 mg Intravenous Q6H PRN    oxybutynin (DITROPAN-XL) 24 hr tablet 10 mg  10 mg Oral HS    polyethylene glycol (MIRALAX) packet 17 g  17 g Oral Daily PRN    saliva substitute (MOUTH KOTE) mucosal solution 5 spray  5 spray Mouth/Throat PRN    senna oral syrup 8 8 mg  8 8 mg Oral HS    senna-docusate sodium (SENOKOT S) 8 6-50 mg per tablet 1 tablet  1 tablet Oral BID    sodium chloride 0 9 % infusion  75 mL/hr Intravenous Continuous    and PTA meds:   Prior to Admission Medications   Prescriptions Last Dose Informant Patient Reported? Taking? Artificial Saliva (BIOTENE MOISTURIZING MOUTH MT)  Self Yes No   Sig: Apply to the mouth or throat   finasteride (PROSCAR) 5 mg tablet  Self No No   Sig: Take 1 tablet (5 mg total) by mouth daily   folic acid (FOLVITE) 1 mg tablet   No No   Sig: TAKE 1 TABLET (1 MG TOTAL) BY MOUTH DAILY   levothyroxine 125 mcg tablet  Self No No   Sig: Take 1 tablet (125 mcg total) by mouth daily   oxybutynin (DITROPAN XL) 10 MG 24 hr tablet   Yes No   Sig: Take 10 mg by mouth daily at bedtime   senna 8 8 mg/5 mL syrup  Self Yes No   Sig: Take by mouth daily at bedtime      Facility-Administered Medications: None     No Known Allergies    Objective   Vitals:    12/05/18 0748   BP: 136/82   Pulse: 80   Resp: 18   Temp: 98 °F (36 7 °C)   SpO2: 100%     Physical Exam  Constitutional: Overall still appears well but he is thinner  In no acute distress laying on his back in bed  Head: Normocephalic and atraumatic  Eyes: EOM are normal  Right eye exhibits no discharge  Left eye exhibits no discharge  No scleral icterus  Pulmonary/Chest: Effort normal  No stridor  No respiratory distress  Abdominal: No distension  Musculoskeletal: No edema  Neurological: Alert, oriented and appropriately conversant  Skin: Skin is dry, not diaphoretic  Psychiatric: Displays a normal mood and affect  Behavior, judgement and thought content appear normal      Lab Results:   I have personally reviewed pertinent labs  , CBC:   Lab Results   Component Value Date    WBC 7 14 12/05/2018    HGB 8 4 (L) 12/05/2018    HCT 27 6 (L) 12/05/2018    MCV 90 12/05/2018     12/05/2018    MCH 27 3 12/05/2018    MCHC 30 4 (L) 12/05/2018    RDW 17 2 (H) 12/05/2018    MPV 9 5 12/05/2018   , CMP:   Lab Results   Component Value Date    SODIUM 138 12/05/2018    K 3 9 12/05/2018     12/05/2018    CO2 25 12/05/2018    BUN 16 12/05/2018    CREATININE 1 07 12/05/2018 CALCIUM 9 2 12/05/2018    AST 20 12/05/2018    ALT 24 12/05/2018    ALKPHOS 88 12/05/2018    EGFR 67 12/05/2018     Imaging Studies: I have personally reviewed pertinent reports  Code Status: Level 1 - Full Code  Advance Directive and Living Will:      Power of :    POLST:      Counseling / Coordination of Care  Total floor / unit time spent today 60 minutes

## 2018-12-05 NOTE — UTILIZATION REVIEW
[address]  Utilization Review Department  Phone: [phone]; Fax [phone]  [email or web address]  ATTENTION: Please call with any questions or concerns to [phone]  and carefully listen to the prompts so that you are directed to the right person  Send all requests for admission clinical reviews, approved or denied determinations and any other requests to fax [phone]  All voicemails are confidential   Initial Clinical Review    Admission: Date/Time/Statement: inpatient 12/04/18 1203    Orders Placed This Encounter   Procedures    Inpatient Admission     Standing Status:   Standing     Number of Occurrences:   1     Order Specific Question:   Admitting Physician     Answer:   Henri Jama     Order Specific Question:   Level of Care     Answer:   Med Surg [16]     Order Specific Question:   Bed Type     Answer:   Gui [4]     Order Specific Question:   Estimated length of stay     Answer:   More than 2 Midnights     Order Specific Question:   Certification     Answer:   I certify that inpatient services are medically necessary for this patient for a duration of greater than two midnights  See H&P and MD Progress Notes for additional information about the patient's course of treatment  ED: Date/Time/Mode of Arrival:   ED Arrival Information     Patient not seen in ED                       Chief Complaint: No chief complaint on file  History of Illness: 69 y o  Male with PMHx of stage IV lung cancer on maintenance therapy with Alimta and subsequent CT scans demonstrating stability and improvement of pulmonary nodules presented at office visit with fatigue, shortness of breat, pain sever dry mouth with decreased appetite  It was decided to hold therapy & re-evaluate patient for direct hospital admission    Pt weight 10/31/4847=153 lb 12/04/2018=157lb      ED Vital Signs:   ED Triage Vitals   Temperature Pulse Respirations Blood Pressure SpO2   12/04/18 1103 12/04/18 1103 12/04/18 1103 12/04/18 1103 12/04/18 1103   98 7 °F (37 1 °C) 98 14 139/83 99 %      Temp Source Heart Rate Source Patient Position - Orthostatic VS BP Location FiO2 (%)   12/04/18 1103 -- 12/04/18 1103 12/04/18 1103 --   Axillary  Lying Left arm       Pain Score       12/04/18 1213       6        Wt Readings from Last 1 Encounters:   12/04/18 66 7 kg (147 lb)       Vital Signs (abnormal): none    Abnormal Labs/Diagnostic Test Results: 12/04/2018 glucose 173, albumin 3,   RBC 3 78     Hemoglobin 10 2     Hematocrit 33 9           12/05/2018 albumin 2 5,   RBC 3 08     Hemoglobin 8 4     Hematocrit 27 6       ED Treatment:   Medication Administration - No Administrations Displayed (No Start Event Found)     None          Past Medical/Surgical History:    Active Ambulatory Problems     Diagnosis Date Noted    Other specified hypothyroidism     Essential hypertension     Benign prostatic hyperplasia with weak urinary stream 12/22/2017    GAVE (gastric antral vascular ectasia) 12/22/2017    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 ([facility] )     Anemia     Constipation 01/13/2018    Other fatigue 03/19/2018    Dysphagia 03/19/2018    Leukopenia 03/19/2018    Total bilirubin, elevated 03/19/2018    Radiation-induced esophagitis 03/19/2018    Adenocarcinoma of left lung ([address] ) 04/03/2018    Acute respiratory failure with hypoxia  06/18/2018    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  06/18/2018    Pulmonary fibrosis  06/18/2018    Lung disease, restrictive 07/03/2018    Chronic respiratory failure with hypoxia (HCC) 08/14/2018    Interstitial lung disease ([address] ) 08/28/2018    Xerostomia 08/28/2018    Dysgeusia 09/27/2018    Decreased appetite 10/31/2018       Past Medical History:   Diagnosis Date    Anemia     BPH (benign prostatic hyperplasia)     Cancer of lung (HCC)     Disease of thyroid gland     GAVE (gastric antral vascular ectasia)  Hypertension     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[facility] )     Spinal stenosis     Tobacco abuse        Admitting Diagnosis: [facility] [C34 90]    Age/Sex: 69 y o  male    Assessment/Plan: 69 y o  Male presents with decline during lung cancer treatment, Dehydration-IV saline & monitor; CHEM profile, BC to monitor anemia, Radiation induced esophagitis concern for food not going thru-no vomiting or regurgitation-Diagnosed with GAVE history and dysphagia, appreciate GI input, Consult [facility] for 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, faliure to thrive: secondary progressive loss of appetite /weight loss/poor oral hydration, progressive dyspnea on exertion  Pain meds, Palliative care input, PT/OT eval & treat      Admission Orders:  Scheduled Meds:   Current Facility-Administered Medications:  acetaminophen 650 mg Oral Q8H [address]    docusate sodium 100 mg Oral BID    enoxaparin 40 mg Subcutaneous Daily    finasteride 5 mg Oral Daily    folic acid 1 mg Oral Daily    levothyroxine 125 mcg Oral Early Morning    morphine injection 2 mg Intravenous Q4H PRN    ondansetron 4 mg Intravenous Q6H PRN    oxybutynin 10 mg Oral HS    polyethylene glycol 17 g Oral Daily PRN    saliva substitute 5 spray Mouth/Throat PRN    senna 8 8 mg Oral HS    sodium chloride 75 mL/hr Intravenous Continuous Last Rate: 75 mL/hr (12/05/18 0939)     Continuous Infusions:   sodium chloride 75 mL/hr Last Rate: 75 mL/hr (12/05/18 0939)     PRN Meds:   Peripheral IV  Dietary supplements  NPO (12/5 8467)  Height/weight on arrival  Inpatient to Hematology, Gastroenterology, Palliative Care, Pulmonology, Radiation Oncology  scd  Up w assist

## 2018-12-06 PROBLEM — E43 SEVERE PROTEIN-CALORIE MALNUTRITION (HCC): Status: ACTIVE | Noted: 2018-12-06

## 2018-12-06 PROCEDURE — 99232 SBSQ HOSP IP/OBS MODERATE 35: CPT | Performed by: FAMILY MEDICINE

## 2018-12-06 PROCEDURE — 99221 1ST HOSP IP/OBS SF/LOW 40: CPT | Performed by: INTERNAL MEDICINE

## 2018-12-06 PROCEDURE — 99232 SBSQ HOSP IP/OBS MODERATE 35: CPT | Performed by: INTERNAL MEDICINE

## 2018-12-06 PROCEDURE — 99233 SBSQ HOSP IP/OBS HIGH 50: CPT | Performed by: PHYSICIAN ASSISTANT

## 2018-12-06 RX ORDER — SENNOSIDES 8.8 MG/5ML
8.8 LIQUID ORAL
Status: DISCONTINUED | OUTPATIENT
Start: 2018-12-06 | End: 2018-12-07 | Stop reason: HOSPADM

## 2018-12-06 RX ORDER — ONDANSETRON 4 MG/1
4 TABLET, ORALLY DISINTEGRATING ORAL
Status: DISCONTINUED | OUTPATIENT
Start: 2018-12-06 | End: 2018-12-07 | Stop reason: HOSPADM

## 2018-12-06 RX ORDER — MORPHINE SULFATE 20 MG/ML
5 SOLUTION ORAL EVERY 4 HOURS PRN
Status: DISCONTINUED | OUTPATIENT
Start: 2018-12-06 | End: 2018-12-07 | Stop reason: HOSPADM

## 2018-12-06 RX ORDER — ACETAMINOPHEN 325 MG/1
975 TABLET ORAL EVERY 8 HOURS SCHEDULED
Status: DISCONTINUED | OUTPATIENT
Start: 2018-12-06 | End: 2018-12-07 | Stop reason: HOSPADM

## 2018-12-06 RX ADMIN — SENNOSIDES AND DOCUSATE SODIUM 1 TABLET: 8.6; 5 TABLET ORAL at 08:36

## 2018-12-06 RX ADMIN — FOLIC ACID 1 MG: 1 TABLET ORAL at 08:35

## 2018-12-06 RX ADMIN — ONDANSETRON 4 MG: 2 INJECTION INTRAMUSCULAR; INTRAVENOUS at 08:35

## 2018-12-06 RX ADMIN — SUCRALFATE 1000 MG: 1 SUSPENSION ORAL at 17:36

## 2018-12-06 RX ADMIN — ONDANSETRON 4 MG: 2 INJECTION INTRAMUSCULAR; INTRAVENOUS at 11:32

## 2018-12-06 RX ADMIN — SODIUM CHLORIDE 75 ML/HR: 0.9 INJECTION, SOLUTION INTRAVENOUS at 01:26

## 2018-12-06 RX ADMIN — ONDANSETRON 4 MG: 4 TABLET, ORALLY DISINTEGRATING ORAL at 17:36

## 2018-12-06 RX ADMIN — PANTOPRAZOLE SODIUM 40 MG: 40 TABLET, DELAYED RELEASE ORAL at 07:47

## 2018-12-06 RX ADMIN — MORPHINE SULFATE 2 MG: 2 INJECTION, SOLUTION INTRAMUSCULAR; INTRAVENOUS at 08:35

## 2018-12-06 RX ADMIN — MORPHINE SULFATE 2 MG: 2 INJECTION, SOLUTION INTRAMUSCULAR; INTRAVENOUS at 14:55

## 2018-12-06 RX ADMIN — FINASTERIDE 5 MG: 5 TABLET, FILM COATED ORAL at 08:36

## 2018-12-06 RX ADMIN — OXYBUTYNIN CHLORIDE 10 MG: 5 TABLET, EXTENDED RELEASE ORAL at 21:23

## 2018-12-06 RX ADMIN — SENNOSIDES A AND B 8.8 MG: 415.36 LIQUID ORAL at 21:24

## 2018-12-06 RX ADMIN — SUCRALFATE 1000 MG: 1 SUSPENSION ORAL at 07:48

## 2018-12-06 RX ADMIN — ACETAMINOPHEN 975 MG: 325 TABLET, FILM COATED ORAL at 21:22

## 2018-12-06 RX ADMIN — ENOXAPARIN SODIUM 40 MG: 40 INJECTION SUBCUTANEOUS at 08:36

## 2018-12-06 RX ADMIN — SUCRALFATE 1000 MG: 1 SUSPENSION ORAL at 11:32

## 2018-12-06 RX ADMIN — PILOCARPINE HYDROCHLORIDE 5 MG: 5 TABLET, FILM COATED ORAL at 11:32

## 2018-12-06 RX ADMIN — LEVOTHYROXINE SODIUM 125 MCG: 125 TABLET ORAL at 07:47

## 2018-12-06 RX ADMIN — SUCRALFATE 1000 MG: 1 SUSPENSION ORAL at 01:26

## 2018-12-06 RX ADMIN — ACETAMINOPHEN 650 MG: 325 TABLET, FILM COATED ORAL at 07:47

## 2018-12-06 NOTE — ASSESSMENT & PLAN NOTE
Malnutrition Findings:   Malnutrition type: Chronic illness  Degree of Malnutrition: Other severe protein calorie malnutrition (related to inadequate intake, catabolic illness)    BMI Findings: Body mass index is 23 02 kg/m²

## 2018-12-06 NOTE — ASSESSMENT & PLAN NOTE
Completed radiation 3/18  Reports occassional dysphagia   EDG showed some gastritis yesterday -  protonix and carafate  Artificial saliva/ lozenges/ ?pilocarpine  Palliative care input

## 2018-12-06 NOTE — PROGRESS NOTES
Progress Note Jhonatan Cai 1940, 69 y o  male MRN: [id number]    Unit/Bed#: -01 Encounter: [id number]    Primary Care Provider: Pop Hinson MD   Date and time admitted to hospital: 12/4/2018 11:03 AM    Severe protein-calorie malnutrition ([address] )   Assessment & Plan    Malnutrition Findings:   Malnutrition type: Chronic illness  Degree of Malnutrition: Other severe protein calorie malnutrition (related to inadequate intake, catabolic illness)    BMI Findings: Body mass index is 23 02 kg/m²  Pulmonary fibrosis    Assessment & Plan    In context of past pembrolizumab related pneumonitis  Note mild associated nodularity and pleural thickening  NO radiation Rx per [facility]     Radiation-induced esophagitis   Assessment & Plan    Completed radiation 3/18  Reports occassional dysphagia   EDG showed some gastritis yesterday -  protonix and carafate  Artificial saliva/ lozenges/ ?pilocarpine  Palliative care input     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 [facility])   Assessment & Plan    Under follow up by Dr Livia Hamilton  Last chemo 1 month ago and presently on hold  Sent from [address] [place] [address] Dr office with FTT  Port- a cath in situ  Further plan per oncology team     * FTT (failure to thrive) in adult   Assessment & Plan    2" progressive loss of appetite/ loss of weight/ worsened pain/ poor oral hydration  Progressive dyspnea with minimal exertion; increased o2 need  IVF - conntinue  Pain medications  Palliative care consultation noted  PT/OT - wishes to go home       VTE Pharmacologic Prophylaxis: Enoxaparin (Lovenox)  Mechanical: Mechanical VTE prophylaxis in place  Patient Centered Rounds: I have performed bedside rounds with nursing staff today  Discussions with Specialists or Other Care Team Provider:     Education and Discussions with Family / Patient:     Time Spent for Care: More than 50% of total time spent on counseling and coordination of care as described above      Current Length of Stay: 2 day(s)    Current Patient Status: Inpatient   Certification Statement: The patient will continue to require additional inpatient hospital stay due to as above    Discharge Plan:    Code Status: Level 1 - Full Code      Subjective: will above    Objective:     Vitals:   Temp (24hrs), Av 1 °F (36 7 °C), Min:97 8 °F (36 6 °C), Max:98 2 °F (36 8 °C)    Temp:  [97 8 °F (36 6 °C)-98 2 °F (36 8 °C)] 98 1 °F (36 7 °C)  HR:  [72-86] 72  Resp:  [16-18] 18  BP: (109-139)/(60-80) 139/70  SpO2:  [95 %-100 %] 99 %  Body mass index is 23 02 kg/m²  Input and Output Summary (last 24 hours): Intake/Output Summary (Last 24 hours) at 18 1601  Last data filed at 18 1829   Gross per 24 hour   Intake              390 ml   Output                0 ml   Net              390 ml       Physical Exam   HENT:   Head: Normocephalic  Eyes: Pupils are equal, round, and reactive to light  Cardiovascular: Normal heart sounds  Pulmonary/Chest: Effort normal    Abdominal: Soft  Neurological: He is alert  Skin: There is pallor  Additional Data:     Labs:      Results from last 7 days  Lab Units 18  0432 18  0845   WBC Thousand/uL 7 14 8 27   HEMOGLOBIN g/dL 8 4* 10 2*   HEMATOCRIT % 27 6* 33 9*   PLATELETS Thousands/uL 298 331   NEUTROS PCT %  --  83*   LYMPHS PCT %  --  5*   MONOS PCT %  --  8   EOS PCT %  --  3       Results from last 7 days  Lab Units 18  0432   POTASSIUM mmol/L 3 9   CHLORIDE mmol/L 104   CO2 mmol/L 25   BUN mg/dL 16   CREATININE mg/dL 1 07   CALCIUM mg/dL 9 2   ALK PHOS U/L 88   ALT U/L 24   AST U/L 20           * I Have Reviewed All Lab Data Listed Above  Imaging:  Imaging Personally Reviewed by Myself Includes:     Cultures:   Blood Culture:   Lab Results   Component Value Date    BLOODCX No Growth After 5 Days  2018    BLOODCX No Growth After 5 Days  2018    BLOODCX No Growth After 5 Days  2017    BLOODCX No Growth After 5 Days   2017 Urine Culture: No results found for: URINECX  Sputum Culture: No components found for: SPUTUMCX  Wound Culture: No results found for: WOUNDCULT    Last 24 Hours Medication List:     Current Facility-Administered Medications:  acetaminophen 975 mg Oral Q8H Leslye Mazariegos MD   enoxaparin 40 mg Subcutaneous Daily Jayjay Farias MD   finasteride 5 mg Oral Daily Jayjay Farias MD   folic acid 1 mg Oral Daily Jayjay Farias MD   levothyroxine 125 mcg Oral Early Morning Jayjay Farias MD   morphine injection 2 mg Intravenous Q3H PRN Jayjay Farias MD   morphine sulfate (concentrate) 5 mg Oral Q4H PRN Amy Cheek MD   ondansetron 4 mg Oral TID AC Amy Cheek MD   oxybutynin 10 mg Oral HS Jayjay Farias MD   pantoprazole 40 mg Oral Early Morning Justin Morris PA-C   polyethylene glycol 17 g Oral Daily PRN Jayjay Farias MD   saliva substitute 5 spray Mouth/Throat PRN Jayjay Farias MD   senna 8 8 mg Oral HS Amy Cheek MD   sucralfate 1,000 mg Oral Q6H [address] Justin Morris PA-C        Today, Patient Was Seen By: Jayjay Farias MD    ** Please Note: Dragon 360 Dictation voice to text software may have been used in the creation of this document   **

## 2018-12-06 NOTE — ASSESSMENT & PLAN NOTE
Under follow up by Dr Kelly Rutherford  Last chemo 1 month ago and presently on hold  Sent from [facility] office with FTT  Port- a cath in situ  Further plan per oncology team

## 2018-12-06 NOTE — CONSULTS
Consultation - Pulmonary Medicine   Ewelina Rincon 69 y o  male MRN: [id number]  Unit/Bed#: -01 Encounter: [id number]      Assessment/Plan:    1  Idiopathic pulmonary fibrosis prior to malignancy          *  patient has significant basalar and pleural thickening B/L in both lung bases with extensive pulmonary fibrosis      *  will follow-up in office      *  anti fibrinolytic therapy was recommended as an outpatient, wife declined      *  No steroids indicated at this time    2  Respiratory failure with chronic hypoxia in the setting of #1        *  patient is in no acute signs of respiratory distress      *  patient wears 2 L at rest, increases up to 4-5 L upon ambulation      *  patient currently not on any inhaled medications    3  Satiety with nausea, weight loss, and xerostomia         *  GI following and palliative following       *  patient had EGD 12/5/18, esophagus normal, linear erythema noted on stomach, normal appearing duodenal, biopsy taken        *  Impression: bile reflux moderate, gastritis         *  GI recommends avoiding NSAIDs, antiemetics before meals, Carafate q i d , continue PPI    4  Ageusia        *  patient has thrush on his tongue      *  recommended switch and small    5   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 01/2018         *  patient follows with Dr Ariel Haile       *  initiated on carboplatin/pemetrexed + pembrolizumab with palliative radiation to thoracic spine Q 3 months       *  maintenance therapy with Alimta (6/2018-10/2018), bone scan 10/2018- stable bone mets, due to decline- chemo was placed on hold    Outpatient follow up per D/C instructions      History of Present Illness   Physician Requesting Consult: Jesus Cerda MD  Reason for Consult / Principal Problem:  Interstitial lung disease  Hx and PE limited by:  Nothing  Chief Complaint:  "I can't taste any of my food"  HPI: Ewelina Rincon is a 69 y o  male who presented to [facility]  [address] with several days of generalized weakness, back pain, and decreased appetite  Patient has a history of lung cancer, spinal cancer,GAVE, radiation esophagitis, compression fracture T10/L1, and tobacco abuse  Patient is mostly bed bound secondary to excruciating pain upon ambulation  Patient reported that since he started chemotherapy he has had a decreased appetite along with nausea but no vomiting  Patient stated over the past couple weeks he has had absolutely no appetite, increased dry mouth, and he feels his food has no taste  Patient reports an approximate 15 lb weight loss over the last 2 months  Francis affect was rather flat and he expresses frustration with his continued hospital care  He stated that since he was diagnosed with cancer his health has continued to decline and he feels the treatment is making him deteriorate faster  Patient was diagnosed with lung cancer with mets to his spinal cord in January 2018  Patient has received both chemo and radiation, with his last course of chemo completed in October 2018  Patient has had several side effects including dysphagia, thrush, constipation, back pain, decreased appetite, and overall failure to thrive  Patient currently denies fever, chills, hemoptysis, headache, chest pain, and palpitations  From a pulmonary standpoint, patient has had PFTs and a 6 min walk test in the office this year  Patient was recommended to wear 2 L chronically and up to 5 L upon ambulation  Patient states he is not compliant with this  He feels that he does not feel short of breath walking so why does he require oxygen  Patient reports that he smoked for approximately 50 years 1-2 packs a day  Patient reports quitting in the beginning of January 2018  Patient reports occupational exposure to asbestos as he works as an     Patient was following with pulmonary, and in October it was recommended the possibility of starting anti fibrotic agent however wife declined this intervention at that time  Inpatient consult to Pulmonology  Consult performed by: Amanda Ha  Consult ordered by: Geraldine Joe          Review of Systems   Constitutional: Positive for activity change, appetite change and chills  Negative for fever  HENT: Negative for facial swelling and sinus pain  Respiratory: Positive for cough and shortness of breath  Negative for apnea, choking, chest tightness, wheezing and stridor  Cardiovascular: Negative for chest pain and leg swelling  Gastrointestinal: Negative for abdominal pain and constipation  Genitourinary: Negative for enuresis and frequency  Neurological: Negative for dizziness and headaches  Psychiatric/Behavioral: Negative for agitation and behavioral problems  Historical Information   Past Medical History:   Diagnosis Date    Anemia     BPH (benign prostatic hyperplasia)     [facility]     [facility] esophagitis ([facility] ) 03/2018    Disease of thyroid gland     GAVE (gastric antral vascular ectasia)     History of immunotherapy     Pembrolizumab    Hx of radiation therapy 03/2018    lower thoracic spine and upper lumbar spine    Hypertension     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HCC)     Radiation esophagitis 03/2018    Spinal stenosis     Tobacco abuse      Past Surgical History:   Procedure Laterality Date    ESOPHAGOGASTRODUODENOSCOPY N/A 3/20/2018    Procedure: ESOPHAGOGASTRODUODENOSCOPY (EGD); Surgeon: Yady Echeverria MD;  Location: [facility]; Service: Gastroenterology    ESOPHAGOGASTRODUODENOSCOPY N/A 12/5/2018    Procedure: ESOPHAGOGASTRODUODENOSCOPY (EGD); Surgeon: Yady Echeverria MD;  Location: [facility];   Service: Gastroenterology     Social History   History   Alcohol Use No     History   Drug Use No     History   Smoking Status    Former Smoker    Packs/day: 0 50    Years: 15 00    Types: Cigarettes    Start date: 1968    Quit date: 1/11/2018   Smokeless Tobacco    Never Used     Comment: Quit December 2017     Occupational History:  [employer]    Family History:   Family History   Problem Relation Age of Onset    Esophageal cancer Mother     Diabetes Father     No Known Problems Sister     No Known Problems Brother        Meds/Allergies   pertinent pulmonary meds have been reviewed    No Known Allergies    Objective   Vitals: Blood pressure 137/80, pulse 78, temperature 98 1 °F (36 7 °C), temperature source Oral, resp  rate 18, height 5' 7" (1 702 m), weight 66 7 kg (147 lb), SpO2 99 %  3L,Body mass index is 23 02 kg/m²  Intake/Output Summary (Last 24 hours) at 12/06/18 0809  Last data filed at 12/05/18 1829   Gross per 24 hour   Intake              390 ml   Output                0 ml   Net              390 ml     Invasive Devices     Central Venous Catheter Line            Port A Cath -- days          Peripheral Intravenous Line            Peripheral IV 12/05/18 Right Hand less than 1 day                Physical Exam   Constitutional: He is oriented to person, place, and time  He appears well-developed  He appears cachectic  HENT:   Head: Normocephalic and atraumatic  Patient has thrush along his tongue   Neck: Normal range of motion  Neck supple  Cardiovascular: Normal rate, regular rhythm and normal heart sounds  Exam reveals no gallop and no friction rub  No murmur heard  Pulmonary/Chest: Effort normal  No accessory muscle usage  No tachypnea and no bradypnea  No respiratory distress  He has decreased breath sounds  He has no wheezes  He has no rhonchi  He has no rales  He exhibits no tenderness  Dyspnea and hypoxia on exertion   Abdominal: Soft  Bowel sounds are normal  He exhibits no distension  There is no tenderness  Musculoskeletal: Normal range of motion  He exhibits no edema or deformity  Neurological: He is alert and oriented to person, place, and time  Skin: Skin is warm and dry     Psychiatric: He has a normal mood and affect  His behavior is normal        Lab Results: I have personally reviewed pertinent lab results from 12/04/2018    Imaging Studies: I have personally reviewed pertinent films in PACS     CT of the chest 12/04/2018- bronchiectasis changes in both lung bases, corkscrew ring of the bronchi, RUL nodule measuring 3 mm, RML nodule measuring 6 mm    EKG, Pathology, and Other Studies: I have personally reviewed pertinent films in PACS     ECHO 06/20/2018- EF 00% grade 1 diastolic dysfunction    Pulmonary Results (PFTs, PSG): I have personally reviewed pertinent films in PACS     January 2018:  FEV1/FVC ratio 79%, FEV1 96% of predicted, FVC 87% of predicted, TLC 79% of predicted, DLCO 64%    6 min walk test 07/03/2018- 92% on room air patient dropped to 87% after 42 m on 3 L    VTE Prophylaxis: Enoxaparin (Lovenox)    Code Status: Level 1 - Full Code    None    Portions of the record may have been created with voice recognition software  Occasional wrong word or "sound a like" substitutions may have occurred due to the inherent limitations of voice recognition software  Read the chart carefully and recognize, using context, where substitutions have occurred

## 2018-12-06 NOTE — MALNUTRITION/BMI
This medical record reflects one or more clinical indicators suggestive of malnutrition and/or morbid obesity  Malnutrition Findings:   Malnutrition type: Chronic illness  Degree of Malnutrition: Other severe protein calorie malnutrition (related to inadequate intake, catabolic illness)  Malnutrition Characteristics: Weight loss, Inadequate energy (as evidenced by 14% wt loss since 10/31/18 encounter (pt weighed 171 lb at that time), intake inadequate to meet >75% of estimated needs based on pt reported po history  Treatment includes oral supplementation )      See Nutrition note dated 12/6/18 for additional details  Completed nutrition assessment is viewable in the nutrition documentation

## 2018-12-06 NOTE — PROGRESS NOTES
Progress Note - [facility]   Christopher Loza 69 y o  male MRN: [id number]  Unit/Bed#: -01 Encounter: [id number]    CC: Metastatic Adenocarcinoma of the Lung, FTT  HPI: Christopher Loza is a 69y o  year old male with a history of metastatic lung cancer, on chemotherapy break since 10/2018 who was admitted from the outpatient medical oncology office on 12/4 for progressing back pain, anorexia with weight loss and overall weakness and failure to thrive  He reports progressing sharp back pain between his scapulae, largely to the left which is exacerbated by upright positioning especially with ambulation  Pain is slightly better with seated position with slight extention at the Tspine  Pain is not well controlled on current pain regimen but he states morphine this admission has helped  He is largely bed bound at home due to painful ambulation  He has had worsening dyspnea over the past week above his usual baseline  He has chronic dry cough with occasional phlegm but states he feels phlegm "gets stuck" in his throat and "wont come up"  No bowel or bladder issues  He reports ongoing dysgeusia, xerostomia and early satiety for past several months since he received prior palliative XRT to lumbar spine, this has worsened to the point of now a 15 pound weight loss over 5-6 weeks time  He has mild ongoing nausea, no emesis  No fever, chills, sweats, rash  Admission CBC and Chemistry panel were satisfactory  CT chest, abdomen and pelvis showed new nodular pleural thickening in the left pleura, progressing fibrosing interstitial lung disease and known bony metastatic disease in the T11, T10 vertebrae, T7 vertebrae and T3 vertebrae  No concerning findings in the abdomen and pelvis  He underwent an EGD this admission with gastric biopsies  obtained to assess for H pylori        Oncologic History:  Primary Oncology Provider: Dr Tony Ahumada     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 1/15/2018 Initial Diagnosis     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          1/31/2018 - 6/6/2018 Chemotherapy      Carboplatin plus Alimta plus Rubio Aissatou every 6 weeks           3/26/2018 -  Radiation     Radiation to the lower thoracic, upper lumbar spine area            6/18/2018 Adverse Reaction     Patient developed pneumonitis secondary to [facility] was discontinued and patient was given steroids  7/17/2018 -  Chemotherapy     Alimta 500 mg/m2 every 3 weeks     Xgeva 120 mg SQ every 12 weeks           10/30/2018: Most recent dose of chemotherapy  Patient elected for a chemotherapy treatment break going forward  Assessment/Plan:   #1 Adenocarcinoma of the lung with osseous involvement, s/p Pemetrexed, currently on treatment break  Last dose 10/30  #2 Back pain, suspect 2/2 #1  - new nodular pleural thickening in the left pleura seen with associated small left effusion seen on CT imaging this admission may indicate disease progression  We will continue to monitor this in the outpatient setting and if indicated by serial imaging further discussion of anti cancer therapy will be considered by the outpatient team  Additionally, his performance status will  need to improve to tolerate systemic therapy  Otherwise, no other evidence of disease progression including bony metastasis despite complaints of worsening mid thoracic back pain   -rad onc was consulted for consideration of palliative XRT to symptomatic mid to upper T-spine lesions, however this is not felt to be the best option at this time and therefore will continued palliative care recommendation for optimizing pain control with primary medical therapy  Dr Remberto Rodriguez is following this admission      #3 xerostomia, dysgeusia, s/p EGD with gastric biopsy pending  -unclear etiology, previously attributed to pall RT to lower t and l spine in 3/2018    -he is s/p EGD with GI this admission   No obvious findings to explain symptoms, however, biopsies were taken for evaluation of H  Pylori as a possible etiology  Biopsies currently pending  GI following   -Recommend nutrition consult, I have place orders in EPIC and I have discussed with our outpatient nutritionist, Baldemar Snider, who will follow up with him in the outpatient setting to optimize nutrition      #4 Dyspnea, suspect 2/2 pulmonary fibrosis, stable to improved  -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 ? ? Possibly malignant disease progression (see #1) I do not suspect this new nodularity is causing his worsening symptoms at this time  Recommend follow up CT chest in 6 weeks time to re assess the status of this or as determined by Dr Yannick De Santiago in the outpatient setting  We will ask pulmonology input as well       Please contact us if you have any questions  Subjective: Today, he reports some overall improvement of fatigue and dyspnea  He still has dry mouth and poor appetite  No throat pain  Back pain is stable to improved from admission with morphine  He has not had any fever, chills, nausea or changes in bowel or bladder function today  Review of Systems   Constitutional: Positive for appetite change  Negative for chills and fever  HENT:   Negative for mouth sores and sore throat  Eyes: Negative for icterus  Respiratory: Positive for shortness of breath  Negative for chest tightness, cough and hemoptysis  Cardiovascular: Negative for chest pain, leg swelling and palpitations  Gastrointestinal: Negative for abdominal pain, diarrhea, nausea and vomiting  Genitourinary: Positive for dysuria  Musculoskeletal: Positive for back pain  Negative for flank pain  Skin: Negative for rash     Neurological: Negative for dizziness, headaches, light-headedness and speech difficulty  Hematological: Negative  Does not bruise/bleed easily  Psychiatric/Behavioral: Negative for confusion  All other systems reviewed and are negative  Objective:  /80 (BP Location: Right arm)   Pulse 78   Temp 98 1 °F (36 7 °C) (Oral)   Resp 18   Ht 5' 7" (1 702 m)   Wt 66 7 kg (147 lb)   SpO2 99%   BMI 23 02 kg/m²     Physical Exam   Constitutional: He is oriented to person, place, and time  No distress  HENT:   Mouth/Throat: Oropharynx is clear and moist    Eyes: Conjunctivae and EOM are normal  No scleral icterus  Neck: Normal range of motion  Cardiovascular: Normal rate and regular rhythm  Pulmonary/Chest: Effort normal  No respiratory distress  2 5 L supplemental O2/nc   Abdominal: Soft  He exhibits no distension  There is no tenderness  Musculoskeletal: He exhibits no edema or tenderness  Lymphadenopathy:     He has no cervical adenopathy  Neurological: He is alert and oriented to person, place, and time  Skin: Skin is warm and dry  No rash noted  No erythema  No pallor  Vitals reviewed  Recent Labs      12/04/18   0845  12/05/18   0432   WBC  8 27  7 14   HGB  10 2*  8 4*   PLT  331  298   MCV  90  90   RDW  17 3*  17 2*   CREATININE  1 29  1 07   AST  22  20   ALT  33  24     Laboratory studies were reviewed    Procedures:  12/5 EGD (Dr Von Almaraz):  #1  Esophagus- normal appearing esophagus  Regular squamocolumnar junction noted at 40 cm       #2  Stomach- linear erythema noted in the antrum extending into the body, remainder of the gastric mucosa appeared atrophic, biopsies were obtained to assess for H pylori  There was bile noted in the stomach      #3  Duodenum- normal appearing duodenal mucosa within the bulb, duodenal sweep and D2           Imaging Studies:    -12/4 CT CAP:  There is progressing fibrosing interstitial lung disease, this may be chemotherapy related      There is a new nodular pleural thickening in the left pleura with associated small left effusion,, worrisome for new pleural metastatic disease     Bony metastatic disease remains unchanged  Previously noted lung nodules are stable    Pathology: None    Code Status: Level 1 - Full Code

## 2018-12-06 NOTE — PLAN OF CARE
Problem: DISCHARGE PLANNING - CARE MANAGEMENT  Goal: Discharge to post-acute care or home with appropriate resources  INTERVENTIONS:  - Conduct assessment to determine patient/family and health care team treatment goals, and need for post-acute services based on payer coverage, community resources, and patient preferences, and barriers to discharge  - Address psychosocial, clinical, and financial barriers to discharge as identified in assessment in conjunction with the patient/family and health care team  - Arrange appropriate level of post-acute services according to patients   needs and preference and payer coverage in collaboration with the physician and health care team  - Communicate with and update the patient/family, physician, and health care team regarding progress on the discharge plan  - Arrange appropriate transportation to post-acute venues  Outcome: Adequate for Discharge  LOS 2, not a bundle, not a readmit  CM met with Pt, Pt's wife, and Pt's son at bedside  Pt lives with his wife in a 1 level home with 0 JOI  Pt does not use any DME to ambulation, Pt wears oxygen at home from [address], 2 liters at rest and 5L ambulating  Pt does not have a history of HHC or Rehab  Pt is independent with ambulation and ADL's  Pt uses [facility] in [place] and has Rx coverage  Pt denies any MH or D&A history  Pt's wife Brittnee Davila is Pt's POA  Pt drives and Pt's wife drives to appointments  CM discussed VNA with Pt and family, Pt declines need for VNA at this time  CM name and role reviewed  Discharge Checklist reviewed and CM will continue to monitor for progress toward discharge goals in nursing and provider rounds  CM reviewed IMM with Pt  Patient identified as high risk for readmission (HRR)  Pt's family would like to schedule appointment  DCP: home, no needs   Tentative d/c on 12/7

## 2018-12-06 NOTE — SOCIAL WORK
LOS 2, not a bundle, not a readmit  CM met with Pt, Pt's wife, and Pt's son at bedside  Pt lives with his wife in a 1 level home with 0 JOI  Pt does not use any DME to ambulation, Pt wears oxygen at home from [address], 2 liters at rest and 5L ambulating  Pt does not have a history of HHC or Rehab  Pt is independent with ambulation and ADL's  [facility] uses [facility] in [place] and has Rx coverage  Pt denies any MH or D&A history  Pt's wife Katerin Armenta is Pt's POA  Pt drives and Pt's wife drives to appointments  CM discussed VNA with Pt and family, Pt declines need for VNA at this time  CM name and role reviewed  Discharge Checklist reviewed and CM will continue to monitor for progress toward discharge goals in nursing and provider rounds  CM reviewed IMM with Pt  Patient identified as high risk for readmission (HRR)  Pt's family would like to schedule appointment  DCP: home, no needs   Tentative d/c on 12/7

## 2018-12-06 NOTE — ASSESSMENT & PLAN NOTE
In context of past pembrolizumab related pneumonitis  Note mild associated nodularity and pleural thickening  NO radiation Rx per radonc

## 2018-12-06 NOTE — ASSESSMENT & PLAN NOTE
2" progressive loss of appetite/ loss of weight/ worsened pain/ poor oral hydration  Progressive dyspnea with minimal exertion; increased o2 need  IVF - conntinue  Pain medications  Palliative care consultation noted  PT/OT - wishes to go home

## 2018-12-06 NOTE — PROGRESS NOTES
Progress Note - Palliative & Supportive Care  [facility]  69 y o   male  /-01   MRN: [id number]  Encounter: [id number]     Assessment  Lung cancer stage IV  Osseous mets - lesion on transverse process of T3, T7, L1; lytic lesion on the left ilium; destructive lesion on the left bryson sacrum --with soft tissue component; lesion of right femoral neck  Hx of radiation therapy 3/2018 to lower T and upper L spine  Hx of immunotherapy - pembrolizumab  Decreased appetite  Dysguesia  Xerostomia  Nausea  Weight loss  Fatigue  Back pain    Plan:  Symptom Management:   Decreased appetite -has been resistant to multiple trials of outpatient appetite stimulants  See dietary recommendations    Dysguesia - refractory symptom post XRT treatment     Xerostomia - refractory symtpom post XRT treatment  It has not responded to any OTC measures or cholingeric prescriptions  He did not tolerate a trial of stopping his use of oxybutynin as an outpatient  Mouth kote at bedside     Weight loss - This is the end result of the first 3 problems       Back pain - Responding to use of morphine 2mg IV PRN  Will transition to use of roxanol as outpt  If use proves to be regular we can discuss more long acting agents      Goals of Care:  Level 1 code status    History  Patient's pain has been responsive to use of as needed morphine  His GI working up as been unrevealing of an overall cause of his failure to thrive  In discussing his transition out of the hospital he is opposed to taking oxycodone for pain as it is too constipating but so far morphine has been okay    ROS: Pertinent items are noted in HPI      Meds    Current Facility-Administered Medications:     acetaminophen (TYLENOL) tablet 975 mg, 975 mg, Oral, Q8H [address], Baldev George MD    enoxaparin (LOVENOX) subcutaneous injection 40 mg, 40 mg, Subcutaneous, Daily, Kerry Marie MD, 40 mg at 12/06/18 0836    finasteride (PROSCAR) tablet 5 mg, 5 mg, Oral, Daily, Supriya Kaplan MD, 5 mg at 02/43/69 9463    folic acid (FOLVITE) tablet 1 mg, 1 mg, Oral, Daily, Supriya Kaplan MD, 1 mg at 12/06/18 1142    levothyroxine tablet 125 mcg, 125 mcg, Oral, Early Morning, Supriya Kaplan MD, 125 mcg at 12/06/18 0747    morphine injection 2 mg, 2 mg, Intravenous, Q3H PRN, Supriya Kaplan MD, 2 mg at 12/06/18 1455    morphine sulfate (concentrate) oral concentrated solution 5 mg, 5 mg, Oral, Q4H PRN, Jose Justin MD    ondansetron (ZOFRAN-ODT) dispersible tablet 4 mg, 4 mg, Oral, TID AC, Jose Justin MD    oxybutynin (DITROPAN-XL) 24 hr tablet 10 mg, 10 mg, Oral, HS, Supriya Kaplan MD, 10 mg at 12/05/18 2210    pantoprazole (PROTONIX) EC tablet 40 mg, 40 mg, Oral, Early Morning, Ivonne Finley PA-C, 40 mg at 12/06/18 0747    polyethylene glycol (MIRALAX) packet 17 g, 17 g, Oral, Daily PRN, Supriya Kaplan MD, 17 g at 12/05/18 2217    saliva substitute (MOUTH KOTE) mucosal solution 5 spray, 5 spray, Mouth/Throat, PRN, Supriya Kaplan MD    senna 8 8 mg/5 mL oral syrup 8 8 mg, 8 8 mg, Oral, HS, Jose Justin MD    sucralfate (CARAFATE) oral suspension 1,000 mg, 1,000 mg, Oral, Q6H KAIDEN, Ivonne Finley PA-C, 1,000 mg at 12/06/18 1132    Objective  /70 (BP Location: Right arm)   Pulse 72   Temp 98 1 °F (36 7 °C) (Oral)   Resp 18   Ht 5' 7" (1 702 m)   Wt 66 7 kg (147 lb)   SpO2 99%   BMI 23 02 kg/m²   Physical Exam:  Constitutional: Appears well-developed and well-nourished though he is thinner  In no acute distress  Head: Normocephalic and atraumatic  Eyes: EOM are normal  Right eye exhibits no discharge  Left eye exhibits no discharge  No scleral icterus  Pulmonary/Chest: Effort normal  No stridor  No respiratory distress  Abdominal: No distension  Musculoskeletal: No edema  Neurological: Alert, oriented and appropriately conversant  Skin: Skin is dry, not diaphoretic     Psychiatric: Displays a normal mood and affect  Behavior, judgement and thought content appear normal      Counseling / Coordination of Care  Total floor / unit time spent today 20 minutes       Miky Altamirano MD  Palliative & Supportive Care  Office number: [phone]

## 2018-12-07 ENCOUNTER — TELEPHONE (OUTPATIENT)
Dept: HEMATOLOGY ONCOLOGY | Facility: CLINIC | Age: 78
End: 2018-12-07

## 2018-12-07 VITALS
OXYGEN SATURATION: 99 % | BODY MASS INDEX: 23.07 KG/M2 | RESPIRATION RATE: 18 BRPM | TEMPERATURE: 97.5 F | SYSTOLIC BLOOD PRESSURE: 128 MMHG | HEIGHT: 67 IN | HEART RATE: 79 BPM | DIASTOLIC BLOOD PRESSURE: 73 MMHG | WEIGHT: 147 LBS

## 2018-12-07 PROBLEM — K31.819 GAVE (GASTRIC ANTRAL VASCULAR ECTASIA): Chronic | Status: RESOLVED | Noted: 2017-12-22 | Resolved: 2018-12-07

## 2018-12-07 PROBLEM — E86.0 DEHYDRATION: Status: RESOLVED | Noted: 2018-12-04 | Resolved: 2018-12-07

## 2018-12-07 PROCEDURE — 99232 SBSQ HOSP IP/OBS MODERATE 35: CPT | Performed by: INTERNAL MEDICINE

## 2018-12-07 PROCEDURE — 99239 HOSP IP/OBS DSCHRG MGMT >30: CPT | Performed by: INTERNAL MEDICINE

## 2018-12-07 RX ORDER — PANTOPRAZOLE SODIUM 40 MG/1
40 TABLET, DELAYED RELEASE ORAL
Qty: 30 TABLET | Refills: 0 | Status: SHIPPED | OUTPATIENT
Start: 2018-12-08

## 2018-12-07 RX ORDER — SUCRALFATE ORAL 1 G/10ML
1000 SUSPENSION ORAL EVERY 6 HOURS SCHEDULED
Qty: 420 ML | Refills: 0 | Status: SHIPPED | OUTPATIENT
Start: 2018-12-07

## 2018-12-07 RX ORDER — MORPHINE SULFATE 20 MG/ML
5 SOLUTION ORAL EVERY 4 HOURS PRN
Qty: 1 BOTTLE | Refills: 0 | Status: SHIPPED | OUTPATIENT
Start: 2018-12-07 | End: 2018-12-17 | Stop reason: SDUPTHER

## 2018-12-07 RX ORDER — ACETAMINOPHEN 325 MG/1
975 TABLET ORAL 3 TIMES DAILY
Qty: 30 TABLET | Refills: 0 | Status: SHIPPED | OUTPATIENT
Start: 2018-12-07

## 2018-12-07 RX ORDER — ONDANSETRON 4 MG/1
4 TABLET, ORALLY DISINTEGRATING ORAL
Qty: 60 TABLET | Refills: 0 | Status: SHIPPED | OUTPATIENT
Start: 2018-12-07

## 2018-12-07 RX ORDER — POLYETHYLENE GLYCOL 3350 17 G/17G
17 POWDER, FOR SOLUTION ORAL DAILY PRN
Qty: 14 EACH | Refills: 0 | Status: SHIPPED | OUTPATIENT
Start: 2018-12-07

## 2018-12-07 RX ADMIN — SUCRALFATE 1000 MG: 1 SUSPENSION ORAL at 11:35

## 2018-12-07 RX ADMIN — MORPHINE SULFATE 2 MG: 2 INJECTION, SOLUTION INTRAMUSCULAR; INTRAVENOUS at 04:05

## 2018-12-07 RX ADMIN — MORPHINE SULFATE 5 MG: 100 SOLUTION ORAL at 14:35

## 2018-12-07 RX ADMIN — ACETAMINOPHEN 975 MG: 325 TABLET, FILM COATED ORAL at 14:36

## 2018-12-07 RX ADMIN — ACETAMINOPHEN 975 MG: 325 TABLET, FILM COATED ORAL at 06:35

## 2018-12-07 RX ADMIN — FOLIC ACID 1 MG: 1 TABLET ORAL at 10:09

## 2018-12-07 RX ADMIN — SUCRALFATE 1000 MG: 1 SUSPENSION ORAL at 06:35

## 2018-12-07 RX ADMIN — PANTOPRAZOLE SODIUM 40 MG: 40 TABLET, DELAYED RELEASE ORAL at 06:35

## 2018-12-07 RX ADMIN — LEVOTHYROXINE SODIUM 125 MCG: 125 TABLET ORAL at 06:35

## 2018-12-07 RX ADMIN — SUCRALFATE 1000 MG: 1 SUSPENSION ORAL at 00:30

## 2018-12-07 RX ADMIN — FINASTERIDE 5 MG: 5 TABLET, FILM COATED ORAL at 10:09

## 2018-12-07 RX ADMIN — ONDANSETRON 4 MG: 4 TABLET, ORALLY DISINTEGRATING ORAL at 11:35

## 2018-12-07 RX ADMIN — MORPHINE SULFATE 5 MG: 100 SOLUTION ORAL at 10:09

## 2018-12-07 RX ADMIN — ONDANSETRON 4 MG: 4 TABLET, ORALLY DISINTEGRATING ORAL at 06:35

## 2018-12-07 NOTE — NURSING NOTE
Pt discharged home  Reviewed instructions with wife and pt at bedside  Both state understanding on follow up with Pulm, Oncology, and GI as an outpt  Pt understands to increase fluid intake and encouraged pt to eat more frequent small meals to promote nutrition and hydration  Wife also stating understanding  Pt and wife understands medication regime  Wife picked up meds from [facility]  Pt belongings are gathered and he leaves the floor instable condition

## 2018-12-07 NOTE — TELEPHONE ENCOUNTER
Pt's wife called stating that pt is in the hospital and his infusion appt for 12/11 was suppose to be cancelled but hasn't been  Please advise

## 2018-12-07 NOTE — PLAN OF CARE
DISCHARGE PLANNING - CARE MANAGEMENT     Discharge to post-acute care or home with appropriate resources Adequate for Discharge        Nutrition/Hydration-ADULT     Nutrient/Hydration intake appropriate for improving, restoring or maintaining nutritional needs Adequate for Discharge        PAIN - ADULT     Verbalizes/displays adequate comfort level or baseline comfort level Adequate for Discharge        Potential for Falls     Patient will remain free of falls Adequate for Discharge

## 2018-12-07 NOTE — DISCHARGE SUMMARY
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)    Anemia    Cancer related pain    Dysphagia    Radiation-induced esophagitis    Pulmonary fibrosis     Decreased appetite    Severe protein-calorie malnutrition ([address] )  Resolved Problems:    GAVE (gastric antral vascular ectasia)    Dehydration      Consultations During Hospital Stay:  · Dr Yohana Gunn  · Dr Pop Lockhart  · Dr Mary Deras Pulmonary  · Dr Randall Alvarez care    Procedures Performed:     · CT chest abdomen and pelvis with contrast -  progressing fibrosing interstitial lung disease  new nodular pleural thickening in the left pleura with associated small left effusion  Bony metastatic disease remains unchanged  Previously noted lung nodules are stable  · EGD - nml esophagus, atrophic gastric mucosa, bile reflux      Incidental Findings:   · none    Test Results Pending at Discharge (will require follow up):   · none     Outpatient Tests Requested:  · none    Complications: none     Hospital Course:     Ani Nuñez is a 69 y o  male patient who originally presented to the hospital on 12/4/2018 due to generalized weakness, dyspnea on exertion and worsening back and lower chest pain  He also reports poor oral intake due to lack of appetite and some difficulty swallowing mostly due to dry mucosal surfaces  CT CAP showed mild progression of local lung cancer with some scarring/fibrosis   He was evaluated by palliative care and started on liquid morphine at discharge  He also had EGD that showed mild atrophic gastritis  He felt better with pain medications, IVF and encouragement  He seems to have difficulty with accepting the changes from cancer, chemo/radiation etc  Will probably benefit from outpt counseling  Encouraged to take PO intake in a regimented way  Further plan as outpt  Stable at discharge  Wife at bedside most days  Condition at Discharge: fair     Discharge Day Visit / Exam:     Vitals: Blood Pressure: 128/73 (12/07/18 0731)  Pulse: 79 (12/07/18 0731)  Temperature: 97 5 °F (36 4 °C) (12/07/18 0731)  Temp Source: Oral (12/07/18 0731)  Respirations: 18 (12/07/18 0731)  Height: 5' 7" (170 2 cm) (12/06/18 1409)  Weight - Scale: 66 7 kg (147 lb) (12/04/18 1103)  SpO2: 99 % (12/07/18 0731)  Exam:   Physical Exam   HENT:   Head: Normocephalic  Eyes: Pupils are equal, round, and reactive to light  Neck: Normal range of motion  Cardiovascular: Normal heart sounds  Pulmonary/Chest: Effort normal    Abdominal: Soft  Neurological: He is alert  Skin: There is pallor  Discharge instructions/Information to patient and family:   See after visit summary for information provided to patient and family  Provisions for Follow-Up Care:  See after visit summary for information related to follow-up care and any pertinent home health orders  Disposition: Home    Discharge Statement:  I spent 35 minutes discharging the patient  This time was spent on the day of discharge  I had direct contact with the patient on the day of discharge  Greater than 50% of the total time was spent examining patient, answering all patient questions, arranging and discussing plan of care with patient as well as directly providing post-discharge instructions  Additional time then spent on discharge activities  Discharge Medications:  See after visit summary for reconciled discharge medications provided to patient and family        ** Please Note: Dragon 360 Dictation voice to text software may have been used in the creation of this document   **

## 2018-12-07 NOTE — PROGRESS NOTES
Progress Note - Pulmonary   Sharon Goods 69 y o  male MRN: [id number]  Unit/Bed#: -01 Encounter: [id number]    Assessment/Plan:    1  Idiopathic pulmonary fibrosis prior to malignancy              *  Dr Varun Tejada had an extensive conversation with the patient, his wife, and his son, involving anti fibrinolytic medications to slow down progression of his pulmonary fibrosis and the side effects  The patient and family were very open to exploring initiation of this therapy  *  patient has significant basalar and pleural thickening B/L in both lung bases with extensive pulmonary fibrosis      *  anti fibrinolytic therapy was recommended as an outpatient, wife declined      *  No steroids indicated at this time     2  Respiratory failure with chronic hypoxia in the setting of #1         *  patient is in no acute signs of respiratory distress      *  patient wears 2 L at rest, increases up to 4-5 L upon ambulation      *  patient currently not on any inhaled medications     3  Satiety with nausea, weight loss, and xerostomia          *  GI following and palliative following       *  patient had EGD 12/5/18, esophagus normal, linear erythema noted on stomach, normal appearing duodenal, biopsy taken        *  nutrition following        *  Impression: bile reflux moderate, gastritis         *  GI recommends avoiding NSAIDs, antiemetics before meals, Carafate q i d , continue PPI     4  Ageusia         *  patient has thrush on his tongue      *  recommended switch and small     5   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 01/2018          *  pain management initiated by primary team and palliative care       *  patient follows with Dr Sheridan Pratt       *  initiated on carboplatin/pemetrexed + pembrolizumab with palliative radiation to thoracic spine Q 3 months       *  maintenance therapy with [facility] (6/2018-10/2018), bone scan 10/2018- stable bone mets, due to decline- chemo was placed on hold     Outpatient follow up per D/C instructions    Chief Complaint:    "I slept good last night"    Subjective:    Rudy Cunningham was resting comfortably in his bed  He stated that he was able to sleep throughout the evening  Patient reported no acute respiratory issues  He continues to report severe back pain that appears to be managed with his pain regimen  Patient reports he will be following up with palliative care to continue pain and symptom management  Objective:    Vitals: Blood pressure 128/73, pulse 79, temperature 97 5 °F (36 4 °C), temperature source Oral, resp  rate 18, height 5' 7" (1 702 m), weight 66 7 kg (147 lb), SpO2 99 %  3L,Body mass index is 23 02 kg/m²  No intake or output data in the 24 hours ending 12/07/18 0818    Invasive Devices     Central Venous Catheter Line            Port A Cath -- days          Peripheral Intravenous Line            Peripheral IV 12/05/18 Right Hand 1 day                Physical Exam:   Physical Exam   Constitutional: He is oriented to person, place, and time  He appears well-developed and well-nourished  HENT:   Head: Normocephalic and atraumatic  Neck: Normal range of motion  Neck supple  No JVD present  No tracheal deviation present  Cardiovascular: Normal rate and regular rhythm  Exam reveals no gallop  No murmur heard  Pulmonary/Chest: Effort normal  No accessory muscle usage  No respiratory distress  He has no decreased breath sounds  He has no wheezes  He has no rhonchi  He has rales in the right lower field and the left lower field  He exhibits no tenderness  Abdominal: Soft  Bowel sounds are normal  He exhibits no distension  There is no tenderness  Musculoskeletal: Normal range of motion  He exhibits no edema or deformity  Neurological: He is alert and oriented to person, place, and time  Skin: Skin is warm and dry  Psychiatric: He has a normal mood and affect  His behavior is normal        Labs:  I have personally reviewed pertinent lab results from 12/04/2018    Imaging and other studies: I have personally reviewed pertinent films in PACS     CT of the chest 12/04/2018- bronchiectasis changes in both lung bases, corkscrew ring of the bronchi, RUL nodule measuring 3 mm, RML nodule measuring 6 mm

## 2018-12-10 ENCOUNTER — OFFICE VISIT (OUTPATIENT)
Dept: PULMONOLOGY | Facility: CLINIC | Age: 78
End: 2018-12-10
Payer: COMMERCIAL

## 2018-12-10 VITALS
DIASTOLIC BLOOD PRESSURE: 80 MMHG | SYSTOLIC BLOOD PRESSURE: 126 MMHG | HEIGHT: 67 IN | RESPIRATION RATE: 16 BRPM | TEMPERATURE: 97.2 F | BODY MASS INDEX: 25.11 KG/M2 | WEIGHT: 160 LBS | OXYGEN SATURATION: 94 % | HEART RATE: 114 BPM

## 2018-12-10 DIAGNOSIS — J96.11 CHRONIC RESPIRATORY FAILURE WITH HYPOXIA (HCC): ICD-10-CM

## 2018-12-10 DIAGNOSIS — C34.92 PRIMARY ADENOCARCINOMA OF LEFT LUNG (HCC): ICD-10-CM

## 2018-12-10 DIAGNOSIS — J98.4 LUNG DISEASE, RESTRICTIVE: Primary | ICD-10-CM

## 2018-12-10 PROCEDURE — 99214 OFFICE O/P EST MOD 30 MIN: CPT | Performed by: INTERNAL MEDICINE

## 2018-12-10 NOTE — PATIENT INSTRUCTIONS
Please follow up with Dr Jimena Huertas  You may take up 0 5ml every 4hrs of pain medication  I will discuss with Dr Yannick De Santiago

## 2018-12-10 NOTE — PROGRESS NOTES
Progress Note - Pulmonary   Louisa Rodriguez 69 y o  male MRN: [id number]   Encounter: [id number]      Assessment/Plan:  Patient is a 15-year-old male with past medical history significant for stage IV lung cancer with osseous metastases who presents for follow-up  The patient's initial complaint was related to his significant pain  He reports 6 chronically in a at 8 but will increase to 11 at 10  He has minimal benefit from the Roxicet  While in the office, I spoke with Dr Reena Joiner and we will increase his Roxicet 0 5 mL  In addition, the patient will set up follow-up with her office  I reviewed the patient's CT scans for the past several months  I am concerned that the changes at the bases are more consistent with progression of his underlying lung cancer then interstitial lung disease  The subpleural thickening is more concerning for this as this is not a routine progression of interstitial fibrosis  I will discuss these concerns with Dr Viri Conde  The patient may continue to use his supplemental oxygen as necessary  He will follow up on as-needed basis  Plan:  -  Increase roxicet  -  Discussed with Dr Reena Joiner from palliative care medicine  -  Will discuss with Dr Viri Conde from oncology    Subjective:   Pts primary complaint is related to pain  He reports chronic pain on his left side which is routienly 8/10  He now notes right sided pain which is 10-11/10  He has no relief from the low dose roxicet  The patient also notes significant decreased appetite  He is eating minimal and nothing tastes good  He has started the medicinal marijuana but again this provided no benefit  He is using supplemental oxygen but still reports he is short of breath  He also notes significant constipation related to the opioid use  Inhaler Regimen:  none    Remainder of 12 point review of systems negative except as described in HPI        The following portions of the patient's history were reviewed and updated as appropriate: allergies, current medications, past family history, past medical history, past social history, past surgical history and problem list      Objective:   Vitals: Blood pressure 126/80, pulse (!) 114, temperature (!) 97 2 °F (36 2 °C), temperature source Tympanic, resp  rate 16, height 5' 7" (1 702 m), weight 72 6 kg (160 lb), SpO2 94 % , 2 5, Body mass index is 25 06 kg/m²  Physical Exam  Gen: Awake, alert, oriented x 3, no acute distress; thin  HEENT: Mucous membranes moist, no oral lesions, no thrush  NECK: No accessory muscle use, JVP not elevated  Cardiac: tachycardic, single S1, single S2, no murmurs, no rubs, no gallops  Lungs: crackles at bilateral bases  Abdomen: normoactive bowel sounds, soft nontender, nondistended, no rebound or rigidity, no guarding  Extremities: no cyanosis, no clubbing, no edema  MSK:  Strength equal in all extremities  Derm:  No rashes/lesions noted  Neuro:  Appropriate mood/affect    Labs: I have personally reviewed pertinent lab results  Lab Results   Component Value Date    WBC 7 14 12/05/2018    HGB 8 4 (L) 12/05/2018     12/05/2018     Lab Results   Component Value Date    CREATININE 1 07 12/05/2018     No recent BNP     Imaging and other studies: I have personally reviewed pertinent reports  and I have personally reviewed pertinent films in PACS  CT Chest 12/4/2018:  LUNGS:  Reticulation seen at the periphery of the both lungs suggest interstitial lung disease  There are traction bronchiectatic changes  at the both lung bases with corkscrewing of the bronchi    Right upper lobe lung nodule seen, measuring about 3 mm, stable and is seen in image 28 of series 5  A nodule seen in the right middle lobe area measuring about 6 mm in image 79 of series 5 is stable  PLEURA:  There is small right and small left effusion there is nodular pleural thickening seen in the left hemithorax, new from the previous study     HEART/GREAT VESSELS:  The ascending aorta measures 3 9 cm      MEDIASTINUM AND SARI:  Calcified the periesophageal lymph nodes are seen and there is no new mediastinal lymphadenopathy seen  A nodular density seen in the anterior aspect of left hemithorax just into the internal mammary vessels, measuring about 11 mm x 7 mm  CHEST WALL AND LOWER NECK:   Unremarkable  Pulmonary Function Testing: I have personally reviewed pertinent reports  and I have personally reviewed pertinent films in PACS  [place]/DLCO 9/4/2018:  Spirometry:  FEV1/FVC Ratio is 81%  FEV1 is 1 66 L which is 67% predicted  FVC is 2 04 L which is 58% predicted  Post bronchodilator testing not performed  Flow volume loop:  Mildly restricted appearance  Diffusing capacity:  DLCO 74% predicted but corrects to 87% predicted for the patient's hemoglobin determination    Altagracia Parra

## 2018-12-11 ENCOUNTER — TELEPHONE (OUTPATIENT)
Dept: PULMONOLOGY | Facility: CLINIC | Age: 78
End: 2018-12-11

## 2018-12-11 DIAGNOSIS — C34.92 PRIMARY ADENOCARCINOMA OF LEFT LUNG (HCC): Primary | ICD-10-CM

## 2018-12-11 NOTE — TELEPHONE ENCOUNTER
Returned patient's call after speaking to Dr Jermaine Rawls  Based on our discussion the patient should proceed with a PET-CT scan and possible biopsy  This was discussed with patient's wife who understands  The patient's wife reported the patient had significant spasming on the right side which was new and not responsive to his current morphine  I told the wife that if there concern for his breathing her safety issue go immediately to the emergency department  Patient's wife states she understood

## 2018-12-11 NOTE — TELEPHONE ENCOUNTER
Mrs Durel Brittle left a  requesting a call back  She would like to know where or not you had discuss patient's  Condition with Dr Rosa Singh Drop can be reached at [phone]

## 2018-12-13 ENCOUNTER — TELEPHONE (OUTPATIENT)
Dept: PULMONOLOGY | Facility: CLINIC | Age: 78
End: 2018-12-13

## 2018-12-13 DIAGNOSIS — N40.0 BENIGN PROSTATIC HYPERPLASIA, UNSPECIFIED WHETHER LOWER URINARY TRACT SYMPTOMS PRESENT: Primary | ICD-10-CM

## 2018-12-13 RX ORDER — TAMSULOSIN HYDROCHLORIDE 0.4 MG/1
0.4 CAPSULE ORAL
Qty: 90 CAPSULE | Refills: 0 | Status: SHIPPED | OUTPATIENT
Start: 2018-12-13

## 2018-12-13 NOTE — TELEPHONE ENCOUNTER
Patients wife called stating that she would like to get a muscle relaxer for her  stating that he is in pain and having trouble breathing I advised the patient to proceed to the ER per Dr Kevin Garcia last conversation but patients wife stated he does not want to do that and would like to speak to Dr Mariam Coleman first  Please advise

## 2018-12-13 NOTE — TELEPHONE ENCOUNTER
Returned patient's phone call  Spoke to wife  Patient has had a slight improvement since using his morphine more regularly  He does have pain which she describes as spasming which is worse with movement  I have instructed him they may try ibuprofen or NSAIDs given his recent creatinine was okay  The patient's wife states she will try this  I have also encouraged capsaicin creams as a topical analgesics  Patient is that she will call Dr Munson Rather for refills of morphine tomorrow

## 2018-12-13 NOTE — TELEPHONE ENCOUNTER
Kevin Brennan patient, managed by Dr Prabhu Meier,  Follow up scheduled 1/23/19  Received faxed refill request from [facility] pharmacy for 90 day prescription of Tamsulosin  Tamsulosin was restarted in 7/23/18

## 2018-12-17 ENCOUNTER — TELEPHONE (OUTPATIENT)
Dept: PALLIATIVE MEDICINE | Facility: CLINIC | Age: 78
End: 2018-12-17

## 2018-12-17 DIAGNOSIS — R53.83 OTHER FATIGUE: ICD-10-CM

## 2018-12-17 DIAGNOSIS — R62.7 FTT (FAILURE TO THRIVE) IN ADULT: ICD-10-CM

## 2018-12-17 DIAGNOSIS — G89.3 CANCER ASSOCIATED PAIN: ICD-10-CM

## 2018-12-17 DIAGNOSIS — R06.02 SHORTNESS OF BREATH: ICD-10-CM

## 2018-12-17 DIAGNOSIS — C79.51 METASTATIC ADENOCARCINOMA TO BONE (HCC): Chronic | ICD-10-CM

## 2018-12-17 DIAGNOSIS — R63.0 DECREASED APPETITE: ICD-10-CM

## 2018-12-17 DIAGNOSIS — G89.3 CANCER ASSOCIATED PAIN: Primary | ICD-10-CM

## 2018-12-17 DIAGNOSIS — R63.0 DECREASED APPETITE: Primary | ICD-10-CM

## 2018-12-17 RX ORDER — PREDNISONE 20 MG/1
10 TABLET ORAL DAILY
COMMUNITY
End: 2018-12-24 | Stop reason: HOSPADM

## 2018-12-17 RX ORDER — MORPHINE SULFATE 20 MG/ML
10 SOLUTION ORAL EVERY 4 HOURS PRN
Qty: 60 ML | Refills: 0 | Status: SHIPPED | OUTPATIENT
Start: 2018-12-17 | End: 2018-12-27

## 2018-12-17 NOTE — PROGRESS NOTES
I spoke to his wife who continues to report that he is not doing well  She asks if this is related to the cancer and states he has a PET scan Friday  Advice:  Unfortunately I stated that his overall failure to thrive could be from advancing cancer but we will know more after the PET  No specific or reversible cause ws found for his overall failure to thrive  They need a refill on morphine because he is taking about 10mg twice a day  They can start prednisone 10mg daily for a spectrum of potential palliative symptom control including improved pain, appetite, improved breathing, and energy level    Raymon Adam MD

## 2018-12-17 NOTE — TELEPHONE ENCOUNTER
Pt's spouse is calling of concerned about pt's low appetite  She mentioned discussion regarding possible use of Prednisone  She currently has a supply of Prednisone 20mg tabs at home  Would she be able to offer to pt?

## 2018-12-18 DIAGNOSIS — R39.15 URINARY URGENCY: Primary | ICD-10-CM

## 2018-12-18 RX ORDER — OXYBUTYNIN CHLORIDE 10 MG/1
10 TABLET, EXTENDED RELEASE ORAL
Qty: 90 TABLET | Refills: 0 | Status: SHIPPED | OUTPATIENT
Start: 2018-12-18

## 2018-12-18 NOTE — TELEPHONE ENCOUNTER
Emile Jensen patient, managed by Dr Rose Blake, follow up scheduled 1/23/19  Received faxed request from [facility] pharmacy for refill of Oxybutynin 10 mg ER ,  90 day supply

## 2018-12-19 ENCOUNTER — HOSPITAL ENCOUNTER (OUTPATIENT)
Dept: RADIOLOGY | Age: 78
Discharge: HOME | End: 2018-12-19
Payer: COMMERCIAL

## 2018-12-19 ENCOUNTER — TELEPHONE (OUTPATIENT)
Dept: HEMATOLOGY ONCOLOGY | Facility: CLINIC | Age: 78
End: 2018-12-19

## 2018-12-19 DIAGNOSIS — C34.92 PRIMARY ADENOCARCINOMA OF LEFT LUNG (HCC): ICD-10-CM

## 2018-12-19 LAB — GLUCOSE SERPL-MCNC: 108 MG/DL (ref 65–140)

## 2018-12-19 PROCEDURE — A9552 F18 FDG: HCPCS

## 2018-12-19 PROCEDURE — 82948 REAGENT STRIP/BLOOD GLUCOSE: CPT

## 2018-12-19 PROCEDURE — 78815 PET IMAGE W/CT SKULL-THIGH: CPT

## 2018-12-19 NOTE — TELEPHONE ENCOUNTER
Spoke with patient and his wife regarding PET/CT  Discussed this showed disease progression  If he wishes, recommendations would be new chemotherapy  If not, palliative care  He wishes to wait to come to the office until the holiday is over  Follow up arranged for 1/7/19 at 9:30 at the [facility]

## 2018-12-20 ENCOUNTER — TELEPHONE (OUTPATIENT)
Dept: PULMONOLOGY | Facility: CLINIC | Age: 78
End: 2018-12-20

## 2018-12-20 ENCOUNTER — TELEPHONE (OUTPATIENT)
Dept: PALLIATIVE MEDICINE | Facility: CLINIC | Age: 78
End: 2018-12-20

## 2018-12-20 DIAGNOSIS — R63.0 DECREASED APPETITE: ICD-10-CM

## 2018-12-20 DIAGNOSIS — C34.92 PRIMARY ADENOCARCINOMA OF LEFT LUNG (HCC): Primary | ICD-10-CM

## 2018-12-20 DIAGNOSIS — G89.3 CANCER ASSOCIATED PAIN: ICD-10-CM

## 2018-12-20 DIAGNOSIS — R43.2 DYSGEUSIA: ICD-10-CM

## 2018-12-20 DIAGNOSIS — R06.02 SHORTNESS OF BREATH: ICD-10-CM

## 2018-12-20 DIAGNOSIS — K11.7 XEROSTOMIA: ICD-10-CM

## 2018-12-20 DIAGNOSIS — R53.83 OTHER FATIGUE: ICD-10-CM

## 2018-12-20 DIAGNOSIS — R13.10 DYSPHAGIA, UNSPECIFIED TYPE: ICD-10-CM

## 2018-12-20 DIAGNOSIS — K59.00 CONSTIPATION, UNSPECIFIED CONSTIPATION TYPE: ICD-10-CM

## 2018-12-20 NOTE — PROGRESS NOTES
Patient continues to get more symptomatic at home from advancing cancer  He does not want to entertain more chemotherapy in the state he is in and wants to start his medicare hospice benefit  Referral made

## 2018-12-22 ENCOUNTER — HOSPITAL ENCOUNTER (INPATIENT)
Facility: HOSPITAL | Age: 78
LOS: 1 days | Discharge: HOSPICE | DRG: 948 | End: 2018-12-24
Attending: EMERGENCY MEDICINE | Admitting: INTERNAL MEDICINE
Payer: COMMERCIAL

## 2018-12-22 ENCOUNTER — APPOINTMENT (EMERGENCY)
Dept: RADIOLOGY | Facility: HOSPITAL | Age: 78
DRG: 948 | End: 2018-12-22
Payer: COMMERCIAL

## 2018-12-22 DIAGNOSIS — R11.0 NAUSEA: ICD-10-CM

## 2018-12-22 DIAGNOSIS — C79.51 METASTATIC ADENOCARCINOMA TO BONE (HCC): Chronic | ICD-10-CM

## 2018-12-22 DIAGNOSIS — C34.92 PRIMARY MALIGNANT NEOPLASM OF LEFT LUNG METASTATIC TO OTHER SITE (HCC): Primary | ICD-10-CM

## 2018-12-22 DIAGNOSIS — R06.02 SHORTNESS OF BREATH: ICD-10-CM

## 2018-12-22 LAB
ANION GAP SERPL CALCULATED.3IONS-SCNC: 8 MMOL/L (ref 4–13)
ATRIAL RATE: 84 BPM
BASOPHILS # BLD AUTO: 0.03 THOUSANDS/ΜL (ref 0–0.1)
BASOPHILS NFR BLD AUTO: 0 % (ref 0–1)
BUN SERPL-MCNC: 11 MG/DL (ref 5–25)
CALCIUM SERPL-MCNC: 9.9 MG/DL (ref 8.3–10.1)
CHLORIDE SERPL-SCNC: 96 MMOL/L (ref 100–108)
CO2 SERPL-SCNC: 32 MMOL/L (ref 21–32)
CREAT SERPL-MCNC: 1.03 MG/DL (ref 0.6–1.3)
EOSINOPHIL # BLD AUTO: 0.14 THOUSAND/ΜL (ref 0–0.61)
EOSINOPHIL NFR BLD AUTO: 1 % (ref 0–6)
ERYTHROCYTE [DISTWIDTH] IN BLOOD BY AUTOMATED COUNT: 17 % (ref 11.6–15.1)
GFR SERPL CREATININE-BSD FRML MDRD: 70 ML/MIN/1.73SQ M
GLUCOSE SERPL-MCNC: 150 MG/DL (ref 65–140)
HCT VFR BLD AUTO: 32.2 % (ref 36.5–49.3)
HGB BLD-MCNC: 9.7 G/DL (ref 12–17)
IMM GRANULOCYTES # BLD AUTO: 0.06 THOUSAND/UL (ref 0–0.2)
IMM GRANULOCYTES NFR BLD AUTO: 1 % (ref 0–2)
LYMPHOCYTES # BLD AUTO: 0.3 THOUSANDS/ΜL (ref 0.6–4.47)
LYMPHOCYTES NFR BLD AUTO: 3 % (ref 14–44)
MCH RBC QN AUTO: 26.4 PG (ref 26.8–34.3)
MCHC RBC AUTO-ENTMCNC: 30.1 G/DL (ref 31.4–37.4)
MCV RBC AUTO: 88 FL (ref 82–98)
MONOCYTES # BLD AUTO: 0.91 THOUSAND/ΜL (ref 0.17–1.22)
MONOCYTES NFR BLD AUTO: 9 % (ref 4–12)
NEUTROPHILS # BLD AUTO: 8.81 THOUSANDS/ΜL (ref 1.85–7.62)
NEUTS SEG NFR BLD AUTO: 86 % (ref 43–75)
NRBC BLD AUTO-RTO: 0 /100 WBCS
P AXIS: 53 DEGREES
PLATELET # BLD AUTO: 362 THOUSANDS/UL (ref 149–390)
PMV BLD AUTO: 9.2 FL (ref 8.9–12.7)
POTASSIUM SERPL-SCNC: 3.6 MMOL/L (ref 3.5–5.3)
PR INTERVAL: 150 MS
QRS AXIS: -23 DEGREES
QRSD INTERVAL: 98 MS
QT INTERVAL: 352 MS
QTC INTERVAL: 415 MS
RBC # BLD AUTO: 3.68 MILLION/UL (ref 3.88–5.62)
SODIUM SERPL-SCNC: 136 MMOL/L (ref 136–145)
T WAVE AXIS: 10 DEGREES
VENTRICULAR RATE: 84 BPM
WBC # BLD AUTO: 10.25 THOUSAND/UL (ref 4.31–10.16)

## 2018-12-22 PROCEDURE — 93005 ELECTROCARDIOGRAM TRACING: CPT

## 2018-12-22 PROCEDURE — 93010 ELECTROCARDIOGRAM REPORT: CPT | Performed by: INTERNAL MEDICINE

## 2018-12-22 PROCEDURE — 96374 THER/PROPH/DIAG INJ IV PUSH: CPT

## 2018-12-22 PROCEDURE — 94664 DEMO&/EVAL PT USE INHALER: CPT

## 2018-12-22 PROCEDURE — 96375 TX/PRO/DX INJ NEW DRUG ADDON: CPT

## 2018-12-22 PROCEDURE — 99222 1ST HOSP IP/OBS MODERATE 55: CPT | Performed by: INTERNAL MEDICINE

## 2018-12-22 PROCEDURE — 71045 X-RAY EXAM CHEST 1 VIEW: CPT

## 2018-12-22 PROCEDURE — 99285 EMERGENCY DEPT VISIT HI MDM: CPT

## 2018-12-22 PROCEDURE — 36415 COLL VENOUS BLD VENIPUNCTURE: CPT | Performed by: EMERGENCY MEDICINE

## 2018-12-22 PROCEDURE — 96361 HYDRATE IV INFUSION ADD-ON: CPT

## 2018-12-22 PROCEDURE — 80048 BASIC METABOLIC PNL TOTAL CA: CPT | Performed by: EMERGENCY MEDICINE

## 2018-12-22 PROCEDURE — 85025 COMPLETE CBC W/AUTO DIFF WBC: CPT | Performed by: EMERGENCY MEDICINE

## 2018-12-22 RX ORDER — OXYBUTYNIN CHLORIDE 5 MG/1
10 TABLET, EXTENDED RELEASE ORAL
Status: DISCONTINUED | OUTPATIENT
Start: 2018-12-22 | End: 2018-12-24 | Stop reason: HOSPADM

## 2018-12-22 RX ORDER — HEPARIN SODIUM 5000 [USP'U]/ML
5000 INJECTION, SOLUTION INTRAVENOUS; SUBCUTANEOUS EVERY 8 HOURS SCHEDULED
Status: DISCONTINUED | OUTPATIENT
Start: 2018-12-22 | End: 2018-12-22

## 2018-12-22 RX ORDER — ACETAMINOPHEN 325 MG/1
975 TABLET ORAL 3 TIMES DAILY
Status: DISCONTINUED | OUTPATIENT
Start: 2018-12-22 | End: 2018-12-24 | Stop reason: HOSPADM

## 2018-12-22 RX ORDER — PANTOPRAZOLE SODIUM 40 MG/1
40 TABLET, DELAYED RELEASE ORAL
Status: DISCONTINUED | OUTPATIENT
Start: 2018-12-22 | End: 2018-12-24 | Stop reason: HOSPADM

## 2018-12-22 RX ORDER — SUCRALFATE ORAL 1 G/10ML
1000 SUSPENSION ORAL EVERY 6 HOURS SCHEDULED
Status: DISCONTINUED | OUTPATIENT
Start: 2018-12-22 | End: 2018-12-24 | Stop reason: HOSPADM

## 2018-12-22 RX ORDER — LEVOTHYROXINE SODIUM 0.12 MG/1
125 TABLET ORAL
Status: DISCONTINUED | OUTPATIENT
Start: 2018-12-22 | End: 2018-12-24 | Stop reason: HOSPADM

## 2018-12-22 RX ORDER — FINASTERIDE 5 MG/1
5 TABLET, FILM COATED ORAL DAILY
Status: DISCONTINUED | OUTPATIENT
Start: 2018-12-22 | End: 2018-12-24 | Stop reason: HOSPADM

## 2018-12-22 RX ORDER — ONDANSETRON 2 MG/ML
4 INJECTION INTRAMUSCULAR; INTRAVENOUS ONCE
Status: COMPLETED | OUTPATIENT
Start: 2018-12-22 | End: 2018-12-22

## 2018-12-22 RX ORDER — ONDANSETRON 2 MG/ML
4 INJECTION INTRAMUSCULAR; INTRAVENOUS EVERY 4 HOURS PRN
Status: DISCONTINUED | OUTPATIENT
Start: 2018-12-22 | End: 2018-12-24 | Stop reason: HOSPADM

## 2018-12-22 RX ORDER — TAMSULOSIN HYDROCHLORIDE 0.4 MG/1
0.4 CAPSULE ORAL
Status: DISCONTINUED | OUTPATIENT
Start: 2018-12-22 | End: 2018-12-24 | Stop reason: HOSPADM

## 2018-12-22 RX ORDER — MORPHINE SULFATE 100 MG/5ML
10 SOLUTION ORAL EVERY 8 HOURS PRN
Status: DISCONTINUED | OUTPATIENT
Start: 2018-12-22 | End: 2018-12-23

## 2018-12-22 RX ORDER — FUROSEMIDE 10 MG/ML
20 INJECTION INTRAMUSCULAR; INTRAVENOUS ONCE
Status: COMPLETED | OUTPATIENT
Start: 2018-12-22 | End: 2018-12-22

## 2018-12-22 RX ORDER — MORPHINE SULFATE 10 MG/ML
6 INJECTION, SOLUTION INTRAMUSCULAR; INTRAVENOUS ONCE
Status: COMPLETED | OUTPATIENT
Start: 2018-12-22 | End: 2018-12-22

## 2018-12-22 RX ORDER — FENTANYL 12 UG/H
12 PATCH TRANSDERMAL
Status: DISCONTINUED | OUTPATIENT
Start: 2018-12-22 | End: 2018-12-24 | Stop reason: HOSPADM

## 2018-12-22 RX ORDER — FOLIC ACID 1 MG/1
1 TABLET ORAL DAILY
Status: DISCONTINUED | OUTPATIENT
Start: 2018-12-22 | End: 2018-12-24 | Stop reason: HOSPADM

## 2018-12-22 RX ORDER — ONDANSETRON 4 MG/1
4 TABLET, ORALLY DISINTEGRATING ORAL
Status: DISCONTINUED | OUTPATIENT
Start: 2018-12-22 | End: 2018-12-24 | Stop reason: HOSPADM

## 2018-12-22 RX ORDER — HEPARIN SODIUM 5000 [USP'U]/ML
5000 INJECTION, SOLUTION INTRAVENOUS; SUBCUTANEOUS EVERY 8 HOURS SCHEDULED
Status: DISCONTINUED | OUTPATIENT
Start: 2018-12-22 | End: 2018-12-24 | Stop reason: HOSPADM

## 2018-12-22 RX ORDER — ALBUTEROL SULFATE 90 UG/1
2 AEROSOL, METERED RESPIRATORY (INHALATION) EVERY 4 HOURS PRN
Status: DISCONTINUED | OUTPATIENT
Start: 2018-12-22 | End: 2018-12-24 | Stop reason: HOSPADM

## 2018-12-22 RX ORDER — PREDNISONE 10 MG/1
10 TABLET ORAL DAILY
Status: DISCONTINUED | OUTPATIENT
Start: 2018-12-22 | End: 2018-12-23

## 2018-12-22 RX ADMIN — ACETAMINOPHEN 975 MG: 325 TABLET, FILM COATED ORAL at 21:21

## 2018-12-22 RX ADMIN — HEPARIN SODIUM 5000 UNITS: 5000 INJECTION, SOLUTION INTRAVENOUS; SUBCUTANEOUS at 21:21

## 2018-12-22 RX ADMIN — OXYBUTYNIN CHLORIDE 10 MG: 5 TABLET, EXTENDED RELEASE ORAL at 21:21

## 2018-12-22 RX ADMIN — ONDANSETRON 4 MG: 2 INJECTION INTRAMUSCULAR; INTRAVENOUS at 09:54

## 2018-12-22 RX ADMIN — MORPHINE SULFATE 2 MG: 2 INJECTION, SOLUTION INTRAMUSCULAR; INTRAVENOUS at 11:21

## 2018-12-22 RX ADMIN — FUROSEMIDE 20 MG: 10 INJECTION, SOLUTION INTRAMUSCULAR; INTRAVENOUS at 11:21

## 2018-12-22 RX ADMIN — MORPHINE SULFATE 2 MG: 2 INJECTION, SOLUTION INTRAMUSCULAR; INTRAVENOUS at 19:28

## 2018-12-22 RX ADMIN — ONDANSETRON 4 MG: 2 INJECTION INTRAMUSCULAR; INTRAVENOUS at 07:53

## 2018-12-22 RX ADMIN — MORPHINE SULFATE 2 MG: 2 INJECTION, SOLUTION INTRAMUSCULAR; INTRAVENOUS at 16:08

## 2018-12-22 RX ADMIN — ONDANSETRON 4 MG: 2 INJECTION INTRAMUSCULAR; INTRAVENOUS at 11:21

## 2018-12-22 RX ADMIN — SODIUM CHLORIDE 1000 ML: 0.9 INJECTION, SOLUTION INTRAVENOUS at 07:57

## 2018-12-22 RX ADMIN — FENTANYL 12 MCG: 12.5 PATCH TRANSDERMAL at 14:43

## 2018-12-22 RX ADMIN — ONDANSETRON 4 MG: 2 INJECTION INTRAMUSCULAR; INTRAVENOUS at 16:08

## 2018-12-22 RX ADMIN — MORPHINE SULFATE 6 MG: 10 INJECTION INTRAVENOUS at 07:55

## 2018-12-22 RX ADMIN — HEPARIN SODIUM 5000 UNITS: 5000 INJECTION, SOLUTION INTRAVENOUS; SUBCUTANEOUS at 13:03

## 2018-12-22 NOTE — ED PROVIDER NOTES
History  Chief Complaint   Patient presents with    Shortness of Breath     SOB, woke up at 3 am with pain, wife gave him Morphine , woke at 0500 "I Thought I was going to die" hx  lung cancer with bone metastisis, increased weakness with nausea     69 yr male- with stage 4 lung ca-- stopped tx 11/18-- in pallative care- was to meet with hospice today -- wife brings in for intractable nausea- and gen weakness-- -- pt states no fevers- no new sob- cough- no new back pain -- - no other comps-- no transfer- transport dysphagia        History provided by:  Patient and spouse   used: No    Shortness of Breath   Associated symptoms: no abdominal pain, no chest pain, no cough, no diaphoresis, no fever, no neck pain, no vomiting and no wheezing        Prior to Admission Medications   Prescriptions Last Dose Informant Patient Reported? Taking?    Artificial Saliva (BIOTENE MOISTURIZING MOUTH MT)  Self Yes No   Sig: Apply to the mouth or throat   acetaminophen (TYLENOL) 325 mg tablet   No No   Sig: Take 3 tablets (975 mg total) by mouth 3 (three) times a day   finasteride (PROSCAR) 5 mg tablet  Self No No   Sig: Take 1 tablet (5 mg total) by mouth daily   folic acid (FOLVITE) 1 mg tablet   No No   Sig: TAKE 1 TABLET (1 MG TOTAL) BY MOUTH DAILY   levothyroxine 125 mcg tablet  Self No No   Sig: Take 1 tablet (125 mcg total) by mouth daily   morphine sulfate, concentrate, 100 MG/5ML concentrated solution   No No   Sig: Take 0 5 mL (10 mg total) by mouth every 4 (four) hours as needed for moderate pain for up to 10 days Max Daily Amount: 60 mg   ondansetron (ZOFRAN-ODT) 4 mg disintegrating tablet   No No   Sig: Take 1 tablet (4 mg total) by mouth 3 (three) times a day before meals   oxybutynin (DITROPAN XL) 10 MG 24 hr tablet   No No   Sig: Take 1 tablet (10 mg total) by mouth daily at bedtime   pantoprazole (PROTONIX) 40 mg tablet   No No   Sig: Take 1 tablet (40 mg total) by mouth daily in the early morning polyethylene glycol (MIRALAX) 17 g packet   No No   Sig: Take 17 g by mouth daily as needed (constipation)   predniSONE 20 mg tablet   Yes No   Sig: Take 10 mg by mouth daily   senna 8 8 mg/5 mL syrup  Self Yes No   Sig: Take by mouth daily at bedtime   sucralfate (CARAFATE) 1 g/10 mL suspension   No No   Sig: Take 10 mL (1,000 mg total) by mouth every 6 (six) hours   tamsulosin (FLOMAX) 0 4 mg   No No   Sig: Take 1 capsule (0 4 mg total) by mouth daily with dinner      Facility-Administered Medications: None       Past Medical History:   Diagnosis Date    Anemia     BPH (benign prostatic hyperplasia)     Cancer of lung (HCC)     [facility]ida es[facility]itis ([facility] [address] ) 03/2018    Disease of thyroid gland     GAVE (gastric antral vascular ectasia)     History of immunotherapy     Pembrolizumab    Hx of radiation therapy 03/2018    lower thoracic spine and upper lumbar spine    Hypertension     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     Radiation esophagitis 03/2018    Spinal stenosis     Tobacco abuse        Past Surgical History:   Procedure Laterality Date    ESOPHAGOGASTRODUODENOSCOPY N/A 3/20/2018    Procedure: ESOPHAGOGASTRODUODENOSCOPY (EGD); Surgeon: Parisa Ramírez MD;  [facility]; Service: Gastroenterology    ESOPHAGOGASTRODUODENOSCOPY N/A 12/5/2018    Procedure: ESOPHAGOGASTRODUODENOSCOPY (EGD); Surgeon: Parisa Ramírez MD;  [facility]; Service: Gastroenterology       Family History   Problem Relation Age of Onset    Esophageal cancer Mother     Diabetes Father     No Known Problems Sister     No Known Problems Brother      I have reviewed and agree with the history as documented      Social History   Substance Use Topics    Smoking status: Former Smoker     Packs/day: 0 50     Years: 15 00     Types: Cigarettes     Start date: 1968     Quit date: 1/11/2018    Smokeless tobacco: Never Used      Comment: Quit December 2017    Alcohol use No        Review of Systems   Constitutional: Positive for activity change, appetite change and unexpected weight change  Negative for chills, diaphoresis, fatigue and fever  HENT: Negative  Eyes: Negative  Respiratory: Positive for shortness of breath  Negative for apnea, cough, choking, chest tightness, wheezing and stridor  Cardiovascular: Negative for chest pain, palpitations and leg swelling  Gastrointestinal: Positive for nausea  Negative for abdominal distention, abdominal pain, anal bleeding, blood in stool, constipation, diarrhea, rectal pain and vomiting  Endocrine: Negative  Genitourinary: Negative  Musculoskeletal: Positive for back pain  Negative for arthralgias, gait problem, joint swelling, myalgias, neck pain and neck stiffness  Skin: Negative  Allergic/Immunologic: Negative  Neurological: Negative  Hematological: Negative  Psychiatric/Behavioral: Negative  Physical Exam  Physical Exam   Constitutional: He is oriented to person, place, and time  No distress  Cachetic-- pulse ox 90 % on 2 lo2 nc- interpretation is low- no intervention    HENT:   Head: Normocephalic and atraumatic  Eyes: Pupils are equal, round, and reactive to light  Conjunctivae and EOM are normal  Right eye exhibits no discharge  Left eye exhibits no discharge  No scleral icterus  Mm pink   Neck: Normal range of motion  Neck supple  No JVD present  No tracheal deviation present  No thyromegaly present  Cardiovascular: Regular rhythm, normal heart sounds and intact distal pulses  Exam reveals no gallop and no friction rub  No murmur heard  Pulmonary/Chest: Effort normal  No stridor  No respiratory distress  He has no wheezes  He has no rales  He exhibits no tenderness  Decreased bs on left - pos r sided rhonic   Abdominal: Soft  Bowel sounds are normal  He exhibits no distension and no mass  There is no tenderness  There is no rebound and no guarding  No hernia     Soft- nt-ns-- no pulsatile abd mass/no bruit- no peritoneal signs   Musculoskeletal: Normal range of motion  He exhibits no edema, tenderness or deformity  Equal b ilateral radial/dp pulses- no ble edema/calf tenderness/assym/ erythema   Lymphadenopathy:     He has no cervical adenopathy  Neurological: He is alert and oriented to person, place, and time  No cranial nerve deficit or sensory deficit  He exhibits normal muscle tone  Coordination normal    Skin: Capillary refill takes less than 2 seconds  No rash noted  He is not diaphoretic  No erythema  There is pallor  Psychiatric: He has a normal mood and affect  His behavior is normal    Nursing note and vitals reviewed        Vital Signs  ED Triage Vitals   Temperature Pulse Respirations Blood Pressure SpO2   12/22/18 0718 12/22/18 0715 12/22/18 0715 12/22/18 0715 12/22/18 0715   97 7 °F (36 5 °C) 105 20 148/85 90 %      Temp Source Heart Rate Source Patient Position - Orthostatic VS BP Location FiO2 (%)   12/22/18 0718 -- -- 12/22/18 0715 --   Oral   Right arm       Pain Score       --                  Vitals:    12/22/18 0715   BP: 148/85   Pulse: 105       Visual Acuity      ED Medications  Medications   sodium chloride 0 9 % bolus 1,000 mL (not administered)   ondansetron (ZOFRAN) injection 4 mg (not administered)   morphine (PF) 10 mg/mL injection 6 mg (not administered)       Diagnostic Studies  Results Reviewed     Procedure Component Value Units Date/Time    CBC and differential [[id number]]     Lab Status:  No result Specimen:  Blood     Basic metabolic panel [[id number]]     Lab Status:  No result Specimen:  Blood                  XR chest 1 view portable    (Results Pending)              Procedures  Procedures       Phone Contacts  ED Phone Contact    ED Course  ED Course as of Dec 29 1400   Sat Dec 22, 2018   0729 - er md note- recent 12/5 labs- 12/19 pet scan results reviewed by er md    0811 Cxr portable- compared to previous from  6/18/18-- increased left pleural disease- left base pleural effusions- small right base pleural effusion -- no infiltrate/ ptx/  r sided port     2913 -- er md note- went to evaluate pt-- pt sound asleep- will wait for wife to return to discuss disposition                                 MDM  CritCare Time    Disposition  Final diagnoses:   None     ED Disposition     None      Follow-up Information    None         Patient's Medications   Discharge Prescriptions    No medications on file     No discharge procedures on file      ED Provider  Electronically Signed by           Leonardo Alvarenga MD  12/29/18 1400

## 2018-12-22 NOTE — PLAN OF CARE
Problem: Prexisting or High Potential for Compromised Skin Integrity  Goal: Skin integrity is maintained or improved  INTERVENTIONS:  - Identify patients at risk for skin breakdown  - Assess and monitor skin integrity  - Assess and monitor nutrition and hydration status  - Monitor labs (i e  albumin)  - Assess for incontinence   - Turn and reposition patient  - Assist with mobility/ambulation  - Relieve pressure over bony prominences  - Avoid friction and shearing  - Provide appropriate hygiene as needed including keeping skin clean and dry  - Evaluate need for skin moisturizer/barrier cream  - Collaborate with interdisciplinary team (i e  Nutrition, Rehabilitation, etc )   - Patient/family teaching   Outcome: Not Progressing

## 2018-12-22 NOTE — ASSESSMENT & PLAN NOTE
Discussed with family and patient  Level 4 DNR/DNI  Wants only comfort measures  Does not want any further chemo or input from medical oncology  Will give IV morphine  Discussed with palliative - Fentanyl patch 12mcg  Also dispersible zofran  Consult palliative and hospice  Patient wants hospice, but not inpatient hospice  Wants to be home with family for nakul  Discussed in person with hospice and palliative care  They are aware of patient

## 2018-12-22 NOTE — ED PROCEDURE NOTE
PROCEDURE  ECG 12 Lead Documentation  Date/Time: 12/22/2018 7:30 AM  Performed by: Debby Wood  Authorized by: Fam BEATTY     Indications / Diagnosis:  Tachy  ECG reviewed by me, the ED Provider: yes    Patient location:  ED and bedside  Previous ECG:     Previous ECG:  Compared to current    Comparison ECG info:  6/18/18    Similarity:  No change    Comparison to cardiac monitor: Yes    Interpretation:     Interpretation: non-specific    Rate:     ECG rate:  84    ECG rate assessment: normal    Rhythm:     Rhythm: sinus rhythm    Ectopy:     Ectopy: none    QRS:     QRS axis:  Normal    QRS intervals:  Normal  Conduction:     Conduction: abnormal      Abnormal conduction: incomplete RBBB    ST segments:     ST segments:  Normal  T waves:     T waves: flattening      Flattening:  III and V1  Other findings:     Other findings: U wave    Comments:      No ecg signs of ischemia/ injury          Mamadou Ferrara MD  12/22/18 3237

## 2018-12-22 NOTE — H&P
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 [facility])   Assessment & Plan    Discussed with family and patient  Level 4 DNR/DNI  Wants only comfort measures  Does not want any further chemo or input from medical oncology  Will give IV morphine  Discussed with palliative - Fentanyl patch 12mcg  Also dispersible zofran  Consult palliative and hospice  Patient wants hospice, but not inpatient hospice  Wants to be home with family for nakul  Discussed in person with hospice and palliative care  They are aware of patient  VTE Prophylaxis: Heparin  / sequential compression device   Code Status: Level 4  POLST: There is no POLST form on file for this patient (pre-hospital)  Discussion with family: Discussed with wife  Anticipated Length of Stay:  Patient will be admitted on an Observation basis with an anticipated length of stay of  less 2 midnights  Justification for Hospital Stay: pain management while waiting for hospice  Total Time for Visit, including Counseling / Coordination of Care: 1 hour  Greater than 50% of this total time spent on direct patient counseling and coordination of care  Chief Complaint:     Intractable back pain and nausea  History of Present Illness:    Ewelina Rincon is a 69 y o  male who presents with thoracic back pain and nausea and vomiting  Patient has metastatic adenocarcinoma of the lung stage IV with vertebral Mets  He has been suffering from back pain as result of this for several months over the last month this has been getting worse      The patient is on oral morphine 10 mg every 6 hours per was unable to tolerate any oral morphine and was vomiting this morning  In addition the patient states that the oral morphine was not really taking the edge off his pain over the last 48 hours  Patient does follow with Medical Oncology as well as palliative care in the outpatient setting  As per the patient and his family he has elected not to pursue any further chemotherapy at this time  The patient wishes to pursue comfort measures and I have now made him a level 4 comfort care  He was due to meet with hospice today however comes in with intractable pain nausea vomiting  Currently sitting up comfortably in bed and is in no acute distress  He has substantial pain relief from IV morphine and appears comfortable  His family and him wish to pursue hospice but wish to have hospice care at home  Review of Systems:    Review of Systems   Constitutional: Negative for activity change, appetite change, chills, diaphoresis, fatigue, fever and unexpected weight change  HENT: Negative  Eyes: Negative  Respiratory: Positive for shortness of breath  Negative for apnea, cough, choking, chest tightness, wheezing and stridor  Cardiovascular: Negative for chest pain, palpitations and leg swelling  Gastrointestinal: Negative  Endocrine: Negative  Genitourinary: Negative  Musculoskeletal: Positive for back pain  Negative for gait problem, joint swelling, myalgias, neck pain and neck stiffness  Skin: Negative  Neurological: Positive for weakness  Hematological: Negative  Psychiatric/Behavioral: Negative          Past Medical and Surgical History:     Past Medical History:   Diagnosis Date    Anemia     BPH (benign prostatic hyperplasia)     [facility] lung ([facility] [address] )     Candida esophagitis ([facility] [address] ) 03/2018    Disease of thyroid gland     GAVE (gastric antral vascular ectasia)     History of immunotherapy     Pembrolizumab    Hx of radiation therapy 03/2018    lower thoracic spine and upper lumbar spine    Hypertension     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[facility] [address] [facility]     Radiation esophagitis 03/2018    Spinal stenosis     Tobacco abuse        Past Surgical History:   Procedure Laterality Date    ESOPHAGOGASTRODUODENOSCOPY N/A 3/20/2018    Procedure: ESOPHAGOGASTRODUODENOSCOPY (EGD); Surgeon: Desiree Gabriel MD;  [facility]; Service: Gastroenterology    ESOPHAGOGASTRODUODENOSCOPY N/A 12/5/2018    Procedure: ESOPHAGOGASTRODUODENOSCOPY (EGD); Surgeon: Desiree Gabriel MD;  [facility]; Service: Gastroenterology       Meds/Allergies:    Prior to Admission medications    Medication Sig Start Date End Date Taking?  Authorizing Provider   acetaminophen (TYLENOL) 325 mg tablet Take 3 tablets (975 mg total) by mouth 3 (three) times a day 12/7/18  Yes Kerry Marie MD   Artificial Saliva (BIOTENE MOISTURIZING MOUTH MT) Apply to the mouth or throat   Yes Historical Provider, MD   finasteride (PROSCAR) 5 mg tablet Take 1 tablet (5 mg total) by mouth daily 4/30/18  Yes Radha Ritter MD   folic acid (FOLVITE) 1 mg tablet TAKE 1 TABLET (1 MG TOTAL) BY MOUTH DAILY 10/29/18  Yes Stanley Elizabeth PA-C   levothyroxine 125 mcg tablet Take 1 tablet (125 mcg total) by mouth daily 9/19/18  Yes Radha Ritter MD   morphine sulfate, concentrate, 100 MG/5ML concentrated solution Take 0 5 mL (10 mg total) by mouth every 4 (four) hours as needed for moderate pain for up to 10 days Max Daily Amount: 60 mg 12/17/18 12/27/18 Yes Baldev George MD   ondansetron (ZOFRAN-ODT) 4 mg disintegrating tablet Take 1 tablet (4 mg total) by mouth 3 (three) times a day before meals 12/7/18  Yes Kerry Marie MD   oxybutynin (DITROPAN XL) 10 MG 24 hr tablet Take 1 tablet (10 mg total) by mouth daily at bedtime 12/18/18  Yes Kaylah Sheffield PA-C   pantoprazole (PROTONIX) 40 mg tablet Take 1 tablet (40 mg total) by mouth daily in the early morning 12/8/18  Yes Kerry Marie MD polyethylene glycol (MIRALAX) 17 g packet Take 17 g by mouth daily as needed (constipation) 12/7/18  Yes Mary Ann Jordan MD   predniSONE 20 mg tablet Take 10 mg by mouth daily   Yes Historical Provider, MD   senna 8 8 mg/5 mL syrup Take by mouth daily at bedtime   Yes Historical Provider, MD   sucralfate (CARAFATE) 1 g/10 mL suspension Take 10 mL (1,000 mg total) by mouth every 6 (six) hours 12/7/18  Yes Mary Ann Jordan MD   tamsulosin (FLOMAX) 0 4 mg Take 1 capsule (0 4 mg total) by mouth daily with dinner 12/13/18  Yes Azalia Hanson PA-C     I have reviewed home medications with patient personally  Allergies: No Known Allergies    Social History:     Marital Status: /Civil Union   Occupation: retired  Patient Pre-hospital Living Situation: lives with wife  Patient Pre-hospital Level of Mobility: fully  Patient Pre-hospital Diet Restrictions: none  Substance Use History:   History   Alcohol Use No     History   Smoking Status    Former Smoker    Packs/day: 0 50    Years: 15 00    Types: Cigarettes    Start date: 1968    Quit date: 1/11/2018   Smokeless Tobacco    Never Used     Comment: Quit December 2017     History   Drug Use No       Family History:    Family History   Problem Relation Age of Onset    Esophageal cancer Mother     Diabetes Father     No Known Problems Sister     No Known Problems Brother        Physical Exam:     Vitals:   Blood Pressure: 147/80 (12/22/18 1500)  Pulse: 96 (12/22/18 1500)  Temperature: 98 4 °F (36 9 °C) (12/22/18 1500)  Temp Source: Oral (12/22/18 1500)  Respirations: 20 (12/22/18 1500)  Height: 5' 7" (170 2 cm) (12/22/18 1032)  Weight - Scale: 74 3 kg (163 lb 12 8 oz) (12/22/18 0718)  SpO2: 99 % (12/22/18 1500)    Physical Exam   Constitutional: He is oriented to person, place, and time  No distress  Frail elderly  Eyes: Right eye exhibits no discharge  Left eye exhibits no discharge  No scleral icterus  Neck: No JVD present  No tracheal deviation present  No thyromegaly present  Cardiovascular: Normal rate  Exam reveals no gallop and no friction rub  No murmur heard  Pulmonary/Chest: Effort normal  No respiratory distress  He has no wheezes  He has no rales  He exhibits no tenderness  Abdominal: Soft  He exhibits no distension and no mass  There is no tenderness  There is no rebound and no guarding  Musculoskeletal: Normal range of motion  He exhibits no edema, tenderness or deformity  Lymphadenopathy:     He has no cervical adenopathy  Neurological: He is alert and oriented to person, place, and time  He displays normal reflexes  No cranial nerve deficit  He exhibits normal muscle tone  Coordination normal    Skin: Skin is warm  No rash noted  He is not diaphoretic  No erythema  No pallor  Psychiatric: He has a normal mood and affect  Additional Data:     Lab Results: I have personally reviewed pertinent reports  Results from last 7 days  Lab Units 12/22/18  0740   WBC Thousand/uL 10 25*   HEMOGLOBIN g/dL 9 7*   HEMATOCRIT % 32 2*   PLATELETS Thousands/uL 362   NEUTROS PCT % 86*   LYMPHS PCT % 3*   MONOS PCT % 9   EOS PCT % 1       Results from last 7 days  Lab Units 12/22/18  0740   SODIUM mmol/L 136   POTASSIUM mmol/L 3 6   CHLORIDE mmol/L 96*   CO2 mmol/L 32   BUN mg/dL 11   CREATININE mg/dL 1 03   ANION GAP mmol/L 8   CALCIUM mg/dL 9 9   GLUCOSE RANDOM mg/dL 150*           Results from last 7 days  Lab Units 12/19/18  0730   POC GLUCOSE mg/dl 108               Imaging: I have personally reviewed pertinent reports  XR chest 1 view portable   Final Result by Mirella Miller MD (12/22 0740)   1  Low lung volumes with mild pulmonary vascular congestion superimposed on pulmonary fibrosis  2   Trace left pleural effusion  Workstation performed: [id number]             EKG, Pathology, and Other Studies Reviewed on Admission:   · EKG - no acute ischemic changes       Allscripts / Epic Records Reviewed: Yes     ** Please Note: This note has been constructed using a voice recognition system   **

## 2018-12-22 NOTE — ED NOTES
Spoke with Hospice Nurse on call Rosalynd Schwab, made aware of pts admission, wife concerned because they had appointment set up for someone coming to home this am     Chinyere Maciel, RN  12/22/18 4660

## 2018-12-22 NOTE — RESPIRATORY THERAPY NOTE
RT Protocol Note  Elbert Holman 69 y o  male MRN: [id number]  Unit/Bed#: -01 Encounter: [id number]    Assessment    Active Problems:    * No active hospital problems  *      Home Pulmonary Medications:  none       Past Medical History:   Diagnosis Date    Anemia     BPH (benign prostatic hyperplasia)     Cancer of lung (HCC)     Candida esophagitis ([address] ) 03/2018    Disease of thyroid gland     GAVE (gastric antral vascular ectasia)     History of immunotherapy     Pembrolizumab    Hx of radiation therapy 03/2018    lower thoracic spine and upper lumbar spine    Hypertension     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HCC)     Radiation esophagitis 03/2018    Spinal stenosis     Tobacco abuse      Social History     Social History    Marital status: /Civil Union     Spouse name: Rylee Spears Number of children: 2    Years of education: N/A     Occupational History    retired       Social History Main Topics    Smoking status: Former Smoker     Packs/day: 0 50     Years: 15 00     Types: Cigarettes     Start date: 1968     Quit date: 1/11/2018    Smokeless tobacco: Never Used      Comment: Quit December 2017    Alcohol use No    Drug use: No    Sexual activity: Not Asked     Other Topics Concern    None     Social History Narrative    None       Subjective         Objective    Physical Exam:   Assessment Type: Assess only  General Appearance: Alert, Awake  Respiratory Pattern: Normal  Chest Assessment: Chest expansion symmetrical  Bilateral Breath Sounds: Diminished, Clear    Vitals:  Blood pressure 165/75, pulse 68, temperature 97 6 °F (36 4 °C), resp  rate 20, height 5' 7" (1 702 m), weight 74 3 kg (163 lb 12 8 oz), SpO2 98 %            Imaging and other studies: reviewed           Plan    Respiratory Plan:  (lung and espphageal ca)    alb mdi ordered

## 2018-12-23 LAB
ALBUMIN SERPL BCP-MCNC: 2.6 G/DL (ref 3.5–5)
ALP SERPL-CCNC: 99 U/L (ref 46–116)
ALT SERPL W P-5'-P-CCNC: 18 U/L (ref 12–78)
ANION GAP SERPL CALCULATED.3IONS-SCNC: 9 MMOL/L (ref 4–13)
AST SERPL W P-5'-P-CCNC: 18 U/L (ref 5–45)
BILIRUB SERPL-MCNC: 0.5 MG/DL (ref 0.2–1)
BUN SERPL-MCNC: 10 MG/DL (ref 5–25)
CALCIUM SERPL-MCNC: 9.4 MG/DL (ref 8.3–10.1)
CHLORIDE SERPL-SCNC: 100 MMOL/L (ref 100–108)
CO2 SERPL-SCNC: 30 MMOL/L (ref 21–32)
CREAT SERPL-MCNC: 1.05 MG/DL (ref 0.6–1.3)
ERYTHROCYTE [DISTWIDTH] IN BLOOD BY AUTOMATED COUNT: 17.2 % (ref 11.6–15.1)
GFR SERPL CREATININE-BSD FRML MDRD: 68 ML/MIN/1.73SQ M
GLUCOSE P FAST SERPL-MCNC: 88 MG/DL (ref 65–99)
GLUCOSE SERPL-MCNC: 88 MG/DL (ref 65–140)
HCT VFR BLD AUTO: 32.7 % (ref 36.5–49.3)
HGB BLD-MCNC: 9.8 G/DL (ref 12–17)
MAGNESIUM SERPL-MCNC: 2 MG/DL (ref 1.6–2.6)
MCH RBC QN AUTO: 26.1 PG (ref 26.8–34.3)
MCHC RBC AUTO-ENTMCNC: 30 G/DL (ref 31.4–37.4)
MCV RBC AUTO: 87 FL (ref 82–98)
PLATELET # BLD AUTO: 372 THOUSANDS/UL (ref 149–390)
PMV BLD AUTO: 9.8 FL (ref 8.9–12.7)
POTASSIUM SERPL-SCNC: 3.9 MMOL/L (ref 3.5–5.3)
PROT SERPL-MCNC: 7.4 G/DL (ref 6.4–8.2)
RBC # BLD AUTO: 3.75 MILLION/UL (ref 3.88–5.62)
SODIUM SERPL-SCNC: 139 MMOL/L (ref 136–145)
WBC # BLD AUTO: 7.68 THOUSAND/UL (ref 4.31–10.16)

## 2018-12-23 PROCEDURE — 83735 ASSAY OF MAGNESIUM: CPT | Performed by: INTERNAL MEDICINE

## 2018-12-23 PROCEDURE — 99232 SBSQ HOSP IP/OBS MODERATE 35: CPT | Performed by: INTERNAL MEDICINE

## 2018-12-23 PROCEDURE — 80053 COMPREHEN METABOLIC PANEL: CPT | Performed by: INTERNAL MEDICINE

## 2018-12-23 PROCEDURE — 85027 COMPLETE CBC AUTOMATED: CPT | Performed by: INTERNAL MEDICINE

## 2018-12-23 RX ORDER — DEXAMETHASONE 2 MG/1
2 TABLET ORAL EVERY 12 HOURS SCHEDULED
Status: DISCONTINUED | OUTPATIENT
Start: 2018-12-23 | End: 2018-12-24 | Stop reason: HOSPADM

## 2018-12-23 RX ORDER — OXYCODONE HCL 5 MG/5 ML
5 SOLUTION, ORAL ORAL EVERY 4 HOURS PRN
Status: DISCONTINUED | OUTPATIENT
Start: 2018-12-23 | End: 2018-12-24 | Stop reason: HOSPADM

## 2018-12-23 RX ORDER — FUROSEMIDE 20 MG/1
20 TABLET ORAL DAILY
Status: DISCONTINUED | OUTPATIENT
Start: 2018-12-23 | End: 2018-12-24 | Stop reason: HOSPADM

## 2018-12-23 RX ADMIN — MORPHINE SULFATE 2 MG: 2 INJECTION, SOLUTION INTRAMUSCULAR; INTRAVENOUS at 14:52

## 2018-12-23 RX ADMIN — MORPHINE SULFATE 2 MG: 2 INJECTION, SOLUTION INTRAMUSCULAR; INTRAVENOUS at 18:01

## 2018-12-23 RX ADMIN — OXYCODONE HYDROCHLORIDE 5 MG: 5 SOLUTION ORAL at 21:17

## 2018-12-23 RX ADMIN — MORPHINE SULFATE 2 MG: 2 INJECTION, SOLUTION INTRAMUSCULAR; INTRAVENOUS at 04:45

## 2018-12-23 RX ADMIN — FINASTERIDE 5 MG: 5 TABLET, FILM COATED ORAL at 08:50

## 2018-12-23 RX ADMIN — HEPARIN SODIUM 5000 UNITS: 5000 INJECTION, SOLUTION INTRAVENOUS; SUBCUTANEOUS at 06:12

## 2018-12-23 RX ADMIN — SUCRALFATE 1000 MG: 1 SUSPENSION ORAL at 17:28

## 2018-12-23 RX ADMIN — OXYBUTYNIN CHLORIDE 10 MG: 5 TABLET, EXTENDED RELEASE ORAL at 21:17

## 2018-12-23 RX ADMIN — MORPHINE SULFATE 2 MG: 2 INJECTION, SOLUTION INTRAMUSCULAR; INTRAVENOUS at 11:43

## 2018-12-23 RX ADMIN — HEPARIN SODIUM 5000 UNITS: 5000 INJECTION, SOLUTION INTRAVENOUS; SUBCUTANEOUS at 13:21

## 2018-12-23 RX ADMIN — SUCRALFATE 1000 MG: 1 SUSPENSION ORAL at 00:00

## 2018-12-23 RX ADMIN — MORPHINE SULFATE 2 MG: 2 INJECTION, SOLUTION INTRAMUSCULAR; INTRAVENOUS at 00:21

## 2018-12-23 RX ADMIN — PANTOPRAZOLE SODIUM 40 MG: 40 TABLET, DELAYED RELEASE ORAL at 06:12

## 2018-12-23 RX ADMIN — TAMSULOSIN HYDROCHLORIDE 0.4 MG: 0.4 CAPSULE ORAL at 17:27

## 2018-12-23 RX ADMIN — ONDANSETRON 4 MG: 4 TABLET, ORALLY DISINTEGRATING ORAL at 10:50

## 2018-12-23 RX ADMIN — MORPHINE SULFATE 2 MG: 2 INJECTION, SOLUTION INTRAMUSCULAR; INTRAVENOUS at 09:02

## 2018-12-23 RX ADMIN — FUROSEMIDE 20 MG: 20 TABLET ORAL at 17:29

## 2018-12-23 RX ADMIN — LEVOTHYROXINE SODIUM 125 MCG: 125 TABLET ORAL at 06:12

## 2018-12-23 RX ADMIN — DEXAMETHASONE 2 MG: 2 TABLET ORAL at 21:17

## 2018-12-23 RX ADMIN — SUCRALFATE 1000 MG: 1 SUSPENSION ORAL at 06:12

## 2018-12-23 RX ADMIN — PREDNISONE 10 MG: 10 TABLET ORAL at 08:50

## 2018-12-23 RX ADMIN — ONDANSETRON 4 MG: 2 INJECTION INTRAMUSCULAR; INTRAVENOUS at 07:44

## 2018-12-23 RX ADMIN — ONDANSETRON 4 MG: 4 TABLET, ORALLY DISINTEGRATING ORAL at 06:12

## 2018-12-23 RX ADMIN — HEPARIN SODIUM 5000 UNITS: 5000 INJECTION, SOLUTION INTRAVENOUS; SUBCUTANEOUS at 21:18

## 2018-12-23 RX ADMIN — ONDANSETRON 4 MG: 4 TABLET, ORALLY DISINTEGRATING ORAL at 15:13

## 2018-12-23 RX ADMIN — ACETAMINOPHEN 975 MG: 325 TABLET, FILM COATED ORAL at 21:17

## 2018-12-23 RX ADMIN — ONDANSETRON 4 MG: 2 INJECTION INTRAMUSCULAR; INTRAVENOUS at 14:52

## 2018-12-23 NOTE — ASSESSMENT & PLAN NOTE
Discussed with family and patient  Level 4 DNR/DNI  Wants only comfort measures  Discussed with hospice and with palliative care  Patient is planned for home with hospice care  Trying decadron in addition to fentanyl patch  Also adding roxycodone PRN in addition to IV for severe

## 2018-12-23 NOTE — PROGRESS NOTES
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 [facility])   Assessment & Plan    Discussed with family and patient  Level 4 DNR/DNI  Wants only comfort measures  Discussed with hospice and with palliative care  Patient is planned for home with hospice care  Trying decadron in addition to fentanyl patch  Also adding roxycodone PRN in addition to IV for severe  VTE Pharmacologic Prophylaxis:   Pharmacologic: Heparin  Mechanical VTE Prophylaxis in Place: Yes    Patient Centered Rounds: I have performed bedside rounds with nursing staff today  Discussions with Specialists or Other Care Team Provider: Discussed with palliative care  Education and Discussions with Family / Patient: Discussed with patient and with wife  Time Spent for Care: 30 minutes  More than 50% of total time spent on counseling and coordination of care as described above  Current Length of Stay: 0 day(s)    Current Patient Status: Observation   Certification Statement: The patient will continue to require additional inpatient hospital stay due to optimization of symptoms control for home hospice  Discharge Plan: home with hospice when appropriate symptom control achieved  Code Status: Level 4 - Comfort Care      Subjective:   Patient seen and examined      " I feel a lot better "    Objective:     Vitals:   Temp (24hrs), Av 8 °F (36 6 °C), Min:97 6 °F (36 4 °C), Max:98 °F (36 7 °C)    Temp:  [97 6 °F (36 4 °C)-98 °F (36 7 °C)] 97 6 °F (36 4 °C)  HR:  [] 69  Resp:  [16-20] 18  BP: (127-145)/(70-78) 127/70  SpO2:  [93 %-100 %] 93 %  Body mass index is 23 92 kg/m²       Input and Output Summary (last 24 hours): Intake/Output Summary (Last 24 hours) at 12/23/18 1647  Last data filed at 12/23/18 1350   Gross per 24 hour   Intake              740 ml   Output             1675 ml   Net             -935 ml       Physical Exam:     Physical Exam   Constitutional: He is oriented to person, place, and time  No distress  Ill-appearing, frail  HENT:   Head: Normocephalic and atraumatic  Mouth/Throat: No oropharyngeal exudate  Eyes: Right eye exhibits no discharge  Left eye exhibits no discharge  No scleral icterus  Neck: No JVD present  No tracheal deviation present  No thyromegaly present  Cardiovascular: Normal rate  Exam reveals no gallop and no friction rub  No murmur heard  Pulmonary/Chest: Effort normal  No respiratory distress  He has no wheezes  He has no rales  He exhibits no tenderness  Abdominal: Soft  He exhibits no distension and no mass  There is no tenderness  There is no rebound and no guarding  Musculoskeletal: He exhibits no edema, tenderness or deformity  Lymphadenopathy:     He has no cervical adenopathy  Neurological: He is alert and oriented to person, place, and time  He displays normal reflexes  No cranial nerve deficit  Coordination normal    Skin: Skin is warm  No rash noted  He is not diaphoretic  No erythema  There is pallor  Psychiatric: He has a normal mood and affect           Additional Data:     Labs:      Results from last 7 days  Lab Units 12/23/18  0437 12/22/18  0740   WBC Thousand/uL 7 68 10 25*   HEMOGLOBIN g/dL 9 8* 9 7*   HEMATOCRIT % 32 7* 32 2*   PLATELETS Thousands/uL 372 362   NEUTROS PCT %  --  86*   LYMPHS PCT %  --  3*   MONOS PCT %  --  9   EOS PCT %  --  1       Results from last 7 days  Lab Units 12/23/18  0437   SODIUM mmol/L 139   POTASSIUM mmol/L 3 9   CHLORIDE mmol/L 100   CO2 mmol/L 30   BUN mg/dL 10   CREATININE mg/dL 1 05   ANION GAP mmol/L 9   CALCIUM mg/dL 9 4   ALBUMIN g/dL 2 6*   TOTAL BILIRUBIN mg/dL 0  50   ALK PHOS U/L 99   ALT U/L 18   AST U/L 18   GLUCOSE RANDOM mg/dL 88           Results from last 7 days  Lab Units 12/19/18  0730   POC GLUCOSE mg/dl 108                   * I Have Reviewed All Lab Data Listed Above  * Additional Pertinent Lab Tests Reviewed: [facility] Admission Reviewed    Imaging:    Imaging Reports Reviewed Today Include:   CXR -   "1   Low lung volumes with mild pulmonary vascular congestion superimposed on pulmonary fibrosis  2   Trace left pleural effusion "  Imaging Personally Reviewed by Myself Includes:  As above  Recent Cultures (last 7 days):           Last 24 Hours Medication List:     Current Facility-Administered Medications:  acetaminophen 975 mg Oral TID Kimber Baldwin MD   albuterol 2 puff Inhalation Q4H PRN Kimber Baldwin MD   dexamethasone 2 mg Oral Q12H [address] Kimber Baldwin MD   fentaNYL 12 mcg Transdermal Q72H Kimber Baldwin MD   finasteride 5 mg Oral Daily Kimber Baldwin MD   folic acid 1 mg Oral Daily Kimber Baldwin MD   furosemide 20 mg Oral Daily Kimber Baldwin MD   heparin (porcine) 5,000 Units Subcutaneous [facility] Kimber Baldwin MD   levothyroxine 125 mcg Oral Early Morning Kimber Baldwin MD   morphine injection 2 mg Intravenous Q3H PRN Kimber Baldwin MD   ondansetron 4 mg Intravenous Q4H PRN Kimber Baldwin MD   ondansetron 4 mg Oral TID AC Kimber Baldwin MD   oxybutynin 10 mg Oral HS Kimber Baldwin MD   oxyCODONE 5 mg Oral Q4H PRN Kimber Baldwin MD   pantoprazole 40 mg Oral Daily Before Nessa Koenig MD   predniSONE 10 mg Oral Daily Kimber Baldwin MD   sucralfate 1,000 mg Oral Q6H [address] Kimber Badlwin MD   tamsulosin 0 4 mg Oral Daily With Dionna Siddiqui MD        Today, Patient Was Seen By: Kimber Baldwin MD    ** Please Note: Dictation voice to text software may have been used in the creation of this document   **

## 2018-12-23 NOTE — UTILIZATION REVIEW
Notification of Observation Care Status/Observation Authorization Request    This is a Notification of Observation Care Status to our facility [address] [facility],[address]  Be advised that this patient was admitted to our facility under Observation Status  Please contact the Utilization Review Department where the patient is receiving care services for additional admission information  Place of Service Code: 25  Place of Service Name: [facility]  CPT Code for Observation:     Presentation Date & Time: 12/22/2018  7:08 AM  Observation Admission Date & Time: 12/22/18 0928AM  Hours in Observation: Currently 25 Hrs  Discharge Date & Time: No discharge date for patient encounter  Discharge Disposition (if discharged): Home/Self Care(Disregard, Still in [facility])  Attending Physician: Liza Zafar [[id number]]  Admission Orders     Ordered        12/22/18 0928  Place in Observation (expected length of stay for this patient is less than two midnights)  Once               Facility: [address] [place] [address],[address]  Address: [place] [address] [place] [address]    Phone: [phone]  Tax ID [id number]  [facility] [id number]  Medicare: [id number]  [address]  Utilization Review Department  Phone: [phone]; Fax [phone]  ATTENTION: Please call with any questions or concerns to [phone]  and carefully listen to the prompts so that you are directed to the right person  Send all requests for admission clinical reviews, approved or denied determinations and any other requests to fax [phone]   All voicemails are confidential

## 2018-12-23 NOTE — HOSPICE NOTE
Late entry for meeting this am with pt and his spouse  Discussed home hospice at length and in detail  Discussed that palliative care team will see pt on Monday and assess what medications pt will need for dc to home and to start with home hospice  Above communicated to pts attending physician and bedside nurse  Liaison to follow

## 2018-12-23 NOTE — UTILIZATION REVIEW
[address] Utilization Review Department  Phone: [phone]; Fax [phone]  [email or web address]  ATTENTION: Please call with any questions or concerns to [phone]  and carefully listen to the prompts so that you are directed to the right person  Send all requests for admission clinical reviews, approved or denied determinations and any other requests to fax [phone]  All voicemails are confidential     ========================================================================    Continued Stay Review    OBSERVATION 12/22/2018 @ 0928, CONVERTED TO INPATIENT ADMISSION 12/23/2018 @ 1733, DUE TO FURTHER DIAGNOSTIC WORKUP, REQUIRING AT LEAST 2 MIDNIGHT STAY  will continue to require additional inpatient hospital stay due to optimization of symptoms control for home hospice  Date: 12/23/2018 12/23/18 1733  Inpatient Admission Once     Transfer Service: General Medicine       Question Answer Comment   Admitting Physician Gabriel Kearns    Level of Care Med Surg    Estimated length of stay More than 2 Midnights    Certification I certify that inpatient services are medically necessary for this patient for a duration of greater than two midnights  See H&P and MD Progress Notes for additional information about the patient's course of treatment  Age/Sex: 69 y o  male     Assessment/Plan: 0809, 0946, 1508, 1636, 1651  Shortness of breath   Assessment & Plan     Some fluid congestion  Improved on lasix  Will continue on PO dose        * 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 [facility])   Assessment & Plan     Discussed with family and patient  Level 4 DNR/DNI  Wants only comfort measures  Discussed with hospice and with palliative care  Patient is planned for home with hospice care  Trying decadron in addition to fentanyl patch     Also adding roxycodone PRN in addition to IV for severe              VTE Pharmacologic Prophylaxis: Pharmacologic: Heparin  Mechanical VTE Prophylaxis in Place: Yes  will continue to require additional inpatient hospital stay due to optimization of symptoms control for home hospice      Discharge Plan: TBD    Vital Signs: /76   Pulse 90   Temp 97 8 °F (36 6 °C)   Resp 16   Ht 5' 7" (1 702 m)   Wt 69 3 kg (152 lb 11 2 oz)   SpO2 100%   BMI 23 92 kg/m²     Medications:   Scheduled Meds:   Current Facility-Administered Medications:  acetaminophen 975 mg Oral TID Eleonora Castillo MD   albuterol 2 puff Inhalation Q4H PRN Eleonora Castillo MD   fentaNYL 12 mcg Transdermal Q72H Eleonora Castillo MD   finasteride 5 mg Oral Daily Eleonora Castillo MD   folic acid 1 mg Oral Daily Eleonora Castillo MD   heparin (porcine) 5,000 Units Subcutaneous [facility] Eleonora Castillo MD   levothyroxine 125 mcg Oral Early Morning Eleonora Castillo MD   morphine injection 2 mg Intravenous Q3H PRN Eleonora Castillo MD   morphine 10 mg Oral Q8H PRN Eleonora Castillo MD   ondansetron 4 mg Intravenous Q4H PRN Eleonora Castilol MD   ondansetron 4 mg Oral TID AC Eleonora Castillo MD   oxybutynin 10 mg Oral HS Eleonora Castillo MD   pantoprazole 40 mg Oral Daily Before Breakfast Eleonora Castillo MD   predniSONE 10 mg Oral Daily Eleonora Castillo MD   sucralfate 1,000 mg Oral Q6H [facility] Eleonora Castillo MD   tamsulosin 0 4 mg Oral Daily With Luca Lee MD       Abnormal Labs/Diagnostic Results:   Hgl 9 8 / Hct 32 7

## 2018-12-24 VITALS
HEIGHT: 67 IN | RESPIRATION RATE: 16 BRPM | OXYGEN SATURATION: 100 % | SYSTOLIC BLOOD PRESSURE: 148 MMHG | BODY MASS INDEX: 23.98 KG/M2 | DIASTOLIC BLOOD PRESSURE: 70 MMHG | TEMPERATURE: 98.2 F | HEART RATE: 77 BPM | WEIGHT: 152.78 LBS

## 2018-12-24 PROCEDURE — 99239 HOSP IP/OBS DSCHRG MGMT >30: CPT | Performed by: INTERNAL MEDICINE

## 2018-12-24 RX ORDER — FENTANYL 12 UG/H
1 PATCH TRANSDERMAL
Qty: 4 PATCH | Refills: 0 | Status: SHIPPED | OUTPATIENT
Start: 2018-12-25 | End: 2018-12-24

## 2018-12-24 RX ORDER — DEXAMETHASONE 2 MG/1
2 TABLET ORAL EVERY 12 HOURS SCHEDULED
Qty: 6 TABLET | Refills: 0 | Status: SHIPPED | OUTPATIENT
Start: 2018-12-24

## 2018-12-24 RX ORDER — FENTANYL 12 UG/H
1 PATCH TRANSDERMAL
Qty: 4 PATCH | Refills: 0 | Status: SHIPPED | OUTPATIENT
Start: 2018-12-25 | End: 2019-01-04

## 2018-12-24 RX ORDER — LORAZEPAM 0.5 MG/1
0.5 TABLET ORAL EVERY 2 HOUR PRN
Qty: 30 TABLET | Refills: 0 | Status: SHIPPED | OUTPATIENT
Start: 2018-12-24 | End: 2019-01-03

## 2018-12-24 RX ORDER — FUROSEMIDE 20 MG/1
20 TABLET ORAL DAILY
Qty: 10 TABLET | Refills: 0 | Status: SHIPPED | OUTPATIENT
Start: 2018-12-25

## 2018-12-24 RX ADMIN — FUROSEMIDE 20 MG: 20 TABLET ORAL at 10:14

## 2018-12-24 RX ADMIN — FINASTERIDE 5 MG: 5 TABLET, FILM COATED ORAL at 10:12

## 2018-12-24 RX ADMIN — SUCRALFATE 1000 MG: 1 SUSPENSION ORAL at 00:35

## 2018-12-24 RX ADMIN — DEXAMETHASONE 2 MG: 2 TABLET ORAL at 10:12

## 2018-12-24 RX ADMIN — MORPHINE SULFATE 2 MG: 2 INJECTION, SOLUTION INTRAMUSCULAR; INTRAVENOUS at 10:14

## 2018-12-24 RX ADMIN — ACETAMINOPHEN 975 MG: 325 TABLET, FILM COATED ORAL at 10:13

## 2018-12-24 RX ADMIN — SUCRALFATE 1000 MG: 1 SUSPENSION ORAL at 07:54

## 2018-12-24 RX ADMIN — HEPARIN SODIUM 5000 UNITS: 5000 INJECTION, SOLUTION INTRAVENOUS; SUBCUTANEOUS at 07:55

## 2018-12-24 RX ADMIN — ONDANSETRON 4 MG: 4 TABLET, ORALLY DISINTEGRATING ORAL at 07:54

## 2018-12-24 RX ADMIN — OXYCODONE HYDROCHLORIDE 5 MG: 5 SOLUTION ORAL at 08:08

## 2018-12-24 RX ADMIN — LEVOTHYROXINE SODIUM 125 MCG: 125 TABLET ORAL at 07:54

## 2018-12-24 RX ADMIN — SUCRALFATE 1000 MG: 1 SUSPENSION ORAL at 12:36

## 2018-12-24 RX ADMIN — HEPARIN 300 UNITS: 100 SYRINGE at 13:47

## 2018-12-24 RX ADMIN — PANTOPRAZOLE SODIUM 40 MG: 40 TABLET, DELAYED RELEASE ORAL at 07:53

## 2018-12-24 RX ADMIN — ONDANSETRON 4 MG: 4 TABLET, ORALLY DISINTEGRATING ORAL at 12:36

## 2018-12-24 RX ADMIN — ONDANSETRON 4 MG: 2 INJECTION INTRAMUSCULAR; INTRAVENOUS at 08:08

## 2018-12-24 RX ADMIN — FOLIC ACID 1 MG: 1 TABLET ORAL at 10:12

## 2018-12-24 NOTE — DISCHARGE SUMMARY
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)   Assessment & Plan    Discussed with family and patient  Level 4 DNR/DNI  Wants only comfort measures  Discussed with hospice and with palliative care  Patient is planned for home with hospice care  Trying decadron in addition to fentanyl patch  Scripts for pain medication provided by palliative care  Patient to be discharged to inpatient hospice today  Discharging Physician / Practitioner: Carol Dasilva MD  PCP: Narinder Renee MD  Admission Date:   Admission Orders     Ordered        12/23/18 1733  Inpatient Admission  Once         12/22/18 5007  Place in Observation (expected length of stay for this patient is less than two midnights)  Once             Discharge Date: 12/24/18    Resolved Problems  Date Reviewed: 12/24/2018    None          Consultations During Hospital Stay:  · None  Procedures Performed:     · CXR -   "1   Low lung volumes with mild pulmonary vascular congestion superimposed on pulmonary fibrosis  2   Trace left pleural effusion "    Significant Findings / Test Results:     · As above  Incidental Findings:   · None  Test Results Pending at Discharge (will require follow up): · None  Outpatient Tests Requested:  · None  Complications:  None  Reason for Admission:   Worsening intractable pain          Hospital Course:     Serjio Power is a 69 y o  male patient who originally presented to the hospital on 12/22/2018 due to back pain  The patient has 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 and has been struggling with pain control for a while  He was actually due to see hospice and meet with them regarding home hospice arrangements on Saturday when he came into hospital due to intractable pain  He was already on p  O  Morphine but was unable to tolerate this at the time  During his hospital stay he was able to tolerate p o  Pain medication after sufficient IV Zofran and morphine  At the time of discharge the patient was happy to be discharged home with home hospice set in place  He will be discharged on a fentanyl patch Lasix 20 mg once a day as well as p r n  Morphine and liquid form  Also provided with Decadron 2 mg every 12 hours  Prescriptions were provided by palliative care  Please see above list of diagnoses and related plan for additional information  Condition at Discharge: stable     Discharge Day Visit / Exam:     Subjective:    Patient seen and examined      " I really feel fine doc"  Vitals: Blood Pressure: 148/70 (12/24/18 0722)  Pulse: 77 (12/24/18 0722)  Temperature: 98 2 °F (36 8 °C) (12/24/18 0722)  Temp Source: Oral (12/23/18 2153)  Respirations: 16 (12/24/18 0722)  Height: 5' 7" (170 2 cm) (12/22/18 1032)  Weight - Scale: 69 3 kg (152 lb 12 5 oz) (12/24/18 0600)  SpO2: 100 % (12/24/18 0722)  Exam:   Physical Exam   Constitutional: He is oriented to person, place, and time  No distress  Frail  HENT:   Head: Normocephalic and atraumatic  Mouth/Throat: No oropharyngeal exudate  Eyes: Pupils are equal, round, and reactive to light  Right eye exhibits no discharge  Left eye exhibits no discharge  No scleral icterus  Neck: No JVD present  No tracheal deviation present  No thyromegaly present  Cardiovascular: Normal rate  Exam reveals no gallop and no friction rub  No murmur heard  Pulmonary/Chest: Effort normal  No respiratory distress   He has no wheezes  He has no rales  He exhibits no tenderness  Abdominal: Soft  He exhibits no distension and no mass  There is no tenderness  There is no rebound and no guarding  Musculoskeletal: He exhibits no edema, tenderness or deformity  Lymphadenopathy:     He has no cervical adenopathy  Neurological: He is alert and oriented to person, place, and time  He displays normal reflexes  No cranial nerve deficit  He exhibits normal muscle tone  Coordination normal    Skin: No rash noted  He is not diaphoretic  No erythema  No pallor  Psychiatric: He has a normal mood and affect  Discussion with Family: Discussed with wife  Discharge instructions/Information to patient and family:   See after visit summary for information provided to patient and family  Provisions for Follow-Up Care:  See after visit summary for information related to follow-up care and any pertinent home health orders  Disposition:     Home    For Discharges to [facility] SNF:   · Not Applicable to this Patient - Not Applicable to this Patient    Planned Readmission: none - home with home hospice  Discharge Statement:  I spent 45 minutes discharging the patient  This time was spent on the day of discharge  I had direct contact with the patient on the day of discharge  Greater than 50% of the total time was spent examining patient, answering all patient questions, arranging and discussing plan of care with patient as well as directly providing post-discharge instructions  Additional time then spent on discharge activities  Discharge Medications:  See after visit summary for reconciled discharge medications provided to patient and family        ** Please Note: This note has been constructed using a voice recognition system **

## 2018-12-24 NOTE — ASSESSMENT & PLAN NOTE
Some fluid congestion  Improved on lasix  Will continue on PO dose  Patient in agreement he does not feel that urinary frequencies an issue at this point  Provided with prescription for 20 mg once a day of Lasix

## 2018-12-24 NOTE — HOSPICE NOTE
Met with pt and his son and wife  Reviewed hospice care and benefits  Consents signed and faxed to the [facility] office  Spoke with Dr Declan Davis who is going to write for his scripts and they will be filled prior to discharge  Alleene  Team is aware   ly

## 2018-12-24 NOTE — OCCUPATIONAL THERAPY NOTE
Occupational Therapy Note        Patient Name: Avis LUU'ROGERIO Date: 12/24/2018      OT orders received and chart review completed  Spoke w/ Catherine PORRAS who reports that pt will discharge w/ hospice and is Level 4 - comfort care   D/C OT    Yuan Rahman OT

## 2018-12-24 NOTE — PLAN OF CARE
Problem: DISCHARGE PLANNING - CARE MANAGEMENT  Goal: Discharge to post-acute care or home with appropriate resources  INTERVENTIONS:  - Conduct assessment to determine patient/family and health care team treatment goals, and need for post-acute services based on payer coverage, community resources, and patient preferences, and barriers to discharge  - Address psychosocial, clinical, and financial barriers to discharge as identified in assessment in conjunction with the patient/family and health care team  - Arrange appropriate level of post-acute services according to patients   needs and preference and payer coverage in collaboration with the physician and health care team  - Communicate with and update the patient/family, physician, and health care team regarding progress on the discharge plan  - Arrange appropriate transportation to post-acute venues   Outcome: Completed Date Met: 12/24/18  CM spoke with Byron Lundy from [facility]  Patient's copay cost range $60-65  [facility] updated that wife will come  medications in pharmacy  CM spoke with patient's wife re: DCP  Patient's wife updated that the copay cost ranges from $60-65  Patient's wife is agreeable to medication cost and picking up medications in pharmacy  Wife updated that nursing will de access patient's port prior to discharge  Wife updated that [facility] is working on discharge instructions  All questions/concerns answered face to face

## 2018-12-24 NOTE — SOCIAL WORK
CM spoke with Raffi Gutierrez from [facility]  Patient's copay cost range $60-65  [facility] updated that wife will come  medications in pharmacy  CM spoke with patient's wife re: DCP  Patient's wife updated that the copay cost ranges from $60-65  Patient's wife is agreeable to medication cost and picking up medications in pharmacy  Wife updated that nursing will de access patient's port prior to discharge  Wife updated that [facility] is working on discharge instructions  All questions/concerns answered face to face

## 2018-12-24 NOTE — PHYSICAL THERAPY NOTE
Physical Therapy Screen Note        Referral received for PT eval and tx  Chart check performed  Pt will be discharged home w/ hospice  Inpatient PT is not indicated due to pt's medical status  Discotinue PT      Tsering Velasco, PT

## 2018-12-24 NOTE — ASSESSMENT & PLAN NOTE
Discussed with family and patient  Level 4 DNR/DNI  Wants only comfort measures  Discussed with hospice and with palliative care  Patient is planned for home with hospice care  Trying decadron in addition to fentanyl patch  Scripts for pain medication provided by palliative care  Patient to be discharged to inpatient hospice today

## 2018-12-24 NOTE — PLAN OF CARE
Problem: DISCHARGE PLANNING - CARE MANAGEMENT  Goal: Discharge to post-acute care or home with appropriate resources  INTERVENTIONS:  - Conduct assessment to determine patient/family and health care team treatment goals, and need for post-acute services based on payer coverage, community resources, and patient preferences, and barriers to discharge  - Address psychosocial, clinical, and financial barriers to discharge as identified in assessment in conjunction with the patient/family and health care team  - Arrange appropriate level of post-acute services according to patients   needs and preference and payer coverage in collaboration with the physician and health care team  - Communicate with and update the patient/family, physician, and health care team regarding progress on the discharge plan  - Arrange appropriate transportation to post-acute venues  Outcome: Progressing  CM spoke with patient's wife at the bedside  Patient's wife would like prescriptions to be filled by Eligio Gotti  Patient's wife is able to pick them up at the pharmacy  CM will f/u with patient and wife at the bedside re:medications  CM updated SL[facility] and nursing with plan of care

## 2018-12-28 NOTE — UTILIZATION REVIEW
Continued Stay Review  CHANGED TO INPATIENT 2018 RE: patient requires optimization of symptom control for home hospice     Date: 2018 173  Inpatient Admission Once     Transfer Service: General Medicine       Question Answer Comment   Admitting Physician Franklin Mike    Level of Care Med Surg    Estimated length of stay More than 2 Midnights    Certification I certify that inpatient services are medically necessary for this patient for a duration of greater than two midnights  See H&P and MD Progress Notes for additional information about the patient's course of treatment  18 1733         Vital Signs: Temp (24hrs), Av 8 °F (36 6 °C), Min:97 6 °F (36 4 °C), Max:98 °F (36 7 °C)     Temp:  [97 6 °F (36 4 °C)-98 °F (36 7 °C)] 97 6 °F (36 4 °C)  HR:  [] 69  Resp:  [16-20] 18  BP: (127-145)/(70-78) 127/70  SpO2:  [93 %-100 %] 93 %  Body mass index is 23 92 kg/m²  Medications:   Current Facility-Administered Medications:  acetaminophen 975 mg Oral TID   albuterol 2 puff Inhalation Q4H PRN   dexamethasone 2 mg Oral Q12H KAIDEN   fentaNYL 12 mcg Transdermal Q72H   finasteride 5 mg Oral Daily   folic acid 1 mg Oral Daily   furosemide 20 mg Oral Daily   heparin (porcine) 5,000 Units Subcutaneous Q8H [address]   levothyroxine 125 mcg Oral Early Morning   morphine injection 2 mg Intravenous Q3H PRN   ondansetron 4 mg Intravenous Q4H PRN   ondansetron 4 mg Oral TID AC   oxybutynin 10 mg Oral HS   oxyCODONE 5 mg Oral Q4H PRN   pantoprazole 40 mg Oral Daily Before Breakfast   predniSONE 10 mg Oral Daily   sucralfate 1,000 mg Oral Q6H KAIDEN   tamsulosin 0 4 mg Oral Daily With Dinner     PRN - morphine 2 mg iv  - used x 6  (0021; 7529; 0902; 1647; 1452; 1801)  zofran 4 mg iv - used x 2 (5084; 1452)  Oxycodone - used x 1       Abnormal Labs/Diagnostic Results:   Results from last 7 days  Lab Units 18  0437 18  0740   WBC Thousand/uL 7 68 10 25*   HEMOGLOBIN g/dL 9 8* 9 7* HEMATOCRIT % 32 7* 32 2*   PLATELETS Thousands/uL 372 362   NEUTROS PCT %  --  86*   LYMPHS PCT %  --  3*   MONOS PCT %  --  9   EOS PCT %  --  1         Results from last 7 days  Lab Units 12/23/18  0437   SODIUM mmol/L 139   POTASSIUM mmol/L 3 9   CHLORIDE mmol/L 100   CO2 mmol/L 30   BUN mg/dL 10   CREATININE mg/dL 1 05   ANION GAP mmol/L 9   CALCIUM mg/dL 9 4   ALBUMIN g/dL 2 6*   TOTAL BILIRUBIN mg/dL 0 50   ALK PHOS U/L 99   ALT U/L 18   AST U/L 18   GLUCOSE RANDOM mg/dL 88             Results from last 7 days  Lab Units 12/19/18  0730   POC GLUCOSE mg/dl 108        12/22- CxR-  Low lung volumes with mild pulmonary vascular congestion superimposed on pulmonary fibrosis  2   Trace left pleural effusion  Age/Sex: 69 y o  male     Assessment/Plan:   Shortness of breath   Assessment & Plan     Some fluid congestion  Improved on lasix  Will continue on PO dose        * 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 [facility])   Assessment & Plan     Discussed with family and patient  Level 4 DNR/DNI  Wants only comfort measures  Discussed with hospice and with palliative care  Patient is planned for home with hospice care  Trying decadron in addition to fentanyl patch  Also adding roxycodone PRN in addition to IV for severe              Discharge Plan: discharged 12/24/2018                      [address] [facility] Review Department  Phone: [phone]; Fax [phone]  [email or web address]  ATTENTION: Please call with any questions or concerns to [phone]  and carefully listen to the prompts so that you are directed to the right person  Send all requests for admission clinical reviews, approved or denied determinations and any other requests to fax [phone]   All voicemails are confidential

## 2019-02-13 DIAGNOSIS — E03.2 HYPOTHYROIDISM DUE TO MEDICATION: ICD-10-CM

## 2019-02-13 RX ORDER — LEVOTHYROXINE SODIUM 0.12 MG/1
TABLET ORAL
Qty: 90 TABLET | Refills: 1 | OUTPATIENT
Start: 2019-02-13

## 2019-04-02 NOTE — PROGRESS NOTES
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  This was diagnosed in January 2018  PD L1  was 0  He is in the midst of cycle 4   He has also received palliative radiation therapy to thoracic spine and had developed esophagitis that has improved  His mouth is dry  He has noticed it decreased appetite, weakness and tiredness, some cough and exertional dyspnea  He is not sure if he will continue taking medications like this  Back pain has improved     The diagnosis was made by the biopsy  from the left iliac crest identified metastatic adenocarcinoma  He has responded to treatments based on follow-up CT scan and today's chest x-ray      Current Outpatient Prescriptions:     acetaminophen (TYLENOL) 325 mg tablet, Take 2 tablets by mouth every 6 (six) hours as needed for mild pain, headaches or fever, Disp: 30 tablet, Rfl: 0    dexamethasone (DECADRON) 4 mg tablet, Take 1 tablet (4 mg total) by mouth 2 (two) times a day as needed (PLEASE SEE SIG NOTES) TAKE 1 TABLET WITH BREAKFAST, 1 TABLET WITH DINNER, DAY BEFORE, DAY OF, AND DAY AFTER CHEMO , Disp: 12 tablet, Rfl: 1    doxazosin (CARDURA) 4 mg tablet, Take 4 mg by mouth daily at bedtime, Disp: , Rfl:     finasteride (PROSCAR) 5 mg tablet, Take 5 mg by mouth daily, Disp: , Rfl:     folic acid (FOLVITE) 1 mg tablet, Take 1 tablet (1 mg total) by mouth daily, Disp: 90 tablet, Rfl: 0    levothyroxine 88 mcg tablet, Take 88 mcg by mouth daily, Disp: , Rfl:     losartan (COZAAR) 100 MG tablet, Take 50 mg by mouth daily  , Disp: , Rfl:     nystatin (MYCOSTATIN) 100,000 units/mL suspension, Swish and swallow 5 mL (500,000 Units total) 4 (four) times a day, Disp: 473 mL, Rfl: 0    ondansetron (ZOFRAN) 8 mg tablet, Take 1 tablet (8 mg total) by mouth every 8 (eight) hours as needed for nausea or vomiting, Disp: 20 tablet, Rfl: 1    pantoprazole (PROTONIX) 40 mg tablet, Take 1 tablet (40 mg total) by mouth daily In AM prior to eating, Disp: 30 tablet, Rfl: 2    sucralfate (CARAFATE) 1 g/10 mL suspension, Take 10 mL (1,000 mg total) by mouth every 6 (six) hours, Disp: 420 mL, Rfl: 0    No Known Allergies    ROS:  04/30/18 Reviewed 13 systems:  Presently no headaches, seizures, dizziness, diplopia, dysphagia, hoarseness, chest pain, palpitations,  hemoptysis, abdominal pain, nausea, vomiting, change in bowel habits, melena, hematuria, fever, chills, bleeding,  skin rash,  arthritic symptoms, numbness,  claudication and gait problem  No frequent infections  Not unusually sensitive to heat or cold  No swelling of the ankles  No swollen glands  Patient is anxious  Other symptoms are in HPI        /66 (BP Location: Left arm, Cuff Size: Adult)   Pulse 80   Temp (!) 97 3 °F (36 3 °C) (Tympanic)   Resp 16   Ht 5' 7" (1 702 m)   Wt 81 9 kg (180 lb 9 6 oz)   SpO2 96%   BMI 28 29 kg/m²     Physical Exam:  Alert, oriented, not in distress, no icterus, no oral thrush, no palpable neck mass, clear lung fields, regular heart rate, abdomen  soft and non tender, no palpable abdominal mass, no ascites, no edema of ankles, no calf tenderness, no focal neurological deficit, no skin rash, no palpable lymphadenopathy, good arterial pulses, no clubbing  Patient is anxious  Performance status 1      IMAGING:  No orders to display       LABS:  Results for orders placed or performed in visit on 04/24/18   CBC and differential   Result Value Ref Range    WBC 1 66 (LL) 4 31 - 10 16 Thousand/uL    RBC 2 77 (L) 3 88 - 5 62 Million/uL    Hemoglobin 8 4 (L) 12 0 - 17 0 g/dL    Hematocrit 25 4 (L) 36 5 - 49 3 %    MCV 92 82 - 98 fL    MCH 30 3 26 8 - 34 3 pg    MCHC 33 1 31 4 - 37 4 g/dL    RDW 20 5 (H) 11 6 - 15 1 %    MPV 9 1 8 9 - 12 7 fL    Platelets 103 356 - 653 Thousands/uL    Neutrophils Relative 78 (H) 43 - 75 %    Lymphocytes Relative 13 (L) 14 - 44 %    Monocytes Relative 7 4 - 12 %    Eosinophils Relative 1 0 - 6 %    Basophils Relative 1 0 - 1 %    Neutrophils Absolute 1 31 (L) 1 85 - 7 62 Thousands/µL    Lymphocytes Absolute 0 22 (L) 0 60 - 4 47 Thousands/µL    Monocytes Absolute 0 11 (L) 0 17 - 1 22 Thousand/µL    Eosinophils Absolute 0 01 0 00 - 0 61 Thousand/µL    Basophils Absolute 0 01 0 00 - 0 10 Thousands/µL     Labs, Imaging, & Other studies:   All pertinent labs and imaging studies were personally reviewed    Lab Results   Component Value Date     04/16/2018    K 3 9 04/16/2018     04/16/2018    CO2 27 04/16/2018    ANIONGAP 7 04/16/2018    BUN 16 04/16/2018    CREATININE 1 09 04/16/2018    GLUCOSE 114 04/16/2018    CALCIUM 8 7 04/16/2018    AST 31 04/16/2018    ALT 31 04/16/2018    ALKPHOS 77 04/16/2018    PROT 7 0 04/16/2018    BILITOT 0 66 04/16/2018    EGFR 65 04/16/2018     Lab Results   Component Value Date    WBC 1 66 (LL) 04/24/2018    HGB 8 4 (L) 04/24/2018    HCT 25 4 (L) 04/24/2018    MCV 92 04/24/2018     04/24/2018   No results found for: [address] [place] [address] and discussed with patient  Assessment and plan:  Patient is here with his wife  He has been on carboplatin, Alimta and Keytruda for 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  This was diagnosed in January 2018  PD L1  was 0  He is in the midst of cycle 4   He has also received palliative radiation therapy to thoracic spine and had developed esophagitis that has improved  His mouth is dry  He has noticed it decreased appetite, weakness and tiredness, some cough and exertional dyspnea  He is not sure if he will continue taking medications like this  Back pain has improved     The diagnosis was made by the biopsy  from the left iliac crest identified metastatic adenocarcinoma  He has responded to treatments based on follow-up CT scan and today's chest x-ray    Physical examination and test results are as recorded and discussed  He will take an extra week off from chemotherapy and will call before the next cycle of chemotherapy  If he did not feel better he will go straight to maintenance chemotherapy and that could be Alimta plus Keytruda  Also he has low hemoglobin 8 4 and if that dropped further he could have a unit of packed RBC  Patient and his wife agree with this plan  All discussed in detail  Questions answered  He has follow-up office appointment  He is scheduled to have a follow-up CT scan at the end of May 2018    1  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 ([facility] [address] )      [facility]  [facility]      3  Benign prostatic hyperplasia with lower urinary tract symptoms, symptom details unspecified    - Ambulatory referral to Urology; Future  - finasteride (PROSCAR) 5 mg tablet; Take 1 tablet (5 mg total) by mouth daily  Dispense: 30 tablet; Refill: 0  - doxazosin (CARDURA) 4 mg tablet; Take 1 tablet (4 mg total) by mouth daily at bedtime  Dispense: 30 tablet; Refill: 0  Addendum:  History of BPH and his urologist is in [place]  He was running out of above 2 medications  I have renewed these 2 medications and he will be referred to a urologist locally  Patient voiced understanding and agreement in the discussion  Counseling / Coordination of Care   Greater than 50% of total time was spent with the patient and / or family counseling and / or coordination of care  n/a

## 2020-11-10 NOTE — TELEPHONE ENCOUNTER
Nausea medication that he is currently taking isn't working and wants to know if you can call something else in to either [facility] or call into [facility] in [place] since they are are [employer]  until 1:30  Going to address the vision with the PCP  I informed her if it gets worse to take him to the Ed 
Per Mark Galdamez, the patient's dose of Zofran was increased to 8 mg TID PRN for nausea  Called and made patient aware that the script was sent to his [facility] pharmacy in [place] 
none